# Patient Record
Sex: MALE | Race: BLACK OR AFRICAN AMERICAN | HISPANIC OR LATINO
[De-identification: names, ages, dates, MRNs, and addresses within clinical notes are randomized per-mention and may not be internally consistent; named-entity substitution may affect disease eponyms.]

---

## 2017-07-21 ENCOUNTER — APPOINTMENT (OUTPATIENT)
Dept: VASCULAR SURGERY | Facility: CLINIC | Age: 62
End: 2017-07-21

## 2017-07-21 VITALS
TEMPERATURE: 98.3 F | BODY MASS INDEX: 25.77 KG/M2 | HEART RATE: 82 BPM | WEIGHT: 174 LBS | SYSTOLIC BLOOD PRESSURE: 131 MMHG | HEIGHT: 69 IN | DIASTOLIC BLOOD PRESSURE: 91 MMHG

## 2017-07-26 ENCOUNTER — OTHER (OUTPATIENT)
Age: 62
End: 2017-07-26

## 2018-03-28 ENCOUNTER — APPOINTMENT (OUTPATIENT)
Dept: UROLOGY | Facility: CLINIC | Age: 63
End: 2018-03-28

## 2018-05-14 ENCOUNTER — APPOINTMENT (OUTPATIENT)
Dept: CT IMAGING | Facility: IMAGING CENTER | Age: 63
End: 2018-05-14
Payer: COMMERCIAL

## 2018-05-14 ENCOUNTER — OUTPATIENT (OUTPATIENT)
Dept: OUTPATIENT SERVICES | Facility: HOSPITAL | Age: 63
LOS: 1 days | End: 2018-05-14
Payer: COMMERCIAL

## 2018-05-14 DIAGNOSIS — Z00.8 ENCOUNTER FOR OTHER GENERAL EXAMINATION: ICD-10-CM

## 2018-05-14 PROCEDURE — 82565 ASSAY OF CREATININE: CPT

## 2018-05-14 PROCEDURE — 74177 CT ABD & PELVIS W/CONTRAST: CPT

## 2018-05-14 PROCEDURE — 74177 CT ABD & PELVIS W/CONTRAST: CPT | Mod: 26

## 2018-05-14 PROCEDURE — 71260 CT THORAX DX C+: CPT

## 2018-05-14 PROCEDURE — 71260 CT THORAX DX C+: CPT | Mod: 26

## 2022-04-26 ENCOUNTER — INPATIENT (INPATIENT)
Facility: HOSPITAL | Age: 67
LOS: 1 days | Discharge: HOME | DRG: 300 | End: 2022-04-28
Attending: GENERAL PRACTICE | Admitting: GENERAL PRACTICE
Payer: MEDICARE

## 2022-04-26 VITALS
OXYGEN SATURATION: 97 % | HEART RATE: 63 BPM | HEIGHT: 69 IN | RESPIRATION RATE: 16 BRPM | DIASTOLIC BLOOD PRESSURE: 89 MMHG | SYSTOLIC BLOOD PRESSURE: 155 MMHG | TEMPERATURE: 97 F

## 2022-04-26 LAB
ALBUMIN SERPL ELPH-MCNC: 4.4 G/DL — SIGNIFICANT CHANGE UP (ref 3.3–5)
ALP SERPL-CCNC: 105 U/L — SIGNIFICANT CHANGE UP (ref 40–120)
ALT FLD-CCNC: 95 U/L — HIGH (ref 10–45)
ANION GAP SERPL CALC-SCNC: 13 MMOL/L — SIGNIFICANT CHANGE UP (ref 5–17)
APAP SERPL-MCNC: <15 UG/ML — SIGNIFICANT CHANGE UP (ref 10–30)
APTT BLD: 31.7 SEC — SIGNIFICANT CHANGE UP (ref 27.5–35.5)
AST SERPL-CCNC: 93 U/L — HIGH (ref 10–40)
BASOPHILS # BLD AUTO: 0.06 K/UL — SIGNIFICANT CHANGE UP (ref 0–0.2)
BASOPHILS NFR BLD AUTO: 0.5 % — SIGNIFICANT CHANGE UP (ref 0–2)
BILIRUB SERPL-MCNC: 1.6 MG/DL — HIGH (ref 0.2–1.2)
BUN SERPL-MCNC: 5 MG/DL — LOW (ref 7–23)
CALCIUM SERPL-MCNC: 9.7 MG/DL — SIGNIFICANT CHANGE UP (ref 8.4–10.5)
CHLORIDE SERPL-SCNC: 93 MMOL/L — LOW (ref 96–108)
CO2 SERPL-SCNC: 28 MMOL/L — SIGNIFICANT CHANGE UP (ref 22–31)
CREAT SERPL-MCNC: 0.62 MG/DL — SIGNIFICANT CHANGE UP (ref 0.5–1.3)
EGFR: 103 ML/MIN/1.73M2 — SIGNIFICANT CHANGE UP
EOSINOPHIL # BLD AUTO: 0.15 K/UL — SIGNIFICANT CHANGE UP (ref 0–0.5)
EOSINOPHIL NFR BLD AUTO: 1.4 % — SIGNIFICANT CHANGE UP (ref 0–6)
ETHANOL SERPL-MCNC: SIGNIFICANT CHANGE UP MG/DL (ref 0–10)
GLUCOSE SERPL-MCNC: 109 MG/DL — HIGH (ref 70–99)
HCT VFR BLD CALC: 35.9 % — LOW (ref 39–50)
HGB BLD-MCNC: 12.1 G/DL — LOW (ref 13–17)
IMM GRANULOCYTES NFR BLD AUTO: 0.7 % — SIGNIFICANT CHANGE UP (ref 0–1.5)
INR BLD: 0.89 RATIO — SIGNIFICANT CHANGE UP (ref 0.88–1.16)
LIDOCAIN IGE QN: 25 U/L — SIGNIFICANT CHANGE UP (ref 7–60)
LYMPHOCYTES # BLD AUTO: 1.29 K/UL — SIGNIFICANT CHANGE UP (ref 1–3.3)
LYMPHOCYTES # BLD AUTO: 11.7 % — LOW (ref 13–44)
MCHC RBC-ENTMCNC: 30.4 PG — SIGNIFICANT CHANGE UP (ref 27–34)
MCHC RBC-ENTMCNC: 33.7 GM/DL — SIGNIFICANT CHANGE UP (ref 32–36)
MCV RBC AUTO: 90.2 FL — SIGNIFICANT CHANGE UP (ref 80–100)
MONOCYTES # BLD AUTO: 0.76 K/UL — SIGNIFICANT CHANGE UP (ref 0–0.9)
MONOCYTES NFR BLD AUTO: 6.9 % — SIGNIFICANT CHANGE UP (ref 2–14)
NEUTROPHILS # BLD AUTO: 8.67 K/UL — HIGH (ref 1.8–7.4)
NEUTROPHILS NFR BLD AUTO: 78.8 % — HIGH (ref 43–77)
NRBC # BLD: 0 /100 WBCS — SIGNIFICANT CHANGE UP (ref 0–0)
PLATELET # BLD AUTO: 352 K/UL — SIGNIFICANT CHANGE UP (ref 150–400)
POTASSIUM SERPL-MCNC: 4.1 MMOL/L — SIGNIFICANT CHANGE UP (ref 3.5–5.3)
POTASSIUM SERPL-SCNC: 4.1 MMOL/L — SIGNIFICANT CHANGE UP (ref 3.5–5.3)
PROT SERPL-MCNC: 7.5 G/DL — SIGNIFICANT CHANGE UP (ref 6–8.3)
PROTHROM AB SERPL-ACNC: 10.2 SEC — LOW (ref 10.5–13.4)
RBC # BLD: 3.98 M/UL — LOW (ref 4.2–5.8)
RBC # FLD: 15.7 % — HIGH (ref 10.3–14.5)
SARS-COV-2 RNA SPEC QL NAA+PROBE: SIGNIFICANT CHANGE UP
SODIUM SERPL-SCNC: 134 MMOL/L — LOW (ref 135–145)
WBC # BLD: 11.01 K/UL — HIGH (ref 3.8–10.5)
WBC # FLD AUTO: 11.01 K/UL — HIGH (ref 3.8–10.5)

## 2022-04-26 PROCEDURE — 74174 CTA ABD&PLVS W/CONTRAST: CPT | Mod: 26,MA

## 2022-04-26 PROCEDURE — 76705 ECHO EXAM OF ABDOMEN: CPT | Mod: 26

## 2022-04-26 PROCEDURE — 99285 EMERGENCY DEPT VISIT HI MDM: CPT

## 2022-04-26 RX ORDER — IPRATROPIUM/ALBUTEROL SULFATE 18-103MCG
3 AEROSOL WITH ADAPTER (GRAM) INHALATION ONCE
Refills: 0 | Status: COMPLETED | OUTPATIENT
Start: 2022-04-26 | End: 2022-04-26

## 2022-04-26 RX ORDER — FAMOTIDINE 10 MG/ML
20 INJECTION INTRAVENOUS ONCE
Refills: 0 | Status: COMPLETED | OUTPATIENT
Start: 2022-04-26 | End: 2022-04-26

## 2022-04-26 RX ADMIN — FAMOTIDINE 20 MILLIGRAM(S): 10 INJECTION INTRAVENOUS at 19:27

## 2022-04-26 RX ADMIN — Medication 3 MILLILITER(S): at 19:27

## 2022-04-26 RX ADMIN — Medication 30 MILLILITER(S): at 18:57

## 2022-04-26 NOTE — ED PROVIDER NOTE - CLINICAL SUMMARY MEDICAL DECISION MAKING FREE TEXT BOX
Pt with several comorbids most notably cardiac disease and COPD, PAD, here with abd pain x1 week and outpatient elevated ?LFT/lipase/bili unclear which. Tender RUQ, no fevers. Is a daily drinker. No tylenol use. ETOH use vs acute hepatitis vs pancreatitis vs bob, will start with sono and labs inc tylenol and lipase. CT if workup does not reveal cause. No new medications to attribute this to.

## 2022-04-26 NOTE — ED ADULT NURSE NOTE - OBJECTIVE STATEMENT
68 y/o Male pmhx COPD on oxygen PRN at home of 2 L NC,  CAD with cardiac stents, CHF with AICD, severe PAD s/p angioplasty and fem-pop bypass presenting to ED for abnormal labs- elevated liver function found on labs that were drawn yesterday, pt for the most part asymptomatic, denies any cp, new or worsening sob, weakness, dizziness, cough, fever, vomiting or change in bowels. verbalized mild abd pain. labs, ct  and sono of abdomen obtained and pts sono abnormal, pending vascular consult and admission. safety and fall precautions maintained ann-marie all times, call bell within reach.

## 2022-04-26 NOTE — ED PROVIDER NOTE - PHYSICAL EXAMINATION
Vitals: I have reviewed the patients vital signs  General: chronically ill appearing  HEENT: Atraumatic, normocephalic, airway patent  Eyes: EOMI, tracking appropriately  Neck: no tracheal deviation, no JVD  Chest/Lungs: no trauma, symmetric chest rise, speaking in complete sentences, no WOB  Heart: skin and extremities well perfused, regular rate and rhythm  Neuro: A+Ox3, CN grossly intact, moving all extremities  MSK: strength at baseline in all extremities, no muscle wasting or atrophy  Skin: chronic PAD related changes  Abd: soft, RUQ and epigastric tenderness without peritoneal signs

## 2022-04-26 NOTE — ED PROVIDER NOTE - ATTENDING CONTRIBUTION TO CARE
68 y/o M hx COPD on oxygen PRN, CAD with stent, CHF with AICD, severe PAD s/p angioplasty and fem-pop bypass sent in by dr ackerman for abnormal lft outpt, daily drinker, not currently intoxiccated, abd soft, ruq tend, neg Maddox's, not jaundiced, labs drawn, ruq us ordered.  pt with no prior hepatology history, he said he's labs have been nl in the past for his lft.  ruq work up, benign exam likely could be outpt gi work up.

## 2022-04-26 NOTE — ED PROVIDER NOTE - OBJECTIVE STATEMENT
68 y/o M hx COPD on oxygen PRN, CAD with stent, CHF with AICD, severe PAD s/p angioplasty and fem-pop bypass, difficulty getting around 2/2 leg pain so has home health nurse come and draw labs. Pt of Dr Welsh. Told today that he has "very high liver numbers". For the last week has had some abdominal pain, left and right upper quadrant, intermittent. Has not noted skin or eye color changes. Does drink "a couple beers" daily. No tylenol use. No fevers.

## 2022-04-26 NOTE — ED PROVIDER NOTE - NS ED ROS FT
Constitutional: (-) fever (-) vomiting  Eyes/ENT: (-) vision changes, (-) hearing changes  Cardiovascular: (-) chest pain, (-) wheezing  Respiratory: (-) cough, (-) shortness of breath  Gastrointestinal: (-) vomiting, (-) diarrhea, +-) abdominal pain  : (-) dysuria   Musculoskeletal: (-) back pain  Integumentary: (-) rash, (-) edema  Neurological: (-)loc  Allergic/Immunologic: (-) pruritus  Endocrine: No history of thyroid disease

## 2022-04-27 DIAGNOSIS — I50.22 CHRONIC SYSTOLIC (CONGESTIVE) HEART FAILURE: ICD-10-CM

## 2022-04-27 DIAGNOSIS — I71.4 ABDOMINAL AORTIC ANEURYSM, WITHOUT RUPTURE: ICD-10-CM

## 2022-04-27 DIAGNOSIS — R74.01 ELEVATION OF LEVELS OF LIVER TRANSAMINASE LEVELS: ICD-10-CM

## 2022-04-27 DIAGNOSIS — R10.9 UNSPECIFIED ABDOMINAL PAIN: ICD-10-CM

## 2022-04-27 DIAGNOSIS — J44.9 CHRONIC OBSTRUCTIVE PULMONARY DISEASE, UNSPECIFIED: ICD-10-CM

## 2022-04-27 DIAGNOSIS — R07.9 CHEST PAIN, UNSPECIFIED: ICD-10-CM

## 2022-04-27 DIAGNOSIS — I25.10 ATHEROSCLEROTIC HEART DISEASE OF NATIVE CORONARY ARTERY WITHOUT ANGINA PECTORIS: ICD-10-CM

## 2022-04-27 LAB
HAV IGM SER-ACNC: SIGNIFICANT CHANGE UP
HBV CORE IGM SER-ACNC: SIGNIFICANT CHANGE UP
HBV SURFACE AG SER-ACNC: SIGNIFICANT CHANGE UP
HCV AB S/CO SERPL IA: 2.77 S/CO — HIGH (ref 0–0.99)
HCV AB SERPL-IMP: ABNORMAL
HCV RNA FLD QL NAA+PROBE: SIGNIFICANT CHANGE UP
HCV RNA SPEC QL PROBE+SIG AMP: SIGNIFICANT CHANGE UP
TROPONIN T, HIGH SENSITIVITY RESULT: 23 NG/L — SIGNIFICANT CHANGE UP (ref 0–51)

## 2022-04-27 RX ORDER — ASPIRIN/CALCIUM CARB/MAGNESIUM 324 MG
81 TABLET ORAL DAILY
Refills: 0 | Status: DISCONTINUED | OUTPATIENT
Start: 2022-04-27 | End: 2022-04-28

## 2022-04-27 RX ORDER — LANOLIN ALCOHOL/MO/W.PET/CERES
3 CREAM (GRAM) TOPICAL AT BEDTIME
Refills: 0 | Status: DISCONTINUED | OUTPATIENT
Start: 2022-04-27 | End: 2022-04-28

## 2022-04-27 RX ORDER — CHOLECALCIFEROL (VITAMIN D3) 125 MCG
1000 CAPSULE ORAL DAILY
Refills: 0 | Status: DISCONTINUED | OUTPATIENT
Start: 2022-04-27 | End: 2022-04-28

## 2022-04-27 RX ORDER — METOPROLOL TARTRATE 50 MG
50 TABLET ORAL
Refills: 0 | Status: DISCONTINUED | OUTPATIENT
Start: 2022-04-27 | End: 2022-04-28

## 2022-04-27 RX ORDER — PRASUGREL 5 MG/1
10 TABLET, FILM COATED ORAL DAILY
Refills: 0 | Status: DISCONTINUED | OUTPATIENT
Start: 2022-04-27 | End: 2022-04-28

## 2022-04-27 RX ORDER — ALBUTEROL 90 UG/1
1 AEROSOL, METERED ORAL EVERY 6 HOURS
Refills: 0 | Status: DISCONTINUED | OUTPATIENT
Start: 2022-04-27 | End: 2022-04-28

## 2022-04-27 RX ORDER — ACETAMINOPHEN 500 MG
650 TABLET ORAL EVERY 6 HOURS
Refills: 0 | Status: DISCONTINUED | OUTPATIENT
Start: 2022-04-27 | End: 2022-04-28

## 2022-04-27 RX ORDER — LISINOPRIL 2.5 MG/1
20 TABLET ORAL DAILY
Refills: 0 | Status: DISCONTINUED | OUTPATIENT
Start: 2022-04-27 | End: 2022-04-28

## 2022-04-27 RX ORDER — ACETAMINOPHEN 500 MG
650 TABLET ORAL ONCE
Refills: 0 | Status: COMPLETED | OUTPATIENT
Start: 2022-04-27 | End: 2022-04-27

## 2022-04-27 RX ORDER — LEVOTHYROXINE SODIUM 125 MCG
37.5 TABLET ORAL DAILY
Refills: 0 | Status: DISCONTINUED | OUTPATIENT
Start: 2022-04-27 | End: 2022-04-28

## 2022-04-27 RX ORDER — ONDANSETRON 8 MG/1
4 TABLET, FILM COATED ORAL EVERY 8 HOURS
Refills: 0 | Status: DISCONTINUED | OUTPATIENT
Start: 2022-04-27 | End: 2022-04-28

## 2022-04-27 RX ORDER — ATORVASTATIN CALCIUM 80 MG/1
40 TABLET, FILM COATED ORAL AT BEDTIME
Refills: 0 | Status: DISCONTINUED | OUTPATIENT
Start: 2022-04-27 | End: 2022-04-28

## 2022-04-27 RX ORDER — PANTOPRAZOLE SODIUM 20 MG/1
40 TABLET, DELAYED RELEASE ORAL
Refills: 0 | Status: DISCONTINUED | OUTPATIENT
Start: 2022-04-27 | End: 2022-04-28

## 2022-04-27 RX ADMIN — Medication 50 MILLIGRAM(S): at 17:41

## 2022-04-27 RX ADMIN — Medication 1000 UNIT(S): at 13:05

## 2022-04-27 RX ADMIN — ATORVASTATIN CALCIUM 40 MILLIGRAM(S): 80 TABLET, FILM COATED ORAL at 21:40

## 2022-04-27 RX ADMIN — PRASUGREL 10 MILLIGRAM(S): 5 TABLET, FILM COATED ORAL at 13:05

## 2022-04-27 RX ADMIN — Medication 81 MILLIGRAM(S): at 13:06

## 2022-04-27 RX ADMIN — Medication 650 MILLIGRAM(S): at 06:35

## 2022-04-27 NOTE — H&P ADULT - HISTORY OF PRESENT ILLNESS
>>>>>>Medical Attending Initial / Admission note<<<<<<  -------------------------------------------------------------------------------  CHIEF COMPLAINT & HISTORY OF PRESENT ILLNESS:      Patient is a 66yo man with past medical history of CAD, PAD, CHF, AICD, COPD ( home O2, 2L), OA, Hypothyroidism, AAA, Bladder cancer, presenting with abdominal pain X 4 days and sent in for abnormal labs : elevated LFTs and anemia  Pt otherwise denies fever/chills, CP, SOB, nausea, vomiting or other concerns.   US Abdomen showed Partially thrombosed abdominal aortic aneurysm measuring 4.8 x 4.7 by at least 8.4 cm. CTA showed Partially thrombosed infrarenal aortic aneurysm measures 5.2 x 5.0 x 11.1 cm (AP by transverse by craniocaudad) end extends to the bifurcation. No evidence of dissection or impending rupture. Ectasia of the bilateral common iliac arteries with the right being aneurysmal measuring up to 1.8 cm. Mesenteric vessels are patent. Right femoral bypass graft appears patent proximally in the visualized portions  pt in ED evaluated by vascular : no intervention planned ( pt follows as OP with Dr Joaquin  mildly elevated LFTs, and Hep C tested positive..!  last EGD / colonoscopy was several years ago    --------------------------------------------------------------------------------  PAST MEDICAL HISTORY:    CAD, several past angioplasties   PAD, post Rt fem-pop Bypass   COPD ( home O2, 2L)  OA  Hypothyroidism  AAA  Bladder cancer post resection   gastritis  CHF post AICD  anemia    --------------------------------------------------------------------------------  PAST SURGICAL HISTORY:    as above     --------------------------------------------------------------------------------  FAMILY HISTORY:    Father: CHF  Mother: ESRD  Sister: Lung cancer  Sister: CHF    --------------------------------------------------------------------------------  SOCIAL HISTORY:  1 ppd x 30 years but has not smoked in the past 4 years and drinks 2-3 drinks of alcohol ( beer ) per day.     --------------------------------------------------------------------------------  ALLERGIES:    [As bellow, reviewed]    --------------------------------------------------------------------------------  MEDICATIONS:   [As bellow, reviewed]    --------------------------------------------------------------------------------  REVIEW OF SYSTEM:    GEN: no fever, no chills, no pain  RESP: no SOB, no cough, no sputum  CVS: no chest pain, no palpitations, no edema  GI: no abdominal pain now but past 4 days, with pain after each meal , no nausea, no vomiting, no constipation, no diarrhea  : no dysuria, no frequency, no hematuria  Neuro: no headache, no dizziness  PSYCH: no anxiety, no depression  Derm : no itching, no rash    --------------------------------------------------------------------------------    VITAL SIGNS:  Height (cm): 175.3  Vital Signs Last 24 HrsT(C): 36.7 (04-27-22 @ 08:22)  T(F): 98 (04-27-22 @ 08:22), Max: 98 (04-26-22 @ 19:01)  HR: 64 (04-27-22 @ 08:22) (60 - 76)  BP: 156/87 (04-27-22 @ 08:22)  RR: 20 (04-27-22 @ 08:22) (16 - 20)  SpO2: 100% (04-27-22 @ 08:22) (97% - 100%)      --------------------------------------------------------------------------------  PHYSICAL EXAM:    GEN: A&O X 3 , NAD , comfortable, pleasant, calm  HEENT: NCAT, PERRL, MMM, hearing intact  Neck: supple , no JVD  CVS: S1S2 , regular , No M/R/G appreciated  PULM: CTA B/L,  no W/R/R appreciated  ABD.: soft. non tender, non distended,  bowel sounds present  Extrem: intact pulses , no edema   Derm: No rash , no ecchymoses  PSYCH : normal mood,  no delusion not anxious    --------------------------------------------------------------------------------  LAB AND IMAGING:   [As audi, reviewed]    --------------------------------------------------------------------------------  ASSESSMENT AND PLAN:   [As bellow]    --------------------  Case discussed with pt, Cardio, GI, PMD..   ___________________________  H. JOJO Brown.  Pager: [phone]  04-27-22

## 2022-04-27 NOTE — CONSULT NOTE ADULT - SUBJECTIVE AND OBJECTIVE BOX
CHIEF COMPLAINT: abnormal labs    HISTORY OF PRESENT ILLNESS:  67M with a PMHx of COPD on home O2, CAD s/p stents, CHF with AICD, PAD s/p angioplasties and RLE fem-pop bypass (these procedures done by Dr. Calle at [facility]), gastritis, osteoarthritis, hypoactive thryroid who presents after bring sent in by his PMD secondary to elevated LFTs and abdominal pain that started 4 days ago. Pt otherwise denies fever/chills, CP, SOB, nausea, vomiting or other concerns. Of note, pt has smoked 1 ppd x 30 years but has not smoked in the past 4 years and drinks 2-3 drinks of alcohol per day. US Abdomen showed Partially thrombosed abdominal aortic aneurysm measuring 4.8 x 4.7 by at least 8.4 cm. CTA showed Partially thrombosed infrarenal aortic aneurysm measures 5.2 x 5.0 x 11.1 cm (AP by transverse by craniocaudad) end extends to the bifurcation. No evidence of dissection or impending rupture. Ectasia of the bilateral common iliac arteries with the right being aneurysmal measuring up to 1.8 cm. Mesenteric vessels are patent. Right femoral bypass graft appears patent proximally in the visualized portions.         Allergies    No Known Allergies    Intolerances    	    MEDICATIONS:  see med rec                PAST MEDICAL & SURGICAL HISTORY:      FAMILY HISTORY:      SOCIAL HISTORY:    former smoker. indep in adl      REVIEW OF SYSTEMS:  See HPI, otherwise complete 10 point review of systems negative    [ ] All others negative	      PHYSICAL EXAM:  T(C): 36.6 (04-27-22 @ 04:55), Max: 36.7 (04-26-22 @ 19:01)  HR: 60 (04-27-22 @ 04:55) (60 - 76)  BP: 146/91 (04-27-22 @ 04:55) (130/84 - 157/91)  RR: 18 (04-27-22 @ 04:55) (16 - 18)  SpO2: 100% (04-27-22 @ 04:55) (97% - 100%)  Wt(kg): --  I&O's Summary      Appearance: No Acute Distress	  HEENT:  Normal oral mucosa, PERRL, EOMI	  Cardiovascular: Normal S1 S2, No JVD, No murmurs/rubs/gallops  Respiratory: Lungs clear to auscultation bilaterally  Gastrointestinal:  Soft, Non-tender, + BS	  Skin: No rashes, No ecchymoses, No cyanosis	  Neurologic: Non-focal  Extremities: No clubbing, cyanosis or edema  Vascular: dec peripheral pulses  Psychiatry: A & O x 3, Mood & affect appropriate    Laboratory Data:	 	    CBC Full  -  ( 26 Apr 2022 18:35 )  WBC Count : 11.01 K/uL  Hemoglobin : 12.1 g/dL  Hematocrit : 35.9 %  Platelet Count - Automated : 352 K/uL  Mean Cell Volume : 90.2 fl  Mean Cell Hemoglobin : 30.4 pg  Mean Cell Hemoglobin Concentration : 33.7 gm/dL  Auto Neutrophil # : 8.67 K/uL  Auto Lymphocyte # : 1.29 K/uL  Auto Monocyte # : 0.76 K/uL  Auto Eosinophil # : 0.15 K/uL  Auto Basophil # : 0.06 K/uL  Auto Neutrophil % : 78.8 %  Auto Lymphocyte % : 11.7 %  Auto Monocyte % : 6.9 %  Auto Eosinophil % : 1.4 %  Auto Basophil % : 0.5 %    04-26    134<L>  |  93<L>  |  5<L>  ----------------------------<  109<H>  4.1   |  28  |  0.62    Ca    9.7      26 Apr 2022 18:35    TPro  7.5  /  Alb  4.4  /  TBili  1.6<H>  /  DBili  x   /  AST  93<H>  /  ALT  95<H>  /  AlkPhos  105  04-26      proBNP:   Lipid Profile:   HgA1c:   TSH:       CARDIAC MARKERS:            Interpretation of Telemetry: 	    ECG:  	not uploaded  RADIOLOGY:  OTHER: 	    PREVIOUS DIAGNOSTIC TESTING:    [ ] Echocardiogram:  [ ] Catheterization:  [ ] Stress Test:  	    Assessment:  elevated LFT  COPD on home O2  CAD s/p stents,  HFrEF s/p  AICD  PAD s/p angioplasties and RLE fem-pop bypass   gastritis  Partially thrombosed abdominal aortic aneurysm measuring 4.8 x 4.7 by at least 8.4 cm. CTA showed Partially thrombosed infrarenal aortic aneurysm measures 5.2 x 5.0 x 11.1 cm (AP by transverse by craniocaudad) end extends to the bifurcation.    Recs:  cardiac stable  f/u ecg. no e/o acs by biomarkers  check 2d echo  vol status stable  cw gdmt for lv dysfunction  cw antiplatelets, statin and antianginals  f/u vascular recs  gi eval  will follow          Greater than 60 minutes spent on total encounter; more than 50% of the visit was spent counseling and/or coordinating care by the attending physician.   	  Vaughn Simms MD   Cardiovascular Diseases  [phone]    
[facility]  Hair Anderson PA-C  [address] [place] [address]  [phone]      Chief Complaint:  Patient is a 67y old  Male who presents with a chief complaint of abnormal labs, abd pain, AAA (27 Apr 2022 09:43)      HPI:68yo man with past medical history of CAD, PAD, CHF, AICD, COPD ( home O2, 2L), OA, Hypothyroidism, AAA, Bladder cancer, presenting with abdominal pain X 4 days and sent in for abnormal labs : elevated LFTs and anemia  Pt otherwise denies fever/chills, CP, SOB, nausea, vomiting or other concerns.   US Abdomen showed Partially thrombosed abdominal aortic aneurysm measuring 4.8 x 4.7 by at least 8.4 cm. CTA showed Partially thrombosed infrarenal aortic aneurysm measures 5.2 x 5.0 x 11.1 cm (AP by transverse by craniocaudad) end extends to the bifurcation. No evidence of dissection or impending rupture. Ectasia of the bilateral common iliac arteries with the right being aneurysmal measuring up to 1.8 cm. Mesenteric vessels are patent. Right femoral bypass graft appears patent proximally in the visualized portions  pt in ED evaluated by vascular : no intervention planned ( pt follows as OP with Dr Joaquin  mildly elevated LFTs, and Hep C tested positive..!  last EGD / colonoscopy was several years ago    Allergies:  No Known Allergies      Medications:  acetaminophen     Tablet .. 650 milliGRAM(s) Oral every 6 hours PRN  ALBUTerol    90 MICROgram(s) HFA Inhaler 1 Puff(s) Inhalation every 6 hours PRN  aluminum hydroxide/magnesium hydroxide/simethicone Suspension 30 milliLiter(s) Oral every 4 hours PRN  aspirin enteric coated 81 milliGRAM(s) Oral daily  atorvastatin 40 milliGRAM(s) Oral at bedtime  cholecalciferol 1000 Unit(s) Oral daily  levothyroxine 37.5 MICROGram(s) Oral daily  lisinopril 20 milliGRAM(s) Oral daily  melatonin 3 milliGRAM(s) Oral at bedtime PRN  metoprolol tartrate 50 milliGRAM(s) Oral two times a day  ondansetron Injectable 4 milliGRAM(s) IV Push every 8 hours PRN  pantoprazole    Tablet 40 milliGRAM(s) Oral before breakfast  prasugrel 10 milliGRAM(s) Oral daily      PMHX/PSHX:      Family history:      Social History:     ROS:     General:  No wt loss, fevers, chills, night sweats, fatigue,   Eyes:  Good vision, no reported pain  ENT:  No sore throat, pain, runny nose, dysphagia  CV:  No pain, palpitations, hypo/hypertension  Resp:  No dyspnea, cough, tachypnea, wheezing  GI:  No pain, No nausea, No vomiting, No diarrhea, No constipation, No weight loss, No fever, No pruritis, No rectal bleeding, No tarry stools, No dysphagia,  :  No pain, bleeding, incontinence, nocturia  Muscle:  No pain, weakness  Neuro:  No weakness, tingling, memory problems  Psych:  No fatigue, insomnia, mood problems, depression  Endocrine:  No polyuria, polydipsia, cold/heat intolerance  Heme:  No petechiae, ecchymosis, easy bruisability  Skin:  No rash, tattoos, scars, edema      PHYSICAL EXAM:   Vital Signs:  Vital Signs Last 24 Hrs  T(C): 36.4 (27 Apr 2022 13:31), Max: 36.7 (26 Apr 2022 19:01)  T(F): 97.6 (27 Apr 2022 13:31), Max: 98.1 (27 Apr 2022 13:14)  HR: 73 (27 Apr 2022 13:31) (60 - 76)  BP: 135/88 (27 Apr 2022 13:31) (122/81 - 157/91)  BP(mean): 113 (27 Apr 2022 04:09) (113 - 113)  RR: 18 (27 Apr 2022 13:31) (16 - 20)  SpO2: 100% (27 Apr 2022 13:31) (97% - 100%)  Daily Height in cm: 175.26 (26 Apr 2022 17:05)    Daily     GENERAL:  Appears stated age,   HEENT:  NC/AT,    CHEST:  Full & symmetric excursion,   HEART:  Regular rhythm  ABDOMEN:  Soft, non-tender, non-distended,   EXTEREMITIES:  no cyanosis,clubbing or edema  SKIN:  No rash  NEURO:  Alert,    LABS:                        12.1   11.01 )-----------( 352      ( 26 Apr 2022 18:35 )             35.9     04-26    134<L>  |  93<L>  |  5<L>  ----------------------------<  109<H>  4.1   |  28  |  0.62    Ca    9.7      26 Apr 2022 18:35    TPro  7.5  /  Alb  4.4  /  TBili  1.6<H>  /  DBili  x   /  AST  93<H>  /  ALT  95<H>  /  AlkPhos  105  04-26    LIVER FUNCTIONS - ( 26 Apr 2022 18:35 )  Alb: 4.4 g/dL / Pro: 7.5 g/dL / ALK PHOS: 105 U/L / ALT: 95 U/L / AST: 93 U/L / GGT: x           PT/INR - ( 26 Apr 2022 18:35 )   PT: 10.2 sec;   INR: 0.89 ratio         PTT - ( 26 Apr 2022 18:35 )  PTT:31.7 sec    Amylase Serum--      Lipase serum25       Ammonia--      Imaging:          
HPI: 67M with a PMHx of COPD on home O2, CAD s/p stents, CHF with AICD, PAD s/p angioplasties and RLE fem-pop bypass (these procedures done by Dr. Calle at [facility]), gastritis, osteoarthritis, hypoactive thryroid who presents after bring sent in by his PMD secondary to elevated LFTs and abdominal pain that started 4 days ago. Pt otherwise denies fever/chills, CP, SOB, nausea, vomiting or other concerns. Of note, pt has smoked 1 ppd x 30 years but has not smoked in the past 4 years and drinks 2-3 drinks of alcohol per day. US Abdomen showed Partially thrombosed abdominal aortic aneurysm measuring 4.8 x 4.7 by at least 8.4 cm. CTA showed Partially thrombosed infrarenal aortic aneurysm measures 5.2 x 5.0 x 11.1 cm (AP by transverse by craniocaudad) end extends to the bifurcation. No evidence of dissection or impending rupture. Ectasia of the bilateral common iliac arteries with the right being aneurysmal measuring up to 1.8 cm. Mesenteric vessels are patent. Right femoral bypass graft appears patent proximally in the visualized portions. Vascular Surgery called to comment.        PAST MEDICAL & SURGICAL HISTORY:      REVIEW OF SYSTEMS    10 point review of systems was assessed and is unremarkable other than what was mentioned in the HPI    MEDICATIONS  (STANDING):    MEDICATIONS  (PRN):      Allergies    No Known Allergies    Intolerances    FAMILY HISTORY:    unless noted, no significant family hx with Mother, Father, Siblings    Vital Signs Last 24 Hrs  T(C): 36.7 (26 Apr 2022 19:01), Max: 36.7 (26 Apr 2022 19:01)  T(F): 98 (26 Apr 2022 19:01), Max: 98 (26 Apr 2022 19:01)  HR: 76 (26 Apr 2022 19:01) (63 - 76)  BP: 146/92 (26 Apr 2022 19:01) (146/92 - 155/89)  BP(mean): --  RR: 16 (26 Apr 2022 17:05) (16 - 16)  SpO2: 97% (26 Apr 2022 17:05) (97% - 97%)    General: WN/WD NAD  Neurology: Awake, nonfocal, METZ x 4  Respiratory: non labored breath sounds on RA  CV: RRR, S1S2, no murmurs, rubs or gallops  Abdominal: Soft, mild epigastric tenderness, ND  Psych: Oriented x 3, normal affect    LABS:                        12.1   11.01 )-----------( 352      ( 26 Apr 2022 18:35 )             35.9     04-26    134<L>  |  93<L>  |  5<L>  ----------------------------<  109<H>  4.1   |  28  |  0.62    Ca    9.7      26 Apr 2022 18:35    TPro  7.5  /  Alb  4.4  /  TBili  1.6<H>  /  DBili  x   /  AST  93<H>  /  ALT  95<H>  /  AlkPhos  105  04-26    PT/INR - ( 26 Apr 2022 18:35 )   PT: 10.2 sec;   INR: 0.89 ratio         PTT - ( 26 Apr 2022 18:35 )  PTT:31.7 sec      RADIOLOGY & ADDITIONAL STUDIES:

## 2022-04-27 NOTE — CONSULT NOTE ADULT - ASSESSMENT
67M with a PMHx of COPD on home O2, CAD s/p stents, CHF with AICD, PAD s/p angioplasties and RLE fem-pop bypass (these procedures done by Dr. Calle at [facility]), gastritis, osteoarthritis, hypoactive thryroid who presents after bring sent in by his PMD secondary to elevated LFTs and abdominal pain that started 4 days ago. Pt otherwise denies fever/chills, CP, SOB, nausea, vomiting or other concerns. Of note, pt has smoked 1 ppd x 30 years but has not smoked in the past 4 years and drinks 2-3 drinks of alcohol per day. US Abdomen showed Partially thrombosed abdominal aortic aneurysm measuring 4.8 x 4.7 by at least 8.4 cm. CTA showed Partially thrombosed infrarenal aortic aneurysm measures 5.2 x 5.0 x 11.1 cm (AP by transverse by craniocaudad) end extends to the bifurcation. No evidence of dissection or impending rupture. Ectasia of the bilateral common iliac arteries with the right being aneurysmal measuring up to 1.8 cm. Mesenteric vessels are patent. Right femoral bypass graft appears patent proximally in the visualized portions. Vascular Surgery called to comment.    PLAN:  - Pt was seen, examined and discussed with Dr. Amaya, vascular surgery fellow  - No acute indication for surgical intervention  - Pt should follow up as an outpatient with Dr. Ordonez within 1 week    En Gambino, PGY-2  Vascular Surgery  [id number]
abdominal pain  elevated lfts    u/s and CT both reviewed  no hepatobiliary pathology  elevated lfts likely 2/2 etoh use  hep c ab weakly posititive  check hep c pcr  abdominal pain and reflux  agree with proton pump inhibitor; if no improvement then will consider adding carafate  d/w patient    I reviewed the overnight course of events on the unit, re-confirming the patient history. I discussed the care with the patient and their family  The plan of care was discussed with the physician assistant and modifications were made to the notation where appropriate.   Differential diagnosis and plan of care discussed with patient after the evaluation  35 minutes spent on total encounter of which more than fifty percent of the encounter was spent counseling and/or coordinating care by the attending physician.  Advanced care planning was discussed with patient and family.  Advanced care planning forms were reviewed and discussed.  Risks, benefits and alternatives of gastroenterologic procedures were discussed in detail and all questions were answered.

## 2022-04-27 NOTE — H&P ADULT - ASSESSMENT
Patient is a 66yo man with past medical history of CAD, PAD, CHF, AICD, COPD ( home O2, 2L), OA, Hypothyroidism, AAA, Bladder cancer, presenting with abdominal pain X 4 days and sent in for abnormal labs : elevated LFTs and anemia

## 2022-04-27 NOTE — PATIENT PROFILE ADULT - FALL HARM RISK - HARM RISK INTERVENTIONS

## 2022-04-27 NOTE — H&P ADULT - PROBLEM SELECTOR PLAN 2
unclear etiology       ? related to CHF     ? related to Hep C    full Hep C workup needed    monitor ( seems decreased compared to reported OP labs) >> trend   GI to follow

## 2022-04-27 NOTE — H&P ADULT - PROBLEM SELECTOR PLAN 1
unclear etiology for post prandial abd pain    pt states was taking vinegar and later probiotics with some improvement..!    unclear if + Hep C and mild transaminitis may be related to pain    CT scan noted     GI consulted    likely will need further workup inpatient ( pt has a very difficult time walking to offices due to severe PAD)   diet as tolerated for now  monitor  PPI  check occult blood, anemia panel  monitor  supportive care

## 2022-04-27 NOTE — H&P ADULT - PROBLEM SELECTOR PLAN 3
appreciated vascular and cardio follow notes  no urgent intervention  needs close monitoring  Continue Current medications otherwise and monitor.

## 2022-04-28 ENCOUNTER — TRANSCRIPTION ENCOUNTER (OUTPATIENT)
Age: 67
End: 2022-04-28

## 2022-04-28 LAB
24R-OH-CALCIDIOL SERPL-MCNC: 68.2 NG/ML — SIGNIFICANT CHANGE UP (ref 30–80)
ALBUMIN SERPL ELPH-MCNC: 3.6 G/DL — SIGNIFICANT CHANGE UP (ref 3.3–5)
ALP SERPL-CCNC: 75 U/L — SIGNIFICANT CHANGE UP (ref 40–120)
ALT FLD-CCNC: 84 U/L — HIGH (ref 10–45)
ANION GAP SERPL CALC-SCNC: 12 MMOL/L — SIGNIFICANT CHANGE UP (ref 5–17)
AST SERPL-CCNC: 75 U/L — HIGH (ref 10–40)
BASOPHILS # BLD AUTO: 0.04 K/UL — SIGNIFICANT CHANGE UP (ref 0–0.2)
BASOPHILS NFR BLD AUTO: 0.5 % — SIGNIFICANT CHANGE UP (ref 0–2)
BILIRUB SERPL-MCNC: 0.6 MG/DL — SIGNIFICANT CHANGE UP (ref 0.2–1.2)
BUN SERPL-MCNC: 6 MG/DL — LOW (ref 7–23)
CALCIUM SERPL-MCNC: 8.8 MG/DL — SIGNIFICANT CHANGE UP (ref 8.4–10.5)
CHLORIDE SERPL-SCNC: 96 MMOL/L — SIGNIFICANT CHANGE UP (ref 96–108)
CHOLEST SERPL-MCNC: 152 MG/DL — SIGNIFICANT CHANGE UP
CO2 SERPL-SCNC: 25 MMOL/L — SIGNIFICANT CHANGE UP (ref 22–31)
CREAT SERPL-MCNC: 0.61 MG/DL — SIGNIFICANT CHANGE UP (ref 0.5–1.3)
EGFR: 104 ML/MIN/1.73M2 — SIGNIFICANT CHANGE UP
EOSINOPHIL # BLD AUTO: 0.27 K/UL — SIGNIFICANT CHANGE UP (ref 0–0.5)
EOSINOPHIL NFR BLD AUTO: 3.3 % — SIGNIFICANT CHANGE UP (ref 0–6)
FERRITIN SERPL-MCNC: 268 NG/ML — SIGNIFICANT CHANGE UP (ref 30–400)
FOLATE SERPL-MCNC: 19.4 NG/ML — SIGNIFICANT CHANGE UP
GLUCOSE SERPL-MCNC: 97 MG/DL — SIGNIFICANT CHANGE UP (ref 70–99)
HCT VFR BLD CALC: 30.8 % — LOW (ref 39–50)
HCV AB S/CO SERPL IA: 1.96 S/CO — HIGH (ref 0–0.99)
HCV AB SERPL-IMP: ABNORMAL
HCV RNA SPEC NAA+PROBE-LOG IU: SIGNIFICANT CHANGE UP
HCV RNA SPEC NAA+PROBE-LOG IU: SIGNIFICANT CHANGE UP LOGIU/ML
HDLC SERPL-MCNC: 96 MG/DL — SIGNIFICANT CHANGE UP
HGB BLD-MCNC: 10.3 G/DL — LOW (ref 13–17)
IMM GRANULOCYTES NFR BLD AUTO: 0.7 % — SIGNIFICANT CHANGE UP (ref 0–1.5)
IRON SATN MFR SERPL: 16 % — SIGNIFICANT CHANGE UP (ref 16–55)
IRON SATN MFR SERPL: 35 UG/DL — LOW (ref 45–165)
LIPID PNL WITH DIRECT LDL SERPL: 45 MG/DL — SIGNIFICANT CHANGE UP
LYMPHOCYTES # BLD AUTO: 1.91 K/UL — SIGNIFICANT CHANGE UP (ref 1–3.3)
LYMPHOCYTES # BLD AUTO: 23.1 % — SIGNIFICANT CHANGE UP (ref 13–44)
MCHC RBC-ENTMCNC: 30.7 PG — SIGNIFICANT CHANGE UP (ref 27–34)
MCHC RBC-ENTMCNC: 33.4 GM/DL — SIGNIFICANT CHANGE UP (ref 32–36)
MCV RBC AUTO: 91.7 FL — SIGNIFICANT CHANGE UP (ref 80–100)
MONOCYTES # BLD AUTO: 0.81 K/UL — SIGNIFICANT CHANGE UP (ref 0–0.9)
MONOCYTES NFR BLD AUTO: 9.8 % — SIGNIFICANT CHANGE UP (ref 2–14)
NEUTROPHILS # BLD AUTO: 5.19 K/UL — SIGNIFICANT CHANGE UP (ref 1.8–7.4)
NEUTROPHILS NFR BLD AUTO: 62.6 % — SIGNIFICANT CHANGE UP (ref 43–77)
NON HDL CHOLESTEROL: 56 MG/DL — SIGNIFICANT CHANGE UP
NRBC # BLD: 0 /100 WBCS — SIGNIFICANT CHANGE UP (ref 0–0)
PLATELET # BLD AUTO: 261 K/UL — SIGNIFICANT CHANGE UP (ref 150–400)
POTASSIUM SERPL-MCNC: 3.6 MMOL/L — SIGNIFICANT CHANGE UP (ref 3.5–5.3)
POTASSIUM SERPL-SCNC: 3.6 MMOL/L — SIGNIFICANT CHANGE UP (ref 3.5–5.3)
PROT SERPL-MCNC: 5.9 G/DL — LOW (ref 6–8.3)
RBC # BLD: 3.36 M/UL — LOW (ref 4.2–5.8)
RBC # FLD: 15.5 % — HIGH (ref 10.3–14.5)
SODIUM SERPL-SCNC: 133 MMOL/L — LOW (ref 135–145)
TIBC SERPL-MCNC: 213 UG/DL — LOW (ref 220–430)
TRIGL SERPL-MCNC: 53 MG/DL — SIGNIFICANT CHANGE UP
TSH SERPL-MCNC: 8.48 UIU/ML — HIGH (ref 0.27–4.2)
UIBC SERPL-MCNC: 178 UG/DL — SIGNIFICANT CHANGE UP (ref 110–370)
VIT B12 SERPL-MCNC: 810 PG/ML — SIGNIFICANT CHANGE UP (ref 232–1245)
WBC # BLD: 8.28 K/UL — SIGNIFICANT CHANGE UP (ref 3.8–10.5)
WBC # FLD AUTO: 8.28 K/UL — SIGNIFICANT CHANGE UP (ref 3.8–10.5)

## 2022-04-28 PROCEDURE — 93282 PRGRMG EVAL IMPLANTABLE DFB: CPT | Mod: 26

## 2022-04-28 PROCEDURE — 93010 ELECTROCARDIOGRAM REPORT: CPT

## 2022-04-28 RX ORDER — ALBUTEROL 90 UG/1
1 AEROSOL, METERED ORAL
Qty: 0 | Refills: 0 | DISCHARGE
Start: 2022-04-28

## 2022-04-28 RX ORDER — ATORVASTATIN CALCIUM 80 MG/1
1 TABLET, FILM COATED ORAL
Qty: 0 | Refills: 0 | DISCHARGE
Start: 2022-04-28

## 2022-04-28 RX ORDER — METOPROLOL TARTRATE 50 MG
1 TABLET ORAL
Qty: 0 | Refills: 0 | DISCHARGE
Start: 2022-04-28

## 2022-04-28 RX ORDER — PRASUGREL 5 MG/1
1 TABLET, FILM COATED ORAL
Qty: 0 | Refills: 0 | DISCHARGE
Start: 2022-04-28

## 2022-04-28 RX ORDER — ASPIRIN/CALCIUM CARB/MAGNESIUM 324 MG
1 TABLET ORAL
Qty: 0 | Refills: 0 | DISCHARGE
Start: 2022-04-28

## 2022-04-28 RX ORDER — LEVOTHYROXINE SODIUM 125 MCG
0.5 TABLET ORAL
Qty: 15 | Refills: 0
Start: 2022-04-28 | End: 2022-05-27

## 2022-04-28 RX ORDER — PANTOPRAZOLE SODIUM 20 MG/1
1 TABLET, DELAYED RELEASE ORAL
Qty: 30 | Refills: 0
Start: 2022-04-28 | End: 2022-05-27

## 2022-04-28 RX ADMIN — Medication 81 MILLIGRAM(S): at 13:17

## 2022-04-28 RX ADMIN — Medication 50 MILLIGRAM(S): at 05:30

## 2022-04-28 RX ADMIN — LISINOPRIL 20 MILLIGRAM(S): 2.5 TABLET ORAL at 05:29

## 2022-04-28 RX ADMIN — Medication 1000 UNIT(S): at 13:17

## 2022-04-28 RX ADMIN — Medication 50 MILLIGRAM(S): at 17:59

## 2022-04-28 RX ADMIN — PANTOPRAZOLE SODIUM 40 MILLIGRAM(S): 20 TABLET, DELAYED RELEASE ORAL at 05:29

## 2022-04-28 RX ADMIN — PRASUGREL 10 MILLIGRAM(S): 5 TABLET, FILM COATED ORAL at 13:17

## 2022-04-28 RX ADMIN — Medication 37.5 MICROGRAM(S): at 05:29

## 2022-04-28 NOTE — PROGRESS NOTE ADULT - ASSESSMENT
abdominal pain  elevated lfts    u/s and CT both reviewed  no hepatobiliary pathology  elevated lfts likely 2/2 etoh use  hep c ab weakly positive  check hep c pcr, pending   abdominal pain and reflux  agree with proton pump inhibitor; if no improvement then will consider adding carafate  d/w patient
                                            M E D I C A L   A T T E N D I N G    F O L L O W    U P   N O T E  (04-28-22 )                                     ------------------------------------------------------------------------------------------------    patient evaluated by me, case discussed with team, chart, medications, and physical exam reviewed, labs / tests  and vitals reviewed by me, as bellow.   Patient is stable for discharge today.   I discussed case with GI attending... pt likely with alcoholic gastritis, started on PPI... for ROM, to follow up as OP for further workup..      hep C results reviewed with GI as well : no intervention needed as considered negative..    pt noted on tele to have ? abnormal pacing>> AICD interrogated showing normal function      no further medical testing / intervention planned... pt feels better and plan for DC home   Patient to follow up with  PMD, Cardio, GI  See discharge document for full note.                             ( note written / Date of service  04-28-22 )    ==================>> MEDICATIONS <<====================    aspirin enteric coated 81 milliGRAM(s) Oral daily  atorvastatin 40 milliGRAM(s) Oral at bedtime  cholecalciferol 1000 Unit(s) Oral daily  levothyroxine 37.5 MICROGram(s) Oral daily  lisinopril 20 milliGRAM(s) Oral daily  metoprolol tartrate 50 milliGRAM(s) Oral two times a day  pantoprazole    Tablet 40 milliGRAM(s) Oral before breakfast  prasugrel 10 milliGRAM(s) Oral daily    MEDICATIONS  (PRN):  acetaminophen     Tablet .. 650 milliGRAM(s) Oral every 6 hours PRN Temp greater or equal to 38C (100.4F), Mild Pain (1 - 3)  ALBUTerol    90 MICROgram(s) HFA Inhaler 1 Puff(s) Inhalation every 6 hours PRN Shortness of Breath and/or Wheezing  aluminum hydroxide/magnesium hydroxide/simethicone Suspension 30 milliLiter(s) Oral every 4 hours PRN Dyspepsia  melatonin 3 milliGRAM(s) Oral at bedtime PRN Insomnia  ondansetron Injectable 4 milliGRAM(s) IV Push every 8 hours PRN Nausea and/or Vomiting    ___________  Active diet:  Diet, Regular:   DASH/TLC Sodium & Cholesterol Restricted (DASH)  ___________________    ==================>> VITAL SIGNS <<==================    T(C): 36.3 (04-28-22 @ 11:54), Max: 36.7 (04-27-22 @ 19:10)  HR: 67 (04-28-22 @ 11:54) (63 - 67)  BP: 114/70 (04-28-22 @ 11:54) (114/70 - 135/80)  BP(mean): --  RR: 18 (04-28-22 @ 11:54) (18 - 18)  SpO2: 95% (04-28-22 @ 11:54) (95% - 100%)       I&O's Summary    27 Apr 2022 07:01  -  28 Apr 2022 07:00  --------------------------------------------------------  IN: 180 mL / OUT: 600 mL / NET: -420 mL    28 Apr 2022 07:01  -  28 Apr 2022 17:45  --------------------------------------------------------  IN: 180 mL / OUT: 150 mL / NET: 30 mL       ==================>> LAB AND IMAGING <<==================                        10.3   8.28  )-----------( 261      ( 28 Apr 2022 06:41 )             30.8        04-28    133<L>  |  96  |  6<L>  ----------------------------<  97  3.6   |  25  |  0.61    Ca    8.8      28 Apr 2022 06:41    TPro  5.9<L>  /  Alb  3.6  /  TBili  0.6  /  DBili  x   /  AST  75<H>  /  ALT  84<H>  /  AlkPhos  75  04-28    PT/INR - ( 26 Apr 2022 18:35 )   PT: 10.2 sec;   INR: 0.89 ratio         PTT - ( 26 Apr 2022 18:35 )  PTT:31.7 sec                 TSH:      8.48   (04-28-22)           Lipid profile:  (04-28-22)     Total: 152     LDL  : (p)     HDL  :96     TG   :53     WBC count:   8.28 <<== ,  11.01 <<==   Hemoglobin:   10.3 <<==,  12.1 <<==  platelets:  261 <==, 352 <==    Creatinine:  0.61  <<==, 0.62  <<==  Sodium:   133  <==, 134  <==       AST:          75 <== , 93 <==      ALT:        84  <== , 95  <==      AP:        75  <=, 105  <=     Bili:        0.6  <=, 1.6  <=

## 2022-04-28 NOTE — DISCHARGE NOTE PROVIDER - NSDCCPCAREPLAN_GEN_ALL_CORE_FT
PRINCIPAL DISCHARGE DIAGNOSIS  Diagnosis: Atypical chest pain  Assessment and Plan of Treatment:   HOME CARE INSTRUCTIONS  For the next few days, avoid physical activities that bring on chest pain. Continue physical activities as directed.  Do not smoke.  Avoid drinking alcohol.   Only take over-the-counter or prescription medicine for pain, discomfort, or fever as directed by your caregiver.  Follow your caregiver's suggestions for further testing if your chest pain does not go away.  Keep any follow-up appointments you made. If you do not go to an appointment, you could develop lasting (chronic) problems with pain. If there is any problem keeping an appointment, you must call to reschedule.   SEEK MEDICAL CARE IF:  You think you are having problems from the medicine you are taking. Read your medicine instructions carefully.  Your chest pain does not go away, even after treatment.  You develop a rash with blisters on your chest.  SEEK IMMEDIATE MEDICAL CARE IF:  You have increased chest pain or pain that spreads to your arm, neck, jaw, back, or abdomen.   You develop shortness of breath, an increasing cough, or you are coughing up blood.  You have severe back or abdominal pain, feel nauseous, or vomit.  You develop severe weakness, fainting, or chills.  You have a fever.  THIS IS AN EMERGENCY. Do not wait to see if the pain will go away. Get medical help at once. Call your local emergency services (_____________________). Do not drive yourself to the hospital.        SECONDARY DISCHARGE DIAGNOSES  Diagnosis: Chronic systolic heart failure  Assessment and Plan of Treatment: Weigh yourself daily.  If you gain 3lbs in 3 days, or 5lbs in a week call your Health Care Provider.  Do not eat or drink foods containing more than 2000mg of salt (sodium) in your diet every day.  Call your Health Care Provider if you have any swelling or increased swelling in your feet, ankles, and/or stomach.  Take all of your medication as directed.  If you become dizzy call your Health Care Provider.      Diagnosis: Abdominal pain  Assessment and Plan of Treatment: resolved

## 2022-04-28 NOTE — PROGRESS NOTE ADULT - SUBJECTIVE AND OBJECTIVE BOX
Cardiovascular Disease Progress Note    Overnight events: No acute events overnight.  no cp/sob/palps/dizziness  Otherwise review of systems negative    Objective Findings:  T(C): 36.3 (04-28-22 @ 04:33), Max: 36.7 (04-27-22 @ 08:22)  HR: 63 (04-28-22 @ 04:33) (63 - 97)  BP: 135/80 (04-28-22 @ 04:33) (122/81 - 156/87)  RR: 18 (04-28-22 @ 04:33) (18 - 20)  SpO2: 99% (04-28-22 @ 04:33) (99% - 100%)  Wt(kg): --  Daily     Daily       Physical Exam:  Gen: NAD  HEENT: EOMI  CV: RRR, normal S1 + S2, no m/r/g  Lungs: CTAB  Abd: soft, non-tender  Ext: No edema    Telemetry:    Laboratory Data:                        10.3   8.28  )-----------( 261      ( 28 Apr 2022 06:41 )             30.8     04-28    133<L>  |  96  |  6<L>  ----------------------------<  97  3.6   |  25  |  0.61    Ca    8.8      28 Apr 2022 06:41    TPro  5.9<L>  /  Alb  3.6  /  TBili  0.6  /  DBili  x   /  AST  75<H>  /  ALT  84<H>  /  AlkPhos  75  04-28    PT/INR - ( 26 Apr 2022 18:35 )   PT: 10.2 sec;   INR: 0.89 ratio         PTT - ( 26 Apr 2022 18:35 )  PTT:31.7 sec          Inpatient Medications:  MEDICATIONS  (STANDING):  aspirin enteric coated 81 milliGRAM(s) Oral daily  atorvastatin 40 milliGRAM(s) Oral at bedtime  cholecalciferol 1000 Unit(s) Oral daily  levothyroxine 37.5 MICROGram(s) Oral daily  lisinopril 20 milliGRAM(s) Oral daily  metoprolol tartrate 50 milliGRAM(s) Oral two times a day  pantoprazole    Tablet 40 milliGRAM(s) Oral before breakfast  prasugrel 10 milliGRAM(s) Oral daily      Assessment:  elevated LFT  COPD on home O2  CAD s/p stents,  HFrEF s/p  AICD  PAD s/p angioplasties and RLE fem-pop bypass   gastritis  Partially thrombosed abdominal aortic aneurysm measuring 4.8 x 4.7 by at least 8.4 cm. CTA showed Partially thrombosed infrarenal aortic aneurysm measures 5.2 x 5.0 x 11.1 cm (AP by transverse by craniocaudad) end extends to the bifurcation.    Recs:  cardiac stable  f/u ecg. no e/o acs by biomarkers  check 2d echo  vol status stable  cw gdmt for lv dysfunction  cw antiplatelets, statin and antianginals  f/u vascular recs  gi eval. pending egd. can proceed without further cardiac workup  will follow            Over 25 minutes spent on total encounter; more than 50% of the visit was spent counseling and/or coordinating care by the attending physician.      Vaughn Simms MD   Cardiovascular Disease  [phone]
[facility]  Hair Anderson PA-C  Formerly [facility] [place] [address] [phone]      INTERVAL HPI/OVERNIGHT EVENTS:  pt seen and examined, no new events  reports some improvement in reflux   tolerating diet, no N/V/D, abdominal pain     MEDICATIONS  (STANDING):  aspirin enteric coated 81 milliGRAM(s) Oral daily  atorvastatin 40 milliGRAM(s) Oral at bedtime  cholecalciferol 1000 Unit(s) Oral daily  levothyroxine 37.5 MICROGram(s) Oral daily  lisinopril 20 milliGRAM(s) Oral daily  metoprolol tartrate 50 milliGRAM(s) Oral two times a day  pantoprazole    Tablet 40 milliGRAM(s) Oral before breakfast  prasugrel 10 milliGRAM(s) Oral daily    MEDICATIONS  (PRN):  acetaminophen     Tablet .. 650 milliGRAM(s) Oral every 6 hours PRN Temp greater or equal to 38C (100.4F), Mild Pain (1 - 3)  ALBUTerol    90 MICROgram(s) HFA Inhaler 1 Puff(s) Inhalation every 6 hours PRN Shortness of Breath and/or Wheezing  aluminum hydroxide/magnesium hydroxide/simethicone Suspension 30 milliLiter(s) Oral every 4 hours PRN Dyspepsia  melatonin 3 milliGRAM(s) Oral at bedtime PRN Insomnia  ondansetron Injectable 4 milliGRAM(s) IV Push every 8 hours PRN Nausea and/or Vomiting      Allergies    No Known Allergies    Intolerances        ROS:   General:  No wt loss, fevers, chills, night sweats, fatigue,   Eyes:  Good vision, no reported pain  ENT:  No sore throat, pain, runny nose, dysphagia  CV:  No pain, palpitations, hypo/hypertension  Resp:  No dyspnea, cough, tachypnea, wheezing  GI:  No pain, No nausea, No vomiting, No diarrhea, No constipation, No weight loss, No fever, No pruritis, No rectal bleeding, No tarry stools, No dysphagia,  :  No pain, bleeding, incontinence, nocturia  Muscle:  No pain, weakness  Neuro:  No weakness, tingling, memory problems  Psych:  No fatigue, insomnia, mood problems, depression  Endocrine:  No polyuria, polydipsia, cold/heat intolerance  Heme:  No petechiae, ecchymosis, easy bruisability  Skin:  No rash, tattoos, scars, edema      PHYSICAL EXAM:   Vital Signs:  Vital Signs Last 24 Hrs  T(C): 36.3 (28 Apr 2022 04:33), Max: 36.7 (27 Apr 2022 13:14)  T(F): 97.3 (28 Apr 2022 04:33), Max: 98.1 (27 Apr 2022 13:14)  HR: 63 (28 Apr 2022 04:33) (63 - 97)  BP: 135/80 (28 Apr 2022 04:33) (122/81 - 150/85)  BP(mean): --  RR: 18 (28 Apr 2022 04:33) (18 - 18)  SpO2: 99% (28 Apr 2022 04:33) (99% - 100%)  Daily     Daily     GENERAL:  Appears stated age,   HEENT:  NC/AT,    CHEST:  Full & symmetric excursion,   HEART:  Regular rhythm,  ABDOMEN:  Soft, non-tender, non-distended,  EXTEREMITIES:  no cyanosis  SKIN:  No rash  NEURO:  Alert,       LABS:                        10.3   8.28  )-----------( 261      ( 28 Apr 2022 06:41 )             30.8     04-28    133<L>  |  96  |  6<L>  ----------------------------<  97  3.6   |  25  |  0.61    Ca    8.8      28 Apr 2022 06:41    TPro  5.9<L>  /  Alb  3.6  /  TBili  0.6  /  DBili  x   /  AST  75<H>  /  ALT  84<H>  /  AlkPhos  75  04-28    PT/INR - ( 26 Apr 2022 18:35 )   PT: 10.2 sec;   INR: 0.89 ratio         PTT - ( 26 Apr 2022 18:35 )  PTT:31.7 sec      RADIOLOGY & ADDITIONAL TESTS:

## 2022-04-28 NOTE — PROCEDURE NOTE - ADDITIONAL PROCEDURE DETAILS
1) Presenting rhythm: NSR  2) Normal sensing and pacing threshold. Stable lead impedence. Patient is not PPM dependent.  3) Battery Longevity: 50%  4) Stored data revealed patient is paced 0%      [id number]

## 2022-04-28 NOTE — DISCHARGE NOTE PROVIDER - NSDCFUSCHEDAPPT_GEN_ALL_CORE_FT
Ricci Joaquin  [facility]  VASCULAR [address]  Scheduled Appointment: 08/11/2022    [facility]  VASCULAR [address]  Scheduled Appointment: 08/11/2022

## 2022-04-28 NOTE — DISCHARGE NOTE PROVIDER - NSDCCAREPROVSEEN_GEN_ALL_CORE_FT
[facility]han[facility] Brown [facility]  Ordering Physician  Vaughn Simms  [facility] Medicine   Bhanu Pichardo  [facility] Chichi Ponce  Ordering Physician  Vaughn Simms  [facility] Medicine      [ Greater than 35 min spent for discharge services.   JACQUELINE Brown ]

## 2022-04-28 NOTE — DISCHARGE NOTE PROVIDER - CARE PROVIDERS DIRECT ADDRESSES
,DirectAddress_Unknown,DirectAddress_Unknown ,DirectAddress_Unknown,DirectAddress_Unknown,[email or web address]

## 2022-04-28 NOTE — DISCHARGE NOTE NURSING/CASE MANAGEMENT/SOCIAL WORK - NSDCPEFALRISK_GEN_ALL_CORE
For information on Fall & Injury Prevention, visit: [email or web address] OR  [email or web address] OR  https://www.cdc.gov/steadi/patient.html

## 2022-04-28 NOTE — DISCHARGE NOTE PROVIDER - NSDCMRMEDTOKEN_GEN_ALL_CORE_FT
albuterol 90 mcg/inh inhalation aerosol: 1 puff(s) inhaled , As Needed  Aspirin Enteric Coated 81 mg oral delayed release tablet: 1 tab(s) orally once a day  Effient 10 mg oral tablet: 1 tab(s) orally once a day    Note:Pt gets from [employer] order service.  [phone]  Lipitor 40 mg oral tablet: 1 tab(s) orally once a day  metoprolol tartrate 50 mg oral tablet: 1 tab(s) orally 2 times a day  ramipril 5 mg oral capsule: 1 cap(s) orally once a day  Synthroid 25 mcg (0.025 mg) oral tablet: 1 tab(s) orally 5 times a week on Mon,Wed,Thur,Sat,Sun only  Synthroid 25 mcg (0.025 mg) oral tablet: 2 tab(s) orally 2 times a week on Tue and Fri only  Vitamin D3: 1 tab(s) orally once a day   albuterol 90 mcg/inh inhalation aerosol: 1 puff(s) inhaled every 6 hours, As needed, Shortness of Breath and/or Wheezing  aspirin 81 mg oral delayed release tablet: 1 tab(s) orally once a day  atorvastatin 40 mg oral tablet: 1 tab(s) orally once a day (at bedtime)  metoprolol tartrate 50 mg oral tablet: 1 tab(s) orally 2 times a day  pantoprazole 40 mg oral delayed release tablet: 1 tab(s) orally once a day (before a meal)  prasugrel 10 mg oral tablet: 1 tab(s) orally once a day  ramipril 5 mg oral capsule: 1 cap(s) orally once a day  Synthroid 75 mcg (0.075 mg) oral tablet: 0.5 tab(s) orally once a day  Vitamin D3: 1 tab(s) orally once a day   albuterol 90 mcg/inh inhalation aerosol: 1 puff(s) inhaled every 6 hours, As needed, Shortness of Breath and/or Wheezing  aspirin 81 mg oral delayed release tablet: 1 tab(s) orally once a day  atorvastatin 40 mg oral tablet: 1 tab(s) orally once a day (at bedtime)  metoprolol tartrate 50 mg oral tablet: 1 tab(s) orally 2 times a day  pantoprazole 40 mg oral delayed release tablet: 1 tab(s) orally once a day (before a meal)  prasugrel 10 mg oral tablet: 1 tab(s) orally once a day  ramipril 5 mg oral capsule: 1 cap(s) orally once a day  Synthroid 75 mcg (0.075 mg) oral tablet: 0.5 tab(s) orally once a day  Synthroid 75 mcg (0.075 mg) oral tablet: 0.5 tab(s) orally once a day   Vitamin D3: 1 tab(s) orally once a day

## 2022-04-28 NOTE — DISCHARGE NOTE PROVIDER - PROVIDER TOKENS
PROVIDER:[TOKEN:[[id number]]],PROVIDER:[TOKEN:[[id number]]] PROVIDER:[TOKEN:[[id number]]],PROVIDER:[TOKEN:[[id number]]],PROVIDER:[TOKEN:[[id number]]]

## 2022-04-28 NOTE — DISCHARGE NOTE NURSING/CASE MANAGEMENT/SOCIAL WORK - PATIENT PORTAL LINK FT
You can access the FollowMyHealth Patient Portal offered by [facility] by registering at the following website: [email or web address]. By joining [employer]’s FollowMyHealth portal, you will also be able to view your health information using other applications (apps) compatible with our system.

## 2022-04-28 NOTE — DISCHARGE NOTE PROVIDER - HOSPITAL COURSE
Patient is a 68yo man with past medical history of CAD, PAD, CHF, AICD, COPD ( home O2, 2L), OA, Hypothyroidism, AAA, Bladder cancer, presenting with abdominal pain X 4 days and sent in for abnormal labs : elevated LFTs and anemia Patient is a 66yo man with past medical history of CAD, PAD, CHF, AICD, COPD ( home O2, 2L), OA, Hypothyroidism, AAA, Bladder cancer, presenting with abdominal pain X 4 days and sent in for abnormal labs : elevated LFTs and anemia  GI eval and cards eval no acute findings   Elevated LFT  sec to ETOH abuse , symptoms resolved on PPI   will be discharged home with close follow up from PCP   PPM interrogation , not pacemaker dependant   Vascular consult  for AAA Partially thrombosed infrarenal aortic aneurysm measuring 5.2 cm in AP , OP follow up with DR Joaquin planned

## 2022-04-28 NOTE — DISCHARGE NOTE NURSING/CASE MANAGEMENT/SOCIAL WORK - NSDCFUADDAPPT_GEN_ALL_CORE_FT
APPTS ARE READY TO BE MADE: [ x] YES    Best Family or Patient Contact (if needed):    Additional Information about above appointments (if needed):    1: please follow up with PCP with in a week of discharge   2: please follow up with DR Joaquin as routinely   3:     Other comments or requests:

## 2022-04-28 NOTE — DISCHARGE NOTE PROVIDER - CARE PROVIDER_API CALL
Remington Simms J  CARDIOVASCULAR DISEASE  [address] [place] [address]  Phone: [phone]  Fax: [phone]  Follow Up Time:     Martinez Hill (DO)  Gastroenterology; Internal Medicine  [address] [place] [address]  Phone: [phone]  Fax: [phone]  Follow Up Time:    Remington Simms J  CARDIOVASCULAR DISEASE  [address] [place] [address]  Phone: [phone]  Fax: [phone]  Follow Up Time:     Martinez Hill (DO)  Gastroenterology; Internal Medicine  [address] [place] [address]  Phone: [phone]  Fax: [phone]  Follow Up Time:     Ricci Joaquin)  Surgery; Vascular Surgery  [address] [place] [address]  Phone: [phone]  Fax: [phone]  Follow Up Time:

## 2022-04-29 VITALS
HEART RATE: 103 BPM | DIASTOLIC BLOOD PRESSURE: 97 MMHG | WEIGHT: 231.26 LBS | RESPIRATION RATE: 18 BRPM | TEMPERATURE: 97 F | SYSTOLIC BLOOD PRESSURE: 145 MMHG | OXYGEN SATURATION: 95 %

## 2022-06-25 RX ORDER — METOPROLOL TARTRATE 50 MG
1 TABLET ORAL
Qty: 0 | Refills: 0 | DISCHARGE

## 2022-06-25 RX ORDER — PRASUGREL 5 MG/1
1 TABLET, FILM COATED ORAL
Qty: 0 | Refills: 0 | DISCHARGE

## 2022-06-25 RX ORDER — ATORVASTATIN CALCIUM 80 MG/1
1 TABLET, FILM COATED ORAL
Qty: 0 | Refills: 0 | DISCHARGE

## 2022-06-25 RX ORDER — LEVOTHYROXINE SODIUM 125 MCG
2 TABLET ORAL
Qty: 0 | Refills: 0 | DISCHARGE

## 2022-06-25 RX ORDER — ALBUTEROL 90 UG/1
1 AEROSOL, METERED ORAL
Qty: 0 | Refills: 0 | DISCHARGE

## 2022-06-25 RX ORDER — ASPIRIN/CALCIUM CARB/MAGNESIUM 324 MG
1 TABLET ORAL
Qty: 0 | Refills: 0 | DISCHARGE

## 2022-06-25 RX ORDER — LEVOTHYROXINE SODIUM 125 MCG
1 TABLET ORAL
Qty: 0 | Refills: 0 | DISCHARGE

## 2022-08-11 ENCOUNTER — APPOINTMENT (OUTPATIENT)
Dept: VASCULAR SURGERY | Facility: CLINIC | Age: 67
End: 2022-08-11

## 2022-08-11 VITALS
HEIGHT: 68 IN | WEIGHT: 142 LBS | DIASTOLIC BLOOD PRESSURE: 83 MMHG | TEMPERATURE: 97.7 F | HEART RATE: 74 BPM | BODY MASS INDEX: 21.52 KG/M2 | SYSTOLIC BLOOD PRESSURE: 138 MMHG

## 2022-08-11 VITALS — DIASTOLIC BLOOD PRESSURE: 90 MMHG | HEART RATE: 76 BPM | SYSTOLIC BLOOD PRESSURE: 142 MMHG

## 2022-08-11 DIAGNOSIS — I71.4 ABDOMINAL AORTIC ANEURYSM, W/OUT RUPTURE: ICD-10-CM

## 2022-08-11 PROCEDURE — 99204 OFFICE O/P NEW MOD 45 MIN: CPT

## 2022-08-11 PROCEDURE — XXXXX: CPT | Mod: 1L

## 2022-08-12 PROBLEM — I71.4 AAA (ABDOMINAL AORTIC ANEURYSM): Status: ACTIVE | Noted: 2017-07-21

## 2022-08-18 ENCOUNTER — APPOINTMENT (OUTPATIENT)
Dept: CT IMAGING | Facility: IMAGING CENTER | Age: 67
End: 2022-08-18

## 2022-10-05 NOTE — DISCHARGE NOTE PROVIDER - NSDCFUADDAPPT_GEN_ALL_CORE_FT
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 diff colitis  -hx of urinary retention with urology follow up on recent admission  -hx of b/l distal DVT  -hx lf L pneumothorax  -hx aspiration pneumonia    who was brought in by EMS from [facility] for evaluation of elevated Cr to 4.2 (baseline 0.9)   ID consulted for UTI and sacral ulcer    IMPRESSION  #    Afebrile    No leukocytosis    Sepsis ruled out on admission   UA Blood: x / Protein: 30 mg/dL / Nitrite: Negative   Leuk Esterase: Large / RBC: 4 /HPF / WBC 58 /HPF   Sq Epi: x / Non Sq Epi: 7 /HPF / Bacteria: Negative  #Left-sided pleural effusion is of uncertain etiology.  #Trach/PEG  #Abx allergy: penicillin (Other)  #MICHELLE- urinary retention s/p teixeira  < from: US Kidney and Bladder (10.04.22 @ 13:17) >  Echogenic kidneys consistent with medical renal disease.  Mild fullness of the collecting systems.  Creatinine, Serum: 3.2 (10-05-22 @ 08:05)    Weight (kg): 66.4 (10-04-22 @ 22:39)    RECOMMENDATIONS  This is an incomplete consult note. All final recommendations to follow after interview and examination of the patient. Please follow recommendations noted below.    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 diff colitis  -hx of urinary retention with urology follow up on recent admission  -hx of b/l distal DVT  -hx lf L pneumothorax  -hx aspiration pneumonia    who was brought in by EMS from [facility] for evaluation of elevated Cr to 4.2 (baseline 0.9)   ID consulted for UTI and sacral ulcer    IMPRESSION  # Sepsis ruled out on admission     Afebrile    No leukocytosis   UA Blood: x / Protein: 30 mg/dL / Nitrite: Negative   Leuk Esterase: Large / RBC: 4 /HPF / WBC 58 /HPF   Sq Epi: x / Non Sq Epi: 7 /HPF / Bacteria: Negative  #Left-sided pleural effusion is of uncertain etiology.  #Trach/PEG  #Abx allergy: penicillin (Other)  #MICHELLE- urinary retention s/p teixeira  < from: US Kidney and Bladder (10.04.22 @ 13:17) >  Echogenic kidneys consistent with medical renal disease.  Mild fullness of the collecting systems.  Creatinine, Serum: 3.2 (10-05-22 @ 08:05)    Weight (kg): 66.4 (10-04-22 @ 22:39)    RECOMMENDATIONS  - Monitor off antibiotics  - UA with some pyuria but in the setting of retention, no signs of sepsis and sacrum is clean. UA no Bacteria  - UCX pending    If any questions, please call or send a message on [employer] Teams  Please continue to update ID with any pertinent new laboratory or radiographic findings APPTS ARE READY TO BE MADE: [ x] YES    Best Family or Patient Contact (if needed):    Additional Information about above appointments (if needed):    1: please follow up with PCP with in a week of discharge   2:   3:     Other comments or requests:    APPTS ARE READY TO BE MADE: [ x] YES    Best Family or Patient Contact (if needed):    Additional Information about above appointments (if needed):    1: please follow up with PCP with in a week of discharge   2: please follow up with DR Joaquin as routinely   3:     Other comments or requests:

## 2022-12-15 ENCOUNTER — INPATIENT (INPATIENT)
Facility: HOSPITAL | Age: 67
LOS: 8 days | Discharge: HOME | DRG: 445 | End: 2022-12-24
Attending: INTERNAL MEDICINE | Admitting: INTERNAL MEDICINE
Payer: MEDICARE

## 2022-12-15 VITALS
RESPIRATION RATE: 20 BRPM | WEIGHT: 139.99 LBS | HEIGHT: 68 IN | HEART RATE: 76 BPM | SYSTOLIC BLOOD PRESSURE: 140 MMHG | DIASTOLIC BLOOD PRESSURE: 86 MMHG | TEMPERATURE: 98 F | OXYGEN SATURATION: 100 %

## 2022-12-15 LAB
ALBUMIN SERPL ELPH-MCNC: 4.3 G/DL — SIGNIFICANT CHANGE UP (ref 3.3–5)
ALP SERPL-CCNC: 136 U/L — HIGH (ref 40–120)
ALT FLD-CCNC: 136 U/L — HIGH (ref 10–45)
ANION GAP SERPL CALC-SCNC: 13 MMOL/L — SIGNIFICANT CHANGE UP (ref 5–17)
APPEARANCE UR: CLEAR — SIGNIFICANT CHANGE UP
AST SERPL-CCNC: 142 U/L — HIGH (ref 10–40)
BACTERIA # UR AUTO: NEGATIVE — SIGNIFICANT CHANGE UP
BASE EXCESS BLDV CALC-SCNC: 8.7 MMOL/L — HIGH (ref -2–3)
BASOPHILS # BLD AUTO: 0.04 K/UL — SIGNIFICANT CHANGE UP (ref 0–0.2)
BASOPHILS NFR BLD AUTO: 0.5 % — SIGNIFICANT CHANGE UP (ref 0–2)
BILIRUB SERPL-MCNC: 0.7 MG/DL — SIGNIFICANT CHANGE UP (ref 0.2–1.2)
BILIRUB UR-MCNC: NEGATIVE — SIGNIFICANT CHANGE UP
BUN SERPL-MCNC: 10 MG/DL — SIGNIFICANT CHANGE UP (ref 7–23)
CA-I SERPL-SCNC: 1.18 MMOL/L — SIGNIFICANT CHANGE UP (ref 1.15–1.33)
CALCIUM SERPL-MCNC: 9.3 MG/DL — SIGNIFICANT CHANGE UP (ref 8.4–10.5)
CHLORIDE BLDV-SCNC: 88 MMOL/L — LOW (ref 96–108)
CHLORIDE SERPL-SCNC: 86 MMOL/L — LOW (ref 96–108)
CO2 BLDV-SCNC: 40 MMOL/L — HIGH (ref 22–26)
CO2 SERPL-SCNC: 31 MMOL/L — SIGNIFICANT CHANGE UP (ref 22–31)
COLOR SPEC: SIGNIFICANT CHANGE UP
CREAT SERPL-MCNC: 0.43 MG/DL — LOW (ref 0.5–1.3)
DIFF PNL FLD: NEGATIVE — SIGNIFICANT CHANGE UP
EGFR: 116 ML/MIN/1.73M2 — SIGNIFICANT CHANGE UP
EOSINOPHIL # BLD AUTO: 0.04 K/UL — SIGNIFICANT CHANGE UP (ref 0–0.5)
EOSINOPHIL NFR BLD AUTO: 0.5 % — SIGNIFICANT CHANGE UP (ref 0–6)
EPI CELLS # UR: 1 /HPF — SIGNIFICANT CHANGE UP
FLUAV AG NPH QL: SIGNIFICANT CHANGE UP
FLUBV AG NPH QL: SIGNIFICANT CHANGE UP
GAS PNL BLDV: 126 MMOL/L — LOW (ref 136–145)
GAS PNL BLDV: SIGNIFICANT CHANGE UP
GAS PNL BLDV: SIGNIFICANT CHANGE UP
GLUCOSE BLDV-MCNC: 114 MG/DL — HIGH (ref 70–99)
GLUCOSE SERPL-MCNC: 112 MG/DL — HIGH (ref 70–99)
GLUCOSE UR QL: NEGATIVE — SIGNIFICANT CHANGE UP
HCO3 BLDV-SCNC: 38 MMOL/L — HIGH (ref 22–29)
HCT VFR BLD CALC: 36.2 % — LOW (ref 39–50)
HCT VFR BLDA CALC: 39 % — SIGNIFICANT CHANGE UP (ref 39–51)
HGB BLD CALC-MCNC: 13 G/DL — SIGNIFICANT CHANGE UP (ref 12.6–17.4)
HGB BLD-MCNC: 12.2 G/DL — LOW (ref 13–17)
IMM GRANULOCYTES NFR BLD AUTO: 0.4 % — SIGNIFICANT CHANGE UP (ref 0–0.9)
KETONES UR-MCNC: NEGATIVE — SIGNIFICANT CHANGE UP
LACTATE BLDV-MCNC: 2.5 MMOL/L — HIGH (ref 0.5–2)
LEUKOCYTE ESTERASE UR-ACNC: NEGATIVE — SIGNIFICANT CHANGE UP
LIDOCAIN IGE QN: 19 U/L — SIGNIFICANT CHANGE UP (ref 7–60)
LYMPHOCYTES # BLD AUTO: 1.09 K/UL — SIGNIFICANT CHANGE UP (ref 1–3.3)
LYMPHOCYTES # BLD AUTO: 12.9 % — LOW (ref 13–44)
MCHC RBC-ENTMCNC: 30.7 PG — SIGNIFICANT CHANGE UP (ref 27–34)
MCHC RBC-ENTMCNC: 33.7 GM/DL — SIGNIFICANT CHANGE UP (ref 32–36)
MCV RBC AUTO: 91.2 FL — SIGNIFICANT CHANGE UP (ref 80–100)
MONOCYTES # BLD AUTO: 0.66 K/UL — SIGNIFICANT CHANGE UP (ref 0–0.9)
MONOCYTES NFR BLD AUTO: 7.8 % — SIGNIFICANT CHANGE UP (ref 2–14)
NEUTROPHILS # BLD AUTO: 6.56 K/UL — SIGNIFICANT CHANGE UP (ref 1.8–7.4)
NEUTROPHILS NFR BLD AUTO: 77.9 % — HIGH (ref 43–77)
NITRITE UR-MCNC: NEGATIVE — SIGNIFICANT CHANGE UP
NRBC # BLD: 0 /100 WBCS — SIGNIFICANT CHANGE UP (ref 0–0)
PCO2 BLDV: 75 MMHG — HIGH (ref 42–55)
PH BLDV: 7.31 — LOW (ref 7.32–7.43)
PH UR: 6 — SIGNIFICANT CHANGE UP (ref 5–8)
PLATELET # BLD AUTO: 305 K/UL — SIGNIFICANT CHANGE UP (ref 150–400)
PO2 BLDV: 23 MMHG — LOW (ref 25–45)
POTASSIUM BLDV-SCNC: 4.2 MMOL/L — SIGNIFICANT CHANGE UP (ref 3.5–5.1)
POTASSIUM SERPL-MCNC: 4 MMOL/L — SIGNIFICANT CHANGE UP (ref 3.5–5.3)
POTASSIUM SERPL-SCNC: 4 MMOL/L — SIGNIFICANT CHANGE UP (ref 3.5–5.3)
PROT SERPL-MCNC: 7.3 G/DL — SIGNIFICANT CHANGE UP (ref 6–8.3)
PROT UR-MCNC: ABNORMAL
RBC # BLD: 3.97 M/UL — LOW (ref 4.2–5.8)
RBC # FLD: 16.9 % — HIGH (ref 10.3–14.5)
RBC CASTS # UR COMP ASSIST: 1 /HPF — SIGNIFICANT CHANGE UP (ref 0–4)
RSV RNA NPH QL NAA+NON-PROBE: SIGNIFICANT CHANGE UP
SAO2 % BLDV: 24.9 % — LOW (ref 67–88)
SARS-COV-2 RNA SPEC QL NAA+PROBE: SIGNIFICANT CHANGE UP
SODIUM SERPL-SCNC: 130 MMOL/L — LOW (ref 135–145)
SP GR SPEC: >1.05 (ref 1.01–1.02)
UROBILINOGEN FLD QL: NEGATIVE — SIGNIFICANT CHANGE UP
WBC # BLD: 8.42 K/UL — SIGNIFICANT CHANGE UP (ref 3.8–10.5)
WBC # FLD AUTO: 8.42 K/UL — SIGNIFICANT CHANGE UP (ref 3.8–10.5)
WBC UR QL: 1 /HPF — SIGNIFICANT CHANGE UP (ref 0–5)

## 2022-12-15 PROCEDURE — 74174 CTA ABD&PLVS W/CONTRAST: CPT | Mod: 26,MG

## 2022-12-15 PROCEDURE — 71275 CT ANGIOGRAPHY CHEST: CPT | Mod: 26,MG

## 2022-12-15 PROCEDURE — G1004: CPT

## 2022-12-15 PROCEDURE — 99285 EMERGENCY DEPT VISIT HI MDM: CPT

## 2022-12-15 PROCEDURE — 76705 ECHO EXAM OF ABDOMEN: CPT | Mod: 26

## 2022-12-15 NOTE — ED ADULT NURSE REASSESSMENT NOTE - NS ED NURSE REASSESS COMMENT FT1
Patient resting comfortably, breathing unlabored, remains on 4L NC, VSS, no signs of acute distress, no requests at this time, plan of care explained, pt reminded to provide urine sample, side rails raised, comfort and safety maintained.

## 2022-12-15 NOTE — ED PROVIDER NOTE - PHYSICAL EXAMINATION
Gen: NAD, AOx3, able to make needs known, non-toxic  Head: NCAT  HEENT: EOMI, normal conjunctiva  Lung: CTAB, no respiratory distress, no wheezes/rhonchi/rales B/L, speaking in full sentences. on 2L NC at this time.   CV: RRR, ?murmur, pulses bilaterally   Abd: soft, diffusely tender, mostly over the RUQ, LLQ, and suprapubic, no guarding, no CVA tenderness  MSK: no visible bony deformities  Neuro: No focal sensory or motor deficits  Skin: Warm, well perfused, no rash  Psych: normal affect

## 2022-12-15 NOTE — CONSULT NOTE ADULT - ATTENDING COMMENTS
Pt seen and examined, agree with A/P. Symptoms not c/w acute cholecystitis, possibly related to choledocholithiasis, or other UGI pathology as pt c/o burning and reflux and gas bloating in L side of abdomen while eating. Pt is a poor surgical candidate for lap bob regardless of pathology. Can obtain HIDA. Recommend abx +/- perc bob if positive. Pt also needs vascular surgery f/u, has AAA that requires repair.

## 2022-12-15 NOTE — ED PROVIDER NOTE - ATTENDING CONTRIBUTION TO CARE
I, Kal Sinhg, performed a history and physical exam of the patient and discussed their management with the resident and /or advanced care provider. I reviewed the resident and /or ACP's note and agree with the documented findings and plan of care. I was present and available for all procedures.

## 2022-12-15 NOTE — ED PROVIDER NOTE - CLINICAL SUMMARY MEDICAL DECISION MAKING FREE TEXT BOX
Rupert, PGY2 - 67-year-old man with PMH HTN, HLD, COPD on home oxygen, known AAA, presenting with elevated liver enzymes and diffuse abdominal pain mostly after eating, +dysuria.  Consider cholecystitis vs  hepatitis especially with elevated liver enzymes outpatient vs  UTI vs  pyelonephritis vs  diverticulitis due to LLQ pain on physical exam.  Labs, CT scan, right upper quadrant ultrasound, reassess. *The above represents an initial assessment/impression. Please refer to progress notes for potential changes in patient clinical course* Rupert, PGY2 - 67-year-old man with PMH HTN, HLD, COPD on home oxygen, known AAA, presenting with elevated liver enzymes and diffuse abdominal pain mostly after eating, +dysuria.  Consider cholecystitis vs  hepatitis especially with elevated liver enzymes outpatient vs  UTI vs  pyelonephritis vs  diverticulitis due to LLQ pain on physical exam.  Labs, CT scan, right upper quadrant ultrasound, reassess. *The above represents an initial assessment/impression. Please refer to progress notes for potential changes in patient clinical course*    Kal Singh MD: Will obtain CT Aorta, exam with no focal TTP and no rebound or guarding and pt is not jaundice. exam with sym radial pulses, left DP pulse slightly diminished compared to the right but otherwise good strength in all 4 exts. Presentation could possibly be associated with worsening Aortic aneurysm or the elevated LFTs secondary to liver disease or choledocholithiasis.  Will also obtain UA as pt describes urinary hesitancy which could be secondary to UTI or prostate.

## 2022-12-15 NOTE — ED PROVIDER NOTE - NSICDXPASTMEDICALHX_GEN_ALL_CORE_FT
PAST MEDICAL HISTORY:  HLD (hyperlipidemia)     HTN (hypertension)     PAD (peripheral artery disease)

## 2022-12-15 NOTE — ED PROVIDER NOTE - OBJECTIVE STATEMENT
67-year-old man with PMH HTN, HLD, COPD on oxygen on average 2 to 4 L, PAD, defibrillator, known AAA measuring 5cm, presenting due to elevated liver enzymes that were found 2 days ago on routine labs.  Patient states that he had a call back yesterday about results, transport to the ER was arranged for today.  PCP Dr. Simms.  Endorsing acute worsening of diffuse abdominal pain that is present for the last 3 weeks, usually present after eating. +dysuria for the last few days.  Denies fever, chest pain, SOB.

## 2022-12-15 NOTE — CONSULT NOTE ADULT - ASSESSMENT
67-year-old man with PMH HTN, HLD, COPD on oxygen on average 2 to 4 L, PAD, defibrillator, known AAA measuring 5cm, presenting due to elevated liver enzymes that were found 2 days ago on routine labs.  Patient states that he had a call back yesterday about results, transport to the ER was arranged for today. FOund to have cholelithiasis without acute cholecystitis    Recommendations:    - No surgical intervention at this time. Patient is a poor surgical candidate  - Obtain HIDA  - Start antibiotics and IR consult for drainage if HIDA is positive    Patient seen and examined. Plan discussed with Dr. Janna Castillo MD, PGY2  ACS/Trauma Surgery  [phone] 67-year-old man with PMH HTN, HLD, COPD on oxygen on average 2 to 4 L, PAD, defibrillator, known AAA measuring 5cm, presenting due to elevated liver enzymes that were found 2 days ago on routine labs.  Patient states that he had a call back yesterday about results, transport to the ER was arranged for today. FOund to have cholelithiasis without acute cholecystitis    Recommendations:    - No surgical intervention at this time. Patient is a poor surgical candidate  - Obtain HIDA  - Start antibiotics and IR consult for drainage if HIDA is positive  - Medicine consult/admission  - Surgery will follow    Patient seen and examined. Plan discussed with Dr. Janna Castillo MD, PGY2  ACS/Trauma Surgery  [phone]

## 2022-12-15 NOTE — ED PROVIDER NOTE - PROGRESS NOTE DETAILS
Rupert, PGY2 - Discussed with surgery cholelithiasis seen on RUQ, patient symptomatic, will come see patient. Waiting for CT read Shantanu PGY1: Patient signed out by Dr. Emery. Surgery stated that patient is a poor surgical candidate and requested medicine admission for HIDA scan and possible antibiotics if HIDA scan is positive. Imaging significant for 5.4 cm infrarenal AAA, cholelithiasis w/ no CBD dilation or evidence of cholecystitis, and elevated LFTs. PCP is Dr. Remington Simms. Patien admitted to Dr. Schmitt.

## 2022-12-16 ENCOUNTER — TRANSCRIPTION ENCOUNTER (OUTPATIENT)
Age: 67
End: 2022-12-16

## 2022-12-16 DIAGNOSIS — K80.20 CALCULUS OF GALLBLADDER WITHOUT CHOLECYSTITIS WITHOUT OBSTRUCTION: ICD-10-CM

## 2022-12-16 PROCEDURE — 99223 1ST HOSP IP/OBS HIGH 75: CPT

## 2022-12-16 PROCEDURE — 78226 HEPATOBILIARY SYSTEM IMAGING: CPT | Mod: 26

## 2022-12-16 PROCEDURE — 99232 SBSQ HOSP IP/OBS MODERATE 35: CPT | Mod: GC

## 2022-12-16 PROCEDURE — 99222 1ST HOSP IP/OBS MODERATE 55: CPT

## 2022-12-16 RX ORDER — CHOLECALCIFEROL (VITAMIN D3) 125 MCG
1 CAPSULE ORAL
Qty: 0 | Refills: 0 | DISCHARGE

## 2022-12-16 RX ORDER — KETOROLAC TROMETHAMINE 30 MG/ML
15 SYRINGE (ML) INJECTION ONCE
Refills: 0 | Status: DISCONTINUED | OUTPATIENT
Start: 2022-12-16 | End: 2022-12-16

## 2022-12-16 RX ORDER — LEVOTHYROXINE SODIUM 125 MCG
75 TABLET ORAL DAILY
Refills: 0 | Status: DISCONTINUED | OUTPATIENT
Start: 2022-12-16 | End: 2022-12-24

## 2022-12-16 RX ORDER — RAMIPRIL 5 MG
1 CAPSULE ORAL
Qty: 0 | Refills: 0 | DISCHARGE

## 2022-12-16 RX ORDER — ASPIRIN/CALCIUM CARB/MAGNESIUM 324 MG
81 TABLET ORAL DAILY
Refills: 0 | Status: DISCONTINUED | OUTPATIENT
Start: 2022-12-16 | End: 2022-12-24

## 2022-12-16 RX ORDER — LEVOTHYROXINE SODIUM 125 MCG
0.5 TABLET ORAL
Qty: 0 | Refills: 0 | DISCHARGE

## 2022-12-16 RX ORDER — IBUPROFEN 200 MG
400 TABLET ORAL EVERY 6 HOURS
Refills: 0 | Status: DISCONTINUED | OUTPATIENT
Start: 2022-12-16 | End: 2022-12-24

## 2022-12-16 RX ORDER — METOPROLOL TARTRATE 50 MG
50 TABLET ORAL
Refills: 0 | Status: DISCONTINUED | OUTPATIENT
Start: 2022-12-16 | End: 2022-12-24

## 2022-12-16 RX ORDER — ALBUTEROL 90 UG/1
2 AEROSOL, METERED ORAL EVERY 6 HOURS
Refills: 0 | Status: DISCONTINUED | OUTPATIENT
Start: 2022-12-16 | End: 2022-12-24

## 2022-12-16 RX ORDER — ENOXAPARIN SODIUM 100 MG/ML
40 INJECTION SUBCUTANEOUS EVERY 24 HOURS
Refills: 0 | Status: DISCONTINUED | OUTPATIENT
Start: 2022-12-16 | End: 2022-12-20

## 2022-12-16 RX ORDER — LISINOPRIL 2.5 MG/1
20 TABLET ORAL DAILY
Refills: 0 | Status: DISCONTINUED | OUTPATIENT
Start: 2022-12-16 | End: 2022-12-24

## 2022-12-16 RX ORDER — PANTOPRAZOLE SODIUM 20 MG/1
40 TABLET, DELAYED RELEASE ORAL
Refills: 0 | Status: DISCONTINUED | OUTPATIENT
Start: 2022-12-16 | End: 2022-12-18

## 2022-12-16 RX ADMIN — ENOXAPARIN SODIUM 40 MILLIGRAM(S): 100 INJECTION SUBCUTANEOUS at 17:23

## 2022-12-16 RX ADMIN — Medication 15 MILLIGRAM(S): at 20:57

## 2022-12-16 RX ADMIN — Medication 15 MILLIGRAM(S): at 21:30

## 2022-12-16 RX ADMIN — Medication 50 MILLIGRAM(S): at 17:23

## 2022-12-16 RX ADMIN — Medication 15 MILLIGRAM(S): at 05:00

## 2022-12-16 RX ADMIN — Medication 15 MILLIGRAM(S): at 04:28

## 2022-12-16 NOTE — H&P ADULT - ASSESSMENT
67 m with     Abnormal LFT  - follow  - hold Atorvastatin   - Hepatology evaluation  - Surgical evaluation    AAA  - vascular evaluation called    HTN   - control    Hypothyroid  - TSH     COPD  - stable  - nebs    DVT prophylaxis    Further action as per clinical course     Abelardo Priest MD phone [phone]

## 2022-12-16 NOTE — CONSULT NOTE ADULT - ASSESSMENT
66 yo male with HTN, HLD, COPD on O2 2-4L, PAD, ICD, known AAA to 5 cm who presented due to elevated transaminases on outpatient labs and abdominal pain for 1 month, worse with eating.     #Elevated transaminases   May be due to atorvastatin medication. Not likely due to autoimmune and no new medications in the past 6 months.   -hepatitis B core IgM, B surface antigen, A IgM antibody non-reactive   -hepatitis C weakly reactive but PCR negative  -admission labs Tbili 0.7, Alk phosp 136, ,   -US abdomen with cholithiasis but no evidence of cholecystitis  -HIDA negative  -CT AP without liver abnormalities.      Recommendations:   - CPK  - hold atorvastatin until liver enzymes normalize   - trend CMP and INR daily   - rest of care per primary team     All recommendations are tentative until note is attested by attending.     Camelia Gonzalez, PGY-4   Gastroenterology/Hepatology Fellow  Available on Microsoft Teams  [phone] ([facility] Short Range Pager)  [phone] ([facility] Long Range Pager)    After 5pm, please contact the on-call GI fellow. [phone]  66 yo male with HTN, HLD, COPD on O2 2-4L, PAD, ICD, known AAA to 5 cm who presented due to elevated transaminases on outpatient labs and abdominal pain for 1 month, worse with eating.     #Elevated transaminases   May be due to atorvastatin medication. Not likely due to autoimmune and no new medications in the past 6 months.   -hepatitis B core IgM, B surface antigen, A IgM antibody non-reactive   -hepatitis C weakly reactive but PCR negative  -admission labs Tbili 0.7, Alk phosp 136, ,   -US abdomen with cholithiasis but no evidence of cholecystitis  -HIDA negative  -CT AP without liver abnormalities.      Recommendations:   - CPK  - hold atorvastatin until liver enzymes normalize   - trend CMP and INR daily   - Please provide patient with Hepatology Clinic outpatient follow up number for an appointment; [phone] ([facility] [place]) or [phone] ([facility] at [address] [place] [address])   - rest of care per primary team     All recommendations are tentative until note is attested by attending.     Camelia Gonzalez, PGY-4   Gastroenterology/Hepatology Fellow  Available on Microsoft Teams  [phone] ([facility] Short Range Pager)  [phone] ([facility] Long Range Pager)    After 5pm, please contact the on-call GI fellow. [phone]

## 2022-12-16 NOTE — H&P ADULT - NSICDXPASTMEDICALHX_GEN_ALL_CORE_FT
PAST MEDICAL HISTORY:  Gastritis     History of abdominal aortic aneurysm (AAA)     History of COPD     HLD (hyperlipidemia)     HTN (hypertension)     Hypothyroidism     PAD (peripheral artery disease)

## 2022-12-16 NOTE — H&P ADULT - HISTORY OF PRESENT ILLNESS
67-year-old man with PMH HTN, HLD, COPD on oxygen on average 2 to 4 L, PAD, defibrillator, known AAA measuring 5cm, presenting due to elevated liver enzymes that were found 2 days ago on routine labs.  Patient states that he had a call back yesterday about results, transport to the ER was arranged for today.  PCP Dr. Simms.  Endorsing acute worsening of diffuse abdominal pain that is present for the last 3 weeks, usually present after eating. Denies fever, chest pain, SOB

## 2022-12-16 NOTE — DISCHARGE NOTE PROVIDER - NSDCCPCAREPLAN_GEN_ALL_CORE_FT
PRINCIPAL DISCHARGE DIAGNOSIS  Diagnosis: Symptomatic cholelithiasis  Assessment and Plan of Treatment: Monitor and report  symptoms and signs of  worsening cholelithiasis   Notify provider  or report to ER for fever greater or equal to 100.4, persistent nausea,  uncontoled vomiting, or abdominal pain .  follow up with Surgery as outpatient

## 2022-12-16 NOTE — DISCHARGE NOTE PROVIDER - HOSPITAL COURSE
66 yo male with history of HTN, HLD, COPD on oxygen average 2-4 l, PAD, defibrillator, known AAA measuring 5cm, presents due to elevated LFTs found two days ago on routine lab work. Pt states "my doctor called me and told me to come to the ED". CT abdomen showing cholelithiasis, negative for cholecystitis, HIDA scan (12/16) confirmed these results. Hepatology consulted on 12/16 for further reccs  Vascular surgery following for known AAA, no interventions at this time as AAA is stable. 68 yo male with history of HTN, HLD, COPD on oxygen average 2-4 l, PAD, defibrillator, known AAA measuring 5cm, presents due to elevated LFTs found two days ago on routine lab work. Pt states "my doctor called me and told me to come to the ED". CT abdomen showing cholelithiasis, negative for cholecystitis, HIDA scan (12/16) confirmed these results. Hepatology consulted on 12/16 for further reccs  Vascular surgery following for known AAA, no interventions at this time as AAA is stable.   CPK____. CMP/INR monitored daily. Atorvastatin held until liver enzymes normalized as per hepatology 68 yo male with history of HTN, HLD, COPD on oxygen average 2-4 l, PAD, defibrillator, known AAA measuring 5cm, presents due to elevated LFTs found two days ago on routine lab work. Pt states "my doctor called me and told me to come to the ED". CT abdomen showing cholelithiasis, negative for cholecystitis, HIDA scan (12/16) confirmed these results. [facility]  Vascular surgery following for known AAA, no interventions at this time as AAA is stable.   CK 72. CMP/INR monitored daily. Atorvastatin held until liver enzymes normalized as per hepatology. hepatology signed off, psych recommended remeron and melatonin for sleep, anxiety, patient doesn't want. patient ready for discharge. 67-year-old man with PMH HTN, HLD, COPD on oxygen on average 2 to 4 L, PAD, defibrillator, known AAA measuring 5cm, presenting due to elevated liver enzymes that were found 2 days ago on routine labs.  Patient states that he had a call back yesterday about results, transport to the ER was arranged for today.  PCP Dr. Simms.  Endorsing acute worsening of diffuse abdominal pain that is present for the last 3 weeks, usually present after eating. Denies fever, chest pain, SOB  His CTangio abdomen on 12/15  showing cholelithiasis, negative for cholecystitis, HIDA scan (12/16) confirmed these results. GI/Hepatology consulted on 12/16 for further reccs  Vascular surgery following for known AAA, no interventions at this time as AAA is stable.   Atorvastatin held until liver enzymes normalized as per hepatology. Psych consulted and  recommended Remeron and melatonin for sleep, anxiety, patient doesn't want.   as per GI, EGD and  EUS to r/o choledocholithiasis for evaluation of symptoms.  PPM interrogation done prior to procedure. Pt underwent EGD and  EUS on ___      ----incomplete ----- 67-year-old man with PMH HTN, HLD, COPD on oxygen on average 2 to 4 L, PAD, defibrillator, known AAA measuring 5cm, presenting due to elevated liver enzymes that were found 2 days ago on routine labs.  Patient states that he had a call back yesterday about results, transport to the ER was arranged for today.  PCP Dr. Simms.  Endorsing acute worsening of diffuse abdominal pain that is present for the last 3 weeks, usually present after eating. Denies fever, chest pain, SOB  His CTangio abdomen on 12/15  showing cholelithiasis, negative for cholecystitis, HIDA scan (12/16) confirmed these results. GI/Hepatology consulted on 12/16 for further reccs  Vascular surgery following for known AAA, no interventions at this time as AAA is stable.   Atorvastatin held until liver enzymes normalized as per hepatology. Psych consulted and  recommended Remeron and melatonin for sleep, anxiety, patient doesn't want.   as per GI, EGD and  EUS to r/o choledocholithiasis for evaluation of symptoms.  PPM interrogation done prior to procedure. Pt underwent EGD and  EUS on  12/22 reveals B sludge, gastritis, subepithelial lesion in 2nd portion of duodenum benign appearing.  f/u path from EGD as outpatient. GI suggested to consider surgical eval for elective cholecystectomy.   Surgery called on 12/23 for FU____          ----incomplete ----- 67-year-old man with PMH HTN, HLD, COPD on oxygen on average 2 to 4 L, PAD, defibrillator, known AAA measuring 5cm, presenting due to elevated liver enzymes that were found 2 days ago on routine labs.  Patient states that he had a call back yesterday about results, transport to the ER was arranged for today.  PCP Dr. Simms.  Endorsing acute worsening of diffuse abdominal pain that is present for the last 3 weeks, usually present after eating. Denies fever, chest pain, SOB  His CTangio abdomen on 12/15  showing cholelithiasis, negative for cholecystitis, HIDA scan (12/16) confirmed these results. GI/Hepatology consulted on 12/16 for further reccs  Vascular surgery following for known AAA, no interventions at this time as AAA is stable.   Atorvastatin held until liver enzymes normalized as per hepatology. Psych consulted and  recommended Remeron and melatonin for sleep, anxiety, patient doesn't want.   as per GI, EGD and  EUS to r/o choledocholithiasis for evaluation of symptoms.  PPM interrogation done prior to procedure. Pt underwent EGD and  EUS on  12/22 reveals B sludge, gastritis, subepithelial lesion in 2nd portion of duodenum benign appearing.  f/u path from EGD as outpatient. GI suggested to consider surgical eval for elective cholecystectomy.   Surgery called on 12/23 for FU rec follow up outpatient with Dr. Nieto or one of her associates [phone] for outpatient follow-up in 2 weeks for elective lap bob planning for symptomatic cholelithiasis   If GI doctors are considering PUD as differential for ABD pain, please have patient follow up with them for EGD prior to planning of Laparoscopic Bob.  Pt is stable for discharge home

## 2022-12-16 NOTE — PATIENT PROFILE ADULT - FALL HARM RISK - HARM RISK INTERVENTIONS

## 2022-12-16 NOTE — H&P ADULT - NSHPPHYSICALEXAM_GEN_ALL_CORE
PHYSICAL EXAMINATION:  Vital Signs Last 24 Hrs  T(C): 36.6 (16 Dec 2022 13:00), Max: 37 (16 Dec 2022 03:19)  T(F): 97.9 (16 Dec 2022 13:00), Max: 98.6 (16 Dec 2022 03:19)  HR: 92 (16 Dec 2022 13:00) (70 - 92)  BP: 150/87 (16 Dec 2022 13:00) (138/89 - 152/92)  BP(mean): --  RR: 18 (16 Dec 2022 13:00) (18 - 20)  SpO2: 98% (16 Dec 2022 13:00) (98% - 100%)    Parameters below as of 16 Dec 2022 13:00  Patient On (Oxygen Delivery Method): nasal cannula  O2 Flow (L/min): 2    CAPILLARY BLOOD GLUCOSE          GENERAL: NAD  HEAD:  atraumatic, normocephalic  EYES: sclera anicteric  ENMT: mucous membranes moist  NECK: supple, No JVD  CHEST/LUNG: clear to auscultation bilaterally; no rales, rhonchi, or wheezing b/l  HEART: normal S1, S2  ABDOMEN: BS+, soft, ND, NT   EXTREMITIES:  pulses palpable; no clubbing, cyanosis, or edema b/l LEs  NEURO: awake, alert, interactive; moves all extremities  SKIN: no rashes or lesions

## 2022-12-16 NOTE — H&P ADULT - NSHPLABSRESULTS_GEN_ALL_CORE
12.2   8.42  )-----------( 305      ( 15 Dec 2022 15:39 )             36.2       12-15    130<L>  |  86<L>  |  10  ----------------------------<  112<H>  4.0   |  31  |  0.43<L>    Ca    9.3      15 Dec 2022 15:39    TPro  7.3  /  Alb  4.3  /  TBili  0.7  /  DBili  x   /  AST  142<H>  /  ALT  136<H>  /  AlkPhos  136<H>  12-15              Urinalysis Basic - ( 15 Dec 2022 21:36 )    Color: Light Yellow / Appearance: Clear / SG: >1.050 / pH: x  Gluc: x / Ketone: Negative  / Bili: Negative / Urobili: Negative   Blood: x / Protein: Trace / Nitrite: Negative   Leuk Esterase: Negative / RBC: 1 /hpf / WBC 1 /HPF   Sq Epi: x / Non Sq Epi: 1 /hpf / Bacteria: Negative      < from: [facility] Hepatobiliary Imaging (12.16.22 @ 09:33) >    IMPRESSION: Hepatobiliary scan demonstrates:    No evidence of acute cholecystitis.    < end of copied text >    < from: CT Angio Abdomen and Pelvis w/ IV Cont (12.15.22 @ 17:29) >      IMPRESSION:  No aortic dissection.    Stable 5.3 cm partially thrombosed infrarenal intra-abdominal aortic   aneurysm.    < end of copied text >

## 2022-12-16 NOTE — PROGRESS NOTE ADULT - ASSESSMENT
67-year-old man with PMH HTN, HLD, COPD on oxygen on average 2 to 4 L, PAD, defibrillator, known AAA measuring 5cm, presenting due to elevated liver enzymes that were found 2 days ago on routine labs.  Patient states that he had a call back yesterday about results, transport to the ER was arranged for today. FOund to have cholelithiasis without acute cholecystitis    Recommendations:    - No surgical intervention at this time. Patient is a poor surgical candidate and HIDA negative for acute cholecystitis  - Rest of care and work up of symptoms per Medicine  - Please call back or page if needed      ACS/Trauma Surgery  [phone] 67-year-old man with PMH HTN, HLD, COPD on oxygen on average 2 to 4 L, PAD, defibrillator, known AAA measuring 5cm, presenting due to elevated liver enzymes that were found 2 days ago on routine labs.  Patient states that he had a call back yesterday about results, transport to the ER was arranged for today. Found to have cholelithiasis without acute cholecystitis    Recommendations:    - No surgical intervention at this time. Patient is a poor surgical candidate and HIDA negative for acute cholecystitis  - Rest of care and work up of symptoms per Medicine  - Please call back or page if needed      ACS/Trauma Surgery  [phone]

## 2022-12-16 NOTE — CONSULT NOTE ADULT - ASSESSMENT
Patient is a 67-year-old man with PMH HTN, HLD, COPD on oxygen on average 2 to 4 L, PAD, defibrillator, known AAA measuring 5cm, presenting due to elevated liver enzymes that were found 2 days ago on routine labs. Endorsing acute worsening of diffuse abdominal pain that is present for the last 3 weeks, usually present after eating. +dysuria for the last few days. Vascular Surgery consulted for AAA measuring 5.3 cm. CT showing stable partially thrombosed infrarenal intra-abdominal aortic aneurysm measures 5.3 x 4.8 x 11.5 cm ( TRV x AP x CC). No aortic dissection is seen. Unchanged ectatic bilateral common iliac arteries. Right common iliac artery measures up to 1.5 cm. Intact right femoral bypass graft. Patient known to Dr. Joaquin, last follow-up 08/2022.    PLAN:  - No acute vascular surgical intervention  - Rest of care per primary team  - Patient should follow up as an outpatient with Dr. Joaquin within 1-2 weeks after discharge  - Plan to be discussed with Vascular Surgery Fellow on call Dr. Zepeda on behalf of Dr. Joaquin        Vascular Surgery  [phone]   Patient is a 67-year-old man with PMH HTN, HLD, COPD on oxygen on average 2 to 4 L, PAD, defibrillator, known AAA measuring 5cm, presenting due to elevated liver enzymes that were found 2 days ago on routine labs. Endorsing acute worsening of diffuse abdominal pain that is present for the last 3 weeks, usually present after eating. +dysuria for the last few days. Vascular Surgery consulted for AAA measuring 5.3 cm. CT showing stable partially thrombosed infrarenal intra-abdominal aortic aneurysm measures 5.3 x 4.8 x 11.5 cm ( TRV x AP x CC). No aortic dissection is seen. Unchanged ectatic bilateral common iliac arteries. Right common iliac artery measures up to 1.5 cm. Intact right femoral bypass graft. Patient known to Dr. Joaquin, last follow-up 08/2022.    PLAN:  - No acute vascular surgical intervention  - Rest of care per primary team  - Patient should follow up as an outpatient with Dr. Joaquin within 1-2 weeks after discharge  - Plan discussed with Vascular Surgery Fellow on call Dr. Zepeda on behalf of Dr. Joaquin        Vascular Surgery  [phone]   Patient is a 67-year-old man with PMH HTN, HLD, COPD on oxygen on average 2 to 4 L, PAD, defibrillator, known AAA measuring 5cm, presenting due to elevated liver enzymes that were found 2 days ago on routine labs. Endorsing acute worsening of diffuse abdominal pain that is present for the last 3 weeks, usually present after eating. +dysuria for the last few days. Vascular Surgery consulted for AAA measuring 5.3 cm. CT showing stable partially thrombosed infrarenal intra-abdominal aortic aneurysm measures 5.3 x 4.8 x 11.5 cm ( TRV x AP x CC). No aortic dissection is seen. Unchanged ectatic bilateral common iliac arteries. Right common iliac artery measures up to 1.5 cm. Intact right femoral bypass graft. Patient known to Dr. Joaquin, last follow-up 08/2022.    PLAN:  - No acute vascular surgical intervention  - Rest of care per primary team  - Recommend GI consult  - Patient should follow up as an outpatient with Dr. Joaquin within 1-2 weeks after discharge  - Plan discussed with Vascular Surgery Fellow on call Dr. Zepeda on behalf of Dr. Joaquin        Vascular Surgery  [phone]

## 2022-12-16 NOTE — CONSULT NOTE ADULT - ATTENDING COMMENTS
As above  Pt will inés Joaquin As above  Pt will inés Joaquin    For [place]:   As above  Stable from vascular- INÉS Joaquin outpt.

## 2022-12-16 NOTE — DISCHARGE NOTE PROVIDER - NSDCFUADDINST_GEN_ALL_CORE_FT
Patient should follow up as an outpatient with Dr. Joaquin within 1-2 weeks after discharge Patient should follow up as an outpatient with Dr. Joaquin within 1-2 weeks after discharge     Please provide patient with Hepatology Clinic outpatient follow up number for an appointment; [phone] ([facility] [place]) or [phone] ([facility] at [address] [place] [address])

## 2022-12-16 NOTE — H&P ADULT - NSHPSOCIALHISTORY_GEN_ALL_CORE
Social History:    Marital Status:  ( x  )    (   ) Single    (   )    (  )   Occupation:   Lives with: (  ) alone  (  ) children   ( x ) spouse   (  ) parents  (  ) other    Substance Use (street drugs): (x  ) never used  (  ) other:  Tobacco Usage:  (   ) never smoked   (  x ) former smoker   (   ) current smoker  (     ) pack years  (        ) last cigarette date  Alcohol Usage: 3-4 beers daily    (     ) Advanced Directives: (     ) None    (      ) DNR    (     ) DNI    (     ) Health Care Proxy:

## 2022-12-16 NOTE — DISCHARGE NOTE PROVIDER - PROVIDER TOKENS
PROVIDER:[TOKEN:[[id number]],FOLLOWUP:[1 week],ESTABLISHEDPATIENT:[T]] PROVIDER:[TOKEN:[[id number]],FOLLOWUP:[1 week],ESTABLISHEDPATIENT:[T]],PROVIDER:[TOKEN:[[id number]],FOLLOWUP:[2 weeks]] PROVIDER:[TOKEN:[[id number]],FOLLOWUP:[1 week],ESTABLISHEDPATIENT:[T]],PROVIDER:[TOKEN:[[id number]],FOLLOWUP:[2 weeks]],PROVIDER:[TOKEN:[[id number]],FOLLOWUP:[1 week]]

## 2022-12-16 NOTE — CONSULT NOTE ADULT - ATTENDING COMMENTS
This is a 67-year-old male with history of hypertension, hyperlipidemia, COPD on home oxygen 2 to 4 L, PAD, ICD, known AAA to 5 cm who currently presented with elevated transaminases on outpatient labs and abdominal pain for about 1 month, worse with eating.  Trend of liver test was reviewed.  Patient has baseline elevation of liver test in April 28, 2022 with a AST 75, ALT 84, alkaline phosphatase 75, total bilirubin 0.6 mg/dL which is now increased to , , alkaline phosphatase 136, total bilirubin 0.7 mg/dL.  Patient had an ultrasound of the abdomen on December 50 which revealed cholelithiasis without evidence for cholecystitis.  5.4 cm infrarenal abdominal attic aneurysm which is stable was noted.  CT angio of the abdomen revealed no aortic dissection.  Stable 5.3 cm partially thrombosed infrarenal intra-abdominal anticoagulation was again noted.  HIDA scan revealed no evidence for acute cholecystitis.  Activity was first seen in the gallbladder neck 20 minutes and in the bowel in 15 minutes.  There is good clearance of the activity from the liver by the end of the study.  No evidence for biliary obstruction.  Viral serology for hepatitis A IgM antibody, hepatitis B surface antigen, hepatitis B core IgM antibody were all negative.  Hepatitis C antibody was positive but hep C RNA is negative likely reflecting past infection.  Etiology of elevated liver test is unclear at this time.  Potential considerations include mild physiological increase related to atorvastatin (patient has chronic elevation of transaminases).  Recommend checking a CPK level.  We will hold atorvastatin for now at least temporarily to assess potential relationship of abnormal liver enzymes with atorvastatin.  There is no evidence for fatty liver on the available imaging study including ultrasound of the abdomen and CT abdomen.  Trend LFTs for now.

## 2022-12-16 NOTE — H&P ADULT - NSHPREVIEWOFSYSTEMS_GEN_ALL_CORE
`REVIEW OF SYSTEMS:    CONSTITUTIONAL: No weakness, fevers or chills  EYES/ENT: No visual changes;  No vertigo or throat pain   NECK: No pain or stiffness  RESPIRATORY: No cough, wheezing, hemoptysis; No shortness of breath  CARDIOVASCULAR: No chest pain or palpitations  GASTROINTESTINAL: + abdominal pain. No nausea, vomiting, or hematemesis; No diarrhea or constipation. No melena or hematochezia.  GENITOURINARY: No dysuria, frequency or hematuria  NEUROLOGICAL: No numbness or weakness  SKIN: No itching, burning, rashes, or lesions   All other review of systems is negative unless indicated above.

## 2022-12-16 NOTE — DISCHARGE NOTE PROVIDER - CARE PROVIDERS DIRECT ADDRESSES
,[email or web address]

## 2022-12-16 NOTE — DISCHARGE NOTE PROVIDER - NSDCFUADDAPPT_GEN_ALL_CORE_FT
Hepatology clinical call [phone] (Faculty practice [facility]) or [phone](fellow clinic at [address] [place] [address])  Hepatology clinical call [phone] (Faculty practice in [facility]) or [phone](fellow clinic at [address] [place] [address])     follow up outpatient with Dr. Nieto or one of her associates [phone] for outpatient follow-up in 2 weeks for elective lap bob planning for symptomatic cholelithiasis

## 2022-12-16 NOTE — DISCHARGE NOTE PROVIDER - NSDCMRMEDTOKEN_GEN_ALL_CORE_FT
albuterol 90 mcg/inh inhalation aerosol with adapter: 1-2 puff(s) inhaled every 4-6 hours, As Needed  Aspirin Enteric Coated 81 mg oral delayed release tablet: 1 tab(s) orally once a day    NOTE: Spouse unsure if patient still takes aspirin.  atorvastatin 40 mg oral tablet: 1 tab(s) orally once a day  levothyroxine 75 mcg (0.075 mg) oral tablet: 1 tab(s) orally once a day  Metoprolol Tartrate 50 mg oral tablet: 1 tab(s) orally 2 times a day  pantoprazole 40 mg oral delayed release tablet: 1 tab(s) orally once a day  ramipril 5 mg oral capsule: 1 cap(s) orally once a day   albuterol 90 mcg/inh inhalation aerosol: 2 puff(s) inhaled every 6 hours, As Needed -Shortness of Breath and/or Wheezing - for shortness of breath and/or wheezing   Aspirin Enteric Coated 81 mg oral delayed release tablet: 1 tab(s) orally once a day    NOTE: Spouse unsure if patient still takes aspirin.  atorvastatin 40 mg oral tablet: 1 tab(s) orally once a day  levothyroxine 75 mcg (0.075 mg) oral tablet: 1 tab(s) orally once a day  lisinopril 20 mg oral tablet: 1 tab(s) orally once a day  melatonin 3 mg oral tablet: 1 tab(s) orally once a day (at bedtime), As needed, Sleep  Metoprolol Tartrate 50 mg oral tablet: 1 tab(s) orally 2 times a day  mirtazapine 7.5 mg oral tablet: 1 tab(s) orally once a day (at bedtime)  pantoprazole 40 mg oral delayed release tablet: 1 tab(s) orally once a day  polyethylene glycol 3350 oral powder for reconstitution: 17 gram(s) orally 2 times a day  senna leaf extract oral tablet: 2 tab(s) orally once a day (at bedtime)  simethicone 80 mg oral tablet, chewable: 1 tab(s) orally 3 times a day

## 2022-12-16 NOTE — DISCHARGE NOTE PROVIDER - CARE PROVIDER_API CALL
Ricci Joaquin)  Surgery; Vascular Surgery  [address] [place] [address]  Phone: [phone]  Fax: [phone]  Established Patient  Follow Up Time: 1 week   Ricci Joaquin)  Surgery; Vascular Surgery  [address] [place] [address]  Phone: [phone]  Fax: [phone]  Established Patient  Follow Up Time: 1 week    Gato Saldivar)  Surgery  [address] [place] [address], [address]  [place] [address]  Phone: [phone]  Fax: [phone]  Follow Up Time: 2 weeks   Ricci Joaquin)  Surgery; Vascular Surgery  [address] [place] [address]  Phone: [phone]  Fax: [phone]  Established Patient  Follow Up Time: 1 week    Radha Niteo)  Surgery; Surgical Critical Care  [address] [place] [address]  Phone: [phone]  Fax: [phone]  Follow Up Time: 2 weeks    Leno Crump)  Gastroenterology; Internal Medicine  Division of Gastroenterology - [address] [place] [address]  Phone: [phone]  Fax: [phone]  Follow Up Time: 1 week

## 2022-12-17 LAB
ALBUMIN SERPL ELPH-MCNC: 3.6 G/DL — SIGNIFICANT CHANGE UP (ref 3.3–5)
ALBUMIN SERPL ELPH-MCNC: 3.9 G/DL — SIGNIFICANT CHANGE UP (ref 3.3–5)
ALBUMIN SERPL ELPH-MCNC: 4.1 G/DL — SIGNIFICANT CHANGE UP (ref 3.3–5)
ALP SERPL-CCNC: 109 U/L — SIGNIFICANT CHANGE UP (ref 40–120)
ALP SERPL-CCNC: 112 U/L — SIGNIFICANT CHANGE UP (ref 40–120)
ALP SERPL-CCNC: 115 U/L — SIGNIFICANT CHANGE UP (ref 40–120)
ALT FLD-CCNC: 85 U/L — HIGH (ref 10–45)
ALT FLD-CCNC: 86 U/L — HIGH (ref 10–45)
ALT FLD-CCNC: 86 U/L — HIGH (ref 10–45)
ANION GAP SERPL CALC-SCNC: 10 MMOL/L — SIGNIFICANT CHANGE UP (ref 5–17)
ANION GAP SERPL CALC-SCNC: 10 MMOL/L — SIGNIFICANT CHANGE UP (ref 5–17)
AST SERPL-CCNC: 77 U/L — HIGH (ref 10–40)
AST SERPL-CCNC: 77 U/L — HIGH (ref 10–40)
AST SERPL-CCNC: 79 U/L — HIGH (ref 10–40)
BILIRUB DIRECT SERPL-MCNC: 0.5 MG/DL — HIGH (ref 0–0.3)
BILIRUB INDIRECT FLD-MCNC: 0.9 MG/DL — SIGNIFICANT CHANGE UP (ref 0.2–1)
BILIRUB SERPL-MCNC: 1.3 MG/DL — HIGH (ref 0.2–1.2)
BILIRUB SERPL-MCNC: 1.4 MG/DL — HIGH (ref 0.2–1.2)
BILIRUB SERPL-MCNC: 1.5 MG/DL — HIGH (ref 0.2–1.2)
BUN SERPL-MCNC: 7 MG/DL — SIGNIFICANT CHANGE UP (ref 7–23)
BUN SERPL-MCNC: 7 MG/DL — SIGNIFICANT CHANGE UP (ref 7–23)
CALCIUM SERPL-MCNC: 9.2 MG/DL — SIGNIFICANT CHANGE UP (ref 8.4–10.5)
CALCIUM SERPL-MCNC: 9.4 MG/DL — SIGNIFICANT CHANGE UP (ref 8.4–10.5)
CHLORIDE SERPL-SCNC: 88 MMOL/L — LOW (ref 96–108)
CHLORIDE SERPL-SCNC: 90 MMOL/L — LOW (ref 96–108)
CK SERPL-CCNC: 72 U/L — SIGNIFICANT CHANGE UP (ref 30–200)
CO2 SERPL-SCNC: 31 MMOL/L — SIGNIFICANT CHANGE UP (ref 22–31)
CO2 SERPL-SCNC: 31 MMOL/L — SIGNIFICANT CHANGE UP (ref 22–31)
CREAT SERPL-MCNC: 0.44 MG/DL — LOW (ref 0.5–1.3)
CREAT SERPL-MCNC: 0.47 MG/DL — LOW (ref 0.5–1.3)
CULTURE RESULTS: SIGNIFICANT CHANGE UP
EGFR: 112 ML/MIN/1.73M2 — SIGNIFICANT CHANGE UP
EGFR: 115 ML/MIN/1.73M2 — SIGNIFICANT CHANGE UP
GLUCOSE SERPL-MCNC: 122 MG/DL — HIGH (ref 70–99)
GLUCOSE SERPL-MCNC: 88 MG/DL — SIGNIFICANT CHANGE UP (ref 70–99)
HCT VFR BLD CALC: 34 % — LOW (ref 39–50)
HGB BLD-MCNC: 11.2 G/DL — LOW (ref 13–17)
INR BLD: 1 RATIO — SIGNIFICANT CHANGE UP (ref 0.88–1.16)
MAGNESIUM SERPL-MCNC: 1.7 MG/DL — SIGNIFICANT CHANGE UP (ref 1.6–2.6)
MCHC RBC-ENTMCNC: 30.8 PG — SIGNIFICANT CHANGE UP (ref 27–34)
MCHC RBC-ENTMCNC: 32.9 GM/DL — SIGNIFICANT CHANGE UP (ref 32–36)
MCV RBC AUTO: 93.4 FL — SIGNIFICANT CHANGE UP (ref 80–100)
NRBC # BLD: 0 /100 WBCS — SIGNIFICANT CHANGE UP (ref 0–0)
PHOSPHATE SERPL-MCNC: 2.7 MG/DL — SIGNIFICANT CHANGE UP (ref 2.5–4.5)
PLATELET # BLD AUTO: 230 K/UL — SIGNIFICANT CHANGE UP (ref 150–400)
POTASSIUM SERPL-MCNC: 3.3 MMOL/L — LOW (ref 3.5–5.3)
POTASSIUM SERPL-MCNC: 3.4 MMOL/L — LOW (ref 3.5–5.3)
POTASSIUM SERPL-SCNC: 3.3 MMOL/L — LOW (ref 3.5–5.3)
POTASSIUM SERPL-SCNC: 3.4 MMOL/L — LOW (ref 3.5–5.3)
PROT SERPL-MCNC: 6.4 G/DL — SIGNIFICANT CHANGE UP (ref 6–8.3)
PROT SERPL-MCNC: 6.7 G/DL — SIGNIFICANT CHANGE UP (ref 6–8.3)
PROT SERPL-MCNC: 6.8 G/DL — SIGNIFICANT CHANGE UP (ref 6–8.3)
PROTHROM AB SERPL-ACNC: 11.5 SEC — SIGNIFICANT CHANGE UP (ref 10.5–13.4)
RBC # BLD: 3.64 M/UL — LOW (ref 4.2–5.8)
RBC # FLD: 16.8 % — HIGH (ref 10.3–14.5)
SODIUM SERPL-SCNC: 129 MMOL/L — LOW (ref 135–145)
SODIUM SERPL-SCNC: 131 MMOL/L — LOW (ref 135–145)
SPECIMEN SOURCE: SIGNIFICANT CHANGE UP
TSH SERPL-MCNC: 6.39 UIU/ML — HIGH (ref 0.27–4.2)
WBC # BLD: 8.13 K/UL — SIGNIFICANT CHANGE UP (ref 3.8–10.5)
WBC # FLD AUTO: 8.13 K/UL — SIGNIFICANT CHANGE UP (ref 3.8–10.5)

## 2022-12-17 PROCEDURE — 74018 RADEX ABDOMEN 1 VIEW: CPT | Mod: 26

## 2022-12-17 RX ORDER — DIPHENHYDRAMINE HCL 50 MG
25 CAPSULE ORAL ONCE
Refills: 0 | Status: DISCONTINUED | OUTPATIENT
Start: 2022-12-17 | End: 2022-12-17

## 2022-12-17 RX ORDER — POTASSIUM CHLORIDE 20 MEQ
40 PACKET (EA) ORAL ONCE
Refills: 0 | Status: COMPLETED | OUTPATIENT
Start: 2022-12-17 | End: 2022-12-17

## 2022-12-17 RX ORDER — LANOLIN ALCOHOL/MO/W.PET/CERES
3 CREAM (GRAM) TOPICAL ONCE
Refills: 0 | Status: COMPLETED | OUTPATIENT
Start: 2022-12-17 | End: 2022-12-17

## 2022-12-17 RX ORDER — KETOROLAC TROMETHAMINE 30 MG/ML
15 SYRINGE (ML) INJECTION ONCE
Refills: 0 | Status: DISCONTINUED | OUTPATIENT
Start: 2022-12-17 | End: 2022-12-17

## 2022-12-17 RX ORDER — SODIUM CHLORIDE 9 MG/ML
500 INJECTION INTRAMUSCULAR; INTRAVENOUS; SUBCUTANEOUS ONCE
Refills: 0 | Status: COMPLETED | OUTPATIENT
Start: 2022-12-17 | End: 2022-12-17

## 2022-12-17 RX ORDER — SENNA PLUS 8.6 MG/1
2 TABLET ORAL AT BEDTIME
Refills: 0 | Status: DISCONTINUED | OUTPATIENT
Start: 2022-12-17 | End: 2022-12-24

## 2022-12-17 RX ORDER — SODIUM CHLORIDE 9 MG/ML
1000 INJECTION INTRAMUSCULAR; INTRAVENOUS; SUBCUTANEOUS
Refills: 0 | Status: DISCONTINUED | OUTPATIENT
Start: 2022-12-17 | End: 2022-12-24

## 2022-12-17 RX ORDER — POLYETHYLENE GLYCOL 3350 17 G/17G
17 POWDER, FOR SOLUTION ORAL
Refills: 0 | Status: DISCONTINUED | OUTPATIENT
Start: 2022-12-17 | End: 2022-12-24

## 2022-12-17 RX ORDER — SIMETHICONE 80 MG/1
80 TABLET, CHEWABLE ORAL THREE TIMES A DAY
Refills: 0 | Status: DISCONTINUED | OUTPATIENT
Start: 2022-12-17 | End: 2022-12-24

## 2022-12-17 RX ADMIN — Medication 50 MILLIGRAM(S): at 17:11

## 2022-12-17 RX ADMIN — Medication 400 MILLIGRAM(S): at 13:11

## 2022-12-17 RX ADMIN — Medication 50 MILLIGRAM(S): at 05:24

## 2022-12-17 RX ADMIN — SODIUM CHLORIDE 100 MILLILITER(S): 9 INJECTION INTRAMUSCULAR; INTRAVENOUS; SUBCUTANEOUS at 19:39

## 2022-12-17 RX ADMIN — Medication 40 MILLIEQUIVALENT(S): at 22:03

## 2022-12-17 RX ADMIN — SIMETHICONE 80 MILLIGRAM(S): 80 TABLET, CHEWABLE ORAL at 22:01

## 2022-12-17 RX ADMIN — PANTOPRAZOLE SODIUM 40 MILLIGRAM(S): 20 TABLET, DELAYED RELEASE ORAL at 05:24

## 2022-12-17 RX ADMIN — LISINOPRIL 20 MILLIGRAM(S): 2.5 TABLET ORAL at 10:50

## 2022-12-17 RX ADMIN — SODIUM CHLORIDE 1000 MILLILITER(S): 9 INJECTION INTRAMUSCULAR; INTRAVENOUS; SUBCUTANEOUS at 18:49

## 2022-12-17 RX ADMIN — POLYETHYLENE GLYCOL 3350 17 GRAM(S): 17 POWDER, FOR SOLUTION ORAL at 18:45

## 2022-12-17 RX ADMIN — SODIUM CHLORIDE 100 MILLILITER(S): 9 INJECTION INTRAMUSCULAR; INTRAVENOUS; SUBCUTANEOUS at 18:48

## 2022-12-17 RX ADMIN — Medication 3 MILLIGRAM(S): at 22:01

## 2022-12-17 RX ADMIN — Medication 81 MILLIGRAM(S): at 11:49

## 2022-12-17 RX ADMIN — Medication 15 MILLIGRAM(S): at 07:53

## 2022-12-17 RX ADMIN — Medication 15 MILLIGRAM(S): at 08:50

## 2022-12-17 RX ADMIN — Medication 400 MILLIGRAM(S): at 12:53

## 2022-12-17 RX ADMIN — ENOXAPARIN SODIUM 40 MILLIGRAM(S): 100 INJECTION SUBCUTANEOUS at 17:11

## 2022-12-17 RX ADMIN — Medication 75 MICROGRAM(S): at 05:24

## 2022-12-17 RX ADMIN — SENNA PLUS 2 TABLET(S): 8.6 TABLET ORAL at 22:02

## 2022-12-17 NOTE — PROGRESS NOTE ADULT - ASSESSMENT
67 m with     Abnormal LFT improving   - follow  - hold Atorvastatin   - Hepatology evaluation noted  - Surgical evaluation noted    AAA  - vascular evaluation noted    HTN   - control    Hypothyroid  - TSH     COPD  - stable  - nebs    Abdominal pain  - Axr  - GI follow    DVT prophylaxis    Abelardo Priest MD phone [phone]

## 2022-12-17 NOTE — PHYSICAL THERAPY INITIAL EVALUATION ADULT - PERTINENT HX OF CURRENT PROBLEM, REHAB EVAL
67-year-old man with PMH HTN, HLD, COPD on oxygen on average 2 to 4 L, PAD, defibrillator, known AAA measuring 5cm, presenting due to elevated liver enzymes that were found 2 days ago on routine labs.  Patient states that he had a call back yesterday about results, transport to the ER was arranged for today.  PCP Dr. Simms.  Endorsing acute worsening of diffuse abdominal pain that is present for the last 3 weeks, usually present after eating. Denies fever, chest pain, SOB. CT angio chest: (-)dissection, stable 5.3 cm partially aortic aneurysm; NM hepato: (-)acute cholecystitis; right US quadrant:Cholelithiasis without evidence of cholecystitis.5.4 cm infrarenal abdominal aortic aneurysm, stable.

## 2022-12-17 NOTE — PHYSICAL THERAPY INITIAL EVALUATION ADULT - ADDITIONAL COMMENTS
pt states lives with significant other in private house, 10 steps to enter, pt ambulates with straight cane, owns rolling walker

## 2022-12-18 LAB
ALBUMIN SERPL ELPH-MCNC: 3.6 G/DL — SIGNIFICANT CHANGE UP (ref 3.3–5)
ALP SERPL-CCNC: 93 U/L — SIGNIFICANT CHANGE UP (ref 40–120)
ALT FLD-CCNC: 68 U/L — HIGH (ref 10–45)
ANION GAP SERPL CALC-SCNC: 9 MMOL/L — SIGNIFICANT CHANGE UP (ref 5–17)
AST SERPL-CCNC: 58 U/L — HIGH (ref 10–40)
BILIRUB SERPL-MCNC: 0.8 MG/DL — SIGNIFICANT CHANGE UP (ref 0.2–1.2)
BUN SERPL-MCNC: 4 MG/DL — LOW (ref 7–23)
CALCIUM SERPL-MCNC: 8.6 MG/DL — SIGNIFICANT CHANGE UP (ref 8.4–10.5)
CHLORIDE SERPL-SCNC: 91 MMOL/L — LOW (ref 96–108)
CO2 SERPL-SCNC: 30 MMOL/L — SIGNIFICANT CHANGE UP (ref 22–31)
CREAT SERPL-MCNC: 0.46 MG/DL — LOW (ref 0.5–1.3)
EGFR: 113 ML/MIN/1.73M2 — SIGNIFICANT CHANGE UP
ERYTHROCYTE [SEDIMENTATION RATE] IN BLOOD: 17 MM/HR — SIGNIFICANT CHANGE UP (ref 0–20)
GLUCOSE SERPL-MCNC: 100 MG/DL — HIGH (ref 70–99)
HCT VFR BLD CALC: 34.6 % — LOW (ref 39–50)
HGB BLD-MCNC: 11.5 G/DL — LOW (ref 13–17)
INR BLD: 1.03 RATIO — SIGNIFICANT CHANGE UP (ref 0.88–1.16)
MCHC RBC-ENTMCNC: 31.3 PG — SIGNIFICANT CHANGE UP (ref 27–34)
MCHC RBC-ENTMCNC: 33.2 GM/DL — SIGNIFICANT CHANGE UP (ref 32–36)
MCV RBC AUTO: 94.3 FL — SIGNIFICANT CHANGE UP (ref 80–100)
NRBC # BLD: 0 /100 WBCS — SIGNIFICANT CHANGE UP (ref 0–0)
PLATELET # BLD AUTO: 263 K/UL — SIGNIFICANT CHANGE UP (ref 150–400)
POTASSIUM SERPL-MCNC: 3.9 MMOL/L — SIGNIFICANT CHANGE UP (ref 3.5–5.3)
POTASSIUM SERPL-SCNC: 3.9 MMOL/L — SIGNIFICANT CHANGE UP (ref 3.5–5.3)
PROT SERPL-MCNC: 5.9 G/DL — LOW (ref 6–8.3)
PROTHROM AB SERPL-ACNC: 12 SEC — SIGNIFICANT CHANGE UP (ref 10.5–13.4)
RBC # BLD: 3.67 M/UL — LOW (ref 4.2–5.8)
RBC # FLD: 16.9 % — HIGH (ref 10.3–14.5)
SODIUM SERPL-SCNC: 130 MMOL/L — LOW (ref 135–145)
WBC # BLD: 11.57 K/UL — HIGH (ref 3.8–10.5)
WBC # FLD AUTO: 11.57 K/UL — HIGH (ref 3.8–10.5)

## 2022-12-18 RX ORDER — LANOLIN ALCOHOL/MO/W.PET/CERES
5 CREAM (GRAM) TOPICAL ONCE
Refills: 0 | Status: COMPLETED | OUTPATIENT
Start: 2022-12-18 | End: 2022-12-18

## 2022-12-18 RX ORDER — PANTOPRAZOLE SODIUM 20 MG/1
40 TABLET, DELAYED RELEASE ORAL EVERY 12 HOURS
Refills: 0 | Status: DISCONTINUED | OUTPATIENT
Start: 2022-12-18 | End: 2022-12-24

## 2022-12-18 RX ADMIN — Medication 400 MILLIGRAM(S): at 21:33

## 2022-12-18 RX ADMIN — Medication 75 MICROGRAM(S): at 05:29

## 2022-12-18 RX ADMIN — Medication 50 MILLIGRAM(S): at 17:15

## 2022-12-18 RX ADMIN — Medication 400 MILLIGRAM(S): at 09:31

## 2022-12-18 RX ADMIN — SENNA PLUS 2 TABLET(S): 8.6 TABLET ORAL at 21:33

## 2022-12-18 RX ADMIN — SIMETHICONE 80 MILLIGRAM(S): 80 TABLET, CHEWABLE ORAL at 15:19

## 2022-12-18 RX ADMIN — SIMETHICONE 80 MILLIGRAM(S): 80 TABLET, CHEWABLE ORAL at 05:28

## 2022-12-18 RX ADMIN — LISINOPRIL 20 MILLIGRAM(S): 2.5 TABLET ORAL at 05:28

## 2022-12-18 RX ADMIN — Medication 400 MILLIGRAM(S): at 09:01

## 2022-12-18 RX ADMIN — Medication 81 MILLIGRAM(S): at 11:03

## 2022-12-18 RX ADMIN — SODIUM CHLORIDE 100 MILLILITER(S): 9 INJECTION INTRAMUSCULAR; INTRAVENOUS; SUBCUTANEOUS at 07:00

## 2022-12-18 RX ADMIN — SIMETHICONE 80 MILLIGRAM(S): 80 TABLET, CHEWABLE ORAL at 21:33

## 2022-12-18 RX ADMIN — ENOXAPARIN SODIUM 40 MILLIGRAM(S): 100 INJECTION SUBCUTANEOUS at 17:17

## 2022-12-18 RX ADMIN — POLYETHYLENE GLYCOL 3350 17 GRAM(S): 17 POWDER, FOR SOLUTION ORAL at 05:26

## 2022-12-18 RX ADMIN — Medication 50 MILLIGRAM(S): at 05:29

## 2022-12-18 RX ADMIN — PANTOPRAZOLE SODIUM 40 MILLIGRAM(S): 20 TABLET, DELAYED RELEASE ORAL at 05:30

## 2022-12-18 RX ADMIN — Medication 5 MILLIGRAM(S): at 21:34

## 2022-12-18 RX ADMIN — PANTOPRAZOLE SODIUM 40 MILLIGRAM(S): 20 TABLET, DELAYED RELEASE ORAL at 17:15

## 2022-12-18 NOTE — PROVIDER CONTACT NOTE (OTHER) - SITUATION
Patient requesting sleep aid
Pt refused Benadryl
Pt requesting sleep aid
Lab results - Sodium 130
Patient reports aching body pain in legs with pain rating 7-8, back pain rating of 10, and abdomen pain rating of 3/4. VS stable, no signs of distress.
Patient reports stabbing pain in abdomen rated 7, requests pain medication.

## 2022-12-18 NOTE — PROVIDER CONTACT NOTE (OTHER) - ASSESSMENT
VSS, no signs of distress.
VSS, no signs of distress.
VSS, no acute distress.
VSS, no signs of distress.
VSS no signs of distress.
VSS, no signs of distress.

## 2022-12-18 NOTE — PROGRESS NOTE ADULT - ASSESSMENT
67 m with     Abnormal LFT improving   - follow  - hold Atorvastatin   - Hepatology evaluation noted  - Surgical evaluation noted    AAA  - vascular evaluation noted  - follow as OTP    HTN   - control    Hypothyroid  - TSH     COPD  - stable  - nebs    Abdominal pain resolved  - Axr noted  - GI follow    DVT prophylaxis    Abelardo Priest MD phone [phone]      67 m with     Abnormal LFT improving   - follow  - hold Atorvastatin   - Hepatology evaluation noted  - Surgical evaluation noted    AAA  - vascular evaluation noted  - follow as OTP    HTN   - control    Hypothyroid  - TSH     COPD  - stable  - nebs    Abdominal pain resolved  - Axr noted  - GI follow    DVT prophylaxis    DCP home .    Abelardo Priest MD phone [phone]

## 2022-12-18 NOTE — PROVIDER CONTACT NOTE (OTHER) - BACKGROUND
Abdominal Pain
Abdominal pain, calculus of gallbladder without cholecystitis
Abdominal pain

## 2022-12-18 NOTE — PROVIDER CONTACT NOTE (OTHER) - REASON
Please contact patient. Patient will need to schedule an appointment for future refills.       
Patient requesting sleep aid
Patient reports pain
Pt refused Benadryl
Lab results - Sodium 130
Pt requesting sleep aid
Patient reports aching body pain in legs with pain rating 7-8, back pain rating of 10, and abdomen pain rating of 3/4. VS stable, no signs of distress.
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
## 2022-12-18 NOTE — PROVIDER CONTACT NOTE (OTHER) - DATE AND TIME:
16-Dec-2022 20:15
17-Dec-2022 02:30
17-Dec-2022 06:30
18-Dec-2022 03:25
17-Dec-2022 02:15
17-Dec-2022 20:37

## 2022-12-18 NOTE — PROVIDER CONTACT NOTE (OTHER) - ACTION/TREATMENT ORDERED:
Provider d/c orders
Melatonin 3mg PO x1 dose
No new orders
Toradol 15mg IV Push x1 dose
Toradol 15 mg IV push x1 dose
As per hospital protocol no sleep aids after 02:00 am. 25 mg Benadryl x1 dose ordered.

## 2022-12-19 PROCEDURE — 99222 1ST HOSP IP/OBS MODERATE 55: CPT

## 2022-12-19 PROCEDURE — 99232 SBSQ HOSP IP/OBS MODERATE 35: CPT | Mod: GC

## 2022-12-19 PROCEDURE — 99221 1ST HOSP IP/OBS SF/LOW 40: CPT

## 2022-12-19 RX ORDER — LANOLIN ALCOHOL/MO/W.PET/CERES
3 CREAM (GRAM) TOPICAL AT BEDTIME
Refills: 0 | Status: DISCONTINUED | OUTPATIENT
Start: 2022-12-19 | End: 2022-12-24

## 2022-12-19 RX ORDER — MIRTAZAPINE 45 MG/1
7.5 TABLET, ORALLY DISINTEGRATING ORAL AT BEDTIME
Refills: 0 | Status: DISCONTINUED | OUTPATIENT
Start: 2022-12-19 | End: 2022-12-24

## 2022-12-19 RX ADMIN — LISINOPRIL 20 MILLIGRAM(S): 2.5 TABLET ORAL at 05:15

## 2022-12-19 RX ADMIN — Medication 400 MILLIGRAM(S): at 09:10

## 2022-12-19 RX ADMIN — SODIUM CHLORIDE 100 MILLILITER(S): 9 INJECTION INTRAMUSCULAR; INTRAVENOUS; SUBCUTANEOUS at 21:59

## 2022-12-19 RX ADMIN — SIMETHICONE 80 MILLIGRAM(S): 80 TABLET, CHEWABLE ORAL at 21:39

## 2022-12-19 RX ADMIN — PANTOPRAZOLE SODIUM 40 MILLIGRAM(S): 20 TABLET, DELAYED RELEASE ORAL at 17:19

## 2022-12-19 RX ADMIN — PANTOPRAZOLE SODIUM 40 MILLIGRAM(S): 20 TABLET, DELAYED RELEASE ORAL at 05:16

## 2022-12-19 RX ADMIN — ENOXAPARIN SODIUM 40 MILLIGRAM(S): 100 INJECTION SUBCUTANEOUS at 17:19

## 2022-12-19 RX ADMIN — Medication 75 MICROGRAM(S): at 05:16

## 2022-12-19 RX ADMIN — Medication 400 MILLIGRAM(S): at 09:54

## 2022-12-19 RX ADMIN — SIMETHICONE 80 MILLIGRAM(S): 80 TABLET, CHEWABLE ORAL at 14:46

## 2022-12-19 RX ADMIN — Medication 50 MILLIGRAM(S): at 17:19

## 2022-12-19 RX ADMIN — SIMETHICONE 80 MILLIGRAM(S): 80 TABLET, CHEWABLE ORAL at 05:15

## 2022-12-19 RX ADMIN — Medication 81 MILLIGRAM(S): at 12:02

## 2022-12-19 RX ADMIN — SODIUM CHLORIDE 100 MILLILITER(S): 9 INJECTION INTRAMUSCULAR; INTRAVENOUS; SUBCUTANEOUS at 00:41

## 2022-12-19 RX ADMIN — SENNA PLUS 2 TABLET(S): 8.6 TABLET ORAL at 21:39

## 2022-12-19 RX ADMIN — Medication 50 MILLIGRAM(S): at 05:15

## 2022-12-19 NOTE — PROGRESS NOTE ADULT - ASSESSMENT
67 m with     Abnormal LFT improving   - follow  - hold Atorvastatin   - Hepatology follow   - Surgical evaluation noted    AAA  - vascular evaluation noted  - follow as OTP    HTN   - control    Hypothyroid  - TSH     COPD  - stable  - nebs    Abdominal pain  - Axr noted  - GI follow  - EGD/ EUS pending     DVT prophylaxis    DCP home .    Abelardo Priest MD phone [phone]

## 2022-12-19 NOTE — PROGRESS NOTE ADULT - ASSESSMENT
66 yo male with HTN, HLD, COPD on O2 2-4L, PAD, ICD, known AAA to 5 cm who presented due to elevated transaminases on outpatient labs and abdominal pain for 1 month, worse with eating.     #Elevated transaminases   May be due to atorvastatin medication. Not likely due to autoimmune and no new medications in the past 6 months.   -hepatitis B core IgM, B surface antigen, A IgM antibody non-reactive   -hepatitis C weakly reactive but PCR negative  -admission labs Tbili 0.7, Alk phosp 136, ,   -US abdomen with cholithiasis but no evidence of cholecystitis  -HIDA negative  -CT AP without liver abnormalities.    -CPK 72    Recommendations:   - hold atorvastatin until liver enzymes normalize   - trend CMP and INR daily, downtrending   - Please provide patient with Hepatology Clinic outpatient follow up number for an appointment; [phone] ([facility]ulty Practice [facility]) or [phone] ([facility] at [address] [place] [address])   - rest of care per primary team     All recommendations are tentative until note is attested by attending.     Camelia Gonzalez, PGY-4   Gastroenterology/Hepatology Fellow  Available on [employer] Teams  [phone] ([facility] Short Range Pager)  [phone] ([facility] Long Range Pager)    After 5pm, please contact the on-call GI fellow. [phone]    66 yo male with HTN, HLD, COPD on O2 2-4L, PAD, ICD, known AAA to 5 cm who presented due to elevated transaminases on outpatient labs and abdominal pain for 1 month, worse with eating.     #Elevated transaminases   May be due to atorvastatin medication. Not likely due to autoimmune and no new medications in the past 6 months.   -hepatitis B core IgM, B surface antigen, A IgM antibody non-reactive   -hepatitis C weakly reactive but PCR negative  -admission labs Tbili 0.7, Alk phosp 136, ,   -US abdomen with cholithiasis but no evidence of cholecystitis  -HIDA negative  -CT AP without liver abnormalities.    -CPK 72    Recommendations:   - hold atorvastatin until liver enzymes normalize   - trend CMP and INR daily, downtrending currently   - patient complaining of abdominal pain with eating; appreciate GI consult   - rest of care per primary team     No further interventions at this time from hepatology. Hepatology will sign off at this time. Please re-consult for any other further questions.   All recommendations are tentative until note is attested by attending.     Camelia Gonzalez, PGY-4   Gastroenterology/Hepatology Fellow  Available on [employer] Teams  [phone] ([facility] Short Range Pager)  [phone] ([facility] Long Range Pager)    After 5pm, please contact the on-call GI fellow. [phone]
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
## 2022-12-19 NOTE — BH CONSULTATION LIAISON ASSESSMENT NOTE - NSBHSATHC_PSY_A_CORE FT
After Visit Summary   6/8/2018    Tisha Arias    MRN: [id number]           Patient Information     Date Of Birth          1962        Visit Information        Provider Department      6/8/2018 12:00 PM Edu Bneitez MD [facility]        Today's Diagnoses     Neuropathic pain of chest    -  1    High grade B-cell lymphoma (H)        Neoplasm related pain           Follow-ups after your visit        Your next 10 appointments already scheduled     Sep 04, 2018  4:30 PM CDT   [facility] with  [facility]   [facility] Draw ([facility])    [address] [place] [address]  [address]  [facility] [id number] [phone]            Sep 04, 2018  5:00 PM CDT   RETURN ONC with Garima Sauceda MD   [facility] Blood and Marrow Transplant ([facility])    [address] [place] [address]  [address]  [facility] [id number] [phone]            Nov 26, 2018  3:00 PM CST   (Arrive by 2:45 PM)   Return Visit with Shhala Crawley MD   [facility] ([facility])    [address] [place] [address]  [address]  [facility] [id number] [phone]            Nov 26, 2018  4:40 PM CST   (Arrive by 4:25 PM)   RETURN ENDOCRINE with Avelina Castro MD   [facility] Endocrinology ([facility])    [address] [place] [address]  3rd Floor  [facility] [id number] [phone]              Who to contact     If you have questions or need follow up information about today's clinic visit or your schedule please contact [facility] directly at [phone].  Normal or non-critical lab and imaging results will be communicated to you by MyChart, letter or phone within 4 business days after the clinic has received the results. If you do not hear from us within 7 days, please contact the clinic through MyChart or phone. If you have a critical or abnormal lab  "result, we will notify you by phone as soon as possible.  Submit refill requests through [employer] or call your pharmacy and they will forward the refill request to us. Please allow 3 business days for your refill to be completed.          Additional Information About Your Visit        [employer] Information     [employer] gives you secure access to your electronic health record. If you see a primary care provider, you can also send messages to your care team and make appointments. If you have questions, please call your primary care clinic.  If you do not have a primary care provider, please call [phone] and they will assist you.        Care EveryWhere ID     This is your Care EveryWhere ID. This could be used by other organizations to access your [facility] medical records  [id number]        Your Vitals Were     Pulse Temperature Respirations Height Pulse Oximetry BMI (Body Mass Index)    121 98.4  F (36.9  C) (Oral) 16 1.651 m (5' 5\") 97% 30.19 kg/m2       Blood Pressure from Last 3 Encounters:   06/08/18 (!) 141/92   05/29/18 123/85   05/29/18 123/85    Weight from Last 3 Encounters:   06/08/18 82.3 kg (181 lb 7 oz)   05/29/18 84 kg (185 lb 1.6 oz)   05/29/18 84 kg (185 lb 1.6 oz)              Today, you had the following     No orders found for display         Today's Medication Changes          These changes are accurate as of 6/8/18 11:59 PM.  If you have any questions, ask your nurse or doctor.               Start taking these medicines.        Dose/Directions    morphine 30 MG 12 hr tablet   Commonly known as:  MS CONTIN   Used for:  Neoplasm related pain   Started by:  Edu Benitez MD        Dose:  30 mg   Start taking on:  7/14/2018   Take 1 tablet (30 mg) by mouth every 8 hours . Take an addition pill at night such that your evening dose is 60mg   Quantity:  120 tablet   Refills:  0       oxyCODONE IR 30 MG tablet   Commonly known as:  ROXICODONE   Used for:  High grade B-cell lymphoma (H) "   Started by:  Edu Benitez MD        Dose:  30 mg   Start taking on:  7/14/2018   Take 1 tablet (30 mg) by mouth every 4 hours as needed for moderate to severe pain   Quantity:  180 tablet   Refills:  0         These medicines have changed or have updated prescriptions.        Dose/Directions    cyclobenzaprine 10 MG tablet   Commonly known as:  FLEXERIL   This may have changed:  when to take this   Used for:  High grade B-cell lymphoma (H)        Dose:  10 mg   Take 1 tablet (10 mg) by mouth 3 times daily as needed   Quantity:  30 tablet   Refills:  0       levothyroxine 112 MCG tablet   Commonly known as:  SYNTHROID/LEVOTHROID   This may have changed:    - how much to take  - additional instructions   Used for:  Postsurgical hypothyroidism, Papillary carcinoma, follicular variant (H), Postsurgical hypoparathyroidism (H), Thyroid cancer (H)        Mon-Sat: (2 tabs daily) Sunday: 3 tabs   Quantity:  189 tablet   Refills:  3       * ondansetron 8 MG ODT tab   Commonly known as:  ZOFRAN-ODT   This may have changed:  when to take this   Used for:  High grade B-cell lymphoma (H), Nausea and vomiting, intractability of vomiting not specified, unspecified vomiting type        Dose:  8 mg   Take 1 tablet (8 mg) by mouth every 8 hours as needed   Quantity:  30 tablet   Refills:  1       * ondansetron 8 MG ODT tab   Commonly known as:  ZOFRAN-ODT   This may have changed:  Another medication with the same name was changed. Make sure you understand how and when to take each.   Used for:  High grade B-cell lymphoma (H), Nausea and vomiting, intractability of vomiting not specified, unspecified vomiting type, Breast cancer (H)        Take 1 tablet (8 mg) by mouth every 8 hours as needed   Quantity:  30 tablet   Refills:  0       * Notice:  This list has 2 medication(s) that are the same as other medications prescribed for you. Read the directions carefully, and ask your doctor or other care provider to review them  with you.         Where to get your medicines      Some of these will need a paper prescription and others can be bought over the counter.  Ask your nurse if you have questions.     Bring a paper prescription for each of these medications     morphine 30 MG 12 hr tablet    oxyCODONE IR 30 MG tablet               Information about OPIOIDS     PRESCRIPTION OPIOIDS: WHAT YOU NEED TO KNOW   You have a prescription for an opioid (narcotic) pain medicine. Opioids can cause addiction. If you have a history of chemical dependency of any type, you are at a higher risk of becoming addicted to opioids. Only take this medicine after all other options have been tried. Take it for as short a time and as few doses as possible.     Do not:    Drive. If you drive while taking these medicines, you could be arrested for driving under the influence (DUI).    Operate heavy machinery    Do any other dangerous activities while taking these medicines.     Drink any alcohol while taking these medicines.      Take with any other medicines that contain acetaminophen. Read all labels carefully. Look for the word  acetaminophen  or  Tylenol.  Ask your pharmacist if you have questions or are unsure.    Store your pills in a secure place, locked if possible. We will not replace any lost or stolen medicine. If you don t finish your medicine, please throw away (dispose) as directed by your pharmacist. The [place] Pollution Control Agency has more information about safe disposal: https://www.pca.[facility].[place].us/living-green/managing-unwanted-medications    All opioids tend to cause constipation. Drink plenty of water and eat foods that have a lot of fiber, such as fruits, vegetables, prune juice, apple juice and high-fiber cereal. Take a laxative (Miralax, milk of magnesia, Colace, Senna) if you don t move your bowels at least every other day.          Primary Care Provider Office Phone # Fax #    Shahla Crawley [phone]        [address] [facility] [address]  [facility] [address]        Equal Access to Services     RODRIGO ZAMORA : Hadii aad ku hadmoraimajennifer Venegas, wajony ge, qafrancymunir bernabemajosseline lama, ranjan avelarjaronchance martin. So [facility] [phone].    ATENCIÓN: Si habla español, tiene a stiles disposición servicios gratuitos de asistencia lingüística. [facility] [phone].    We comply with applicable federal civil rights laws and [place] laws. We do not discriminate on the basis of race, color, national origin, age, disability, sex, sexual orientation, or gender identity.            Thank you!     Thank you for choosing [facility]  for your care. Our goal is always to provide you with excellent care. Hearing back from our patients is one way we can continue to improve our services. Please take a few minutes to complete the written survey that you may receive in the mail after your visit with us. Thank you!             Your Updated Medication List - Protect others around you: Learn how to safely use, store and throw away your medicines at www.disposemymeds.org.          This list is accurate as of 6/8/18 11:59 PM.  Always use your most recent med list.                   Brand Name Dispense Instructions for use Diagnosis    ACAI RAMIREZ PO      Take 50 mg by mouth        acetaminophen 325 MG tablet    TYLENOL    100 tablet    Take 2 tablets (650 mg) by mouth every 4 hours as needed for mild pain or fever    High grade B-cell lymphoma (H)       acyclovir 400 MG tablet    ZOVIRAX    30 tablet    Take 1 tablet (400 mg) by mouth 2 times daily    High grade B-cell lymphoma (H)       aluminum chloride 20 % external solution    DRYSOL    60 mL    Apply topically At Bedtime    Rash, Intertrigo       AZMACORT IN      Inhale 2 puffs into the lungs as needed        bisacodyl 5 MG EC tablet     30 tablet    Take 3 tablets (15 mg) by mouth daily as needed for constipation    Constipation, unspecified constipation type        calcitRIOL 0.25 MCG capsule    ROCALTROL    90 capsule    TAKE 2 CAPSULES BY MOUTH EVERY MORNING AND TAKE 1 CAPSULE BY MOUTH EVERY NIGHT AT BEDTIME    Postsurgical hypothyroidism, Papillary carcinoma, follicular variant (H), 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    High grade B-cell lymphoma (H)       COMPOUND CONTAINING CONTROLLED SUBSTANCE - PHARMACY TO MIX COMPOUNDED MEDICATION    CMPD RX    120 g    Apply small amount to affected area two times daily.    Neoplasm related pain       cromolyn 4 % ophthalmic solution    OPTICROM    10 mL    Place 1 drop into both eyes 4 times daily    Idiopathic mast cell activation syndrome (H)       cromolyn sodium 5.2 MG/ACT Aers Inhaler    NASALCROM    1 Bottle    SPRAY ONE SPRAY( 1 ML) IN NOSTRIL DAILY    Mast cell disease, systemic       [employer] GUMMY [employer]ILDREN Chew     120 tablet    Take 5 g by mouth daily (2 gummy= 5 g =1 serving)    High grade B-cell lymphoma (H)       cyclobenzaprine 10 MG tablet    FLEXERIL    30 tablet    Take 1 tablet (10 mg) by mouth 3 times daily as needed    High grade B-cell lymphoma (H)       diazepam 5 MG tablet    VALIUM    90 tablet    Take 1 tablet (5 mg) by mouth 3 times daily as needed for muscle spasms    Chest wall pain       diclofenac 1 % Gel topical gel    VOLTAREN    100 g    Apply affected area two times daily PRN using enclosed dosing card.    Myofascial pain       EPINEPHrine 0.3 MG/0.3ML injection 2-pack    EPIPEN 2-CARIDAD    0.6 mL    Inject 0.3 mLs (0.3 mg) into the muscle once as needed for anaphylaxis        fluticasone 50 MCG/ACT spray    FLONASE    1 Bottle     Spray 1-2 sprays into both nostrils daily    Bacterial sinusitis       * furosemide 20 MG tablet    LASIX    60 tablet    Take 1 tablet (20 mg) by mouth 2 times daily    Localized edema       * furosemide 20 MG tablet    LASIX    60 tablet    Take 1 tablet (20 mg) by mouth 2 times daily    Bilateral lower extremity edema       hydrochlorothiazide 12.5 MG capsule    MICROZIDE    30 capsule    Take 1 capsule (12.5 mg) by mouth daily    Hypocalcemia       levofloxacin 750 MG tablet    LEVAQUIN    10 tablet    Take 1 tablet (750 mg) by mouth daily    Bacterial sinusitis       levothyroxine 112 MCG tablet    SYNTHROID/LEVOTHROID    189 tablet    Mon-Sat: (2 tabs daily) Sunday: 3 tabs    Postsurgical hypothyroidism, Papillary carcinoma, follicular variant (H), Postsurgical hypoparathyroidism (H), Thyroid cancer (H)       lidocaine 5 % ointment    XYLOCAINE    350 g    Apply quarter size amount to chest and back up to 3 times daily as needed for pain.    Chest wall pain       lidocaine visc 2% 2.5mL/5mL & maalox/mylanta w/ simeth 2.5mL/5mL & diphenhydrAMINE 5mg/5mL Susp suspension    [facility]    360 mL    Swish and spit 10 mLs in mouth every 6 hours as needed for mouth sores    Stomatitis and mucositis, High grade B-cell lymphoma (H)       * lidocaine-prilocaine cream    EMLA    30 g    Apply topically as needed (port access pain.)    Neoplasm related pain, Chest wall pain       * lidocaine-prilocaine cream    EMLA    60 g    Apply topically as needed for moderate pain    High grade B-cell lymphoma (H)       magic mouthwash suspension (diphenhydrAMINE, lidocaine, aluminum-magnesium & simethicone) Susp compounding kit     237 mL    Swish and spit 5-10 mLs in mouth every 6 hours as needed for mouth sores    Stomatitis and mucositis, High grade B-cell lymphoma (H)       methocarbamol 750 MG tablet    ROBAXIN    360 tablet    Take 1 tablet (750 mg) by mouth 4 times daily . Ok to take a 5th Robaxin on very  severe days.    Myofascial pain       montelukast 10 MG tablet    SINGULAIR    60 tablet    Take 2 tablets (20 mg) by mouth 2 times daily    Idiopathic mast cell activation syndrome (H)       morphine 30 MG 12 hr tablet   Start taking on:  7/14/2018    MS CONTIN    120 tablet    Take 1 tablet (30 mg) by mouth every 8 hours . Take an addition pill at night such that your evening dose is 60mg    Neoplasm related pain       * ondansetron 8 MG ODT tab    ZOFRAN-ODT    30 tablet    Take 1 tablet (8 mg) by mouth every 8 hours as needed    High grade B-cell lymphoma (H), Nausea and vomiting, intractability of vomiting not specified, unspecified vomiting type       * ondansetron 8 MG ODT tab    ZOFRAN-ODT    30 tablet    Take 1 tablet (8 mg) by mouth every 8 hours as needed    High grade B-cell lymphoma (H), Nausea and vomiting, intractability of vomiting not specified, unspecified vomiting type, Breast cancer (H)       * order for DME     1 Units    Compression stockings, medium grade, please measure and fit. Please dispense a pair.    Peripheral edema       * order for DME     2 Units    Equipment being ordered: compression stockings. 20-30 mm or 30 - 40 mm as patient can tolerate. Physical therapy to determine if they should be above or below the knee.    Venous stasis       * order for DME     2 Device    Equipment being ordered: compression bra    Malignant neoplasm of right female breast (H)       order for DME     1 Units    Compression socks - knee high 20-30 mmHg zippered if possible    Lower extremity edema       oxyCODONE IR 30 MG tablet   Start taking on:  7/14/2018    ROXICODONE    180 tablet    Take 1 tablet (30 mg) by mouth every 4 hours as needed for moderate to severe pain    High grade B-cell lymphoma (H)       polyethylene glycol powder    MIRALAX    510 g    Take 17 g (1 capful) by mouth daily    Acute constipation       potassium chloride SA 20 MEQ CR tablet    KLOR-CON    60 tablet    Take 1 tablet (20  mEq) by mouth 2 times daily    Hypokalemia       PROBIOTIC DAILY PO      Take 1 capsule by mouth daily Lacto acid bifidobacterium    Breast cancer, unspecified laterality, Thyroid cancer (H), Chronic arthralgias of knees and hips, unspecified laterality       prochlorperazine 10 MG tablet    COMPAZINE     Take 10 mg by mouth as needed        ranitidine 75 MG tablet    ZANTAC    90 tablet    Take 1 tablet (75 mg) by mouth 3 times daily    Mast cell disease, systemic       senna-docusate 8.6-50 MG per tablet    SENOKOT-S;PERICOLACE    60 tablet    Take 1-2 tablets by mouth 2 times daily as needed for constipation    Acute constipation       SUMAtriptan 50 MG tablet    IMITREX     Take 50 mg by mouth as needed        tamoxifen 20 MG tablet    NOLVADEX    90 tablet    Take 1 tablet (20 mg) by mouth daily    Ductal carcinoma in situ (DCIS) of breast, unspecified laterality       triamcinolone 0.025 % ointment    KENALOG     Apply topically as needed        UNABLE TO FIND      3 tablets 3 times daily MEDICATION NAME: calcium D-Glucarate  3 caps contain 180mg of elemental calcium.        UNABLE TO FIND      Take 2 capsules by mouth 3 times daily Muscle Mag. 2 caps contain B1 20mg, B2 20mg, B6 10mg, magesium 20mg, manganese 2mg.        UNABLE TO FIND      2 tablets 3 times daily MEDICATION NAME: Digestzymes    Thyroid cancer (H), Postsurgical hypothyroidism, Postsurgical hypoparathyroidism (H)       UNABLE TO FIND      1 tablet daily MEDICATION NAME: Pure Encapsulations    Thyroid cancer (H), Postsurgical hypothyroidism, Postsurgical hypoparathyroidism (H)       VENTOLIN  (90 Base) MCG/ACT Inhaler   Generic drug:  albuterol     18 g    INHALE 2 PUFFS INTO THE LUNGS EVERY 4 HOURS AS NEEDED FOR SHORTNESS OF BREATH OR DIFFICULT BREATHING OR WHEEZING    Mild intermittent asthma without complication       vitamin D3 2000 units Caps     60 capsule    Take 5,000 Units by mouth daily Takes 2 tabs daily    Thyroid cancer (H),  Postsurgical hypothyroidism, Papillary carcinoma, follicular variant (H), Postsurgical hypoparathyroidism (H)       * Notice:  This list has 9 medication(s) that are the same as other medications prescribed for you. Read the directions carefully, and ask your doctor or other care provider to review them with you.       Medical marijuana

## 2022-12-19 NOTE — CONSULT NOTE ADULT - ASSESSMENT
66 yo male with HTN, HLD, COPD on O2 2-4L, PAD, ICD, known AAA to 5 cm who presented due to elevated transaminases on outpatient labs with acute on chronic postprandial abdominal pain.    Impression:  #Post prandial abdominal pain - different locations recently as described above. Likely has a component of chronic dyspepsia however may have symptomatic cholelithiasis vs PUD.   #COPD on home O2  #AAA    Recommendation:  - patient should undergo EGD and can do EUS to r/o choledocholithiasis for evaluation of symptoms  - last AICD interrogation was 4/2022, please repeat interrogation as it must be within last 6 months  - Cardiology following - please document cardiac optimization for EGD/EUS  - can tentatively plan for tomorrow if schedule allows, will determine in AM  - NPO after midnight    Note not finalized until signed by attending.    Angelic Drew PGY-6  Gastroenterology/Hepatology Fellow  Pager #[phone] (SONAL) or [phone] (NS)  Available on [employer] Teams.  Please contact on-call GI fellow via answering service ([phone]) after 5pm and before 8am, and on weekends.

## 2022-12-19 NOTE — CONSULT NOTE ADULT - ATTENDING COMMENTS
As above  Nonspecific abd pain - post prandial, possibly PUD?  Mildly elevated liver tests  Normal CT/US of bile ducts, do note cholelithiasis      Will plan for EGD/ EUS possibly Tues or Wed pending endoscopy schedule  PPI + symptomatic management for now    Thank you for this interesting consult.  Please call the advanced GI service with any questions or concerns.
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
## 2022-12-19 NOTE — BH CONSULTATION LIAISON ASSESSMENT NOTE - CURRENT MEDICATION
MEDICATIONS  (STANDING):  aspirin enteric coated 81 milliGRAM(s) Oral daily  enoxaparin Injectable 40 milliGRAM(s) SubCutaneous every 24 hours  levothyroxine 75 MICROGram(s) Oral daily  lisinopril 20 milliGRAM(s) Oral daily  metoprolol tartrate 50 milliGRAM(s) Oral two times a day  pantoprazole    Tablet 40 milliGRAM(s) Oral every 12 hours  polyethylene glycol 3350 17 Gram(s) Oral two times a day  senna 2 Tablet(s) Oral at bedtime  simethicone 80 milliGRAM(s) Chew three times a day  sodium chloride 0.9%. 1000 milliLiter(s) (100 mL/Hr) IV Continuous <Continuous>    MEDICATIONS  (PRN):  albuterol    90 MICROgram(s) HFA Inhaler 2 Puff(s) Inhalation every 6 hours PRN Shortness of Breath and/or Wheezing  ibuprofen  Tablet. 400 milliGRAM(s) Oral every 6 hours PRN Temp greater or equal to 38.5C (101.3F), Mild Pain (1 - 3)

## 2022-12-19 NOTE — CONSULT NOTE ADULT - SUBJECTIVE AND OBJECTIVE BOX
General Surgery Consult  Consulting surgical team: [facility]  Consulting attending: Janna    HPI: Patient is a 67-year-old man with PMH HTN, HLD, COPD on oxygen on average 2 to 4 L, PAD, defibrillator, known AAA measuring 5cm, presenting due to elevated liver enzymes that were found 2 days ago on routine labs.  Patient states that he had a call back yesterday about results, transport to the ER was arranged for today.  PCP Dr. Simms.  Endorsing acute worsening of diffuse abdominal pain that is present for the last 3 weeks, usually present after eating. +dysuria for the last few days.  In the ED, found to have cholelithiasis without acute cholecystitis. Denies fever, chest pain, SOB.      PAST MEDICAL HISTORY:  HTN (hypertension)    HLD (hyperlipidemia)    PAD (peripheral artery disease)        PAST SURGICAL HISTORY:  No significant past surgical history        MEDICATIONS:      ALLERGIES:  No Known Allergies      VITALS & I/Os:  Vital Signs Last 24 Hrs  T(C): 36.7 (15 Dec 2022 20:48), Max: 36.9 (15 Dec 2022 12:22)  T(F): 98.1 (15 Dec 2022 20:48), Max: 98.5 (15 Dec 2022 12:22)  HR: 77 (15 Dec 2022 20:48) (76 - 77)  BP: 139/72 (15 Dec 2022 20:48) (139/72 - 140/86)  BP(mean): --  RR: 18 (15 Dec 2022 20:48) (18 - 20)  SpO2: 100% (15 Dec 2022 20:48) (100% - 100%)    Parameters below as of 15 Dec 2022 20:48  Patient On (Oxygen Delivery Method): nasal cannula  O2 Flow (L/min): 4      I&O's Summary      PHYSICAL EXAM:  General: No acute distress  Respiratory: Nonlabored  Cardiovascular: RRR  Abdominal: Soft, nondistended, mildly tender in RUQ. No rebound or guarding. No organomegaly, no palpable mass.  Extremities: Warm    LABS:                        12.2   8.42  )-----------( 305      ( 15 Dec 2022 15:39 )             36.2     12-15    130<L>  |  86<L>  |  10  ----------------------------<  112<H>  4.0   |  31  |  0.43<L>    Ca    9.3      15 Dec 2022 15:39    TPro  7.3  /  Alb  4.3  /  TBili  0.7  /  DBili  x   /  AST  142<H>  /  ALT  136<H>  /  AlkPhos  136<H>  12-15    Lactate:  12-15 @ 15:30  2.5              Urinalysis Basic - ( 15 Dec 2022 21:36 )    Color: Light Yellow / Appearance: Clear / SG: >1.050 / pH: x  Gluc: x / Ketone: Negative  / Bili: Negative / Urobili: Negative   Blood: x / Protein: Trace / Nitrite: Negative   Leuk Esterase: Negative / RBC: 1 /hpf / WBC 1 /HPF   Sq Epi: x / Non Sq Epi: 1 /hpf / Bacteria: Negative        IMAGING:      ACC: [id number] EXAM: CT ANGIO ABD PELV (W)AW IC  ACC: [id number] EXAM: CT ANGIO CHEST AORTA [facility]    PROCEDURE DATE: 12/15/2022        INTERPRETATION: CLINICAL INFORMATION: Chest pain.    ADDITIONAL CLINICAL INFORMATION:    COMPARISON: CT into abdomen pelvis 4/26/2022, CT chest abdomen pelvis 5/14/2018.    CONTRAST/COMPLICATIONS:  IV Contrast: Omnipaque 350 125 cc administered 25 cc discarded  Oral Contrast: NONE  Complications: None reported at time of study completion    PROCEDURE:  CT Angiography of the Chest, Abdomen and Pelvis.  Gated precontrast imaging was performed through the heart followed by gated CT Angiography of the heart with subsequent non-gated arterial phase imaging of the chest, abdomen and pelvis.  Sagittal and coronal reformats were performed as well as 3D (MIP) reconstructions.    FINDINGS:  CHEST:  LUNGS AND LARGE AIRWAYS: Patent central airways. Emphysema with unchanged bilateral lung nodules measuring up to 3 mm.  PLEURA: No pleural effusion.  VESSELS: There is no intramural hematoma. There is no aneurysmal dilatation or dissection of the aorta. Aortic calcification.  HEART: Heart size is normal. Coronary artery and aortic valvular calcifications. No pericardial effusion.  MEDIASTINUM AND ALANNA: No lymphadenopathy.  CHEST WALL AND LOWER NECK: Right chest wall AICD.    ABDOMEN AND PELVIS:  LIVER: Within normal limits.  BILE DUCTS: Normal caliber.  GALLBLADDER: Cholelithiasis.  SPLEEN: Within normal limits.  PANCREAS: Within normal limits.  ADRENALS: Stable bilateral adrenal nodules.  KIDNEYS/URETERS: Symmetric enhancement of the kidneys. Stable left lower pole cortical scarring. No hydronephrosis. 1.1 cm left upper pole renal cyst, unchanged.    BLADDER: Fluid status distended bladder.  REPRODUCTIVE ORGANS: Prostate is enlarged.    BOWEL: No bowel obstruction. Appendix is normal. Moderate rectal stool burden.  PERITONEUM: No ascites.  VESSELS: Stable partially thrombosed infrarenal intra-abdominal aortic aneurysm measures 5.3 x 4.8 x 11.5 cm ( TRV x AP x CC). No aortic dissection is seen. Unchanged ectatic bilateral common iliac arteries. Right common iliac artery measures up to 1.5 cm. Intact right femoral bypass graft.  RETROPERITONEUM/LYMPH NODES: No lymphadenopathy.  ABDOMINAL WALL: Within normal limits.  BONES: Degenerative changes of the spine.    IMPRESSION:  No aortic dissection.    Stable 5.3 cm partially thrombosed infrarenal intra-abdominal aortic aneurysm.        --- End of Report ---      
VASCULAR SURGERY CONSULT NOTE    Previous consult from 04/26/22:   67M with a PMHx of COPD on home O2, CAD s/p stents, CHF with AICD, PAD s/p angioplasties and RLE fem-pop bypass (these procedures done by Dr. Calle at [facility]), gastritis, osteoarthritis, hypoactive thryroid who presents after bring sent in by his PMD secondary to elevated LFTs and abdominal pain that started 4 days ago. Pt otherwise denies fever/chills, CP, SOB, nausea, vomiting or other concerns. Of note, pt has smoked 1 ppd x 30 years but has not smoked in the past 4 years and drinks 2-3 drinks of alcohol per day. US Abdomen showed Partially thrombosed abdominal aortic aneurysm measuring 4.8 x 4.7 by at least 8.4 cm. CTA showed Partially thrombosed infrarenal aortic aneurysm measures 5.2 x 5.0 x 11.1 cm (AP by transverse by craniocaudad) end extends to the bifurcation. No evidence of dissection or impending rupture. Ectasia of the bilateral common iliac arteries with the right being aneurysmal measuring up to 1.8 cm. Mesenteric vessels are patent. Right femoral bypass graft appears patent proximally in the visualized portions. Vascular Surgery called to comment.      HPI:  Patient is a 67-year-old man with PMH HTN, HLD, COPD on oxygen on average 2 to 4 L, PAD, defibrillator, known AAA measuring 5cm, presenting due to elevated liver enzymes that were found 2 days ago on routine labs.  Patient states that he had a call back yesterday about results, transport to the ER was arranged for today.  PCP Dr. Simms.  Endorsing acute worsening of diffuse abdominal pain that is present for the last 3 weeks, usually present after eating. +dysuria for the last few days.  In the ED, found to have cholelithiasis without acute cholecystitis. Denies fever, chest pain, SOB.    Vascular Surgery consulted for AAA measuring 5.3 cm. CT showing stable partially thrombosed infrarenal intra-abdominal aortic aneurysm measures 5.3 x 4.8 x 11.5 cm ( TRV x AP x CC). No aortic dissection is seen. Unchanged ectatic bilateral common iliac arteries. Right common iliac artery measures up to 1.5 cm. Intact right femoral bypass graft.  Patient known to Dr. Joaquin, last follow-up 08/2022.      PAST MEDICAL & SURGICAL HISTORY:      REVIEW OF SYSTEMS    10 point review of systems was assessed and is unremarkable other than what was mentioned in the HPI    MEDICATIONS  (STANDING):    MEDICATIONS  (PRN):      Allergies    No Known Allergies    Intolerances    FAMILY HISTORY:    unless noted, no significant family hx with Mother, Father, Siblings     Vital Signs Last 24 Hrs  T(C): 36.6 (16 Dec 2022 13:00), Max: 37 (16 Dec 2022 03:19)  T(F): 97.9 (16 Dec 2022 13:00), Max: 98.6 (16 Dec 2022 03:19)  HR: 92 (16 Dec 2022 13:00) (70 - 92)  BP: 150/87 (16 Dec 2022 13:00) (138/89 - 152/92)  BP(mean): --  RR: 18 (16 Dec 2022 13:00) (18 - 20)  SpO2: 98% (16 Dec 2022 13:00) (98% - 100%)    Parameters below as of 16 Dec 2022 13:00  Patient On (Oxygen Delivery Method): nasal cannula  O2 Flow (L/min): 2      General: WN/WD NAD  Neurology: Awake, nonfocal, METZ x 4  Respiratory: non labored breath sounds on RA  CV: RRR, S1S2, no murmurs, rubs or gallops  Abdominal: Soft, mild epigastric tenderness, ND  Psych: Oriented x 3, normal affect  Vascular: Palpable fem/pop b/l, signals on Doppler b/l       LABS:  cret                        12.2   8.42  )-----------( 305      ( 15 Dec 2022 15:39 )             36.2     12-15    130<L>  |  86<L>  |  10  ----------------------------<  112<H>  4.0   |  31  |  0.43<L>    Ca    9.3      15 Dec 2022 15:39    TPro  7.3  /  Alb  4.3  /  TBili  0.7  /  DBili  x   /  AST  142<H>  /  ALT  136<H>  /  AlkPhos  136<H>  12-15    RADIOLOGY & ADDITIONAL STUDIES:    ACC: [id number] EXAM:  CT ANGIO ABD PELV (W)AW IC                        ACC: [id number] EXAM:  CT ANGIO CHEST AORTA [facility]                          PROCEDURE DATE:  12/15/2022          INTERPRETATION:  CLINICAL INFORMATION: Chest pain.    ADDITIONAL CLINICAL INFORMATION:    COMPARISON: CT into abdomen pelvis 4/26/2022, CT chest abdomen pelvis   5/14/2018.    CONTRAST/COMPLICATIONS:  IV Contrast: Omnipaque 350  125 cc administered   25 cc discarded  Oral Contrast: NONE  Complications: None reported at time of study completion    PROCEDURE:  CT Angiography of the Chest, Abdomen and Pelvis.  Gated precontrast imaging was performed through the heart followed by   gated CT Angiography of the heart with subsequent non-gated arterial   phase imaging of the chest, abdomen and pelvis.  Sagittal and coronal reformats were performed as well as 3D (MIP)   reconstructions.    FINDINGS:  CHEST:  LUNGS AND LARGE AIRWAYS: Patent central airways. Emphysema with unchanged   bilateral lung nodules measuring up to 3 mm.  PLEURA: No pleural effusion.  VESSELS: There is no intramural hematoma. There is no aneurysmal   dilatation or dissection of the aorta. Aortic calcification.  HEART: Heart size is normal. Coronary artery and aortic valvular   calcifications. No pericardial effusion.  MEDIASTINUM AND ALANNA: No lymphadenopathy.  CHEST WALL AND LOWER NECK: Right chest wall AICD.    ABDOMEN AND PELVIS:  LIVER: Within normal limits.  BILE DUCTS: Normal caliber.  GALLBLADDER: Cholelithiasis.  SPLEEN: Within normal limits.  PANCREAS: Within normal limits.  ADRENALS: Stable bilateral adrenal nodules.  KIDNEYS/URETERS: Symmetric enhancement of the kidneys. Stable left lower   pole cortical scarring. No hydronephrosis. 1.1 cm left upper pole renal   cyst, unchanged.    BLADDER: Fluid status distended bladder.  REPRODUCTIVE ORGANS: Prostate is enlarged.    BOWEL: No bowel obstruction. Appendix is normal. Moderate rectal stool   burden.  PERITONEUM: No ascites.  VESSELS: Stable partially thrombosed infrarenal intra-abdominal aortic   aneurysm measures 5.3 x 4.8 x 11.5 cm ( TRV x AP x CC). No aortic   dissection is seen. Unchanged ectatic bilateral common iliac arteries.   Right common iliac artery measures up to 1.5 cm. Intact right femoral   bypass graft.  RETROPERITONEUM/LYMPH NODES: No lymphadenopathy.  ABDOMINAL WALL: Within normal limits.  BONES: Degenerative changes of the spine.    IMPRESSION:  No aortic dissection.    Stable 5.3 cm partially thrombosed infrarenal intra-abdominal aortic   aneurysm.        --- End of Report ---           JOAN WILLIS MD; Resident Radiologist  This document has been electronically signed.  DIANNA FLORES MD; Attending Radiologist  This document has been electronically signed. Dec 15 2022  7:01PM    
  Chief Complaint:  Patient is a 67y old  Male who presents with a chief complaint of Elevated liver enzymes (16 Dec 2022 16:20)      HPI: 66 yo male with HTN, HLD, COPD on O2 2-4L, PAD, ICD, known AAA to 5 cm who presented due to elevated transaminases on outpatient labs. Patient reports that he has been having one month of abdominal pain that was worse with eating. He states he has chronic "gastritis" which he describes as post-prandial discomfort in epigastric area. He states it is usually worse at night that improves with belching. However over the past 4 weeks he notes it has been getting worse after eating and turned into a sharp pain that lasts for 30min and self resolves. Denies taking any OTC meds at home for the pain. He states he still has his baseline epigastric discomfort that is relieved with belching. He also notes baseline constipation. He reports having to strain occasionally and has not had a solid BM since admission. Denies ever noticing yellowing of the eyes or skin. Does report his urine looked slightly red and dark in color recently. Patient had gotten labwork with elevated transaminases but endorses history of elevated transaminases in the past he states in April however reports on repeat blood work he was told it was a "false positive". Patient reports that he has not had any new medications in the past 6 months, but patient is on atorvastatin 40mg daily.     Labs pertinent on admission for elevated AST/ALT, normal bili and alk phos. HIDA neg. US with cholelithiasis.     Of note, patient states he had an upper endoscopy, colonoscopy and capsule endoscopy about 12 years ago for dark stools and was told he had ulcers. He states at that time he was taking aleve.     Allergies:  No Known Allergies      Home Medications:    Hospital Medications:  albuterol    90 MICROgram(s) HFA Inhaler 2 Puff(s) Inhalation every 6 hours PRN  aspirin enteric coated 81 milliGRAM(s) Oral daily  enoxaparin Injectable 40 milliGRAM(s) SubCutaneous every 24 hours  ibuprofen  Tablet. 400 milliGRAM(s) Oral every 6 hours PRN  levothyroxine 75 MICROGram(s) Oral daily  lisinopril 20 milliGRAM(s) Oral daily  melatonin 3 milliGRAM(s) Oral at bedtime PRN  metoprolol tartrate 50 milliGRAM(s) Oral two times a day  mirtazapine 7.5 milliGRAM(s) Oral at bedtime  pantoprazole    Tablet 40 milliGRAM(s) Oral every 12 hours  polyethylene glycol 3350 17 Gram(s) Oral two times a day  senna 2 Tablet(s) Oral at bedtime  simethicone 80 milliGRAM(s) Chew three times a day  sodium chloride 0.9%. 1000 milliLiter(s) IV Continuous <Continuous>      PMHX/PSHX:  HTN (hypertension)    HLD (hyperlipidemia)    PAD (peripheral artery disease)    History of abdominal aortic aneurysm (AAA)    History of COPD    Gastritis    Hypothyroidism    No significant past surgical history        Family history:      Social History:     ROS:     General:  No weight loss, fevers, chills, night sweats, fatigue  Eyes:  No vision changes, no yellowing of eyes   ENT:  No throat pain, runny nose  CV:  No chest pain, palpitations  Resp:  No SOB, cough, wheezing  GI:  See HPI  :  No burning with urination, no hematuria   Muscle:  No muscle pain, weakness  Neuro:  No numbness/tingling, memory problems  Psych:  No fatigue, insomnia, mood problems  Heme:  No easy bruisability  Skin:  No rash, itching       PHYSICAL EXAM:     GENERAL:  Appears stated age, well-groomed, well-nourished, no distress  HEENT:  NC/AT,  conjunctivae clear and pink,  no JVD  CHEST:  Full & symmetric excursion, no increased effort, breath sounds clear  HEART:  Regular rhythm, S1, S2, no murmur/rub/S3/S4, no abdominal bruit, no edema  ABDOMEN:  Soft, tender to palpation in LLQ and RUQ, nondistended, no rebound, no guarding  EXTREMITIES:  no cyanosis,clubbing or edema  SKIN:  No rash/erythema/ecchymoses/petechiae/wounds/abscess/warm/dry  NEURO:  Alert, oriented    Vital Signs:  Vital Signs Last 24 Hrs  T(C): 36.9 (19 Dec 2022 11:30), Max: 36.9 (19 Dec 2022 11:30)  T(F): 98.5 (19 Dec 2022 11:30), Max: 98.5 (19 Dec 2022 11:30)  HR: 70 (19 Dec 2022 11:30) (56 - 70)  BP: 155/88 (19 Dec 2022 11:30) (151/82 - 165/88)  BP(mean): --  RR: 18 (19 Dec 2022 11:30) (18 - 20)  SpO2: 95% (19 Dec 2022 11:30) (95% - 96%)    Parameters below as of 19 Dec 2022 11:30  Patient On (Oxygen Delivery Method): nasal cannula  O2 Flow (L/min): 2    Daily     Daily     LABS:                        11.5   11.57 )-----------( 263      ( 18 Dec 2022 12:06 )             34.6     12-18    130<L>  |  91<L>  |  4<L>  ----------------------------<  100<H>  3.9   |  30  |  0.46<L>    Ca    8.6      18 Dec 2022 02:21    TPro  5.9<L>  /  Alb  3.6  /  TBili  0.8  /  DBili  x   /  AST  58<H>  /  ALT  68<H>  /  AlkPhos  93  12-18    LIVER FUNCTIONS - ( 18 Dec 2022 02:21 )  Alb: 3.6 g/dL / Pro: 5.9 g/dL / ALK PHOS: 93 U/L / ALT: 68 U/L / AST: 58 U/L / GGT: x           PT/INR - ( 18 Dec 2022 02:21 )   PT: 12.0 sec;   INR: 1.03 ratio                 Imaging:      < from: NM Hepatobiliary Imaging (12.16.22 @ 09:33) >    FINDINGS: There is prompt, homogeneous uptake of radiotracer by the   hepatocytes. Activity is first seen in the gallbladder at 20 minutes and   in the bowel at 15 minutes. There is good clearance of activity from the   liver by the end of the study.    IMPRESSION: Hepatobiliary scan demonstrates:    No evidence of acute cholecystitis.    --- End of Report ---    < end of copied text >  < from: CT Angio Abdomen and Pelvis w/ IV Cont (12.15.22 @ 17:29) >    FINDINGS:  CHEST:  LUNGS AND LARGE AIRWAYS: Patent central airways. Emphysema with unchanged   bilateral lung nodules measuring up to 3 mm.  PLEURA: No pleural effusion.  VESSELS: There is no intramural hematoma. There is no aneurysmal   dilatation or dissection of the aorta. Aortic calcification.  HEART: Heart size is normal. Coronary artery and aortic valvular   calcifications. No pericardial effusion.  MEDIASTINUM AND ALANNA: No lymphadenopathy.  CHEST WALL AND LOWER NECK: Right chest wall AICD.    ABDOMEN AND PELVIS:  LIVER: Within normal limits.  BILE DUCTS: Normal caliber.  GALLBLADDER: Cholelithiasis.  SPLEEN: Within normal limits.  PANCREAS: Within normal limits.  ADRENALS: Stable bilateral adrenal nodules.  KIDNEYS/URETERS: Symmetric enhancement of the kidneys. Stable left lower   pole cortical scarring. No hydronephrosis. 1.1 cm left upper pole renal   cyst, unchanged.    BLADDER: Fluid status distended bladder.  REPRODUCTIVE ORGANS: Prostate is enlarged.    BOWEL: No bowel obstruction. Appendix is normal. Moderate rectal stool   burden.  PERITONEUM: No ascites.  VESSELS: Stable partially thrombosed infrarenal intra-abdominal aortic   aneurysm measures 5.3 x 4.8 x 11.5 cm ( TRV x AP x CC). No aortic   dissection is seen. Unchanged ectatic bilateral common iliac arteries.   Right common iliac artery measures up to 1.5 cm. Intact right femoral   bypass graft.  RETROPERITONEUM/LYMPH NODES: No lymphadenopathy.  ABDOMINAL WALL: Within normal limits.  BONES: Degenerative changes of the spine.    IMPRESSION:  No aortic dissection.    Stable 5.3 cm partially thrombosed infrarenal intra-abdominal aortic   aneurysm.    < end of copied text >      
CHIEF COMPLAINT: abnormal labs    HISTORY OF PRESENT ILLNESS:  67-year-old man with PMH HTN, HLD, COPD on oxygen on average 2 to 4 L, PAD, defibrillator, known AAA measuring 5cm, presenting due to elevated liver enzymes that were found 2 days ago on routine labs.  Patient states that he had a call back yesterday about results, transport to the ER was arranged for today.  PCP Dr. Simms.  Endorsing acute worsening of diffuse abdominal pain that is present for the last 3 weeks, usually present after eating. +dysuria for the last few days.  In the ED, found to have cholelithiasis without acute cholecystitis. Denies fever, chest pain, SOB.        Allergies    No Known Allergies    Intolerances    	    MEDICATIONS:  see med rec                PAST MEDICAL & SURGICAL HISTORY:  HTN (hypertension)      HLD (hyperlipidemia)      PAD (peripheral artery disease)      No significant past surgical history          FAMILY HISTORY:      SOCIAL HISTORY:    former smoker. + etoh    REVIEW OF SYSTEMS:  See HPI, otherwise complete 10 point review of systems negative    [ ] All others negative	      PHYSICAL EXAM:  T(C): 37 (12-16-22 @ 03:19), Max: 37 (12-16-22 @ 03:19)  HR: 83 (12-16-22 @ 03:19) (70 - 83)  BP: 152/92 (12-16-22 @ 03:19) (138/89 - 152/92)  RR: 18 (12-16-22 @ 03:19) (18 - 20)  SpO2: 99% (12-16-22 @ 03:19) (99% - 100%)  Wt(kg): --  I&O's Summary      Appearance: No Acute Distress	  HEENT:  Normal oral mucosa, PERRL, EOMI	  Cardiovascular: Normal S1 S2, No JVD, No murmurs/rubs/gallops  Respiratory: Lungs clear to auscultation bilaterally  Gastrointestinal:  Soft, Non-tender, + BS	  Skin: No rashes, No ecchymoses, No cyanosis	  Neurologic: Non-focal  Extremities: No clubbing, cyanosis or edema  Vascular: Peripheral pulses palpable 2+ bilaterally  Psychiatry: A & O x 3, Mood & affect appropriate    Laboratory Data:	 	    CBC Full  -  ( 15 Dec 2022 15:39 )  WBC Count : 8.42 K/uL  Hemoglobin : 12.2 g/dL  Hematocrit : 36.2 %  Platelet Count - Automated : 305 K/uL  Mean Cell Volume : 91.2 fl  Mean Cell Hemoglobin : 30.7 pg  Mean Cell Hemoglobin Concentration : 33.7 gm/dL  Auto Neutrophil # : 6.56 K/uL  Auto Lymphocyte # : 1.09 K/uL  Auto Monocyte # : 0.66 K/uL  Auto Eosinophil # : 0.04 K/uL  Auto Basophil # : 0.04 K/uL  Auto Neutrophil % : 77.9 %  Auto Lymphocyte % : 12.9 %  Auto Monocyte % : 7.8 %  Auto Eosinophil % : 0.5 %  Auto Basophil % : 0.5 %    12-15    130<L>  |  86<L>  |  10  ----------------------------<  112<H>  4.0   |  31  |  0.43<L>    Ca    9.3      15 Dec 2022 15:39    TPro  7.3  /  Alb  4.3  /  TBili  0.7  /  DBili  x   /  AST  142<H>  /  ALT  136<H>  /  AlkPhos  136<H>  12-15      proBNP:   Lipid Profile:   HgA1c:   TSH:       CARDIAC MARKERS:            Interpretation of Telemetry: 	    ECG:  	  RADIOLOGY:  OTHER: 	    PREVIOUS DIAGNOSTIC TESTING:    [ ] Echocardiogram:  [ ] Catheterization:  [ ] Stress Test:  	    Assessment:  elevated LFT  COPD on home O2  CAD s/p stents,  HFrEF s/p  AICD  PAD s/p angioplasties and RLE fem-pop bypass   gastritis  Partially thrombosed abdominal aortic aneurysm measuring 4.8 x 4.7 by at least 8.4 cm. CTA showed Partially thrombosed infrarenal aortic aneurysm measures 5.2 x 5.0 x 11.1 cm (AP by transverse by craniocaudad) end extends to the bifurcation.    Recs:  cardiac stable  vol status acceptable. cw diuretics as dosed  cw gdmt for lv dysfunction  cw antiplatelets, statin and antianginals  f/u surgery recs  pending hida scan   will follow      Greater than 60 minutes spent on total encounter; more than 50% of the visit was spent counseling and/or coordinating care by the attending physician.   	  Vaughn Simms MD   Cardiovascular Diseases  [phone]    
  HPI: 68 yo male with HTN, HLD, COPD on O2 2-4L, PAD, ICD, known AAA to 5 cm who presented due to elevated transaminases on outpatient labs. Patient reports that he had been having one month of abdominal pain that was worse with eating. Patient had gotten labwork with elevated transaminases but endorses history of elevated transaminases in the past. Patient reports that he has not had any new medications in the past 6 months, but patient is on atorvastatin 40mg daily. Otherwise no complaints at this time.     Labs pertinent for Tbili 0.7, Alk phosp 136, , . US abdomen with cholithiasis but no evidence of cholecystitis. HIDA negative. CT AP without liver abnormalities.      Allergies:  No Known Allergies      Home Medications:    Hospital Medications:  albuterol    90 MICROgram(s) HFA Inhaler 2 Puff(s) Inhalation every 6 hours PRN  aspirin enteric coated 81 milliGRAM(s) Oral daily  enoxaparin Injectable 40 milliGRAM(s) SubCutaneous every 24 hours  ibuprofen  Tablet. 400 milliGRAM(s) Oral every 6 hours PRN  levothyroxine 75 MICROGram(s) Oral daily  lisinopril 20 milliGRAM(s) Oral daily  metoprolol tartrate 50 milliGRAM(s) Oral two times a day  pantoprazole    Tablet 40 milliGRAM(s) Oral before breakfast      PMHX/PSHX:  HTN (hypertension)    HLD (hyperlipidemia)    PAD (peripheral artery disease)    History of abdominal aortic aneurysm (AAA)    History of COPD    Gastritis    Hypothyroidism    No significant past surgical history        Family history:      Denies family history of colon cancer/polyps, stomach cancer/polyps, pancreatic cancer/masses, liver cancer/disease, ovarian cancer and endometrial cancer.    Social History:   Tob: Denies  EtOH: Denies  Illicit Drugs: Denies    ROS:     General:  No wt loss, fevers, chills, night sweats, fatigue  Eyes:  Good vision, no reported pain  ENT:  No sore throat, pain, runny nose, dysphagia  CV:  No pain, palpitations, hypo/hypertension  Pulm:  No dyspnea, cough, tachypnea, wheezing  GI:  see HPI  :  No pain, bleeding, incontinence, nocturia  Muscle:  No pain, weakness  Neuro:  No weakness, tingling, memory problems  Psych:  No fatigue, insomnia, mood problems, depression  Endocrine:  No polyuria, polydipsia, cold/heat intolerance  Heme:  No petechiae, ecchymosis, easy bruisability  Skin:  No rash, tattoos, scars, edema    PHYSICAL EXAM:     GENERAL:  No acute distress  HEENT:  NCAT, no scleral icterus   CHEST:  no respiratory distress  HEART:  Regular rate and rhythm  ABDOMEN:  Soft, non-tender, non-distended, no masses  EXTREMITIES: No edema  SKIN:  No rash/erythema/ecchymoses/petechiae/wounds/abscess/warm/dry  NEURO:  Alert and oriented x 3, no asterixis    Vital Signs:  Vital Signs Last 24 Hrs  T(C): 36.6 (16 Dec 2022 13:00), Max: 37 (16 Dec 2022 03:19)  T(F): 97.9 (16 Dec 2022 13:00), Max: 98.6 (16 Dec 2022 03:19)  HR: 92 (16 Dec 2022 13:00) (70 - 92)  BP: 150/87 (16 Dec 2022 13:00) (138/89 - 152/92)  BP(mean): --  RR: 18 (16 Dec 2022 13:00) (18 - 20)  SpO2: 98% (16 Dec 2022 13:00) (98% - 100%)    Parameters below as of 16 Dec 2022 13:00  Patient On (Oxygen Delivery Method): nasal cannula  O2 Flow (L/min): 2    Daily     Daily     LABS:                        12.2   8.42  )-----------( 305      ( 15 Dec 2022 15:39 )             36.2       12-15    130<L>  |  86<L>  |  10  ----------------------------<  112<H>  4.0   |  31  |  0.43<L>    Ca    9.3      15 Dec 2022 15:39    TPro  7.3  /  Alb  4.3  /  TBili  0.7  /  DBili  x   /  AST  142<H>  /  ALT  136<H>  /  AlkPhos  136<H>  12-15    LIVER FUNCTIONS - ( 15 Dec 2022 15:39 )  Alb: 4.3 g/dL / Pro: 7.3 g/dL / ALK PHOS: 136 U/L / ALT: 136 U/L / AST: 142 U/L / GGT: x             Urinalysis Basic - ( 15 Dec 2022 21:36 )    Color: Light Yellow / Appearance: Clear / SG: >1.050 / pH: x  Gluc: x / Ketone: Negative  / Bili: Negative / Urobili: Negative   Blood: x / Protein: Trace / Nitrite: Negative   Leuk Esterase: Negative / RBC: 1 /hpf / WBC 1 /HPF   Sq Epi: x / Non Sq Epi: 1 /hpf / Bacteria: Negative                              12.2   8.42  )-----------( 305      ( 15 Dec 2022 15:39 )             36.2       Imaging:

## 2022-12-19 NOTE — BH CONSULTATION LIAISON ASSESSMENT NOTE - NSBHMSEPERCEPT_PSY_A_CORE
[Adequate] : adequate [Use of Plain Language] : use of plain language [None] : none No abnormalities

## 2022-12-19 NOTE — BH CONSULTATION LIAISON ASSESSMENT NOTE - NSBHCHARTREVIEWVS_PSY_A_CORE FT
Vital Signs Last 24 Hrs  T(C): 36.5 (19 Dec 2022 06:05), Max: 36.6 (18 Dec 2022 17:11)  T(F): 97.7 (19 Dec 2022 06:05), Max: 97.9 (18 Dec 2022 17:11)  HR: 56 (19 Dec 2022 06:05) (56 - 64)  BP: 151/82 (19 Dec 2022 06:05) (151/82 - 165/88)  BP(mean): --  RR: 18 (19 Dec 2022 06:05) (18 - 20)  SpO2: 95% (19 Dec 2022 06:05) (94% - 96%)    Parameters below as of 19 Dec 2022 06:05  Patient On (Oxygen Delivery Method): nasal cannula  O2 Flow (L/min): 2

## 2022-12-20 ENCOUNTER — NON-APPOINTMENT (OUTPATIENT)
Age: 67
End: 2022-12-20

## 2022-12-20 LAB
ALBUMIN SERPL ELPH-MCNC: 3.5 G/DL — SIGNIFICANT CHANGE UP (ref 3.3–5)
ALP SERPL-CCNC: 81 U/L — SIGNIFICANT CHANGE UP (ref 40–120)
ALT FLD-CCNC: 61 U/L — HIGH (ref 10–45)
ANION GAP SERPL CALC-SCNC: 10 MMOL/L — SIGNIFICANT CHANGE UP (ref 5–17)
AST SERPL-CCNC: 57 U/L — HIGH (ref 10–40)
BILIRUB SERPL-MCNC: 0.5 MG/DL — SIGNIFICANT CHANGE UP (ref 0.2–1.2)
BUN SERPL-MCNC: <4 MG/DL — LOW (ref 7–23)
CALCIUM SERPL-MCNC: 8.7 MG/DL — SIGNIFICANT CHANGE UP (ref 8.4–10.5)
CHLORIDE SERPL-SCNC: 92 MMOL/L — LOW (ref 96–108)
CO2 SERPL-SCNC: 28 MMOL/L — SIGNIFICANT CHANGE UP (ref 22–31)
CREAT SERPL-MCNC: 0.42 MG/DL — LOW (ref 0.5–1.3)
EGFR: 116 ML/MIN/1.73M2 — SIGNIFICANT CHANGE UP
GLUCOSE SERPL-MCNC: 90 MG/DL — SIGNIFICANT CHANGE UP (ref 70–99)
HCT VFR BLD CALC: 28.2 % — LOW (ref 39–50)
HGB BLD-MCNC: 9.7 G/DL — LOW (ref 13–17)
MCHC RBC-ENTMCNC: 31.7 PG — SIGNIFICANT CHANGE UP (ref 27–34)
MCHC RBC-ENTMCNC: 34.4 GM/DL — SIGNIFICANT CHANGE UP (ref 32–36)
MCV RBC AUTO: 92.2 FL — SIGNIFICANT CHANGE UP (ref 80–100)
NRBC # BLD: 0 /100 WBCS — SIGNIFICANT CHANGE UP (ref 0–0)
PLATELET # BLD AUTO: 231 K/UL — SIGNIFICANT CHANGE UP (ref 150–400)
POTASSIUM SERPL-MCNC: 3.1 MMOL/L — LOW (ref 3.5–5.3)
POTASSIUM SERPL-SCNC: 3.1 MMOL/L — LOW (ref 3.5–5.3)
PROT SERPL-MCNC: 5.9 G/DL — LOW (ref 6–8.3)
RBC # BLD: 3.06 M/UL — LOW (ref 4.2–5.8)
RBC # FLD: 16.7 % — HIGH (ref 10.3–14.5)
SARS-COV-2 RNA SPEC QL NAA+PROBE: SIGNIFICANT CHANGE UP
SODIUM SERPL-SCNC: 130 MMOL/L — LOW (ref 135–145)
WBC # BLD: 7.61 K/UL — SIGNIFICANT CHANGE UP (ref 3.8–10.5)
WBC # FLD AUTO: 7.61 K/UL — SIGNIFICANT CHANGE UP (ref 3.8–10.5)

## 2022-12-20 PROCEDURE — 99232 SBSQ HOSP IP/OBS MODERATE 35: CPT

## 2022-12-20 PROCEDURE — 99232 SBSQ HOSP IP/OBS MODERATE 35: CPT | Mod: GC

## 2022-12-20 PROCEDURE — 93282 PRGRMG EVAL IMPLANTABLE DFB: CPT | Mod: 26

## 2022-12-20 RX ORDER — INFLUENZA VIRUS VACCINE 15; 15; 15; 15 UG/.5ML; UG/.5ML; UG/.5ML; UG/.5ML
0.7 SUSPENSION INTRAMUSCULAR ONCE
Refills: 0 | Status: DISCONTINUED | OUTPATIENT
Start: 2022-12-20 | End: 2022-12-24

## 2022-12-20 RX ORDER — POTASSIUM CHLORIDE 20 MEQ
40 PACKET (EA) ORAL ONCE
Refills: 0 | Status: COMPLETED | OUTPATIENT
Start: 2022-12-20 | End: 2022-12-20

## 2022-12-20 RX ORDER — POTASSIUM CHLORIDE 20 MEQ
10 PACKET (EA) ORAL
Refills: 0 | Status: COMPLETED | OUTPATIENT
Start: 2022-12-20 | End: 2022-12-20

## 2022-12-20 RX ORDER — MAGNESIUM SULFATE 500 MG/ML
2 VIAL (ML) INJECTION ONCE
Refills: 0 | Status: COMPLETED | OUTPATIENT
Start: 2022-12-20 | End: 2022-12-20

## 2022-12-20 RX ADMIN — SIMETHICONE 80 MILLIGRAM(S): 80 TABLET, CHEWABLE ORAL at 06:31

## 2022-12-20 RX ADMIN — Medication 100 MILLIEQUIVALENT(S): at 11:28

## 2022-12-20 RX ADMIN — SODIUM CHLORIDE 100 MILLILITER(S): 9 INJECTION INTRAMUSCULAR; INTRAVENOUS; SUBCUTANEOUS at 06:32

## 2022-12-20 RX ADMIN — Medication 50 MILLIGRAM(S): at 06:31

## 2022-12-20 RX ADMIN — PANTOPRAZOLE SODIUM 40 MILLIGRAM(S): 20 TABLET, DELAYED RELEASE ORAL at 06:33

## 2022-12-20 RX ADMIN — Medication 50 MILLIGRAM(S): at 17:25

## 2022-12-20 RX ADMIN — Medication 40 MILLIEQUIVALENT(S): at 09:06

## 2022-12-20 RX ADMIN — Medication 81 MILLIGRAM(S): at 11:28

## 2022-12-20 RX ADMIN — SIMETHICONE 80 MILLIGRAM(S): 80 TABLET, CHEWABLE ORAL at 21:59

## 2022-12-20 RX ADMIN — PANTOPRAZOLE SODIUM 40 MILLIGRAM(S): 20 TABLET, DELAYED RELEASE ORAL at 17:26

## 2022-12-20 RX ADMIN — MIRTAZAPINE 7.5 MILLIGRAM(S): 45 TABLET, ORALLY DISINTEGRATING ORAL at 21:59

## 2022-12-20 RX ADMIN — Medication 100 MILLIEQUIVALENT(S): at 13:21

## 2022-12-20 RX ADMIN — LISINOPRIL 20 MILLIGRAM(S): 2.5 TABLET ORAL at 08:46

## 2022-12-20 RX ADMIN — Medication 25 GRAM(S): at 09:07

## 2022-12-20 RX ADMIN — Medication 100 MILLIEQUIVALENT(S): at 16:27

## 2022-12-20 RX ADMIN — SIMETHICONE 80 MILLIGRAM(S): 80 TABLET, CHEWABLE ORAL at 13:21

## 2022-12-20 RX ADMIN — Medication 75 MICROGRAM(S): at 06:31

## 2022-12-20 NOTE — BH CONSULTATION LIAISON PROGRESS NOTE - NSBHFUPINTERVALHXFT_PSY_A_CORE
Pt says he is feeling a little better today.  He declined Remeron because he feels that he is on too many medications already.  He is not interested in taking Melatonin either but says he is eating a little better today since his stomach is not hurting today.  He is hopeful that he will have endoscopy tomorrow.

## 2022-12-20 NOTE — BH CONSULTATION LIAISON PROGRESS NOTE - CURRENT MEDICATION
MEDICATIONS  (STANDING):  aspirin enteric coated 81 milliGRAM(s) Oral daily  influenza  Vaccine (HIGH DOSE) 0.7 milliLiter(s) IntraMuscular once  levothyroxine 75 MICROGram(s) Oral daily  lisinopril 20 milliGRAM(s) Oral daily  metoprolol tartrate 50 milliGRAM(s) Oral two times a day  mirtazapine 7.5 milliGRAM(s) Oral at bedtime  pantoprazole    Tablet 40 milliGRAM(s) Oral every 12 hours  polyethylene glycol 3350 17 Gram(s) Oral two times a day  potassium chloride  10 mEq/100 mL IVPB 10 milliEquivalent(s) IV Intermittent every 1 hour  senna 2 Tablet(s) Oral at bedtime  simethicone 80 milliGRAM(s) Chew three times a day  sodium chloride 0.9%. 1000 milliLiter(s) (100 mL/Hr) IV Continuous <Continuous>    MEDICATIONS  (PRN):  albuterol    90 MICROgram(s) HFA Inhaler 2 Puff(s) Inhalation every 6 hours PRN Shortness of Breath and/or Wheezing  ibuprofen  Tablet. 400 milliGRAM(s) Oral every 6 hours PRN Temp greater or equal to 38.5C (101.3F), Mild Pain (1 - 3)  melatonin 3 milliGRAM(s) Oral at bedtime PRN Sleep

## 2022-12-20 NOTE — PROGRESS NOTE ADULT - ASSESSMENT
67 m with     Abnormal LFT improving   - follow  - hold Atorvastatin   - Hepatology follow   - Surgical evaluation noted    AAA  - vascular evaluation noted  - follow as OTP    HTN   - control    Hypothyroid  - TSH     COPD  - stable  - nebs    Abdominal pain  - Axr noted  - GI follow  - EGD/ EUS pending     Depression  - Psych eval noted    DVT prophylaxis    DCP home .    Abelardo Priest MD phone [phone]
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
## 2022-12-20 NOTE — BH CONSULTATION LIAISON PROGRESS NOTE - NSBHCHARTREVIEWVS_PSY_A_CORE FT
Vital Signs Last 24 Hrs  T(C): 36.6 (20 Dec 2022 13:00), Max: 36.6 (19 Dec 2022 20:05)  T(F): 97.9 (20 Dec 2022 13:00), Max: 97.9 (19 Dec 2022 20:05)  HR: 70 (20 Dec 2022 13:00) (58 - 70)  BP: 136/84 (20 Dec 2022 13:00) (136/84 - 167/83)  BP(mean): --  RR: 16 (20 Dec 2022 13:00) (16 - 17)  SpO2: 93% (20 Dec 2022 13:00) (93% - 94%)    Parameters below as of 20 Dec 2022 13:00  Patient On (Oxygen Delivery Method): nasal cannula w/ humidification  O2 Flow (L/min): 2

## 2022-12-20 NOTE — PROGRESS NOTE ADULT - ASSESSMENT
66 yo male with HTN, HLD, COPD on O2 2-4L, PAD, ICD, known AAA to 5 cm who presented due to elevated transaminases on outpatient labs with acute on chronic postprandial abdominal pain.    Impression:  #Post prandial abdominal pain - different locations recently as described above. Likely has a component of chronic dyspepsia however may have symptomatic cholelithiasis vs PUD.   #COPD on home O2  #AAA    Recommendation:  - send repeat covid swab  - patient should undergo EGD and can do EUS to r/o choledocholithiasis for evaluation of symptoms  - PPM interrogation done today, apprec cards eval  - due to emergent procedures, will reschedule EGD/EUS for tomorrow 12/21  - diet as tolerated today  - NPO after midnight    Note not finalized until signed by attending.    Angelic Drew PGY-6  Gastroenterology/Hepatology Fellow  Pager #[phone] (SONAL) or [phone] (NS)  Available on [employer] Teams.  Please contact on-call GI fellow via answering service ([phone]) after 5pm and before 8am, and on weekends.

## 2022-12-20 NOTE — PROCEDURE NOTE - ADDITIONAL PROCEDURE DETAILS
Indication for interrogation: EGD today  Presenting rhythm: SR 60s  Measured data WNL, normal pacemaker function, Pt is NOT pacemaker dependent  Stored data revealed no new events Indication for interrogation: EGD today  Presenting rhythm: SR 60s  Measured data WNL, normal ICD function, Pt is NOT pacemaker dependent  Stored data revealed no new events

## 2022-12-21 LAB
ALBUMIN SERPL ELPH-MCNC: 3.5 G/DL — SIGNIFICANT CHANGE UP (ref 3.3–5)
ALP SERPL-CCNC: 82 U/L — SIGNIFICANT CHANGE UP (ref 40–120)
ALT FLD-CCNC: 58 U/L — HIGH (ref 10–45)
ANION GAP SERPL CALC-SCNC: 7 MMOL/L — SIGNIFICANT CHANGE UP (ref 5–17)
AST SERPL-CCNC: 51 U/L — HIGH (ref 10–40)
BILIRUB SERPL-MCNC: 0.4 MG/DL — SIGNIFICANT CHANGE UP (ref 0.2–1.2)
BLD GP AB SCN SERPL QL: NEGATIVE — SIGNIFICANT CHANGE UP
BUN SERPL-MCNC: <4 MG/DL — LOW (ref 7–23)
CALCIUM SERPL-MCNC: 9.1 MG/DL — SIGNIFICANT CHANGE UP (ref 8.4–10.5)
CHLORIDE SERPL-SCNC: 97 MMOL/L — SIGNIFICANT CHANGE UP (ref 96–108)
CO2 SERPL-SCNC: 32 MMOL/L — HIGH (ref 22–31)
CREAT SERPL-MCNC: 0.52 MG/DL — SIGNIFICANT CHANGE UP (ref 0.5–1.3)
EGFR: 109 ML/MIN/1.73M2 — SIGNIFICANT CHANGE UP
GLUCOSE SERPL-MCNC: 92 MG/DL — SIGNIFICANT CHANGE UP (ref 70–99)
HCT VFR BLD CALC: 31.3 % — LOW (ref 39–50)
HGB BLD-MCNC: 10.4 G/DL — LOW (ref 13–17)
INR BLD: 0.97 RATIO — SIGNIFICANT CHANGE UP (ref 0.88–1.16)
MCHC RBC-ENTMCNC: 31.1 PG — SIGNIFICANT CHANGE UP (ref 27–34)
MCHC RBC-ENTMCNC: 33.2 GM/DL — SIGNIFICANT CHANGE UP (ref 32–36)
MCV RBC AUTO: 93.7 FL — SIGNIFICANT CHANGE UP (ref 80–100)
NRBC # BLD: 0 /100 WBCS — SIGNIFICANT CHANGE UP (ref 0–0)
PLATELET # BLD AUTO: 243 K/UL — SIGNIFICANT CHANGE UP (ref 150–400)
POTASSIUM SERPL-MCNC: 3.7 MMOL/L — SIGNIFICANT CHANGE UP (ref 3.5–5.3)
POTASSIUM SERPL-SCNC: 3.7 MMOL/L — SIGNIFICANT CHANGE UP (ref 3.5–5.3)
PROT SERPL-MCNC: 6.2 G/DL — SIGNIFICANT CHANGE UP (ref 6–8.3)
PROTHROM AB SERPL-ACNC: 11.2 SEC — SIGNIFICANT CHANGE UP (ref 10.5–13.4)
RBC # BLD: 3.34 M/UL — LOW (ref 4.2–5.8)
RBC # FLD: 17.1 % — HIGH (ref 10.3–14.5)
RH IG SCN BLD-IMP: POSITIVE — SIGNIFICANT CHANGE UP
SODIUM SERPL-SCNC: 136 MMOL/L — SIGNIFICANT CHANGE UP (ref 135–145)
WBC # BLD: 7.11 K/UL — SIGNIFICANT CHANGE UP (ref 3.8–10.5)
WBC # FLD AUTO: 7.11 K/UL — SIGNIFICANT CHANGE UP (ref 3.8–10.5)

## 2022-12-21 PROCEDURE — 99232 SBSQ HOSP IP/OBS MODERATE 35: CPT | Mod: GC

## 2022-12-21 RX ADMIN — LISINOPRIL 20 MILLIGRAM(S): 2.5 TABLET ORAL at 06:19

## 2022-12-21 RX ADMIN — Medication 3 MILLIGRAM(S): at 21:23

## 2022-12-21 RX ADMIN — Medication 50 MILLIGRAM(S): at 17:21

## 2022-12-21 RX ADMIN — SODIUM CHLORIDE 100 MILLILITER(S): 9 INJECTION INTRAMUSCULAR; INTRAVENOUS; SUBCUTANEOUS at 06:20

## 2022-12-21 RX ADMIN — SIMETHICONE 80 MILLIGRAM(S): 80 TABLET, CHEWABLE ORAL at 13:57

## 2022-12-21 RX ADMIN — SIMETHICONE 80 MILLIGRAM(S): 80 TABLET, CHEWABLE ORAL at 06:19

## 2022-12-21 RX ADMIN — PANTOPRAZOLE SODIUM 40 MILLIGRAM(S): 20 TABLET, DELAYED RELEASE ORAL at 06:19

## 2022-12-21 RX ADMIN — SENNA PLUS 2 TABLET(S): 8.6 TABLET ORAL at 21:23

## 2022-12-21 RX ADMIN — Medication 75 MICROGRAM(S): at 06:19

## 2022-12-21 RX ADMIN — SIMETHICONE 80 MILLIGRAM(S): 80 TABLET, CHEWABLE ORAL at 21:23

## 2022-12-21 RX ADMIN — Medication 50 MILLIGRAM(S): at 06:19

## 2022-12-21 RX ADMIN — PANTOPRAZOLE SODIUM 40 MILLIGRAM(S): 20 TABLET, DELAYED RELEASE ORAL at 17:21

## 2022-12-21 RX ADMIN — Medication 81 MILLIGRAM(S): at 11:30

## 2022-12-21 RX ADMIN — MIRTAZAPINE 7.5 MILLIGRAM(S): 45 TABLET, ORALLY DISINTEGRATING ORAL at 21:23

## 2022-12-21 NOTE — PROGRESS NOTE ADULT - ASSESSMENT
68 yo male with HTN, HLD, COPD on O2 2-4L, PAD, ICD, known AAA to 5 cm who presented due to elevated transaminases on outpatient labs with acute on chronic postprandial abdominal pain.    Impression:  #Post prandial abdominal pain - different locations recently as described above. Likely has a component of chronic dyspepsia however may have symptomatic cholelithiasis vs PUD.   #COPD on home O2  #AAA    Recommendation:  - send repeat covid swab  - patient should undergo EGD and can do EUS to r/o choledocholithiasis however given improvement in transaminases and symptoms, discussed with patient this can be done outpatient - he states it is difficult to follow up due to mobility from PAD and prefers to have evaluation prior to d/c  - PPM interrogation done, apprec cards eval  - due to emergent procedures, will tentatively plan for EGD/EUS for tomorrow 12/22 however pending endoscopy schedule  - diet as tolerated today  - NPO after midnight    Note not finalized until signed by attending.    Angelic Drew PGY-6  Gastroenterology/Hepatology Fellow  Pager #[phone] (SONAL) or [phone] (NS)  Available on [employer] Teams.  Please contact on-call GI fellow via answering service ([phone]) after 5pm and before 8am, and on weekends.

## 2022-12-21 NOTE — PROGRESS NOTE ADULT - ASSESSMENT
67 m with     Abnormal LFT improving   - follow  - hold Atorvastatin   - Hepatology follow   - Surgical evaluation noted    AAA  - vascular evaluation noted  - follow as OTP    HTN   - control    Hypothyroid  - TSH     COPD  - stable  - nebs    Abdominal pain  - GI follow  - EGD/ EUS pending     Depression  - Psych eval noted    DVT prophylaxis    DCP home .    Abelardo Priest MD phone [phone]

## 2022-12-22 ENCOUNTER — RESULT REVIEW (OUTPATIENT)
Age: 67
End: 2022-12-22

## 2022-12-22 LAB
ALBUMIN SERPL ELPH-MCNC: 2.7 G/DL — LOW (ref 3.3–5)
ALP SERPL-CCNC: 67 U/L — SIGNIFICANT CHANGE UP (ref 40–120)
ALT FLD-CCNC: 38 U/L — SIGNIFICANT CHANGE UP (ref 10–45)
ANION GAP SERPL CALC-SCNC: 8 MMOL/L — SIGNIFICANT CHANGE UP (ref 5–17)
ANION GAP SERPL CALC-SCNC: 9 MMOL/L — SIGNIFICANT CHANGE UP (ref 5–17)
AST SERPL-CCNC: 32 U/L — SIGNIFICANT CHANGE UP (ref 10–40)
BILIRUB SERPL-MCNC: 0.2 MG/DL — SIGNIFICANT CHANGE UP (ref 0.2–1.2)
BUN SERPL-MCNC: <4 MG/DL — LOW (ref 7–23)
BUN SERPL-MCNC: <4 MG/DL — LOW (ref 7–23)
CALCIUM SERPL-MCNC: 6.6 MG/DL — LOW (ref 8.4–10.5)
CALCIUM SERPL-MCNC: 9.6 MG/DL — SIGNIFICANT CHANGE UP (ref 8.4–10.5)
CHLORIDE SERPL-SCNC: 109 MMOL/L — HIGH (ref 96–108)
CHLORIDE SERPL-SCNC: 97 MMOL/L — SIGNIFICANT CHANGE UP (ref 96–108)
CO2 SERPL-SCNC: 24 MMOL/L — SIGNIFICANT CHANGE UP (ref 22–31)
CO2 SERPL-SCNC: 31 MMOL/L — SIGNIFICANT CHANGE UP (ref 22–31)
CREAT SERPL-MCNC: 0.35 MG/DL — LOW (ref 0.5–1.3)
CREAT SERPL-MCNC: 0.42 MG/DL — LOW (ref 0.5–1.3)
EGFR: 116 ML/MIN/1.73M2 — SIGNIFICANT CHANGE UP
EGFR: 123 ML/MIN/1.73M2 — SIGNIFICANT CHANGE UP
GLUCOSE SERPL-MCNC: 74 MG/DL — SIGNIFICANT CHANGE UP (ref 70–99)
GLUCOSE SERPL-MCNC: 98 MG/DL — SIGNIFICANT CHANGE UP (ref 70–99)
INR BLD: 0.98 RATIO — SIGNIFICANT CHANGE UP (ref 0.88–1.16)
MAGNESIUM SERPL-MCNC: 1.2 MG/DL — LOW (ref 1.6–2.6)
MAGNESIUM SERPL-MCNC: 2.1 MG/DL — SIGNIFICANT CHANGE UP (ref 1.6–2.6)
PHOSPHATE SERPL-MCNC: 3 MG/DL — SIGNIFICANT CHANGE UP (ref 2.5–4.5)
POTASSIUM SERPL-MCNC: 2.8 MMOL/L — CRITICAL LOW (ref 3.5–5.3)
POTASSIUM SERPL-MCNC: 4.3 MMOL/L — SIGNIFICANT CHANGE UP (ref 3.5–5.3)
POTASSIUM SERPL-SCNC: 2.8 MMOL/L — CRITICAL LOW (ref 3.5–5.3)
POTASSIUM SERPL-SCNC: 4.3 MMOL/L — SIGNIFICANT CHANGE UP (ref 3.5–5.3)
PROT SERPL-MCNC: 4.7 G/DL — LOW (ref 6–8.3)
PROTHROM AB SERPL-ACNC: 11.4 SEC — SIGNIFICANT CHANGE UP (ref 10.5–13.4)
SODIUM SERPL-SCNC: 137 MMOL/L — SIGNIFICANT CHANGE UP (ref 135–145)
SODIUM SERPL-SCNC: 141 MMOL/L — SIGNIFICANT CHANGE UP (ref 135–145)

## 2022-12-22 PROCEDURE — 43259 EGD US EXAM DUODENUM/JEJUNUM: CPT | Mod: GC

## 2022-12-22 PROCEDURE — 43239 EGD BIOPSY SINGLE/MULTIPLE: CPT | Mod: GC,59

## 2022-12-22 PROCEDURE — 88305 TISSUE EXAM BY PATHOLOGIST: CPT | Mod: 26

## 2022-12-22 RX ORDER — POTASSIUM CHLORIDE 20 MEQ
40 PACKET (EA) ORAL ONCE
Refills: 0 | Status: COMPLETED | OUTPATIENT
Start: 2022-12-22 | End: 2022-12-22

## 2022-12-22 RX ORDER — SODIUM CHLORIDE 9 MG/ML
500 INJECTION, SOLUTION INTRAVENOUS
Refills: 0 | Status: DISCONTINUED | OUTPATIENT
Start: 2022-12-22 | End: 2022-12-24

## 2022-12-22 RX ORDER — POTASSIUM CHLORIDE 20 MEQ
40 PACKET (EA) ORAL ONCE
Refills: 0 | Status: DISCONTINUED | OUTPATIENT
Start: 2022-12-22 | End: 2022-12-22

## 2022-12-22 RX ORDER — CALCIUM GLUCONATE 100 MG/ML
1 VIAL (ML) INTRAVENOUS ONCE
Refills: 0 | Status: COMPLETED | OUTPATIENT
Start: 2022-12-22 | End: 2022-12-22

## 2022-12-22 RX ORDER — MAGNESIUM SULFATE 500 MG/ML
2 VIAL (ML) INJECTION ONCE
Refills: 0 | Status: COMPLETED | OUTPATIENT
Start: 2022-12-22 | End: 2022-12-22

## 2022-12-22 RX ADMIN — SODIUM CHLORIDE 100 MILLILITER(S): 9 INJECTION INTRAMUSCULAR; INTRAVENOUS; SUBCUTANEOUS at 05:26

## 2022-12-22 RX ADMIN — Medication 50 MILLIGRAM(S): at 05:31

## 2022-12-22 RX ADMIN — Medication 100 GRAM(S): at 12:28

## 2022-12-22 RX ADMIN — SIMETHICONE 80 MILLIGRAM(S): 80 TABLET, CHEWABLE ORAL at 21:21

## 2022-12-22 RX ADMIN — Medication 75 MICROGRAM(S): at 05:33

## 2022-12-22 RX ADMIN — SIMETHICONE 80 MILLIGRAM(S): 80 TABLET, CHEWABLE ORAL at 05:30

## 2022-12-22 RX ADMIN — MIRTAZAPINE 7.5 MILLIGRAM(S): 45 TABLET, ORALLY DISINTEGRATING ORAL at 21:21

## 2022-12-22 RX ADMIN — Medication 40 MILLIEQUIVALENT(S): at 08:22

## 2022-12-22 RX ADMIN — PANTOPRAZOLE SODIUM 40 MILLIGRAM(S): 20 TABLET, DELAYED RELEASE ORAL at 20:09

## 2022-12-22 RX ADMIN — LISINOPRIL 20 MILLIGRAM(S): 2.5 TABLET ORAL at 05:31

## 2022-12-22 RX ADMIN — Medication 50 MILLIGRAM(S): at 20:09

## 2022-12-22 RX ADMIN — Medication 40 MILLIEQUIVALENT(S): at 12:27

## 2022-12-22 RX ADMIN — PANTOPRAZOLE SODIUM 40 MILLIGRAM(S): 20 TABLET, DELAYED RELEASE ORAL at 05:30

## 2022-12-22 RX ADMIN — Medication 25 GRAM(S): at 09:43

## 2022-12-22 RX ADMIN — SIMETHICONE 80 MILLIGRAM(S): 80 TABLET, CHEWABLE ORAL at 13:55

## 2022-12-22 RX ADMIN — Medication 81 MILLIGRAM(S): at 12:28

## 2022-12-22 NOTE — PROGRESS NOTE ADULT - ASSESSMENT
67 m with     Abnormal LFT improving   - follow  - hold Atorvastatin   - Hepatology follow   - Surgical evaluation noted    AAA  - vascular evaluation noted  - follow as OTP    HTN   - control    Hypothyroid  - TSH     COPD  - stable  - nebs    Abdominal pain  - GI follow  - EGD/ EUS pending     Depression  - Psych eval noted    DVT prophylaxis    DCP home .    Abelardo Priest MD phone [phone]

## 2022-12-22 NOTE — PRE PROCEDURE NOTE - PRE PROCEDURE EVALUATION
Attending Physician:    Dr. Crowell                        Procedure: EGD / EUS possible ERCP     Indication for Procedure: Abdominal pain / elevated liver chemistries   ________________________________________________________  PAST MEDICAL & SURGICAL HISTORY:  HTN (hypertension)      HLD (hyperlipidemia)      PAD (peripheral artery disease)      History of abdominal aortic aneurysm (AAA)      History of COPD      Gastritis      Hypothyroidism      No significant past surgical history        ALLERGIES:  No Known Allergies    HOME MEDICATIONS:  albuterol 90 mcg/inh inhalation aerosol with adapter: 1-2 puff(s) inhaled every 4-6 hours, As Needed  Aspirin Enteric Coated 81 mg oral delayed release tablet: 1 tab(s) orally once a day    NOTE: Spouse unsure if patient still takes aspirin.  atorvastatin 40 mg oral tablet: 1 tab(s) orally once a day  levothyroxine 75 mcg (0.075 mg) oral tablet: 1 tab(s) orally once a day  Metoprolol Tartrate 50 mg oral tablet: 1 tab(s) orally 2 times a day  pantoprazole 40 mg oral delayed release tablet: 1 tab(s) orally once a day  ramipril 5 mg oral capsule: 1 cap(s) orally once a day    AICD/PPM: [ ] yes   [x] no    PERTINENT LAB DATA:                        10.4   7.11  )-----------( 243      ( 21 Dec 2022 06:54 )             31.3     12-22    137  |  97  |  <4<L>  ----------------------------<  98  4.3   |  31  |  0.42<L>    Ca    9.6      22 Dec 2022 15:38  Phos  3.0     12-22  Mg     2.1     12-22    TPro  4.7<L>  /  Alb  2.7<L>  /  TBili  0.2  /  DBili  x   /  AST  32  /  ALT  38  /  AlkPhos  67  12-22    PT/INR - ( 22 Dec 2022 07:07 )   PT: 11.4 sec;   INR: 0.98 ratio      PHYSICAL EXAMINATION:    T(C): 36.9  HR: 68  BP: 182/81  RR: 20  SpO2: 96%    Constitutional: NAD    Respiratory: CTAB/L  Cardiovascular: RRR  Gastrointestinal: BS+, soft, NT/ND  Extremities: No peripheral edema  Neurological: A/O x 3, no confusion     COMMENTS:  The patient is a suitable candidate for the planned procedure unless box checked [ ]  No, explain:

## 2022-12-22 NOTE — PRE-ANESTHESIA EVALUATION ADULT - NSANTHOSAYNRD_GEN_A_CORE
No. RICHY screening performed.  STOP BANG Legend: 0-2 = LOW Risk; 3-4 = INTERMEDIATE Risk; 5-8 = HIGH Risk

## 2022-12-23 LAB
ALBUMIN SERPL ELPH-MCNC: 3.5 G/DL — SIGNIFICANT CHANGE UP (ref 3.3–5)
ALP SERPL-CCNC: 85 U/L — SIGNIFICANT CHANGE UP (ref 40–120)
ALT FLD-CCNC: 44 U/L — SIGNIFICANT CHANGE UP (ref 10–45)
ANION GAP SERPL CALC-SCNC: 7 MMOL/L — SIGNIFICANT CHANGE UP (ref 5–17)
AST SERPL-CCNC: 34 U/L — SIGNIFICANT CHANGE UP (ref 10–40)
BILIRUB SERPL-MCNC: 0.4 MG/DL — SIGNIFICANT CHANGE UP (ref 0.2–1.2)
BUN SERPL-MCNC: <4 MG/DL — LOW (ref 7–23)
CALCIUM SERPL-MCNC: 9.2 MG/DL — SIGNIFICANT CHANGE UP (ref 8.4–10.5)
CHLORIDE SERPL-SCNC: 97 MMOL/L — SIGNIFICANT CHANGE UP (ref 96–108)
CO2 SERPL-SCNC: 33 MMOL/L — HIGH (ref 22–31)
CREAT SERPL-MCNC: 0.51 MG/DL — SIGNIFICANT CHANGE UP (ref 0.5–1.3)
EGFR: 110 ML/MIN/1.73M2 — SIGNIFICANT CHANGE UP
GLUCOSE SERPL-MCNC: 107 MG/DL — HIGH (ref 70–99)
INR BLD: 0.99 RATIO — SIGNIFICANT CHANGE UP (ref 0.88–1.16)
POTASSIUM SERPL-MCNC: 4.2 MMOL/L — SIGNIFICANT CHANGE UP (ref 3.5–5.3)
POTASSIUM SERPL-SCNC: 4.2 MMOL/L — SIGNIFICANT CHANGE UP (ref 3.5–5.3)
PROT SERPL-MCNC: 6.3 G/DL — SIGNIFICANT CHANGE UP (ref 6–8.3)
PROTHROM AB SERPL-ACNC: 11.5 SEC — SIGNIFICANT CHANGE UP (ref 10.5–13.4)
SODIUM SERPL-SCNC: 137 MMOL/L — SIGNIFICANT CHANGE UP (ref 135–145)

## 2022-12-23 PROCEDURE — 99232 SBSQ HOSP IP/OBS MODERATE 35: CPT

## 2022-12-23 RX ORDER — RAMIPRIL 5 MG
1 CAPSULE ORAL
Qty: 0 | Refills: 0 | DISCHARGE

## 2022-12-23 RX ORDER — ALBUTEROL 90 UG/1
1 AEROSOL, METERED ORAL
Qty: 0 | Refills: 0 | DISCHARGE

## 2022-12-23 RX ORDER — MIRTAZAPINE 45 MG/1
1 TABLET, ORALLY DISINTEGRATING ORAL
Qty: 30 | Refills: 0
Start: 2022-12-23 | End: 2023-01-21

## 2022-12-23 RX ORDER — POLYETHYLENE GLYCOL 3350 17 G/17G
17 POWDER, FOR SOLUTION ORAL
Qty: 1020 | Refills: 0
Start: 2022-12-23 | End: 2023-01-21

## 2022-12-23 RX ORDER — LANOLIN ALCOHOL/MO/W.PET/CERES
1 CREAM (GRAM) TOPICAL
Qty: 30 | Refills: 0
Start: 2022-12-23 | End: 2023-01-21

## 2022-12-23 RX ORDER — LISINOPRIL 2.5 MG/1
1 TABLET ORAL
Qty: 30 | Refills: 0
Start: 2022-12-23 | End: 2023-01-21

## 2022-12-23 RX ORDER — ALBUTEROL 90 UG/1
2 AEROSOL, METERED ORAL
Qty: 1 | Refills: 0
Start: 2022-12-23 | End: 2023-01-21

## 2022-12-23 RX ORDER — PANTOPRAZOLE SODIUM 20 MG/1
1 TABLET, DELAYED RELEASE ORAL
Qty: 0 | Refills: 0 | DISCHARGE

## 2022-12-23 RX ORDER — PANTOPRAZOLE SODIUM 20 MG/1
1 TABLET, DELAYED RELEASE ORAL
Qty: 30 | Refills: 0
Start: 2022-12-23 | End: 2023-01-21

## 2022-12-23 RX ORDER — SIMETHICONE 80 MG/1
1 TABLET, CHEWABLE ORAL
Qty: 90 | Refills: 0
Start: 2022-12-23 | End: 2023-01-21

## 2022-12-23 RX ORDER — SENNA PLUS 8.6 MG/1
2 TABLET ORAL
Qty: 60 | Refills: 0
Start: 2022-12-23 | End: 2023-01-21

## 2022-12-23 RX ADMIN — PANTOPRAZOLE SODIUM 40 MILLIGRAM(S): 20 TABLET, DELAYED RELEASE ORAL at 05:21

## 2022-12-23 RX ADMIN — SIMETHICONE 80 MILLIGRAM(S): 80 TABLET, CHEWABLE ORAL at 13:13

## 2022-12-23 RX ADMIN — SENNA PLUS 2 TABLET(S): 8.6 TABLET ORAL at 21:12

## 2022-12-23 RX ADMIN — Medication 75 MICROGRAM(S): at 05:20

## 2022-12-23 RX ADMIN — PANTOPRAZOLE SODIUM 40 MILLIGRAM(S): 20 TABLET, DELAYED RELEASE ORAL at 17:45

## 2022-12-23 RX ADMIN — Medication 81 MILLIGRAM(S): at 13:13

## 2022-12-23 RX ADMIN — LISINOPRIL 20 MILLIGRAM(S): 2.5 TABLET ORAL at 05:21

## 2022-12-23 RX ADMIN — Medication 50 MILLIGRAM(S): at 05:21

## 2022-12-23 RX ADMIN — SIMETHICONE 80 MILLIGRAM(S): 80 TABLET, CHEWABLE ORAL at 05:20

## 2022-12-23 RX ADMIN — SIMETHICONE 80 MILLIGRAM(S): 80 TABLET, CHEWABLE ORAL at 21:12

## 2022-12-23 RX ADMIN — Medication 50 MILLIGRAM(S): at 17:45

## 2022-12-23 NOTE — CHART NOTE - NSCHARTNOTEFT_GEN_A_CORE
Plan discussed with Dr. Nieto:    If patient is medically cleared for discharge by primary team with resolving symptoms:    Please follow up outpatient with Dr. Nieto or one of her associates [phone] for outpatient follow-up in 2 weeks for elective lap bob planning for symptomatic cholelithiasis   If GI doctors are considering PUD as differential for ABD pain, please have patient follow up with them for EGD prior to planning of Laparoscopic Bob.      ACS Team  p. [phone]

## 2022-12-23 NOTE — PROGRESS NOTE ADULT - ATTENDING COMMENTS
As above  Post prandial abd pain, improving  Slightly elevated liver tests    Tentative plan for EUS for evaluation tomorrow pending endoscopy schedule    Thank you for this interesting consult.  Please call the advanced GI service with any questions or concerns.
As above.    Impression:    #1.  Acute on chronic migratory abdominal pain, improved  #2.  Minimally abnormal LFTs, improving  #3.  COPD on home oxygen 2-4 Lpm  #4.  Cardiovascular problems (peripheral artery disease, AAA 5 cm, s/p defibrillator)    s/p Upper endoscopy / biopsy and endoscopic ultrasound on 12/22/22.    Recommendations:    #1.  Follow CBC/LFTs.  #2.  Await endoscopic biopsy results.  #3.  No need for ERCP, EUS did not show choledocholithiasis.  #4.  Diet as tolerated.  #5.  Followup in GI office in 2-4 weeks.
As above.    Pt seen/examined on 12/21/22 in the evening.    Impression:    #1.  Acute on chronic migratory abdominal pain  #2.  Abnormal LFTs, improving  #3.  COPD on home oxygen 2-4 Lpm  #4.  Cardiovascular problems (peripheral artery disease, AAA 5 cm, s/p defibrillator)    Recommendations:    #1.  NPO after MN for likely EGD/EUS tomorrow to look for peptic ulcer disease and choledocholithiasis  #2.  Appreciate cardiology consultants  #3.  Follow CBC/LFTs.
He is still having intermittent epigastric pain today out of proportion to exam, with mild AST and ALT elevations. Imaging reviewed with no evidence of acute cholecystitis on HIDA scan, no biliary dilatation on US, and no CT evidence of acute pancreatitis or mesenteric ischemia, though with infrarenal aortic aneurysm. Warrants EGD for further investigation as well as EUS (given inability to undergo MRCP); appreciate Advanced GI involvement. No further inpatient work-up of elevated liver enzymes planned but can have outpatient Hepatology follow-up.

## 2022-12-23 NOTE — PROGRESS NOTE ADULT - ASSESSMENT
68 yo male with HTN, HLD, COPD on O2 2-4L, PAD, ICD, known AAA to 5 cm who presented due to elevated transaminases on outpatient labs with acute on chronic postprandial abdominal pain.    Impression:  #Post prandial abdominal pain - different locations recently as described above. Likely has a component of chronic dyspepsia however may have symptomatic cholelithiasis vs PUD.   #COPD on home O2  #AAA    Recommendation:  - regular diet  - f/u path from EGD  - consider surgical eval for elective CCY  - no further GI w/u needed at this time, please call back with questions    Note not finalized until signed by attending.    Angelic Drew PGY-6  Gastroenterology/Hepatology Fellow  Pager #[phone] (GIDEONJ) or [phone] (NS)  Available on [employer] Teams.  Please contact on-call GI fellow via answering service ([phone]) after 5pm and before 8am, and on weekends.

## 2022-12-23 NOTE — PROGRESS NOTE ADULT - ASSESSMENT
67 m with     Abnormal LFT improving   - follow  - hold Atorvastatin   - Hepatology follow   - Surgical evaluation noted  - cholecystectomy as OTP    AAA  - vascular evaluation noted  - follow as OTP    HTN   - control    Hypothyroid  - TSH     COPD  - stable  - nebs    Abdominal pain  - GI follow  - s/p  EGD Official result pending      Depression  - Psych eval noted    DVT prophylaxis    DCP home .    Abelardo Priest MD phone [phone]

## 2022-12-24 ENCOUNTER — TRANSCRIPTION ENCOUNTER (OUTPATIENT)
Age: 67
End: 2022-12-24

## 2022-12-24 VITALS
SYSTOLIC BLOOD PRESSURE: 156 MMHG | HEART RATE: 70 BPM | RESPIRATION RATE: 18 BRPM | OXYGEN SATURATION: 97 % | TEMPERATURE: 98 F | DIASTOLIC BLOOD PRESSURE: 85 MMHG

## 2022-12-24 LAB
ALBUMIN SERPL ELPH-MCNC: 3.6 G/DL — SIGNIFICANT CHANGE UP (ref 3.3–5)
ALP SERPL-CCNC: 84 U/L — SIGNIFICANT CHANGE UP (ref 40–120)
ALT FLD-CCNC: 37 U/L — SIGNIFICANT CHANGE UP (ref 10–45)
ANION GAP SERPL CALC-SCNC: 7 MMOL/L — SIGNIFICANT CHANGE UP (ref 5–17)
AST SERPL-CCNC: 28 U/L — SIGNIFICANT CHANGE UP (ref 10–40)
BILIRUB SERPL-MCNC: 0.3 MG/DL — SIGNIFICANT CHANGE UP (ref 0.2–1.2)
BUN SERPL-MCNC: <4 MG/DL — LOW (ref 7–23)
CALCIUM SERPL-MCNC: 9.4 MG/DL — SIGNIFICANT CHANGE UP (ref 8.4–10.5)
CHLORIDE SERPL-SCNC: 96 MMOL/L — SIGNIFICANT CHANGE UP (ref 96–108)
CO2 SERPL-SCNC: 35 MMOL/L — HIGH (ref 22–31)
CREAT SERPL-MCNC: 0.49 MG/DL — LOW (ref 0.5–1.3)
EGFR: 111 ML/MIN/1.73M2 — SIGNIFICANT CHANGE UP
GLUCOSE SERPL-MCNC: 107 MG/DL — HIGH (ref 70–99)
INR BLD: 1 RATIO — SIGNIFICANT CHANGE UP (ref 0.88–1.16)
POTASSIUM SERPL-MCNC: 4.1 MMOL/L — SIGNIFICANT CHANGE UP (ref 3.5–5.3)
POTASSIUM SERPL-SCNC: 4.1 MMOL/L — SIGNIFICANT CHANGE UP (ref 3.5–5.3)
PROT SERPL-MCNC: 6.3 G/DL — SIGNIFICANT CHANGE UP (ref 6–8.3)
PROTHROM AB SERPL-ACNC: 11.5 SEC — SIGNIFICANT CHANGE UP (ref 10.5–13.4)
SODIUM SERPL-SCNC: 138 MMOL/L — SIGNIFICANT CHANGE UP (ref 135–145)

## 2022-12-24 PROCEDURE — A9537: CPT

## 2022-12-24 PROCEDURE — 85652 RBC SED RATE AUTOMATED: CPT

## 2022-12-24 PROCEDURE — 87086 URINE CULTURE/COLONY COUNT: CPT

## 2022-12-24 PROCEDURE — 74174 CTA ABD&PLVS W/CONTRAST: CPT | Mod: MG

## 2022-12-24 PROCEDURE — 85025 COMPLETE CBC W/AUTO DIFF WBC: CPT

## 2022-12-24 PROCEDURE — 78226 HEPATOBILIARY SYSTEM IMAGING: CPT

## 2022-12-24 PROCEDURE — 85610 PROTHROMBIN TIME: CPT

## 2022-12-24 PROCEDURE — 84100 ASSAY OF PHOSPHORUS: CPT

## 2022-12-24 PROCEDURE — 86850 RBC ANTIBODY SCREEN: CPT

## 2022-12-24 PROCEDURE — U0003: CPT

## 2022-12-24 PROCEDURE — 84295 ASSAY OF SERUM SODIUM: CPT

## 2022-12-24 PROCEDURE — 71275 CT ANGIOGRAPHY CHEST: CPT | Mod: MG

## 2022-12-24 PROCEDURE — G1004: CPT

## 2022-12-24 PROCEDURE — 83605 ASSAY OF LACTIC ACID: CPT

## 2022-12-24 PROCEDURE — 84443 ASSAY THYROID STIM HORMONE: CPT

## 2022-12-24 PROCEDURE — 85014 HEMATOCRIT: CPT

## 2022-12-24 PROCEDURE — 97162 PT EVAL MOD COMPLEX 30 MIN: CPT

## 2022-12-24 PROCEDURE — 81001 URINALYSIS AUTO W/SCOPE: CPT

## 2022-12-24 PROCEDURE — 97110 THERAPEUTIC EXERCISES: CPT

## 2022-12-24 PROCEDURE — 88305 TISSUE EXAM BY PATHOLOGIST: CPT

## 2022-12-24 PROCEDURE — 82550 ASSAY OF CK (CPK): CPT

## 2022-12-24 PROCEDURE — 76705 ECHO EXAM OF ABDOMEN: CPT

## 2022-12-24 PROCEDURE — 82947 ASSAY GLUCOSE BLOOD QUANT: CPT

## 2022-12-24 PROCEDURE — 74018 RADEX ABDOMEN 1 VIEW: CPT

## 2022-12-24 PROCEDURE — 86900 BLOOD TYPING SEROLOGIC ABO: CPT

## 2022-12-24 PROCEDURE — 99285 EMERGENCY DEPT VISIT HI MDM: CPT | Mod: 25

## 2022-12-24 PROCEDURE — 85018 HEMOGLOBIN: CPT

## 2022-12-24 PROCEDURE — 84132 ASSAY OF SERUM POTASSIUM: CPT

## 2022-12-24 PROCEDURE — 87637 SARSCOV2&INF A&B&RSV AMP PRB: CPT

## 2022-12-24 PROCEDURE — 80053 COMPREHEN METABOLIC PANEL: CPT

## 2022-12-24 PROCEDURE — 80048 BASIC METABOLIC PNL TOTAL CA: CPT

## 2022-12-24 PROCEDURE — 82330 ASSAY OF CALCIUM: CPT

## 2022-12-24 PROCEDURE — 86901 BLOOD TYPING SEROLOGIC RH(D): CPT

## 2022-12-24 PROCEDURE — 82435 ASSAY OF BLOOD CHLORIDE: CPT

## 2022-12-24 PROCEDURE — 82803 BLOOD GASES ANY COMBINATION: CPT

## 2022-12-24 PROCEDURE — 85027 COMPLETE CBC AUTOMATED: CPT

## 2022-12-24 PROCEDURE — 83735 ASSAY OF MAGNESIUM: CPT

## 2022-12-24 PROCEDURE — 80076 HEPATIC FUNCTION PANEL: CPT

## 2022-12-24 PROCEDURE — 97116 GAIT TRAINING THERAPY: CPT

## 2022-12-24 PROCEDURE — 97530 THERAPEUTIC ACTIVITIES: CPT

## 2022-12-24 PROCEDURE — 36415 COLL VENOUS BLD VENIPUNCTURE: CPT

## 2022-12-24 PROCEDURE — 83690 ASSAY OF LIPASE: CPT

## 2022-12-24 PROCEDURE — U0005: CPT

## 2022-12-24 RX ADMIN — PANTOPRAZOLE SODIUM 40 MILLIGRAM(S): 20 TABLET, DELAYED RELEASE ORAL at 05:24

## 2022-12-24 RX ADMIN — SIMETHICONE 80 MILLIGRAM(S): 80 TABLET, CHEWABLE ORAL at 05:25

## 2022-12-24 RX ADMIN — Medication 81 MILLIGRAM(S): at 11:54

## 2022-12-24 RX ADMIN — SIMETHICONE 80 MILLIGRAM(S): 80 TABLET, CHEWABLE ORAL at 11:54

## 2022-12-24 RX ADMIN — Medication 75 MICROGRAM(S): at 05:24

## 2022-12-24 RX ADMIN — Medication 50 MILLIGRAM(S): at 05:24

## 2022-12-24 RX ADMIN — LISINOPRIL 20 MILLIGRAM(S): 2.5 TABLET ORAL at 05:24

## 2022-12-24 NOTE — DISCHARGE NOTE NURSING/CASE MANAGEMENT/SOCIAL WORK - NSDCPEFALRISK_GEN_ALL_CORE
For information on Fall & Injury Prevention, visit: [email or web address] OR  [email or web address] OR  https://www.cdc.gov/steadi/patient.html

## 2022-12-24 NOTE — PROGRESS NOTE ADULT - ASSESSMENT
67 m with     Abnormal LFT improving   - follow  - hold Atorvastatin   - Hepatology follow   - Surgical evaluation noted  - cholecystectomy as OTP    AAA  - vascular evaluation noted  - follow as OTP    HTN   - control    Hypothyroid  - TSH     COPD  - stable  - nebs    Abdominal pain  - GI follow  - s/p  EGD Official result pending      Depression  - Psych eval noted    DVT prophylaxis    DC home . Follow with PMD/ Cardiology/ Vascular/ Gastroenterology in 3-4 days. QA    Abelardo Priest MD phone [phone]

## 2022-12-24 NOTE — DISCHARGE NOTE NURSING/CASE MANAGEMENT/SOCIAL WORK - PATIENT PORTAL LINK FT
You can access the FollowMyHealth Patient Portal offered by [facility] by registering at the following website: [email or web address]. By joining [employer]’s FollowMyHealth portal, you will also be able to view your health information using other applications (apps) compatible with our system.

## 2022-12-24 NOTE — PROGRESS NOTE ADULT - SUBJECTIVE AND OBJECTIVE BOX
Chief Complaint:  Patient is a 67y old  Male who presents with a chief complaint of Elevated liver enzymes (16 Dec 2022 16:20)      Interval Events: s/p EGD/EUS yesterday with GB sludge, gastritis, subepithelial lesion in 2nd portion of duodenum benign appearing  - feels well today, ate yesterday without issues      Hospital Medications:  albuterol    90 MICROgram(s) HFA Inhaler 2 Puff(s) Inhalation every 6 hours PRN  aspirin enteric coated 81 milliGRAM(s) Oral daily  ibuprofen  Tablet. 400 milliGRAM(s) Oral every 6 hours PRN  influenza  Vaccine (HIGH DOSE) 0.7 milliLiter(s) IntraMuscular once  lactated ringers. 500 milliLiter(s) IV Continuous <Continuous>  levothyroxine 75 MICROGram(s) Oral daily  lisinopril 20 milliGRAM(s) Oral daily  melatonin 3 milliGRAM(s) Oral at bedtime PRN  metoprolol tartrate 50 milliGRAM(s) Oral two times a day  mirtazapine 7.5 milliGRAM(s) Oral at bedtime  pantoprazole    Tablet 40 milliGRAM(s) Oral every 12 hours  polyethylene glycol 3350 17 Gram(s) Oral two times a day  senna 2 Tablet(s) Oral at bedtime  simethicone 80 milliGRAM(s) Chew three times a day  sodium chloride 0.9%. 1000 milliLiter(s) IV Continuous <Continuous>      PMHX/PSHX:  HTN (hypertension)    HLD (hyperlipidemia)    PAD (peripheral artery disease)    History of abdominal aortic aneurysm (AAA)    History of COPD    Gastritis    Hypothyroidism    No significant past surgical history            ROS:     General:  No weight loss, fevers, chills, night sweats, fatigue   Eyes:  No vision changes  ENT:  No sore throat, pain, runny nose  CV:  No chest pain, palpitations, dizziness   Resp:  No SOB, cough, wheezing  GI:  See HPI  :  No burning with urination, hematuria  Muscle:  No pain, weakness  Neuro:  No weakness/tingling, memory problems  Psych:  No fatigue, insomnia, mood problems, depression  Heme:  No easy bruisability  Skin:  No rash, edema      PHYSICAL EXAM:     GENERAL:  Well developed, no distress  HEENT:  NC/AT,  conjunctivae clear, sclera anicteric  CHEST:  Full & symmetric excursion, no increased effort w/ respirations  HEART:  Regular rhythm & rate  ABDOMEN:  Soft, non-tender, non-distended  EXTREMITIES:  no LE  edema  SKIN:  No rash/erythema/ecchymoses/petechiae/wounds/jaundice  NEURO:  Alert, oriented    Vital Signs:  Vital Signs Last 24 Hrs  T(C): 36.7 (23 Dec 2022 05:50), Max: 36.9 (22 Dec 2022 16:50)  T(F): 98 (23 Dec 2022 05:50), Max: 98.5 (22 Dec 2022 16:50)  HR: 57 (23 Dec 2022 05:50) (57 - 90)  BP: 129/78 (23 Dec 2022 05:50) (128/78 - 182/81)  BP(mean): --  RR: 18 (23 Dec 2022 05:50) (16 - 20)  SpO2: 99% (23 Dec 2022 05:50) (96% - 100%)    Parameters below as of 23 Dec 2022 05:50  Patient On (Oxygen Delivery Method): nasal cannula  O2 Flow (L/min): 2    Daily Height in cm: 172.72 (22 Dec 2022 18:03)    Daily     LABS:    12-23    137  |  97  |  <4<L>  ----------------------------<  107<H>  4.2   |  33<H>  |  0.51    Ca    9.2      23 Dec 2022 08:59  Phos  3.0     12-22  Mg     2.1     12-22    TPro  6.3  /  Alb  3.5  /  TBili  0.4  /  DBili  x   /  AST  34  /  ALT  44  /  AlkPhos  85  12-23    LIVER FUNCTIONS - ( 23 Dec 2022 08:59 )  Alb: 3.5 g/dL / Pro: 6.3 g/dL / ALK PHOS: 85 U/L / ALT: 44 U/L / AST: 34 U/L / GGT: x           PT/INR - ( 23 Dec 2022 08:59 )   PT: 11.5 sec;   INR: 0.99 ratio                 Imaging:          
Cardiovascular Disease Progress Note    Overnight events: No acute events overnight.  no cp/sob/palps  Otherwise review of systems negative    Objective Findings:  T(C): 36.5 (12-19-22 @ 06:05), Max: 36.6 (12-18-22 @ 17:11)  HR: 56 (12-19-22 @ 06:05) (56 - 64)  BP: 151/82 (12-19-22 @ 06:05) (151/82 - 165/88)  RR: 18 (12-19-22 @ 06:05) (18 - 20)  SpO2: 95% (12-19-22 @ 06:05) (94% - 96%)  Wt(kg): --  Daily     Daily       Physical Exam:  Gen: NAD  HEENT: EOMI  CV: RRR, normal S1 + S2, no m/r/g  Lungs: CTAB  Abd: soft, non-tender  Ext: No edema    Telemetry:    Laboratory Data:                        11.5   11.57 )-----------( 263      ( 18 Dec 2022 12:06 )             34.6     12-18    130<L>  |  91<L>  |  4<L>  ----------------------------<  100<H>  3.9   |  30  |  0.46<L>    Ca    8.6      18 Dec 2022 02:21  Phos  2.7     12-17  Mg     1.7     12-17    TPro  5.9<L>  /  Alb  3.6  /  TBili  0.8  /  DBili  x   /  AST  58<H>  /  ALT  68<H>  /  AlkPhos  93  12-18    PT/INR - ( 18 Dec 2022 02:21 )   PT: 12.0 sec;   INR: 1.03 ratio                   Inpatient Medications:  MEDICATIONS  (STANDING):  aspirin enteric coated 81 milliGRAM(s) Oral daily  enoxaparin Injectable 40 milliGRAM(s) SubCutaneous every 24 hours  levothyroxine 75 MICROGram(s) Oral daily  lisinopril 20 milliGRAM(s) Oral daily  metoprolol tartrate 50 milliGRAM(s) Oral two times a day  pantoprazole    Tablet 40 milliGRAM(s) Oral every 12 hours  polyethylene glycol 3350 17 Gram(s) Oral two times a day  senna 2 Tablet(s) Oral at bedtime  simethicone 80 milliGRAM(s) Chew three times a day  sodium chloride 0.9%. 1000 milliLiter(s) (100 mL/Hr) IV Continuous <Continuous>      Assessment:  elevated LFT  COPD on home O2  CAD s/p stents,  HFrEF s/p  AICD  PAD s/p angioplasties and RLE fem-pop bypass   gastritis  Partially thrombosed abdominal aortic aneurysm measuring 4.8 x 4.7 by at least 8.4 cm. CTA showed Partially thrombosed infrarenal aortic aneurysm measures 5.2 x 5.0 x 11.1 cm (AP by transverse by craniocaudad) end extends to the bifurcation.    Recs:  cardiac stable  vol status acceptable. cw diuretics as dosed  cw gdmt for lv dysfunction  cw antiplatelets, statin and antianginals  f/u surgery and GI recs  vascular following for AAA  will follow          Over 25 minutes spent on total encounter; more than 50% of the visit was spent counseling and/or coordinating care by the attending physician.      Vaughn Simms MD   Cardiovascular Disease  [phone]
Cardiovascular Disease Progress Note    Overnight events: No acute events overnight.  no cp/sob/palps/dizziness  Otherwise review of systems negative    Objective Findings:  T(C): 36.6 (12-17-22 @ 05:07), Max: 36.7 (12-16-22 @ 20:24)  HR: 67 (12-17-22 @ 05:07) (67 - 92)  BP: 123/81 (12-17-22 @ 05:07) (123/81 - 150/87)  RR: 17 (12-17-22 @ 05:07) (17 - 18)  SpO2: 88% (12-17-22 @ 05:07) (88% - 98%)  Wt(kg): --  Daily     Daily       Physical Exam:  Gen: NAD  HEENT: EOMI  CV: RRR, normal S1 + S2, no m/r/g  Lungs: CTAB  Abd: soft, non-tender  Ext: No edema    Telemetry:    Laboratory Data:                        12.2   8.42  )-----------( 305      ( 15 Dec 2022 15:39 )             36.2     12-17    131<L>  |  90<L>  |  7   ----------------------------<  88  3.4<L>   |  31  |  0.44<L>    Ca    9.2      17 Dec 2022 06:51    TPro  6.7  /  Alb  3.9  /  TBili  1.5<H>  /  DBili  x   /  AST  79<H>  /  ALT  86<H>  /  AlkPhos  112  12-17    PT/INR - ( 17 Dec 2022 06:52 )   PT: 11.5 sec;   INR: 1.00 ratio           CARDIAC MARKERS ( 17 Dec 2022 06:51 )  x     / x     / 72 U/L / x     / x              Inpatient Medications:  MEDICATIONS  (STANDING):  aspirin enteric coated 81 milliGRAM(s) Oral daily  enoxaparin Injectable 40 milliGRAM(s) SubCutaneous every 24 hours  levothyroxine 75 MICROGram(s) Oral daily  lisinopril 20 milliGRAM(s) Oral daily  metoprolol tartrate 50 milliGRAM(s) Oral two times a day  pantoprazole    Tablet 40 milliGRAM(s) Oral before breakfast      Assessment:  elevated LFT  COPD on home O2  CAD s/p stents,  HFrEF s/p  AICD  PAD s/p angioplasties and RLE fem-pop bypass   gastritis  Partially thrombosed abdominal aortic aneurysm measuring 4.8 x 4.7 by at least 8.4 cm. CTA showed Partially thrombosed infrarenal aortic aneurysm measures 5.2 x 5.0 x 11.1 cm (AP by transverse by craniocaudad) end extends to the bifurcation.    Recs:  cardiac stable  vol status acceptable. cw diuretics as dosed  cw gdmt for lv dysfunction  cw antiplatelets, statin and antianginals  f/u surgery and GI recs  vascular following for AAA  will follow          Over 25 minutes spent on total encounter; more than 50% of the visit was spent counseling and/or coordinating care by the attending physician.      Vaughn Simms MD   Cardiovascular Disease  [phone]
Gastroenterology/Hepatology Progress Note    Interval Events:   - no acute ON events   - liver transaminases downtrending     Allergies:  No Known Allergies      Hospital Medications:  albuterol    90 MICROgram(s) HFA Inhaler 2 Puff(s) Inhalation every 6 hours PRN  aspirin enteric coated 81 milliGRAM(s) Oral daily  enoxaparin Injectable 40 milliGRAM(s) SubCutaneous every 24 hours  ibuprofen  Tablet. 400 milliGRAM(s) Oral every 6 hours PRN  levothyroxine 75 MICROGram(s) Oral daily  lisinopril 20 milliGRAM(s) Oral daily  metoprolol tartrate 50 milliGRAM(s) Oral two times a day  pantoprazole    Tablet 40 milliGRAM(s) Oral every 12 hours  polyethylene glycol 3350 17 Gram(s) Oral two times a day  senna 2 Tablet(s) Oral at bedtime  simethicone 80 milliGRAM(s) Chew three times a day  sodium chloride 0.9%. 1000 milliLiter(s) IV Continuous <Continuous>      ROS: 14 point ROS negative unless otherwise state in subjective    PHYSICAL EXAM:   Vital Signs:  Vital Signs Last 24 Hrs  T(C): 36.5 (19 Dec 2022 06:05), Max: 36.6 (18 Dec 2022 17:11)  T(F): 97.7 (19 Dec 2022 06:05), Max: 97.9 (18 Dec 2022 17:11)  HR: 56 (19 Dec 2022 06:05) (56 - 64)  BP: 151/82 (19 Dec 2022 06:05) (151/82 - 165/88)  BP(mean): --  RR: 18 (19 Dec 2022 06:05) (18 - 20)  SpO2: 95% (19 Dec 2022 06:05) (94% - 96%)    Parameters below as of 19 Dec 2022 06:05  Patient On (Oxygen Delivery Method): nasal cannula  O2 Flow (L/min): 2    Daily     Daily     GENERAL:  No acute distress  HEENT:  NCAT, no scleral icterus  CHEST: no resp distress  HEART:  RRR  ABDOMEN:  Soft, non-tender, non-distended, no masses  EXTREMITIES:  No cyanosis, clubbing, or edema  SKIN:  No rash/erythema/ecchymoses/petechiae/wounds/abscess/warm/dry  NEURO:  Alert and oriented x 3, no asterixis, no tremor    LABS:                        11.5   11.57 )-----------( 263      ( 18 Dec 2022 12:06 )             34.6     Mean Cell Volume: 94.3 fl (12-18-22 @ 12:06)    12-18    130<L>  |  91<L>  |  4<L>  ----------------------------<  100<H>  3.9   |  30  |  0.46<L>    Ca    8.6      18 Dec 2022 02:21  Phos  2.7     12-17  Mg     1.7     12-17    TPro  5.9<L>  /  Alb  3.6  /  TBili  0.8  /  DBili  x   /  AST  58<H>  /  ALT  68<H>  /  AlkPhos  93  12-18    LIVER FUNCTIONS - ( 18 Dec 2022 02:21 )  Alb: 3.6 g/dL / Pro: 5.9 g/dL / ALK PHOS: 93 U/L / ALT: 68 U/L / AST: 58 U/L / GGT: x           PT/INR - ( 18 Dec 2022 02:21 )   PT: 12.0 sec;   INR: 1.03 ratio                   Imaging:          
Patient is a 67y old  Male who presents with a chief complaint of Elevated liver enzymes (16 Dec 2022 16:20)      SUBJECTIVE / OVERNIGHT EVENTS: c/o abdominal discomfort/ pain.  Review of Systems  chest pain no  palpitations no  sob no  nausea no  headache no    MEDICATIONS  (STANDING):  aspirin enteric coated 81 milliGRAM(s) Oral daily  enoxaparin Injectable 40 milliGRAM(s) SubCutaneous every 24 hours  levothyroxine 75 MICROGram(s) Oral daily  lisinopril 20 milliGRAM(s) Oral daily  metoprolol tartrate 50 milliGRAM(s) Oral two times a day  pantoprazole    Tablet 40 milliGRAM(s) Oral before breakfast    MEDICATIONS  (PRN):  albuterol    90 MICROgram(s) HFA Inhaler 2 Puff(s) Inhalation every 6 hours PRN Shortness of Breath and/or Wheezing  ibuprofen  Tablet. 400 milliGRAM(s) Oral every 6 hours PRN Temp greater or equal to 38.5C (101.3F), Mild Pain (1 - 3)      Vital Signs Last 24 Hrs  T(C): 36.7 (17 Dec 2022 09:10), Max: 36.7 (16 Dec 2022 20:24)  T(F): 98.1 (17 Dec 2022 09:10), Max: 98.1 (17 Dec 2022 09:10)  HR: 98 (17 Dec 2022 09:10) (67 - 98)  BP: 153/93 (17 Dec 2022 09:10) (123/81 - 153/93)  BP(mean): --  RR: 18 (17 Dec 2022 09:10) (17 - 18)  SpO2: 96% (17 Dec 2022 09:10) (88% - 96%)    Parameters below as of 17 Dec 2022 09:10  Patient On (Oxygen Delivery Method): nasal cannula  O2 Flow (L/min): 2      PHYSICAL EXAM:  GENERAL: NAD, well-developed  HEAD:  Atraumatic, Normocephalic  EYES: EOMI, PERRLA, conjunctiva and sclera clear  NECK: Supple, No JVD  CHEST/LUNG: Clear to auscultation bilaterally; No wheeze  HEART: Regular rate and rhythm; No murmurs, rubs, or gallops  ABDOMEN: Soft, difuse tender, Nondistended; Bowel sounds present  EXTREMITIES:  2+ Peripheral Pulses, No clubbing, cyanosis, or edema  PSYCH: AAOx3  NEUROLOGY: non-focal  SKIN: No rashes or lesions    LABS:                        11.2   8.13  )-----------( 230      ( 17 Dec 2022 09:59 )             34.0     12-17    129<L>  |  88<L>  |  7   ----------------------------<  122<H>  3.3<L>   |  31  |  0.47<L>    Ca    9.4      17 Dec 2022 09:59  Phos  2.7     12-17  Mg     1.7     12-17    TPro  6.8  /  Alb  4.1  /  TBili  1.3<H>  /  DBili  x   /  AST  77<H>  /  ALT  85<H>  /  AlkPhos  115  12-17    PT/INR - ( 17 Dec 2022 06:52 )   PT: 11.5 sec;   INR: 1.00 ratio           CARDIAC MARKERS ( 17 Dec 2022 06:51 )  x     / x     / 72 U/L / x     / x          Urinalysis Basic - ( 15 Dec 2022 21:36 )    Color: Light Yellow / Appearance: Clear / SG: >1.050 / pH: x  Gluc: x / Ketone: Negative  / Bili: Negative / Urobili: Negative   Blood: x / Protein: Trace / Nitrite: Negative   Leuk Esterase: Negative / RBC: 1 /hpf / WBC 1 /HPF   Sq Epi: x / Non Sq Epi: 1 /hpf / Bacteria: Negative        Culture - Urine (collected 15 Dec 2022 19:21)  Source: Clean Catch Clean Catch (Midstream)  Final Report (17 Dec 2022 11:51):    <10,000 CFU/mL Normal Urogenital Bela        RADIOLOGY & ADDITIONAL TESTS:    Imaging Personally Reviewed:    Consultant(s) Notes Reviewed:      Care Discussed with Consultants/Other Providers:  
Patient is a 67y old  Male who presents with a chief complaint of Elevated liver enzymes (16 Dec 2022 16:20)      SUBJECTIVE / OVERNIGHT EVENTS: feels better.  Review of Systems  chest pain no  palpitations no  sob no  nausea no  headache no    MEDICATIONS  (STANDING):  aspirin enteric coated 81 milliGRAM(s) Oral daily  enoxaparin Injectable 40 milliGRAM(s) SubCutaneous every 24 hours  levothyroxine 75 MICROGram(s) Oral daily  lisinopril 20 milliGRAM(s) Oral daily  metoprolol tartrate 50 milliGRAM(s) Oral two times a day  pantoprazole    Tablet 40 milliGRAM(s) Oral every 12 hours  polyethylene glycol 3350 17 Gram(s) Oral two times a day  senna 2 Tablet(s) Oral at bedtime  simethicone 80 milliGRAM(s) Chew three times a day  sodium chloride 0.9%. 1000 milliLiter(s) (100 mL/Hr) IV Continuous <Continuous>    MEDICATIONS  (PRN):  albuterol    90 MICROgram(s) HFA Inhaler 2 Puff(s) Inhalation every 6 hours PRN Shortness of Breath and/or Wheezing  ibuprofen  Tablet. 400 milliGRAM(s) Oral every 6 hours PRN Temp greater or equal to 38.5C (101.3F), Mild Pain (1 - 3)      Vital Signs Last 24 Hrs  T(C): 36.6 (18 Dec 2022 17:11), Max: 37 (17 Dec 2022 21:20)  T(F): 97.9 (18 Dec 2022 17:11), Max: 98.6 (17 Dec 2022 21:20)  HR: 62 (18 Dec 2022 17:11) (60 - 67)  BP: 164/89 (18 Dec 2022 17:11) (156/90 - 175/81)  BP(mean): --  RR: 18 (18 Dec 2022 17:11) (18 - 18)  SpO2: 95% (18 Dec 2022 17:11) (93% - 95%)    Parameters below as of 18 Dec 2022 17:11  Patient On (Oxygen Delivery Method): nasal cannula  O2 Flow (L/min): 2      PHYSICAL EXAM:  GENERAL: NAD, well-developed  HEAD:  Atraumatic, Normocephalic  EYES: EOMI, PERRLA, conjunctiva and sclera clear  NECK: Supple, No JVD  CHEST/LUNG: Clear to auscultation bilaterally; No wheeze  HEART: Regular rate and rhythm; No murmurs, rubs, or gallops  ABDOMEN: Soft, Nontender, Nondistended; Bowel sounds present  EXTREMITIES:  2+ Peripheral Pulses, No clubbing, cyanosis, or edema  PSYCH: AAOx3  NEUROLOGY: non-focal  SKIN: No rashes or lesions    LABS:                        11.5   11.57 )-----------( 263      ( 18 Dec 2022 12:06 )             34.6     12-18    130<L>  |  91<L>  |  4<L>  ----------------------------<  100<H>  3.9   |  30  |  0.46<L>    Ca    8.6      18 Dec 2022 02:21  Phos  2.7     12-17  Mg     1.7     12-17    TPro  5.9<L>  /  Alb  3.6  /  TBili  0.8  /  DBili  x   /  AST  58<H>  /  ALT  68<H>  /  AlkPhos  93  12-18    PT/INR - ( 18 Dec 2022 02:21 )   PT: 12.0 sec;   INR: 1.03 ratio           CARDIAC MARKERS ( 17 Dec 2022 06:51 )  x     / x     / 72 U/L / x     / x              Culture - Urine (collected 15 Dec 2022 19:21)  Source: Clean Catch Clean Catch (Midstream)  Final Report (17 Dec 2022 11:51):    <10,000 CFU/mL Normal Urogenital Bela        RADIOLOGY & ADDITIONAL TESTS:    Imaging Personally Reviewed:  < from: Xray Abdomen 1 View PORTABLE -Urgent (Xray Abdomen 1 View PORTABLE -Urgent .) (12.17.22 @ 11:08) >    IMPRESSION:  Nonobstructive bowel gas pattern.    < end of copied text >    Consultant(s) Notes Reviewed:      Care Discussed with Consultants/Other Providers:  
Patient is a 67y old  Male who presents with a chief complaint of Elevated liver enzymes (16 Dec 2022 16:20)      SUBJECTIVE / OVERNIGHT EVENTS: feels better. Less abdominal pain.  Review of Systems  chest pain no  palpitations no  sob no  nausea no  headache no    MEDICATIONS  (STANDING):  aspirin enteric coated 81 milliGRAM(s) Oral daily  influenza  Vaccine (HIGH DOSE) 0.7 milliLiter(s) IntraMuscular once  levothyroxine 75 MICROGram(s) Oral daily  lisinopril 20 milliGRAM(s) Oral daily  metoprolol tartrate 50 milliGRAM(s) Oral two times a day  mirtazapine 7.5 milliGRAM(s) Oral at bedtime  pantoprazole    Tablet 40 milliGRAM(s) Oral every 12 hours  polyethylene glycol 3350 17 Gram(s) Oral two times a day  senna 2 Tablet(s) Oral at bedtime  simethicone 80 milliGRAM(s) Chew three times a day  sodium chloride 0.9%. 1000 milliLiter(s) (100 mL/Hr) IV Continuous <Continuous>    MEDICATIONS  (PRN):  albuterol    90 MICROgram(s) HFA Inhaler 2 Puff(s) Inhalation every 6 hours PRN Shortness of Breath and/or Wheezing  ibuprofen  Tablet. 400 milliGRAM(s) Oral every 6 hours PRN Temp greater or equal to 38.5C (101.3F), Mild Pain (1 - 3)  melatonin 3 milliGRAM(s) Oral at bedtime PRN Sleep      Vital Signs Last 24 Hrs  T(C): 36.6 (20 Dec 2022 13:00), Max: 36.6 (19 Dec 2022 20:05)  T(F): 97.9 (20 Dec 2022 13:00), Max: 97.9 (19 Dec 2022 20:05)  HR: 70 (20 Dec 2022 13:00) (58 - 70)  BP: 136/84 (20 Dec 2022 13:00) (136/84 - 167/83)  BP(mean): --  RR: 16 (20 Dec 2022 13:00) (16 - 17)  SpO2: 93% (20 Dec 2022 13:00) (93% - 94%)    Parameters below as of 20 Dec 2022 13:00  Patient On (Oxygen Delivery Method): nasal cannula w/ humidification  O2 Flow (L/min): 2      PHYSICAL EXAM:  GENERAL: NAD  HEAD:  Atraumatic, Normocephalic  EYES: EOMI, PERRLA, conjunctiva and sclera clear  NECK: Supple, No JVD  CHEST/LUNG: Clear to auscultation bilaterally; No wheeze  HEART: Regular rate and rhythm; No murmurs, rubs, or gallops  ABDOMEN: Soft, Nontender, Nondistended; Bowel sounds present  EXTREMITIES:  2+ Peripheral Pulses, No clubbing, cyanosis, or edema  PSYCH: AAOx3  NEUROLOGY: non-focal  SKIN: No rashes or lesions    LABS:                        9.7    7.61  )-----------( 231      ( 20 Dec 2022 06:56 )             28.2     12-20    130<L>  |  92<L>  |  <4<L>  ----------------------------<  90  3.1<L>   |  28  |  0.42<L>    Ca    8.7      20 Dec 2022 06:56    TPro  5.9<L>  /  Alb  3.5  /  TBili  0.5  /  DBili  x   /  AST  57<H>  /  ALT  61<H>  /  AlkPhos  81  12-20                RADIOLOGY & ADDITIONAL TESTS:    Imaging Personally Reviewed:    Consultant(s) Notes Reviewed:      Care Discussed with Consultants/Other Providers:  
Patient is a 67y old  Male who presents with a chief complaint of Elevated liver enzymes (16 Dec 2022 16:20)      SUBJECTIVE / OVERNIGHT EVENTS: feels better. Wants to go home.  Review of Systems  chest pain no  palpitations no  sob no  nausea no  headache no    MEDICATIONS  (STANDING):  aspirin enteric coated 81 milliGRAM(s) Oral daily  influenza  Vaccine (HIGH DOSE) 0.7 milliLiter(s) IntraMuscular once  lactated ringers. 500 milliLiter(s) (30 mL/Hr) IV Continuous <Continuous>  levothyroxine 75 MICROGram(s) Oral daily  lisinopril 20 milliGRAM(s) Oral daily  metoprolol tartrate 50 milliGRAM(s) Oral two times a day  mirtazapine 7.5 milliGRAM(s) Oral at bedtime  pantoprazole    Tablet 40 milliGRAM(s) Oral every 12 hours  polyethylene glycol 3350 17 Gram(s) Oral two times a day  senna 2 Tablet(s) Oral at bedtime  simethicone 80 milliGRAM(s) Chew three times a day  sodium chloride 0.9%. 1000 milliLiter(s) (100 mL/Hr) IV Continuous <Continuous>    MEDICATIONS  (PRN):  albuterol    90 MICROgram(s) HFA Inhaler 2 Puff(s) Inhalation every 6 hours PRN Shortness of Breath and/or Wheezing  ibuprofen  Tablet. 400 milliGRAM(s) Oral every 6 hours PRN Temp greater or equal to 38.5C (101.3F), Mild Pain (1 - 3)  melatonin 3 milliGRAM(s) Oral at bedtime PRN Sleep      Vital Signs Last 24 Hrs  T(C): 36.6 (24 Dec 2022 13:13), Max: 36.7 (23 Dec 2022 21:14)  T(F): 97.8 (24 Dec 2022 13:13), Max: 98.1 (23 Dec 2022 21:14)  HR: 70 (24 Dec 2022 13:13) (63 - 70)  BP: 156/85 (24 Dec 2022 13:13) (156/85 - 160/80)  BP(mean): --  RR: 18 (24 Dec 2022 13:13) (16 - 18)  SpO2: 97% (24 Dec 2022 13:13) (97% - 100%)    Parameters below as of 24 Dec 2022 13:13  Patient On (Oxygen Delivery Method): nasal cannula  O2 Flow (L/min): 2      PHYSICAL EXAM:  GENERAL: NAD, well-developed  HEAD:  Atraumatic, Normocephalic  EYES: EOMI, PERRLA, conjunctiva and sclera clear  NECK: Supple, No JVD  CHEST/LUNG: Clear to auscultation bilaterally; No wheeze  HEART: Regular rate and rhythm; No murmurs, rubs, or gallops  ABDOMEN: Soft, Nontender, Nondistended; Bowel sounds present  EXTREMITIES:  2+ Peripheral Pulses, No clubbing, cyanosis, or edema  PSYCH: AAOx3  NEUROLOGY: non-focal  SKIN: No rashes or lesions    LABS:    12-24    138  |  96  |  <4<L>  ----------------------------<  107<H>  4.1   |  35<H>  |  0.49<L>    Ca    9.4      24 Dec 2022 10:52  Mg     2.1     12-22    TPro  6.3  /  Alb  3.6  /  TBili  0.3  /  DBili  x   /  AST  28  /  ALT  37  /  AlkPhos  84  12-24    PT/INR - ( 24 Dec 2022 10:52 )   PT: 11.5 sec;   INR: 1.00 ratio                     RADIOLOGY & ADDITIONAL TESTS:    Imaging Personally Reviewed:    Consultant(s) Notes Reviewed:      Care Discussed with Consultants/Other Providers:  
  Chief Complaint:  Patient is a 67y old  Male who presents with a chief complaint of Elevated liver enzymes (16 Dec 2022 16:20)      Interval Events: abd pain improving, able to eat 1/2 sandwich yesterday without pain  - reports 2 BMs today that were semi-liquid, becoming more formed      Hospital Medications:  albuterol    90 MICROgram(s) HFA Inhaler 2 Puff(s) Inhalation every 6 hours PRN  aspirin enteric coated 81 milliGRAM(s) Oral daily  ibuprofen  Tablet. 400 milliGRAM(s) Oral every 6 hours PRN  influenza  Vaccine (HIGH DOSE) 0.7 milliLiter(s) IntraMuscular once  levothyroxine 75 MICROGram(s) Oral daily  lisinopril 20 milliGRAM(s) Oral daily  melatonin 3 milliGRAM(s) Oral at bedtime PRN  metoprolol tartrate 50 milliGRAM(s) Oral two times a day  mirtazapine 7.5 milliGRAM(s) Oral at bedtime  pantoprazole    Tablet 40 milliGRAM(s) Oral every 12 hours  polyethylene glycol 3350 17 Gram(s) Oral two times a day  potassium chloride  10 mEq/100 mL IVPB 10 milliEquivalent(s) IV Intermittent every 1 hour  senna 2 Tablet(s) Oral at bedtime  simethicone 80 milliGRAM(s) Chew three times a day  sodium chloride 0.9%. 1000 milliLiter(s) IV Continuous <Continuous>      PMHX/PSHX:  HTN (hypertension)    HLD (hyperlipidemia)    PAD (peripheral artery disease)    History of abdominal aortic aneurysm (AAA)    History of COPD    Gastritis    Hypothyroidism    No significant past surgical history            ROS:     General:  No weight loss, fevers, chills, night sweats, fatigue   Eyes:  No vision changes  ENT:  No sore throat, pain, runny nose  CV:  No chest pain, palpitations, dizziness   Resp:  No SOB, cough, wheezing  GI:  See HPI  :  No burning with urination, hematuria  Muscle:  No pain, weakness  Neuro:  No weakness/tingling, memory problems  Psych:  No fatigue, insomnia, mood problems, depression  Heme:  No easy bruisability  Skin:  No rash, edema      PHYSICAL EXAM:     GENERAL:  Well developed, no distress  HEENT:  NC/AT,  conjunctivae clear, sclera anicteric  CHEST:  Full & symmetric excursion, no increased effort w/ respirations  HEART:  Regular rhythm & rate  ABDOMEN:  Soft, non-tender, non-distended  EXTREMITIES:  no LE  edema  SKIN:  No rash/erythema/ecchymoses/petechiae/wounds/jaundice  NEURO:  Alert, oriented    Vital Signs:  Vital Signs Last 24 Hrs  T(C): 36.6 (20 Dec 2022 05:26), Max: 36.9 (19 Dec 2022 11:30)  T(F): 97.9 (20 Dec 2022 05:26), Max: 98.5 (19 Dec 2022 11:30)  HR: 70 (20 Dec 2022 05:26) (58 - 70)  BP: 150/85 (20 Dec 2022 05:26) (150/85 - 167/83)  BP(mean): --  RR: 17 (20 Dec 2022 05:26) (16 - 18)  SpO2: 93% (20 Dec 2022 05:26) (93% - 95%)    Parameters below as of 20 Dec 2022 05:26  Patient On (Oxygen Delivery Method): nasal cannula  O2 Flow (L/min): 2    Daily     Daily     LABS:                        9.7    7.61  )-----------( 231      ( 20 Dec 2022 06:56 )             28.2     12-20    130<L>  |  92<L>  |  <4<L>  ----------------------------<  90  3.1<L>   |  28  |  0.42<L>    Ca    8.7      20 Dec 2022 06:56    TPro  5.9<L>  /  Alb  3.5  /  TBili  0.5  /  DBili  x   /  AST  57<H>  /  ALT  61<H>  /  AlkPhos  81  12-20    LIVER FUNCTIONS - ( 20 Dec 2022 06:56 )  Alb: 3.5 g/dL / Pro: 5.9 g/dL / ALK PHOS: 81 U/L / ALT: 61 U/L / AST: 57 U/L / GGT: x                   Imaging:          
Cardiovascular Disease Progress Note    Overnight events: No acute events overnight.  no cp/sob/palps/dizziness  Otherwise review of systems negative    Objective Findings:  T(C): 36.6 (12-22-22 @ 05:25), Max: 37.3 (12-21-22 @ 20:50)  HR: 67 (12-22-22 @ 05:25) (57 - 67)  BP: 144/80 (12-22-22 @ 05:25) (144/80 - 154/87)  RR: 16 (12-22-22 @ 05:25) (16 - 16)  SpO2: 98% (12-22-22 @ 05:25) (95% - 100%)  Wt(kg): --  Daily     Daily       Physical Exam:  Gen: NAD  HEENT: EOMI  CV: RRR, normal S1 + S2, no m/r/g  Lungs: CTAB  Abd: soft, non-tender  Ext: No edema    Telemetry:    Laboratory Data:                        10.4   7.11  )-----------( 243      ( 21 Dec 2022 06:54 )             31.3     12-21    136  |  97  |  <4<L>  ----------------------------<  92  3.7   |  32<H>  |  0.52    Ca    9.1      21 Dec 2022 06:54    TPro  6.2  /  Alb  3.5  /  TBili  0.4  /  DBili  x   /  AST  51<H>  /  ALT  58<H>  /  AlkPhos  82  12-21    PT/INR - ( 21 Dec 2022 06:54 )   PT: 11.2 sec;   INR: 0.97 ratio                   Inpatient Medications:  MEDICATIONS  (STANDING):  aspirin enteric coated 81 milliGRAM(s) Oral daily  influenza  Vaccine (HIGH DOSE) 0.7 milliLiter(s) IntraMuscular once  levothyroxine 75 MICROGram(s) Oral daily  lisinopril 20 milliGRAM(s) Oral daily  metoprolol tartrate 50 milliGRAM(s) Oral two times a day  mirtazapine 7.5 milliGRAM(s) Oral at bedtime  pantoprazole    Tablet 40 milliGRAM(s) Oral every 12 hours  polyethylene glycol 3350 17 Gram(s) Oral two times a day  senna 2 Tablet(s) Oral at bedtime  simethicone 80 milliGRAM(s) Chew three times a day  sodium chloride 0.9%. 1000 milliLiter(s) (100 mL/Hr) IV Continuous <Continuous>      Assessment:  elevated LFT  COPD on home O2  CAD s/p stents,  HFrEF s/p  AICD  PAD s/p angioplasties and RLE fem-pop bypass   gastritis  Partially thrombosed abdominal aortic aneurysm measuring 4.8 x 4.7 by at least 8.4 cm. CTA showed Partially thrombosed infrarenal aortic aneurysm measures 5.2 x 5.0 x 11.1 cm (AP by transverse by craniocaudad) end extends to the bifurcation.    Recs:  cardiac stable  vol status acceptable. diuretics prn to maintain euvolemic  cw gdmt for lv dysfunction  cw antiplatelets, statin and antianginals  f/u surgery and GI recs  vascular following for AAA  s/p icd interrogation  denies any ischemic or hf sx. on optimal cardiac regimen. can proceed to egd without further cardiac workup  will follow          Over 25 minutes spent on total encounter; more than 50% of the visit was spent counseling and/or coordinating care by the attending physician.      Vaughn Simms MD   Cardiovascular Disease  [phone]
Cardiovascular Disease Progress Note    Overnight events: No acute events overnight.  no new cardiac sx  Otherwise review of systems negative    Objective Findings:  T(C): 36.7 (12-23-22 @ 05:50), Max: 36.9 (12-22-22 @ 16:50)  HR: 57 (12-23-22 @ 05:50) (57 - 90)  BP: 129/78 (12-23-22 @ 05:50) (128/78 - 182/81)  RR: 18 (12-23-22 @ 05:50) (16 - 20)  SpO2: 99% (12-23-22 @ 05:50) (96% - 100%)  Wt(kg): --  Daily Height in cm: 172.72 (22 Dec 2022 18:03)    Daily       Physical Exam:  Gen: NAD  HEENT: EOMI  CV: RRR, normal S1 + S2, no m/r/g  Lungs: CTAB  Abd: soft, non-tender  Ext: No edema    Telemetry:    Laboratory Data:                        10.4   7.11  )-----------( 243      ( 21 Dec 2022 06:54 )             31.3     12-22    137  |  97  |  <4<L>  ----------------------------<  98  4.3   |  31  |  0.42<L>    Ca    9.6      22 Dec 2022 15:38  Phos  3.0     12-22  Mg     2.1     12-22    TPro  4.7<L>  /  Alb  2.7<L>  /  TBili  0.2  /  DBili  x   /  AST  32  /  ALT  38  /  AlkPhos  67  12-22    PT/INR - ( 22 Dec 2022 07:07 )   PT: 11.4 sec;   INR: 0.98 ratio                   Inpatient Medications:  MEDICATIONS  (STANDING):  aspirin enteric coated 81 milliGRAM(s) Oral daily  influenza  Vaccine (HIGH DOSE) 0.7 milliLiter(s) IntraMuscular once  lactated ringers. 500 milliLiter(s) (30 mL/Hr) IV Continuous <Continuous>  levothyroxine 75 MICROGram(s) Oral daily  lisinopril 20 milliGRAM(s) Oral daily  metoprolol tartrate 50 milliGRAM(s) Oral two times a day  mirtazapine 7.5 milliGRAM(s) Oral at bedtime  pantoprazole    Tablet 40 milliGRAM(s) Oral every 12 hours  polyethylene glycol 3350 17 Gram(s) Oral two times a day  senna 2 Tablet(s) Oral at bedtime  simethicone 80 milliGRAM(s) Chew three times a day  sodium chloride 0.9%. 1000 milliLiter(s) (100 mL/Hr) IV Continuous <Continuous>      Assessment:  elevated LFT  COPD on home O2  CAD s/p stents,  HFrEF s/p  AICD  PAD s/p angioplasties and RLE fem-pop bypass   gastritis  Partially thrombosed abdominal aortic aneurysm measuring 4.8 x 4.7 by at least 8.4 cm. CTA showed Partially thrombosed infrarenal aortic aneurysm measures 5.2 x 5.0 x 11.1 cm (AP by transverse by craniocaudad) end extends to the bifurcation.    Recs:  cardiac stable  vol status acceptable. diuretics prn to maintain euvolemic  cw gdmt for lv dysfunction  cw antiplatelets, statin and antianginals  f/u surgery and GI recs  vascular following for AAA  s/p icd interrogation  s/p egd  will follow        Over 25 minutes spent on total encounter; more than 50% of the visit was spent counseling and/or coordinating care by the attending physician.      Vaughn Simms MD   Cardiovascular Disease  [phone]
Cardiovascular Disease Progress Note    Overnight events: No acute events overnight. no cp/sob/palps   Otherwise review of systems negative    Objective Findings:  T(C): 36.6 (12-20-22 @ 05:26), Max: 36.9 (12-19-22 @ 11:30)  HR: 70 (12-20-22 @ 05:26) (58 - 70)  BP: 150/85 (12-20-22 @ 05:26) (150/85 - 167/83)  RR: 17 (12-20-22 @ 05:26) (16 - 18)  SpO2: 93% (12-20-22 @ 05:26) (93% - 95%)  Wt(kg): --  Daily     Daily       Physical Exam:  Gen: NAD  HEENT: EOMI  CV: RRR, normal S1 + S2, no m/r/g  Lungs: CTAB  Abd: soft, non-tender  Ext: No edema    Telemetry:    Laboratory Data:                        9.7    7.61  )-----------( 231      ( 20 Dec 2022 06:56 )             28.2                     Inpatient Medications:  MEDICATIONS  (STANDING):  aspirin enteric coated 81 milliGRAM(s) Oral daily  enoxaparin Injectable 40 milliGRAM(s) SubCutaneous every 24 hours  influenza  Vaccine (HIGH DOSE) 0.7 milliLiter(s) IntraMuscular once  levothyroxine 75 MICROGram(s) Oral daily  lisinopril 20 milliGRAM(s) Oral daily  metoprolol tartrate 50 milliGRAM(s) Oral two times a day  mirtazapine 7.5 milliGRAM(s) Oral at bedtime  pantoprazole    Tablet 40 milliGRAM(s) Oral every 12 hours  polyethylene glycol 3350 17 Gram(s) Oral two times a day  senna 2 Tablet(s) Oral at bedtime  simethicone 80 milliGRAM(s) Chew three times a day  sodium chloride 0.9%. 1000 milliLiter(s) (100 mL/Hr) IV Continuous <Continuous>      Assessment:  elevated LFT  COPD on home O2  CAD s/p stents,  HFrEF s/p  AICD  PAD s/p angioplasties and RLE fem-pop bypass   gastritis  Partially thrombosed abdominal aortic aneurysm measuring 4.8 x 4.7 by at least 8.4 cm. CTA showed Partially thrombosed infrarenal aortic aneurysm measures 5.2 x 5.0 x 11.1 cm (AP by transverse by craniocaudad) end extends to the bifurcation.    Recs:  cardiac stable  vol status acceptable. diuretics prn to maintain euvolemic  cw gdmt for lv dysfunction  cw antiplatelets, statin and antianginals  f/u surgery and GI recs  vascular following for AAA  denies any ischemic or hf sx. on optimal cardiac regimen. can proceed to egd without further cardiac workup  will follow          Over 25 minutes spent on total encounter; more than 50% of the visit was spent counseling and/or coordinating care by the attending physician.      Vaughn Simms MD   Cardiovascular Disease  [phone]
Patient is a 67y old  Male who presents with a chief complaint of Elevated liver enzymes (16 Dec 2022 16:20)      SUBJECTIVE / OVERNIGHT EVENTS: No new complaints.   Review of Systems  chest pain no  palpitations no  sob no  nausea no  headache no    MEDICATIONS  (STANDING):  aspirin enteric coated 81 milliGRAM(s) Oral daily  influenza  Vaccine (HIGH DOSE) 0.7 milliLiter(s) IntraMuscular once  levothyroxine 75 MICROGram(s) Oral daily  lisinopril 20 milliGRAM(s) Oral daily  metoprolol tartrate 50 milliGRAM(s) Oral two times a day  mirtazapine 7.5 milliGRAM(s) Oral at bedtime  pantoprazole    Tablet 40 milliGRAM(s) Oral every 12 hours  polyethylene glycol 3350 17 Gram(s) Oral two times a day  senna 2 Tablet(s) Oral at bedtime  simethicone 80 milliGRAM(s) Chew three times a day  sodium chloride 0.9%. 1000 milliLiter(s) (100 mL/Hr) IV Continuous <Continuous>    MEDICATIONS  (PRN):  albuterol    90 MICROgram(s) HFA Inhaler 2 Puff(s) Inhalation every 6 hours PRN Shortness of Breath and/or Wheezing  ibuprofen  Tablet. 400 milliGRAM(s) Oral every 6 hours PRN Temp greater or equal to 38.5C (101.3F), Mild Pain (1 - 3)  melatonin 3 milliGRAM(s) Oral at bedtime PRN Sleep      Vital Signs Last 24 Hrs  T(C): 36.1 (22 Dec 2022 13:00), Max: 37.3 (21 Dec 2022 20:50)  T(F): 97 (22 Dec 2022 13:00), Max: 99.1 (21 Dec 2022 20:50)  HR: 68 (22 Dec 2022 13:00) (60 - 68)  BP: 146/75 (22 Dec 2022 13:00) (144/80 - 154/87)  BP(mean): --  RR: 16 (22 Dec 2022 13:00) (16 - 16)  SpO2: 98% (22 Dec 2022 13:00) (98% - 100%)    Parameters below as of 22 Dec 2022 13:00  Patient On (Oxygen Delivery Method): nasal cannula  O2 Flow (L/min): 2      PHYSICAL EXAM:  GENERAL: NAD  HEAD:  Atraumatic, Normocephalic  EYES: EOMI, PERRLA, conjunctiva and sclera clear  NECK: Supple, No JVD  CHEST/LUNG: Clear to auscultation bilaterally; No wheeze  HEART: Regular rate and rhythm; No murmurs, rubs, or gallops  ABDOMEN: Soft, Nontender, Nondistended; Bowel sounds present  EXTREMITIES:  2+ Peripheral Pulses, No clubbing, cyanosis, or edema  PSYCH: AAOx3  NEUROLOGY: non-focal  SKIN: No rashes or lesions    LABS:                        10.4   7.11  )-----------( 243      ( 21 Dec 2022 06:54 )             31.3     12-22    137  |  97  |  <4<L>  ----------------------------<  98  4.3   |  31  |  0.42<L>    Ca    9.6      22 Dec 2022 15:38  Phos  3.0     12-22  Mg     2.1     12-22    TPro  4.7<L>  /  Alb  2.7<L>  /  TBili  0.2  /  DBili  x   /  AST  32  /  ALT  38  /  AlkPhos  67  12-22    PT/INR - ( 22 Dec 2022 07:07 )   PT: 11.4 sec;   INR: 0.98 ratio                     RADIOLOGY & ADDITIONAL TESTS:    Imaging Personally Reviewed:    Consultant(s) Notes Reviewed:      Care Discussed with Consultants/Other Providers:  
Patient is a 67y old  Male who presents with a chief complaint of Elevated liver enzymes (16 Dec 2022 16:20)      SUBJECTIVE / OVERNIGHT EVENTS: feels better.  Review of Systems  chest pain no  palpitations no  sob no  nausea no  headache no    MEDICATIONS  (STANDING):  aspirin enteric coated 81 milliGRAM(s) Oral daily  influenza  Vaccine (HIGH DOSE) 0.7 milliLiter(s) IntraMuscular once  lactated ringers. 500 milliLiter(s) (30 mL/Hr) IV Continuous <Continuous>  levothyroxine 75 MICROGram(s) Oral daily  lisinopril 20 milliGRAM(s) Oral daily  metoprolol tartrate 50 milliGRAM(s) Oral two times a day  mirtazapine 7.5 milliGRAM(s) Oral at bedtime  pantoprazole    Tablet 40 milliGRAM(s) Oral every 12 hours  polyethylene glycol 3350 17 Gram(s) Oral two times a day  senna 2 Tablet(s) Oral at bedtime  simethicone 80 milliGRAM(s) Chew three times a day  sodium chloride 0.9%. 1000 milliLiter(s) (100 mL/Hr) IV Continuous <Continuous>    MEDICATIONS  (PRN):  albuterol    90 MICROgram(s) HFA Inhaler 2 Puff(s) Inhalation every 6 hours PRN Shortness of Breath and/or Wheezing  ibuprofen  Tablet. 400 milliGRAM(s) Oral every 6 hours PRN Temp greater or equal to 38.5C (101.3F), Mild Pain (1 - 3)  melatonin 3 milliGRAM(s) Oral at bedtime PRN Sleep      Vital Signs Last 24 Hrs  T(C): 36.6 (23 Dec 2022 12:30), Max: 36.7 (23 Dec 2022 01:05)  T(F): 97.9 (23 Dec 2022 12:30), Max: 98 (23 Dec 2022 01:05)  HR: 66 (23 Dec 2022 12:30) (57 - 75)  BP: 132/68 (23 Dec 2022 12:30) (128/78 - 132/82)  BP(mean): --  RR: 18 (23 Dec 2022 12:30) (17 - 18)  SpO2: 98% (23 Dec 2022 12:30) (98% - 100%)    Parameters below as of 23 Dec 2022 12:30  Patient On (Oxygen Delivery Method): nasal cannula  O2 Flow (L/min): 2      PHYSICAL EXAM:  GENERAL: NAD, well-developed  HEAD:  Atraumatic, Normocephalic  EYES: EOMI, PERRLA, conjunctiva and sclera clear  NECK: Supple, No JVD  CHEST/LUNG: Clear to auscultation bilaterally; No wheeze  HEART: Regular rate and rhythm; No murmurs, rubs, or gallops  ABDOMEN: Soft, Nontender, Nondistended; Bowel sounds present  EXTREMITIES:  2+ Peripheral Pulses, No clubbing, cyanosis, or edema  PSYCH: AAOx3  NEUROLOGY: non-focal  SKIN: No rashes or lesions    LABS:    12-23    137  |  97  |  <4<L>  ----------------------------<  107<H>  4.2   |  33<H>  |  0.51    Ca    9.2      23 Dec 2022 08:59  Phos  3.0     12-22  Mg     2.1     12-22    TPro  6.3  /  Alb  3.5  /  TBili  0.4  /  DBili  x   /  AST  34  /  ALT  44  /  AlkPhos  85  12-23    PT/INR - ( 23 Dec 2022 08:59 )   PT: 11.5 sec;   INR: 0.99 ratio                     RADIOLOGY & ADDITIONAL TESTS:    Imaging Personally Reviewed:    Consultant(s) Notes Reviewed:      Care Discussed with Consultants/Other Providers:  
  Chief Complaint:  Patient is a 67y old  Male who presents with a chief complaint of Elevated liver enzymes (16 Dec 2022 16:20)      Interval Events: no acute events overnight  - patient states he continues to tolerate a diet, states he ate some dinner last night without issues      Hospital Medications:  albuterol    90 MICROgram(s) HFA Inhaler 2 Puff(s) Inhalation every 6 hours PRN  aspirin enteric coated 81 milliGRAM(s) Oral daily  ibuprofen  Tablet. 400 milliGRAM(s) Oral every 6 hours PRN  influenza  Vaccine (HIGH DOSE) 0.7 milliLiter(s) IntraMuscular once  levothyroxine 75 MICROGram(s) Oral daily  lisinopril 20 milliGRAM(s) Oral daily  melatonin 3 milliGRAM(s) Oral at bedtime PRN  metoprolol tartrate 50 milliGRAM(s) Oral two times a day  mirtazapine 7.5 milliGRAM(s) Oral at bedtime  pantoprazole    Tablet 40 milliGRAM(s) Oral every 12 hours  polyethylene glycol 3350 17 Gram(s) Oral two times a day  senna 2 Tablet(s) Oral at bedtime  simethicone 80 milliGRAM(s) Chew three times a day  sodium chloride 0.9%. 1000 milliLiter(s) IV Continuous <Continuous>      PMHX/PSHX:  HTN (hypertension)    HLD (hyperlipidemia)    PAD (peripheral artery disease)    History of abdominal aortic aneurysm (AAA)    History of COPD    Gastritis    Hypothyroidism    No significant past surgical history            ROS:     General:  No weight loss, fevers, chills, night sweats, fatigue   Eyes:  No vision changes  ENT:  No sore throat, pain, runny nose  CV:  No chest pain, palpitations, dizziness   Resp:  No SOB, cough, wheezing  GI:  See HPI  :  No burning with urination, hematuria  Muscle:  No pain, weakness  Neuro:  No weakness/tingling, memory problems  Psych:  No fatigue, insomnia, mood problems, depression  Heme:  No easy bruisability  Skin:  No rash, edema      PHYSICAL EXAM:     GENERAL:  Well developed, no distress  HEENT:  NC/AT,  conjunctivae clear, sclera anicteric  CHEST:  Full & symmetric excursion, no increased effort w/ respirations  HEART:  Regular rhythm & rate  ABDOMEN:  Soft, non-tender, non-distended  EXTREMITIES:  no LE  edema  SKIN:  No rash/erythema/ecchymoses/petechiae/wounds/jaundice  NEURO:  Alert, oriented    Vital Signs:  Vital Signs Last 24 Hrs  T(C): 36.4 (21 Dec 2022 13:00), Max: 36.8 (20 Dec 2022 21:45)  T(F): 97.5 (21 Dec 2022 13:00), Max: 98.3 (20 Dec 2022 21:45)  HR: 57 (21 Dec 2022 13:00) (57 - 76)  BP: 147/88 (21 Dec 2022 13:00) (141/70 - 166/92)  BP(mean): --  RR: 16 (21 Dec 2022 13:00) (16 - 17)  SpO2: 95% (21 Dec 2022 13:00) (94% - 95%)    Parameters below as of 21 Dec 2022 13:00  Patient On (Oxygen Delivery Method): room air  O2 Flow (L/min): 2    Daily     Daily     LABS:                        10.4   7.11  )-----------( 243      ( 21 Dec 2022 06:54 )             31.3     12-21    136  |  97  |  <4<L>  ----------------------------<  92  3.7   |  32<H>  |  0.52    Ca    9.1      21 Dec 2022 06:54    TPro  6.2  /  Alb  3.5  /  TBili  0.4  /  DBili  x   /  AST  51<H>  /  ALT  58<H>  /  AlkPhos  82  12-21    LIVER FUNCTIONS - ( 21 Dec 2022 06:54 )  Alb: 3.5 g/dL / Pro: 6.2 g/dL / ALK PHOS: 82 U/L / ALT: 58 U/L / AST: 51 U/L / GGT: x           PT/INR - ( 21 Dec 2022 06:54 )   PT: 11.2 sec;   INR: 0.97 ratio                 Imaging:          
GENERAL SURGERY PROGRESS NOTE    HPI: Patient is a 67-year-old man with PMH HTN, HLD, COPD on oxygen on average 2 to 4 L, PAD, defibrillator, known AAA measuring 5cm, presenting due to elevated liver enzymes that were found 2 days ago on routine labs.  Patient states that he had a call back yesterday about results, transport to the ER was arranged for today.  PCP Dr. Simms.  Endorsing acute worsening of diffuse abdominal pain that is present for the last 3 weeks, usually present after eating. +dysuria for the last few days.  In the ED, found to have cholelithiasis without acute cholecystitis. Denies fever, chest pain, SOB.      PAST MEDICAL HISTORY:  HTN (hypertension)    HLD (hyperlipidemia)    PAD (peripheral artery disease)        PAST SURGICAL HISTORY:  No significant past surgical history        MEDICATIONS:      ALLERGIES:  No Known Allergies      Vital Signs Last 24 Hrs  T(C): 37 (16 Dec 2022 03:19), Max: 37 (16 Dec 2022 03:19)  T(F): 98.6 (16 Dec 2022 03:19), Max: 98.6 (16 Dec 2022 03:19)  HR: 83 (16 Dec 2022 03:19) (70 - 83)  BP: 152/92 (16 Dec 2022 03:19) (138/89 - 152/92)  BP(mean): --  RR: 18 (16 Dec 2022 03:19) (18 - 20)  SpO2: 99% (16 Dec 2022 03:19) (99% - 100%)    Parameters below as of 16 Dec 2022 03:19  Patient On (Oxygen Delivery Method): nasal cannula  O2 Flow (L/min): 2      I&O's Summary      PHYSICAL EXAM:  General: No acute distress  Respiratory: Nonlabored  Cardiovascular: RRR  Abdominal: Soft, nondistended, mildly tender in RUQ. No rebound or guarding. No organomegaly, no palpable mass.  Extremities: Warm      LABS:                          12.2   8.42  )-----------( 305      ( 15 Dec 2022 15:39 )             36.2     12-15    130<L>  |  86<L>  |  10  ----------------------------<  112<H>  4.0   |  31  |  0.43<L>    Ca    9.3      15 Dec 2022 15:39    TPro  7.3  /  Alb  4.3  /  TBili  0.7  /  DBili  x   /  AST  142<H>  /  ALT  136<H>  /  AlkPhos  136<H>  12-15      Urinalysis Basic - ( 15 Dec 2022 21:36 )    Color: Light Yellow / Appearance: Clear / SG: >1.050 / pH: x  Gluc: x / Ketone: Negative  / Bili: Negative / Urobili: Negative   Blood: x / Protein: Trace / Nitrite: Negative   Leuk Esterase: Negative / RBC: 1 /hpf / WBC 1 /HPF   Sq Epi: x / Non Sq Epi: 1 /hpf / Bacteria: Negative        IMAGING:      ACC: [id number] EXAM: CT ANGIO ABD PELV (W)AW IC  ACC: [id number] EXAM: CT ANGIO CHEST AORTA [facility]    PROCEDURE DATE: 12/15/2022        INTERPRETATION: CLINICAL INFORMATION: Chest pain.    ADDITIONAL CLINICAL INFORMATION:    COMPARISON: CT into abdomen pelvis 4/26/2022, CT chest abdomen pelvis 5/14/2018.    CONTRAST/COMPLICATIONS:  IV Contrast: Omnipaque 350 125 cc administered 25 cc discarded  Oral Contrast: NONE  Complications: None reported at time of study completion    PROCEDURE:  CT Angiography of the Chest, Abdomen and Pelvis.  Gated precontrast imaging was performed through the heart followed by gated CT Angiography of the heart with subsequent non-gated arterial phase imaging of the chest, abdomen and pelvis.  Sagittal and coronal reformats were performed as well as 3D (MIP) reconstructions.    FINDINGS:  CHEST:  LUNGS AND LARGE AIRWAYS: Patent central airways. Emphysema with unchanged bilateral lung nodules measuring up to 3 mm.  PLEURA: No pleural effusion.  VESSELS: There is no intramural hematoma. There is no aneurysmal dilatation or dissection of the aorta. Aortic calcification.  HEART: Heart size is normal. Coronary artery and aortic valvular calcifications. No pericardial effusion.  MEDIASTINUM AND ALANNA: No lymphadenopathy.  CHEST WALL AND LOWER NECK: Right chest wall AICD.    ABDOMEN AND PELVIS:  LIVER: Within normal limits.  BILE DUCTS: Normal caliber.  GALLBLADDER: Cholelithiasis.  SPLEEN: Within normal limits.  PANCREAS: Within normal limits.  ADRENALS: Stable bilateral adrenal nodules.  KIDNEYS/URETERS: Symmetric enhancement of the kidneys. Stable left lower pole cortical scarring. No hydronephrosis. 1.1 cm left upper pole renal cyst, unchanged.    BLADDER: Fluid status distended bladder.  REPRODUCTIVE ORGANS: Prostate is enlarged.    BOWEL: No bowel obstruction. Appendix is normal. Moderate rectal stool burden.  PERITONEUM: No ascites.  VESSELS: Stable partially thrombosed infrarenal intra-abdominal aortic aneurysm measures 5.3 x 4.8 x 11.5 cm ( TRV x AP x CC). No aortic dissection is seen. Unchanged ectatic bilateral common iliac arteries. Right common iliac artery measures up to 1.5 cm. Intact right femoral bypass graft.  RETROPERITONEUM/LYMPH NODES: No lymphadenopathy.  ABDOMINAL WALL: Within normal limits.  BONES: Degenerative changes of the spine.    IMPRESSION:  No aortic dissection.    Stable 5.3 cm partially thrombosed infrarenal intra-abdominal aortic aneurysm.        --- End of Report ---      ACC: [id number] EXAM:  NM HEPATOBILIARY IMG                          PROCEDURE DATE:  12/16/2022          INTERPRETATION:  CLINICAL INFORMATION: 67-year-old male with COPD,   abdominal pain and elevated liver enzymes    DURATION of DYNAMIC SERIES: 60minutes  RADIOPHARMACEUTICAL: 3.1 mCi Tc-99m-Mebrofenin, I.V.  PHARMACOLOGIC INTERVENTION: None.    TECHNIQUE: Dynamic imaging of the anterior abdomen was performed   following radiopharmaceutical injection. Static images of the abdomen in   the anterior, right anterior oblique, and right lateral views were   obtained immediately thereafter.    COMPARISON: None    OTHER STUDIES USED FOR CORRELATION: None    FINDINGS: There is prompt, homogeneous uptake of radiotracer by the   hepatocytes. Activity is first seen in the gallbladder at 20 minutes and   in the bowel at 15 minutes. There is good clearance of activity from the   liver by the end of the study.    IMPRESSION: Hepatobiliary scan demonstrates:    No evidence of acute cholecystitis.    --- End of Report ---            GABRIELA RUIZ MD; Attending Nuclear Medicine  This document has been electronically signed. Dec 16 2022  9:57AM        
Patient is a 67y old  Male who presents with a chief complaint of Elevated liver enzymes (16 Dec 2022 16:20)      SUBJECTIVE / OVERNIGHT EVENTS: No new complaints. EGD cancelled today.  Review of Systems  chest pain no  palpitations no  sob no  nausea no  headache no    MEDICATIONS  (STANDING):  aspirin enteric coated 81 milliGRAM(s) Oral daily  influenza  Vaccine (HIGH DOSE) 0.7 milliLiter(s) IntraMuscular once  levothyroxine 75 MICROGram(s) Oral daily  lisinopril 20 milliGRAM(s) Oral daily  metoprolol tartrate 50 milliGRAM(s) Oral two times a day  mirtazapine 7.5 milliGRAM(s) Oral at bedtime  pantoprazole    Tablet 40 milliGRAM(s) Oral every 12 hours  polyethylene glycol 3350 17 Gram(s) Oral two times a day  senna 2 Tablet(s) Oral at bedtime  simethicone 80 milliGRAM(s) Chew three times a day  sodium chloride 0.9%. 1000 milliLiter(s) (100 mL/Hr) IV Continuous <Continuous>    MEDICATIONS  (PRN):  albuterol    90 MICROgram(s) HFA Inhaler 2 Puff(s) Inhalation every 6 hours PRN Shortness of Breath and/or Wheezing  ibuprofen  Tablet. 400 milliGRAM(s) Oral every 6 hours PRN Temp greater or equal to 38.5C (101.3F), Mild Pain (1 - 3)  melatonin 3 milliGRAM(s) Oral at bedtime PRN Sleep      Vital Signs Last 24 Hrs  T(C): 37.3 (21 Dec 2022 20:50), Max: 37.3 (21 Dec 2022 20:50)  T(F): 99.1 (21 Dec 2022 20:50), Max: 99.1 (21 Dec 2022 20:50)  HR: 60 (21 Dec 2022 20:50) (57 - 76)  BP: 154/87 (21 Dec 2022 20:50) (141/70 - 166/92)  BP(mean): --  RR: 16 (21 Dec 2022 20:50) (16 - 17)  SpO2: 100% (21 Dec 2022 20:50) (94% - 100%)    Parameters below as of 21 Dec 2022 20:50  Patient On (Oxygen Delivery Method): nasal cannula  O2 Flow (L/min): 2      PHYSICAL EXAM:  GENERAL: NAD, well-developed  HEAD:  Atraumatic, Normocephalic  EYES: EOMI, PERRLA, conjunctiva and sclera clear  NECK: Supple, No JVD  CHEST/LUNG: Clear to auscultation bilaterally; No wheeze  HEART: Regular rate and rhythm; No murmurs, rubs, or gallops  ABDOMEN: Soft, Nontender, Nondistended; Bowel sounds present  EXTREMITIES:  2+ Peripheral Pulses, No clubbing, cyanosis, or edema  PSYCH: AAOx3  NEUROLOGY: non-focal  SKIN: No rashes or lesions    LABS:                        10.4   7.11  )-----------( 243      ( 21 Dec 2022 06:54 )             31.3     12-21    136  |  97  |  <4<L>  ----------------------------<  92  3.7   |  32<H>  |  0.52    Ca    9.1      21 Dec 2022 06:54    TPro  6.2  /  Alb  3.5  /  TBili  0.4  /  DBili  x   /  AST  51<H>  /  ALT  58<H>  /  AlkPhos  82  12-21    PT/INR - ( 21 Dec 2022 06:54 )   PT: 11.2 sec;   INR: 0.97 ratio                     RADIOLOGY & ADDITIONAL TESTS:    Imaging Personally Reviewed:    Consultant(s) Notes Reviewed:      Care Discussed with Consultants/Other Providers:  
Patient is a 67y old  Male who presents with a chief complaint of Elevated liver enzymes (16 Dec 2022 16:20)      SUBJECTIVE / OVERNIGHT EVENTS: c/o abdominal discomfort  Review of Systems  chest pain no  palpitations no  sob no  nausea no  headache no    MEDICATIONS  (STANDING):  aspirin enteric coated 81 milliGRAM(s) Oral daily  enoxaparin Injectable 40 milliGRAM(s) SubCutaneous every 24 hours  levothyroxine 75 MICROGram(s) Oral daily  lisinopril 20 milliGRAM(s) Oral daily  metoprolol tartrate 50 milliGRAM(s) Oral two times a day  mirtazapine 7.5 milliGRAM(s) Oral at bedtime  pantoprazole    Tablet 40 milliGRAM(s) Oral every 12 hours  polyethylene glycol 3350 17 Gram(s) Oral two times a day  senna 2 Tablet(s) Oral at bedtime  simethicone 80 milliGRAM(s) Chew three times a day  sodium chloride 0.9%. 1000 milliLiter(s) (100 mL/Hr) IV Continuous <Continuous>    MEDICATIONS  (PRN):  albuterol    90 MICROgram(s) HFA Inhaler 2 Puff(s) Inhalation every 6 hours PRN Shortness of Breath and/or Wheezing  ibuprofen  Tablet. 400 milliGRAM(s) Oral every 6 hours PRN Temp greater or equal to 38.5C (101.3F), Mild Pain (1 - 3)  melatonin 3 milliGRAM(s) Oral at bedtime PRN Sleep      Vital Signs Last 24 Hrs  T(C): 36.6 (19 Dec 2022 20:05), Max: 36.9 (19 Dec 2022 11:30)  T(F): 97.9 (19 Dec 2022 20:05), Max: 98.5 (19 Dec 2022 11:30)  HR: 58 (19 Dec 2022 20:05) (56 - 70)  BP: 167/83 (19 Dec 2022 20:05) (151/82 - 167/83)  BP(mean): --  RR: 16 (19 Dec 2022 20:05) (16 - 18)  SpO2: 94% (19 Dec 2022 20:05) (94% - 95%)    Parameters below as of 19 Dec 2022 20:05  Patient On (Oxygen Delivery Method): nasal cannula  O2 Flow (L/min): 2      PHYSICAL EXAM:  GENERAL: NAD  HEAD:  Atraumatic, Normocephalic  EYES: EOMI, PERRLA, conjunctiva and sclera clear  NECK: Supple, No JVD  CHEST/LUNG: Clear to auscultation bilaterally; No wheeze  HEART: Regular rate and rhythm; No murmurs, rubs, or gallops  ABDOMEN: Soft, Nontender, Nondistended; Bowel sounds present  EXTREMITIES:  2+ Peripheral Pulses, No clubbing, cyanosis, or edema  PSYCH: AAOx3  NEUROLOGY: non-focal  SKIN: No rashes or lesions    LABS:                        11.5   11.57 )-----------( 263      ( 18 Dec 2022 12:06 )             34.6     12-18    130<L>  |  91<L>  |  4<L>  ----------------------------<  100<H>  3.9   |  30  |  0.46<L>    Ca    8.6      18 Dec 2022 02:21    TPro  5.9<L>  /  Alb  3.6  /  TBili  0.8  /  DBili  x   /  AST  58<H>  /  ALT  68<H>  /  AlkPhos  93  12-18    PT/INR - ( 18 Dec 2022 02:21 )   PT: 12.0 sec;   INR: 1.03 ratio                     RADIOLOGY & ADDITIONAL TESTS:    Imaging Personally Reviewed:    Consultant(s) Notes Reviewed:      Care Discussed with Consultants/Other Providers:

## 2022-12-24 NOTE — PROGRESS NOTE ADULT - PROVIDER SPECIALTY LIST ADULT
Cardiology
Internal Medicine
Internal Medicine
Surgery
Cardiology
Cardiology
Gastroenterology
Internal Medicine
Gastroenterology
Gastroenterology
Hepatology
Internal Medicine

## 2022-12-24 NOTE — DISCHARGE NOTE NURSING/CASE MANAGEMENT/SOCIAL WORK - NSDCFUADDAPPT_GEN_ALL_CORE_FT
Hepatology clinical call [phone] (Faculty practice in [place]) or [phone](fellow clinic at [address] [place] [address])     follow up outpatient with Dr. Nieto or one of her associates [phone] for outpatient follow-up in 2 weeks for elective lap bob planning for symptomatic cholelithiasis

## 2022-12-27 LAB — SURGICAL PATHOLOGY STUDY: SIGNIFICANT CHANGE UP

## 2022-12-30 PROBLEM — Z86.79 PERSONAL HISTORY OF OTHER DISEASES OF THE CIRCULATORY SYSTEM: Chronic | Status: ACTIVE | Noted: 2022-12-16

## 2022-12-30 PROBLEM — K29.70 GASTRITIS, UNSPECIFIED, WITHOUT BLEEDING: Chronic | Status: ACTIVE | Noted: 2022-12-16

## 2022-12-30 PROBLEM — I73.9 PERIPHERAL VASCULAR DISEASE, UNSPECIFIED: Chronic | Status: ACTIVE | Noted: 2022-12-15

## 2022-12-30 PROBLEM — Z87.09 PERSONAL HISTORY OF OTHER DISEASES OF THE RESPIRATORY SYSTEM: Chronic | Status: ACTIVE | Noted: 2022-12-16

## 2022-12-30 PROBLEM — E78.5 HYPERLIPIDEMIA, UNSPECIFIED: Chronic | Status: ACTIVE | Noted: 2022-12-15

## 2022-12-30 PROBLEM — E03.9 HYPOTHYROIDISM, UNSPECIFIED: Chronic | Status: ACTIVE | Noted: 2022-12-16

## 2022-12-30 PROBLEM — I10 ESSENTIAL (PRIMARY) HYPERTENSION: Chronic | Status: ACTIVE | Noted: 2022-12-15

## 2023-01-06 ENCOUNTER — APPOINTMENT (OUTPATIENT)
Dept: VASCULAR SURGERY | Facility: CLINIC | Age: 68
End: 2023-01-06
Payer: MEDICARE

## 2023-01-06 DIAGNOSIS — M25.473 EFFUSION, UNSPECIFIED ANKLE: ICD-10-CM

## 2023-01-06 PROCEDURE — 99441: CPT

## 2023-01-09 PROBLEM — M25.473 ANKLE SWELLING: Status: ACTIVE | Noted: 2023-01-09

## 2023-01-09 NOTE — REASON FOR VISIT
[Home] : at home, [unfilled] , at the time of the visit. [Medical Office: ([facility])___] : at the medical office located in  [Patient] : the patient

## 2023-01-13 NOTE — ADDENDUM
[FreeTextEntry1] : 1/11/2023 Recieved VLE results from outside facility. Bilateral VLE negative for DVT. Unable to reach patient to discuss results. VM left for call back. \par \par 1/12/2023 - VLE results discussed with patient. Advise leg elevation and compression stockings. Call back for in person evaluation if worsening symptoms.

## 2023-01-13 NOTE — HISTORY OF PRESENT ILLNESS
[FreeTextEntry1] : FRANCIS CHAUDHARY (: Mar 30 1955) is a 67 year old male with history of AAA. \par \par He was last evaluated by Dr. Joaquin in 2022 for a juxtarenal AAA. At that time, the aneurysm measured 5.2 cm and its anatomy was not suitable for standard EVAR. CTA was recommend for further evaluation. Patient was unable to go for CTA for various reasons. \par \par Recently he was hospitalized at [facility] for elevated liver function. CTA c/a/p was obtained:\par - no aneurysmal dilatation or dissection in the thoracic aorta\par - stable partially thrombosed intra-abdominal aorta aneurysm measures 5.3x4.8x11.5 cm. No aortic dissection. Unchanged ectatic bilateral common iliac arteries. R PRANAV 1.5 cm. Intact R femoral bypass graft. \par \par Today he reports feeling well. Has improved abdominal pain from before. Possible future plans for cholecystectomy. No chest pain or back pain. Walking distance limited to PAD. Denies any rest pain or wounds. 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 CTA images reviewed - he will need fenestrated EVAR. Do not recommend endovascular repair at this time given multiple comorbidities. Will continue to monitor aneurysm and recommend abdominal duplex in 6 months. If aneurysm has significant growth, then will consider surgery. Patient also advised to seek immediate medical attention if severe pain in the chest/abd/pelvis. \par \par Patient reports new ankle swelling, R>L, since being discharged. No calf swelling, chest pain, or increased SOB (baseline dyspnea 2/2 COPD). Will order duplex to rule out DVT. Offered to schedule lab study at our office, however, patient elects to go to imaging facility near him. Advised to go to the ER if worsening swelling or new chest pain/SOB.

## 2023-01-17 ENCOUNTER — APPOINTMENT (OUTPATIENT)
Dept: HEPATOLOGY | Facility: CLINIC | Age: 68
End: 2023-01-17
Payer: MEDICARE

## 2023-01-17 VITALS
WEIGHT: 133 LBS | DIASTOLIC BLOOD PRESSURE: 94 MMHG | BODY MASS INDEX: 20.16 KG/M2 | HEIGHT: 68 IN | SYSTOLIC BLOOD PRESSURE: 169 MMHG | TEMPERATURE: 98 F | OXYGEN SATURATION: 92 % | HEART RATE: 74 BPM | RESPIRATION RATE: 14 BRPM

## 2023-01-17 DIAGNOSIS — Z86.79 PERSONAL HISTORY OF OTHER DISEASES OF THE CIRCULATORY SYSTEM: ICD-10-CM

## 2023-01-17 DIAGNOSIS — Z72.89 OTHER PROBLEMS RELATED TO LIFESTYLE: ICD-10-CM

## 2023-01-17 DIAGNOSIS — E78.00 PURE HYPERCHOLESTEROLEMIA, UNSPECIFIED: ICD-10-CM

## 2023-01-17 DIAGNOSIS — F10.11 ALCOHOL ABUSE, IN REMISSION: ICD-10-CM

## 2023-01-17 PROCEDURE — 99213 OFFICE O/P EST LOW 20 MIN: CPT

## 2023-01-17 PROCEDURE — 99203 OFFICE O/P NEW LOW 30 MIN: CPT

## 2023-01-17 RX ORDER — METOPROLOL TARTRATE 50 MG/1
50 TABLET, FILM COATED ORAL
Qty: 60 | Refills: 0 | Status: ACTIVE | COMMUNITY
Start: 2023-01-17

## 2023-01-17 RX ORDER — ASPIRIN ENTERIC COATED TABLETS 81 MG 81 MG/1
81 TABLET, DELAYED RELEASE ORAL DAILY
Qty: 30 | Refills: 0 | Status: ACTIVE | COMMUNITY
Start: 2023-01-17

## 2023-01-17 RX ORDER — PRASUGREL 10 MG/1
10 TABLET, FILM COATED ORAL DAILY
Qty: 30 | Refills: 0 | Status: ACTIVE | COMMUNITY
Start: 2023-01-17

## 2023-01-17 RX ORDER — PANTOPRAZOLE 40 MG/1
40 TABLET, DELAYED RELEASE ORAL DAILY
Qty: 30 | Refills: 0 | Status: ACTIVE | COMMUNITY
Start: 2023-01-17

## 2023-01-17 RX ORDER — LEVOTHYROXINE SODIUM 0.07 MG/1
75 TABLET ORAL DAILY
Qty: 15 | Refills: 0 | Status: ACTIVE | COMMUNITY
Start: 2023-01-17

## 2023-01-17 RX ORDER — LISINOPRIL 20 MG/1
20 TABLET ORAL
Refills: 0 | Status: ACTIVE | COMMUNITY

## 2023-01-17 RX ORDER — ATORVASTATIN CALCIUM 40 MG/1
40 TABLET, FILM COATED ORAL
Qty: 30 | Refills: 0 | Status: ACTIVE | COMMUNITY
Start: 2023-01-17

## 2023-01-17 RX ORDER — ALBUTEROL SULFATE 90 UG/1
108 (90 BASE) INHALANT RESPIRATORY (INHALATION) EVERY 6 HOURS
Qty: 1 | Refills: 0 | Status: ACTIVE | COMMUNITY
Start: 2023-01-17

## 2023-01-17 NOTE — HISTORY OF PRESENT ILLNESS
[FreeTextEntry1] : 67M with hx of Chronic ETOH hx - 30-40 years of ETOH use\par 3-4 drinks daily at least No hx of IVDU\par He has a hx of juxtarenal AAA that measured about 5.2cm\par He previously had normal liver tests last year but was admitted to the hospital 2x for abdominal pain and elevated liver tests. Once in April 2022 and again in Dec 2022. Both times they resolved without intervention\par Inpatient workup recently included EGD and EUS with demonstration of sludge in GB but no CBD blockage\par Liver tests normalized upon discharge\par He denies having any nausea associated with the abdominal pain\par There does appear to be some correlation with fatty meals\par Due to medical comorbidities CCY was not considered\par He denies any use of oTC meds or herbal supplements\par There is no other family hx of liver disease as far as he knows\par \par AST / ALT 70's but then improved to normal\par \par He presents today with his son who is also concerned about his chronic ETOH use\par \par PMH: CAD, multiple MI, CABG, HTN AFIB ICD and COPD on Home O2, PAD s/p RLE bypass\par \par \par

## 2023-01-17 NOTE — ASSESSMENT
[FreeTextEntry1] : 67M with multiple medical problems admitted for abdominal pain and elevated liver tests \par Resolved without intervention though there was concern for possible passed stone +/- ETOH +/- GB sludge vs DILI\par \par Concern for chronic ETOH use causing fibrosis\par Has been abstinent since hospitalization\par \par Will repeat labs and schedule fibroscan\par If the liver tests are normal at this time and fibroscan shows absence of advanced fibrosis patient can continue follow up with PCP\par \par Regardless i am concerned about the patients long standing hx of ETOH use\par Spoke with him and son extensively in regards to ETOH cessation and even consideration for pharmacotherapy in future\par \par Will call and review results after Fibroscan\par \par Herve Cerna MD

## 2023-01-17 NOTE — PHYSICAL EXAM
[Scleral Icterus] : No Scleral Icterus [Spider Angioma] : No spider angioma(s) were observed [Abdominal  Ascites] : no ascites [Ascites Fluid Wave] : no ascites fluid wave [Ascites Tense] : ascites is not tense [Ascites Shifting Dullness] : no shifting dullness of ascites [Asterixis] : no asterixis observed [Jaundice] : No jaundice [Palmar Erythema] : no Palmar Erythema [General Appearance - Alert] : alert [General Appearance - In No Acute Distress] : in no acute distress [Sclera] : the sclera and conjunctiva were normal [PERRL With Normal Accommodation] : pupils were equal in size, round, and reactive to light [Extraocular Movements] : extraocular movements were intact [Outer Ear] : the ears and nose were normal in appearance [Oropharynx] : the oropharynx was normal [Neck Appearance] : the appearance of the neck was normal [Neck Cervical Mass (___cm)] : no neck mass was observed [Jugular Venous Distention Increased] : there was no jugular-venous distention [Thyroid Diffuse Enlargement] : the thyroid was not enlarged [Thyroid Nodule] : there were no palpable thyroid nodules [Auscultation Breath Sounds / Voice Sounds] : lungs were clear to auscultation bilaterally [Heart Rate And Rhythm] : heart rate was normal and rhythm regular [Heart Sounds] : normal S1 and S2 [Heart Sounds Gallop] : no gallops [Murmurs] : no murmurs [Heart Sounds Pericardial Friction Rub] : no pericardial rub [Breast Appearance] : normal in appearance [Breast Palpation Mass] : no palpable masses [Bowel Sounds] : normal bowel sounds [Abdomen Soft] : soft [Abdomen Tenderness] : non-tender [] : no hepato-splenomegaly [Abdomen Mass (___ Cm)] : no abdominal mass palpated [No CVA Tenderness] : no ~M costovertebral angle tenderness [No Spinal Tenderness] : no spinal tenderness [Abnormal Walk] : normal gait [Nail Clubbing] : no clubbing  or cyanosis of the fingernails [Musculoskeletal - Swelling] : no joint swelling seen [Motor Tone] : muscle strength and tone were normal [Deep Tendon Reflexes (DTR)] : deep tendon reflexes were 2+ and symmetric [Sensation] : the sensory exam was normal to light touch and pinprick [No Focal Deficits] : no focal deficits [Oriented To Time, Place, And Person] : oriented to person, place, and time [Impaired Insight] : insight and judgment were intact [Affect] : the affect was normal

## 2023-01-18 LAB
ALBUMIN SERPL ELPH-MCNC: 4.4 G/DL
ALP BLD-CCNC: 90 U/L
ALT SERPL-CCNC: 14 U/L
ANION GAP SERPL CALC-SCNC: 7 MMOL/L
AST SERPL-CCNC: 16 U/L
BILIRUB SERPL-MCNC: 0.4 MG/DL
BUN SERPL-MCNC: 5 MG/DL
CALCIUM SERPL-MCNC: 9.9 MG/DL
CHLORIDE SERPL-SCNC: 95 MMOL/L
CO2 SERPL-SCNC: 36 MMOL/L
CREAT SERPL-MCNC: 0.51 MG/DL
EGFR: 110 ML/MIN/1.73M2
GLUCOSE SERPL-MCNC: 102 MG/DL
HCV AB SER QL: ABNORMAL
HCV RNA FLD QL NAA+PROBE: NORMAL
HCV RNA SPEC QL PROBE+SIG AMP: NOT DETECTED
HCV S/CO RATIO: 1.72 S/CO
POTASSIUM SERPL-SCNC: 4.3 MMOL/L
PROT SERPL-MCNC: 6.8 G/DL
SODIUM SERPL-SCNC: 138 MMOL/L

## 2023-01-31 ENCOUNTER — APPOINTMENT (OUTPATIENT)
Dept: HEPATOLOGY | Facility: CLINIC | Age: 68
End: 2023-01-31
Payer: MEDICARE

## 2023-01-31 PROCEDURE — 76981 USE PARENCHYMA: CPT

## 2023-02-07 DIAGNOSIS — R94.5 ABNORMAL RESULTS OF LIVER FUNCTION STUDIES: ICD-10-CM

## 2023-03-07 ENCOUNTER — APPOINTMENT (OUTPATIENT)
Dept: GASTROENTEROLOGY | Facility: CLINIC | Age: 68
End: 2023-03-07

## 2023-03-15 ENCOUNTER — APPOINTMENT (OUTPATIENT)
Dept: GASTROENTEROLOGY | Facility: CLINIC | Age: 68
End: 2023-03-15

## 2023-03-30 ENCOUNTER — APPOINTMENT (OUTPATIENT)
Dept: ULTRASOUND IMAGING | Facility: CLINIC | Age: 68
End: 2023-03-30
Payer: MEDICARE

## 2023-03-30 PROCEDURE — 76982 USE 1ST TARGET LESION: CPT

## 2023-04-05 ENCOUNTER — NON-APPOINTMENT (OUTPATIENT)
Age: 68
End: 2023-04-05

## 2023-04-05 DIAGNOSIS — R74.01 ELEVATION OF LEVELS OF LIVER TRANSAMINASE LEVELS: ICD-10-CM

## 2023-08-01 ENCOUNTER — APPOINTMENT (OUTPATIENT)
Dept: HEPATOLOGY | Facility: CLINIC | Age: 68
End: 2023-08-01
Payer: MEDICARE

## 2023-08-01 DIAGNOSIS — K74.00 HEPATIC FIBROSIS, UNSPECIFIED: ICD-10-CM

## 2023-08-01 PROCEDURE — 99213 OFFICE O/P EST LOW 20 MIN: CPT

## 2023-08-03 PROBLEM — K74.00 LIVER FIBROSIS: Status: ACTIVE | Noted: 2023-04-05

## 2023-08-03 NOTE — PHYSICAL EXAM
[General Appearance - Alert] : alert [General Appearance - In No Acute Distress] : in no acute distress [Sclera] : the sclera and conjunctiva were normal [PERRL With Normal Accommodation] : pupils were equal in size, round, and reactive to light [Extraocular Movements] : extraocular movements were intact [Outer Ear] : the ears and nose were normal in appearance [Oropharynx] : the oropharynx was normal [Neck Appearance] : the appearance of the neck was normal [Neck Cervical Mass (___cm)] : no neck mass was observed [Jugular Venous Distention Increased] : there was no jugular-venous distention [Thyroid Diffuse Enlargement] : the thyroid was not enlarged [Thyroid Nodule] : there were no palpable thyroid nodules [Auscultation Breath Sounds / Voice Sounds] : lungs were clear to auscultation bilaterally [Heart Rate And Rhythm] : heart rate was normal and rhythm regular [Heart Sounds] : normal S1 and S2 [Heart Sounds Gallop] : no gallops [Murmurs] : no murmurs [Heart Sounds Pericardial Friction Rub] : no pericardial rub [Breast Appearance] : normal in appearance [Breast Palpation Mass] : no palpable masses [Bowel Sounds] : normal bowel sounds [Abdomen Soft] : soft [Abdomen Tenderness] : non-tender [] : no hepato-splenomegaly [Abdomen Mass (___ Cm)] : no abdominal mass palpated [No CVA Tenderness] : no ~M costovertebral angle tenderness [No Spinal Tenderness] : no spinal tenderness [Abnormal Walk] : normal gait [Nail Clubbing] : no clubbing  or cyanosis of the fingernails [Musculoskeletal - Swelling] : no joint swelling seen [Deep Tendon Reflexes (DTR)] : deep tendon reflexes were 2+ and symmetric [Motor Tone] : muscle strength and tone were normal [Sensation] : the sensory exam was normal to light touch and pinprick [No Focal Deficits] : no focal deficits [Oriented To Time, Place, And Person] : oriented to person, place, and time [Impaired Insight] : insight and judgment were intact [Affect] : the affect was normal [Scleral Icterus] : No Scleral Icterus [Spider Angioma] : No spider angioma(s) were observed [Abdominal  Ascites] : no ascites [Ascites Fluid Wave] : no ascites fluid wave [Ascites Tense] : ascites is not tense [Ascites Shifting Dullness] : no shifting dullness of ascites [Asterixis] : no asterixis observed [Jaundice] : No jaundice [Palmar Erythema] : no Palmar Erythema

## 2023-08-03 NOTE — ASSESSMENT
[FreeTextEntry1] : 68M with multiple medical problems admitted for abdominal pain and elevated liver tests  Resolved without intervention though there was concern for possible passed stone +/- ETOH +/- GB sludge vs DILI Concern for chronic ETOH use causing fibrosis but he has stopped  Patient with F3 disease Monitor closely Schedule US for HCC SCreening q6 months - he is getting one now to look at AAA - will add RUQ as well   RTC 6M with SONO and Labs  Instructed to provide results to us when available

## 2023-08-03 NOTE — HISTORY OF PRESENT ILLNESS
[FreeTextEntry1] : 68M with hx of Chronic ETOH hx - 30-40 years of ETOH use / 3-4 drinks daily at least No hx of IVDU He has a hx of juxtarenal AAA that measured about 5.2cm He previously had normal liver tests last year but was admitted to the hospital 2x for abdominal pain and elevated liver tests. Once in April 2022 and again in Dec 2022. Both times they resolved without intervention Inpatient workup recently included EGD and EUS with demonstration of sludge in GB but no CBD blockage Liver tests normalized upon discharge He denies having any nausea associated with the abdominal pain There does appear to be some correlation with fatty meals Due to medical comorbidities CCY was not considered He denies any use of oTC meds or herbal supplements There is no other family hx of liver disease as far as he knows  AST / ALT 70's but then improved to normal  PMH: CAD, multiple MI, CABG, HTN AFIB ICD and COPD on Home O2, PAD s/p RLE bypass  Interval Hx Patient had Fibroscan and US Elastography which both confirmed F3 disease He reduced ETOH at time of evaluation but has stopped completely at this time He presents today for routine follow up  Recent liver tests normal but Alk phosph mildly elevated There is no recent imaging to review Remains on 3L NC Denies any GI bleeding, confusion or abdominal distention

## 2023-08-25 ENCOUNTER — APPOINTMENT (OUTPATIENT)
Dept: VASCULAR SURGERY | Facility: CLINIC | Age: 68
End: 2023-08-25
Payer: MEDICARE

## 2023-08-25 PROCEDURE — 99441: CPT

## 2023-08-25 NOTE — HISTORY OF PRESENT ILLNESS
[FreeTextEntry1] : FRANCIS CHAUDHARY (: Mar 30 1955) is a 68 year old male with history of AAA.   He was evaluated by Dr. Joaquin in 2022 for a juxtarenal AAA. At that time, the aneurysm measured 5.2 cm and its anatomy was not suitable for standard EVAR. CTA c/a/p was obtained in 2023 during [facility] hospitalization for elevated liver function. CTA c/a/p demonstrated a stable partially thrombosed intra-abdominal aorta aneurysm measures 5.3x4.8x11.5 cm. No aortic dissection. Unchanged ectatic bilateral common iliac arteries. R PRANAV 1.5 cm. Intact R femoral bypass graft. Images were reviewed with Dr. Joaquin and aneurysm anatomy was not suitable for standard EVAR (likely would need fEVAR). Given patient's comorbidities, routine surveillance was advised for the time being.   Patient reports recent testing at [facility] (2023) concerning for 4.5 x 5.5 cm abdominal aorta. Non-contrast CT chest noted with 5.6 x 4.7 cm infrarenal AAA. Ascending thoracic aorta measures 3.9 x 4.2 cm.   Today he complains of worsening RLE claudication at distances <50 feet. He has history of PAD s/p RLE bypass (?failed). Denies rest pain and poor healing.  Since [facility] hospitalization for abdominal pain, patient reports improvement in pain with intermittent discomfort in the periumbilical area.  He also c/o various joint and back pain which he attributes to arthritis and fibromyalgia.   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 Will obtain CTA a/p for further evaluation and pre-op planning. Patient prefers having imaging done at [facility]. Will e-mail script and advise patient to schedule the test. Will need to obtain CD/report and arrange for TTM follow up.

## 2023-09-28 ENCOUNTER — APPOINTMENT (OUTPATIENT)
Dept: VASCULAR SURGERY | Facility: CLINIC | Age: 68
End: 2023-09-28

## 2023-10-27 ENCOUNTER — INPATIENT (INPATIENT)
Facility: HOSPITAL | Age: 68
LOS: 23 days | Discharge: HOME HEALTH | End: 2023-11-20
Attending: GENERAL PRACTICE | Admitting: GENERAL PRACTICE
Payer: MEDICARE

## 2023-10-27 VITALS
SYSTOLIC BLOOD PRESSURE: 174 MMHG | OXYGEN SATURATION: 99 % | RESPIRATION RATE: 20 BRPM | DIASTOLIC BLOOD PRESSURE: 81 MMHG | TEMPERATURE: 98 F | HEART RATE: 55 BPM

## 2023-10-27 LAB
ALBUMIN SERPL ELPH-MCNC: 4.3 G/DL — SIGNIFICANT CHANGE UP (ref 3.3–5)
ALBUMIN SERPL ELPH-MCNC: 4.3 G/DL — SIGNIFICANT CHANGE UP (ref 3.3–5)
ALP SERPL-CCNC: 100 U/L — SIGNIFICANT CHANGE UP (ref 40–120)
ALP SERPL-CCNC: 100 U/L — SIGNIFICANT CHANGE UP (ref 40–120)
ALT FLD-CCNC: 11 U/L — SIGNIFICANT CHANGE UP (ref 4–41)
ALT FLD-CCNC: 11 U/L — SIGNIFICANT CHANGE UP (ref 4–41)
ANION GAP SERPL CALC-SCNC: 10 MMOL/L — SIGNIFICANT CHANGE UP (ref 7–14)
ANION GAP SERPL CALC-SCNC: 10 MMOL/L — SIGNIFICANT CHANGE UP (ref 7–14)
APPEARANCE UR: ABNORMAL
APPEARANCE UR: ABNORMAL
APTT BLD: 33 SEC — SIGNIFICANT CHANGE UP (ref 24.5–35.6)
APTT BLD: 33 SEC — SIGNIFICANT CHANGE UP (ref 24.5–35.6)
AST SERPL-CCNC: 27 U/L — SIGNIFICANT CHANGE UP (ref 4–40)
AST SERPL-CCNC: 27 U/L — SIGNIFICANT CHANGE UP (ref 4–40)
BACTERIA # UR AUTO: NEGATIVE /HPF — SIGNIFICANT CHANGE UP
BACTERIA # UR AUTO: NEGATIVE /HPF — SIGNIFICANT CHANGE UP
BASE EXCESS BLDV CALC-SCNC: 11.9 MMOL/L — HIGH (ref -2–3)
BASE EXCESS BLDV CALC-SCNC: 11.9 MMOL/L — HIGH (ref -2–3)
BILIRUB SERPL-MCNC: 0.6 MG/DL — SIGNIFICANT CHANGE UP (ref 0.2–1.2)
BILIRUB SERPL-MCNC: 0.6 MG/DL — SIGNIFICANT CHANGE UP (ref 0.2–1.2)
BILIRUB UR-MCNC: NEGATIVE — SIGNIFICANT CHANGE UP
BILIRUB UR-MCNC: NEGATIVE — SIGNIFICANT CHANGE UP
BLD GP AB SCN SERPL QL: NEGATIVE — SIGNIFICANT CHANGE UP
BLD GP AB SCN SERPL QL: NEGATIVE — SIGNIFICANT CHANGE UP
BLOOD GAS VENOUS COMPREHENSIVE RESULT: SIGNIFICANT CHANGE UP
BLOOD GAS VENOUS COMPREHENSIVE RESULT: SIGNIFICANT CHANGE UP
BUN SERPL-MCNC: 10 MG/DL — SIGNIFICANT CHANGE UP (ref 7–23)
BUN SERPL-MCNC: 10 MG/DL — SIGNIFICANT CHANGE UP (ref 7–23)
CALCIUM SERPL-MCNC: 9.5 MG/DL — SIGNIFICANT CHANGE UP (ref 8.4–10.5)
CALCIUM SERPL-MCNC: 9.5 MG/DL — SIGNIFICANT CHANGE UP (ref 8.4–10.5)
CAST: 0 /LPF — SIGNIFICANT CHANGE UP (ref 0–4)
CAST: 0 /LPF — SIGNIFICANT CHANGE UP (ref 0–4)
CHLORIDE BLDV-SCNC: 92 MMOL/L — LOW (ref 96–108)
CHLORIDE BLDV-SCNC: 92 MMOL/L — LOW (ref 96–108)
CHLORIDE SERPL-SCNC: 94 MMOL/L — LOW (ref 98–107)
CHLORIDE SERPL-SCNC: 94 MMOL/L — LOW (ref 98–107)
CO2 BLDV-SCNC: 42.5 MMOL/L — HIGH (ref 22–26)
CO2 BLDV-SCNC: 42.5 MMOL/L — HIGH (ref 22–26)
CO2 SERPL-SCNC: 33 MMOL/L — HIGH (ref 22–31)
CO2 SERPL-SCNC: 33 MMOL/L — HIGH (ref 22–31)
COLOR SPEC: YELLOW — SIGNIFICANT CHANGE UP
COLOR SPEC: YELLOW — SIGNIFICANT CHANGE UP
CREAT SERPL-MCNC: 0.49 MG/DL — LOW (ref 0.5–1.3)
CREAT SERPL-MCNC: 0.49 MG/DL — LOW (ref 0.5–1.3)
DIFF PNL FLD: NEGATIVE — SIGNIFICANT CHANGE UP
DIFF PNL FLD: NEGATIVE — SIGNIFICANT CHANGE UP
EGFR: 110 ML/MIN/1.73M2 — SIGNIFICANT CHANGE UP
EGFR: 110 ML/MIN/1.73M2 — SIGNIFICANT CHANGE UP
GAS PNL BLDV: 133 MMOL/L — LOW (ref 136–145)
GAS PNL BLDV: 133 MMOL/L — LOW (ref 136–145)
GLUCOSE BLDV-MCNC: 97 MG/DL — SIGNIFICANT CHANGE UP (ref 70–99)
GLUCOSE BLDV-MCNC: 97 MG/DL — SIGNIFICANT CHANGE UP (ref 70–99)
GLUCOSE SERPL-MCNC: 101 MG/DL — HIGH (ref 70–99)
GLUCOSE SERPL-MCNC: 101 MG/DL — HIGH (ref 70–99)
GLUCOSE UR QL: NEGATIVE MG/DL — SIGNIFICANT CHANGE UP
GLUCOSE UR QL: NEGATIVE MG/DL — SIGNIFICANT CHANGE UP
HCO3 BLDV-SCNC: 40 MMOL/L — HIGH (ref 22–29)
HCO3 BLDV-SCNC: 40 MMOL/L — HIGH (ref 22–29)
HCT VFR BLD CALC: 37.2 % — LOW (ref 39–50)
HCT VFR BLD CALC: 37.2 % — LOW (ref 39–50)
HCT VFR BLDA CALC: 36 % — LOW (ref 39–51)
HCT VFR BLDA CALC: 36 % — LOW (ref 39–51)
HGB BLD CALC-MCNC: 11.9 G/DL — LOW (ref 12.6–17.4)
HGB BLD CALC-MCNC: 11.9 G/DL — LOW (ref 12.6–17.4)
HGB BLD-MCNC: 12 G/DL — LOW (ref 13–17)
HGB BLD-MCNC: 12 G/DL — LOW (ref 13–17)
INR BLD: 0.96 RATIO — SIGNIFICANT CHANGE UP (ref 0.85–1.18)
INR BLD: 0.96 RATIO — SIGNIFICANT CHANGE UP (ref 0.85–1.18)
KETONES UR-MCNC: NEGATIVE MG/DL — SIGNIFICANT CHANGE UP
KETONES UR-MCNC: NEGATIVE MG/DL — SIGNIFICANT CHANGE UP
LACTATE BLDV-MCNC: 0.8 MMOL/L — SIGNIFICANT CHANGE UP (ref 0.5–2)
LACTATE BLDV-MCNC: 0.8 MMOL/L — SIGNIFICANT CHANGE UP (ref 0.5–2)
LEUKOCYTE ESTERASE UR-ACNC: ABNORMAL
LEUKOCYTE ESTERASE UR-ACNC: ABNORMAL
MCHC RBC-ENTMCNC: 29.3 PG — SIGNIFICANT CHANGE UP (ref 27–34)
MCHC RBC-ENTMCNC: 29.3 PG — SIGNIFICANT CHANGE UP (ref 27–34)
MCHC RBC-ENTMCNC: 32.3 GM/DL — SIGNIFICANT CHANGE UP (ref 32–36)
MCHC RBC-ENTMCNC: 32.3 GM/DL — SIGNIFICANT CHANGE UP (ref 32–36)
MCV RBC AUTO: 90.7 FL — SIGNIFICANT CHANGE UP (ref 80–100)
MCV RBC AUTO: 90.7 FL — SIGNIFICANT CHANGE UP (ref 80–100)
NITRITE UR-MCNC: NEGATIVE — SIGNIFICANT CHANGE UP
NITRITE UR-MCNC: NEGATIVE — SIGNIFICANT CHANGE UP
NRBC # BLD: 0 /100 WBCS — SIGNIFICANT CHANGE UP (ref 0–0)
NRBC # BLD: 0 /100 WBCS — SIGNIFICANT CHANGE UP (ref 0–0)
NRBC # FLD: 0 K/UL — SIGNIFICANT CHANGE UP (ref 0–0)
NRBC # FLD: 0 K/UL — SIGNIFICANT CHANGE UP (ref 0–0)
PCO2 BLDV: 73 MMHG — HIGH (ref 42–55)
PCO2 BLDV: 73 MMHG — HIGH (ref 42–55)
PH BLDV: 7.35 — SIGNIFICANT CHANGE UP (ref 7.32–7.43)
PH BLDV: 7.35 — SIGNIFICANT CHANGE UP (ref 7.32–7.43)
PH UR: 8 — SIGNIFICANT CHANGE UP (ref 5–8)
PH UR: 8 — SIGNIFICANT CHANGE UP (ref 5–8)
PLATELET # BLD AUTO: 209 K/UL — SIGNIFICANT CHANGE UP (ref 150–400)
PLATELET # BLD AUTO: 209 K/UL — SIGNIFICANT CHANGE UP (ref 150–400)
PO2 BLDV: 24 MMHG — LOW (ref 25–45)
PO2 BLDV: 24 MMHG — LOW (ref 25–45)
POTASSIUM BLDV-SCNC: 3.9 MMOL/L — SIGNIFICANT CHANGE UP (ref 3.5–5.1)
POTASSIUM BLDV-SCNC: 3.9 MMOL/L — SIGNIFICANT CHANGE UP (ref 3.5–5.1)
POTASSIUM SERPL-MCNC: 4.6 MMOL/L — SIGNIFICANT CHANGE UP (ref 3.5–5.3)
POTASSIUM SERPL-MCNC: 4.6 MMOL/L — SIGNIFICANT CHANGE UP (ref 3.5–5.3)
POTASSIUM SERPL-SCNC: 4.6 MMOL/L — SIGNIFICANT CHANGE UP (ref 3.5–5.3)
POTASSIUM SERPL-SCNC: 4.6 MMOL/L — SIGNIFICANT CHANGE UP (ref 3.5–5.3)
PROT SERPL-MCNC: 7.5 G/DL — SIGNIFICANT CHANGE UP (ref 6–8.3)
PROT SERPL-MCNC: 7.5 G/DL — SIGNIFICANT CHANGE UP (ref 6–8.3)
PROT UR-MCNC: SIGNIFICANT CHANGE UP MG/DL
PROT UR-MCNC: SIGNIFICANT CHANGE UP MG/DL
PROTHROM AB SERPL-ACNC: 10.9 SEC — SIGNIFICANT CHANGE UP (ref 9.5–13)
PROTHROM AB SERPL-ACNC: 10.9 SEC — SIGNIFICANT CHANGE UP (ref 9.5–13)
RBC # BLD: 4.1 M/UL — LOW (ref 4.2–5.8)
RBC # BLD: 4.1 M/UL — LOW (ref 4.2–5.8)
RBC # FLD: 15.4 % — HIGH (ref 10.3–14.5)
RBC # FLD: 15.4 % — HIGH (ref 10.3–14.5)
RBC CASTS # UR COMP ASSIST: 1 /HPF — SIGNIFICANT CHANGE UP (ref 0–4)
RBC CASTS # UR COMP ASSIST: 1 /HPF — SIGNIFICANT CHANGE UP (ref 0–4)
RH IG SCN BLD-IMP: POSITIVE — SIGNIFICANT CHANGE UP
RH IG SCN BLD-IMP: POSITIVE — SIGNIFICANT CHANGE UP
SAO2 % BLDV: 27.7 % — LOW (ref 67–88)
SAO2 % BLDV: 27.7 % — LOW (ref 67–88)
SODIUM SERPL-SCNC: 137 MMOL/L — SIGNIFICANT CHANGE UP (ref 135–145)
SODIUM SERPL-SCNC: 137 MMOL/L — SIGNIFICANT CHANGE UP (ref 135–145)
SP GR SPEC: 1.02 — SIGNIFICANT CHANGE UP (ref 1–1.03)
SP GR SPEC: 1.02 — SIGNIFICANT CHANGE UP (ref 1–1.03)
SQUAMOUS # UR AUTO: 0 /HPF — SIGNIFICANT CHANGE UP (ref 0–5)
SQUAMOUS # UR AUTO: 0 /HPF — SIGNIFICANT CHANGE UP (ref 0–5)
UROBILINOGEN FLD QL: 1 MG/DL — SIGNIFICANT CHANGE UP (ref 0.2–1)
UROBILINOGEN FLD QL: 1 MG/DL — SIGNIFICANT CHANGE UP (ref 0.2–1)
WBC # BLD: 11.23 K/UL — HIGH (ref 3.8–10.5)
WBC # BLD: 11.23 K/UL — HIGH (ref 3.8–10.5)
WBC # FLD AUTO: 11.23 K/UL — HIGH (ref 3.8–10.5)
WBC # FLD AUTO: 11.23 K/UL — HIGH (ref 3.8–10.5)
WBC UR QL: 18 /HPF — HIGH (ref 0–5)
WBC UR QL: 18 /HPF — HIGH (ref 0–5)

## 2023-10-27 PROCEDURE — 99285 EMERGENCY DEPT VISIT HI MDM: CPT

## 2023-10-27 PROCEDURE — 71275 CT ANGIOGRAPHY CHEST: CPT | Mod: 26,MA

## 2023-10-27 PROCEDURE — 71046 X-RAY EXAM CHEST 2 VIEWS: CPT | Mod: 26

## 2023-10-27 PROCEDURE — 74174 CTA ABD&PLVS W/CONTRAST: CPT | Mod: 26,MA

## 2023-10-27 NOTE — ED PROVIDER NOTE - PHYSICAL EXAMINATION
Attending/Evelio: NAD, mild tachypnea; PERRL/EOMI, non-icterus, supple, no NOE, no JVD, RRR, prolong exp; Abd-soft, NT/ND, no HSM; no LE edema, A&Ox3, nonfocal; Skin-warm/dry

## 2023-10-27 NOTE — ED ADULT TRIAGE NOTE - CHIEF COMPLAINT QUOTE
"Chief Complaint   Patient presents with     RECHECK     pt here for follow up on her breathing and her cough and her neck hurts as well.       Initial /64 (BP Location: Right arm, Patient Position: Sitting, Cuff Size: Adult Large)  Pulse 100  Temp 98.3  F (36.8  C) (Oral)  Ht 5' 4\" (1.626 m)  Wt 195 lb (88.5 kg)  LMP 04/15/2016  SpO2 98%  BMI 33.47 kg/m2 Estimated body mass index is 33.47 kg/(m^2) as calculated from the following:    Height as of this encounter: 5' 4\" (1.626 m).    Weight as of this encounter: 195 lb (88.5 kg).  Medication Reconciliation: complete    "
abnormal labs  1 wk ago-  received  call today-   c/o pain  lower back area. denies problems urinating ,n,v. reports cough. copd- nasal juhi 2-3 liters prn.  cl90,co2 42,na,135,creat- 0.6,

## 2023-10-27 NOTE — ED PROVIDER NOTE - CLINICAL SUMMARY MEDICAL DECISION MAKING FREE TEXT BOX
A/P  71-year-old male history as described above included COPD on home O2, hypertension, AAA who presents with exacerbation of chronic low back pain and reportedly abnormal outpatient laboratory test.  Exam noted for a benign abdominal exam, prolonged exhalation otherwise clear to auscultation bilateral.  Relevant EMR reviewed patient was as per vascular surgery note in August required a CT abdomen to monitor his AAA that has been progressively growing.  Plan: Discussed with Dr. Simms, labs, screening EKG, CT angio of the abdomen, chest x-ray, reassess

## 2023-10-27 NOTE — ED ADULT NURSE REASSESSMENT NOTE - NS ED NURSE REASSESS COMMENT FT1
Report received from day OLGA Guerrero. Pt A&Ox4, ambulatory on 2L NC coming to ED for abnormal lab result. Pt states PCP sent him to ER for abnormal labs, unsure of what lab value. NSR on cardiac monitor.  Pt denies chest pain, SOB, blurred vision, N/V/D. Respirations even and unlabored, chest rise and fall equal b/l. Abdomen soft, round, nondistended. Right 20g placed, + blood return. Pt pending CTA. Safety maintained. Report received from day OLGA Guerrero. Pt A&Ox4, ambulatory on 2L NC coming to ED for abnormal lab result. Pt history of HTN, HLD, left chest wall defibrillator. Pt states PCP sent him to ER for abnormal labs, unsure of what lab value. NSR on cardiac monitor.  Pt denies chest pain, SOB, blurred vision, N/V/D. Respirations even and unlabored, chest rise and fall equal b/l. Abdomen soft, round, nondistended. Right 20g placed, + blood return. Pt pending CTA. Safety maintained.

## 2023-10-27 NOTE — ED PROVIDER NOTE - PROGRESS NOTE DETAILS
Evelio: Dr. Viki martinez Evelio: Dr. Viki martinez, agreed with patient care plan Ava Burgess DO (PGY3): Patient signed out to me by day team.  Patient sent in by Dr. Simms for elevated CO2 levels.  PCO2 70s on VBG here.  Patient with decreased breath sounds bilaterally however no increased work of breathing.  Patient has not had BiPAP in the past.  Given decreased breath sounds and PCO2 70s will start on BiPAP and give duonebs/steroids. Pt tba for copd exac. Spoke with Dr. Brown will accept patient. Ava Burgess DO (PGY3): MICU consulted for new BiPAP, patient cleared for the floors.  Patient also seen by vascular for infrarenal aortic aneurysm.  patient without chest pain or shortness of breath, no neurodeficits weakness or paresthesias, CTA obtained as pt was scheduled to get test done outpt but has not yet undergone testing. Vascular evaluated in Ed, no acute surgical intervention. plan for eventual surgery with his vascular doctor.

## 2023-10-27 NOTE — ED ADULT NURSE NOTE - OBJECTIVE STATEMENT
Pt received to room 3, A&Ox4, nonambulatory at baseline. Phx of PAD, HTN, COPD, High cholesterol, AAA, A-fib. Defibrillator noted to left chest wall. Pt sent to ER by PCP for irregular labs, pt complaining of dull left flank pain. Pain is present upon palpation, denies radiation, worsened over the last few days, denies burning/trouble urinating. Pt has no neuro deficits. Equal strength in all 4 extremities bilaterally. Pt O2 @ 97% on 2L NC. LS clear bilaterally, dry cough present with intermittent secretion clearing. Breathing is equal and unlabored bilaterally. NS rhythm on monitor noted, all 4 pulses present. Abdomen is soft and nondistended with normoactive bowel sounds. Pt denies HA, dizziness, chest pain, double/blurry vision, SOB. Safety maintained, comfort measures taken, continuing to monitor.

## 2023-10-27 NOTE — ED ADULT NURSE NOTE - CHIEF COMPLAINT QUOTE
abnormal labs  1 wk ago-  received  call today-   c/o pain  lower back area. denies problems urinating ,n,v. reports cough. copd- nasal juhi 2-3 liters prn.  cl90,co2 42,na,135,creat- 0.6,

## 2023-10-27 NOTE — ED PROVIDER NOTE - OBJECTIVE STATEMENT
Attending/Evelio: 71-year-old male history of COPD on home O2 2 L nasal cannula, hypertension, known AAA, PAD, alcohol abuse complicated with liver cirrhosis, recently treated for a UTI, as per patient was referred to the emergency department by his primary care physician Dr. Remington Simms for  abnormal labs.   Patient reports exacerbation of his chronic low back pain.  Patient denies weakness, lightheadedness, chest pain, shortness of breath, palpitations, nausea vomiting, fever or chills.    [facility] surgeon: Dr. Rell Joaquin

## 2023-10-28 DIAGNOSIS — Z29.9 ENCOUNTER FOR PROPHYLACTIC MEASURES, UNSPECIFIED: ICD-10-CM

## 2023-10-28 DIAGNOSIS — I71.40 ABDOMINAL AORTIC ANEURYSM, WITHOUT RUPTURE, UNSPECIFIED: ICD-10-CM

## 2023-10-28 DIAGNOSIS — I10 ESSENTIAL (PRIMARY) HYPERTENSION: ICD-10-CM

## 2023-10-28 DIAGNOSIS — J96.22 ACUTE AND CHRONIC RESPIRATORY FAILURE WITH HYPERCAPNIA: ICD-10-CM

## 2023-10-28 DIAGNOSIS — J44.1 CHRONIC OBSTRUCTIVE PULMONARY DISEASE WITH (ACUTE) EXACERBATION: ICD-10-CM

## 2023-10-28 DIAGNOSIS — E78.5 HYPERLIPIDEMIA, UNSPECIFIED: ICD-10-CM

## 2023-10-28 LAB
ANION GAP SERPL CALC-SCNC: 13 MMOL/L — SIGNIFICANT CHANGE UP (ref 7–14)
ANION GAP SERPL CALC-SCNC: 13 MMOL/L — SIGNIFICANT CHANGE UP (ref 7–14)
B PERT DNA SPEC QL NAA+PROBE: SIGNIFICANT CHANGE UP
B PERT DNA SPEC QL NAA+PROBE: SIGNIFICANT CHANGE UP
B PERT+PARAPERT DNA PNL SPEC NAA+PROBE: SIGNIFICANT CHANGE UP
B PERT+PARAPERT DNA PNL SPEC NAA+PROBE: SIGNIFICANT CHANGE UP
BASE EXCESS BLDV CALC-SCNC: 10.5 MMOL/L — HIGH (ref -2–3)
BASE EXCESS BLDV CALC-SCNC: 10.5 MMOL/L — HIGH (ref -2–3)
BASE EXCESS BLDV CALC-SCNC: 10.6 MMOL/L — HIGH (ref -2–3)
BASE EXCESS BLDV CALC-SCNC: 10.6 MMOL/L — HIGH (ref -2–3)
BASOPHILS # BLD AUTO: 0 K/UL — SIGNIFICANT CHANGE UP (ref 0–0.2)
BASOPHILS # BLD AUTO: 0 K/UL — SIGNIFICANT CHANGE UP (ref 0–0.2)
BASOPHILS NFR BLD AUTO: 0 % — SIGNIFICANT CHANGE UP (ref 0–2)
BASOPHILS NFR BLD AUTO: 0 % — SIGNIFICANT CHANGE UP (ref 0–2)
BLOOD GAS VENOUS COMPREHENSIVE RESULT: SIGNIFICANT CHANGE UP
BORDETELLA PARAPERTUSSIS (RAPRVP): SIGNIFICANT CHANGE UP
BORDETELLA PARAPERTUSSIS (RAPRVP): SIGNIFICANT CHANGE UP
BUN SERPL-MCNC: 13 MG/DL — SIGNIFICANT CHANGE UP (ref 7–23)
BUN SERPL-MCNC: 13 MG/DL — SIGNIFICANT CHANGE UP (ref 7–23)
C PNEUM DNA SPEC QL NAA+PROBE: SIGNIFICANT CHANGE UP
C PNEUM DNA SPEC QL NAA+PROBE: SIGNIFICANT CHANGE UP
CALCIUM SERPL-MCNC: 9.7 MG/DL — SIGNIFICANT CHANGE UP (ref 8.4–10.5)
CALCIUM SERPL-MCNC: 9.7 MG/DL — SIGNIFICANT CHANGE UP (ref 8.4–10.5)
CHLORIDE BLDV-SCNC: 93 MMOL/L — LOW (ref 96–108)
CHLORIDE BLDV-SCNC: 93 MMOL/L — LOW (ref 96–108)
CHLORIDE BLDV-SCNC: 95 MMOL/L — LOW (ref 96–108)
CHLORIDE BLDV-SCNC: 95 MMOL/L — LOW (ref 96–108)
CHLORIDE SERPL-SCNC: 90 MMOL/L — LOW (ref 98–107)
CHLORIDE SERPL-SCNC: 90 MMOL/L — LOW (ref 98–107)
CO2 BLDV-SCNC: 39.7 MMOL/L — HIGH (ref 22–26)
CO2 BLDV-SCNC: 39.7 MMOL/L — HIGH (ref 22–26)
CO2 BLDV-SCNC: 39.9 MMOL/L — HIGH (ref 22–26)
CO2 BLDV-SCNC: 39.9 MMOL/L — HIGH (ref 22–26)
CO2 SERPL-SCNC: 32 MMOL/L — HIGH (ref 22–31)
CO2 SERPL-SCNC: 32 MMOL/L — HIGH (ref 22–31)
CREAT SERPL-MCNC: 0.47 MG/DL — LOW (ref 0.5–1.3)
CREAT SERPL-MCNC: 0.47 MG/DL — LOW (ref 0.5–1.3)
EGFR: 112 ML/MIN/1.73M2 — SIGNIFICANT CHANGE UP
EGFR: 112 ML/MIN/1.73M2 — SIGNIFICANT CHANGE UP
EOSINOPHIL # BLD AUTO: 0 K/UL — SIGNIFICANT CHANGE UP (ref 0–0.5)
EOSINOPHIL # BLD AUTO: 0 K/UL — SIGNIFICANT CHANGE UP (ref 0–0.5)
EOSINOPHIL NFR BLD AUTO: 0 % — SIGNIFICANT CHANGE UP (ref 0–6)
EOSINOPHIL NFR BLD AUTO: 0 % — SIGNIFICANT CHANGE UP (ref 0–6)
FLUAV SUBTYP SPEC NAA+PROBE: SIGNIFICANT CHANGE UP
FLUAV SUBTYP SPEC NAA+PROBE: SIGNIFICANT CHANGE UP
FLUBV RNA SPEC QL NAA+PROBE: SIGNIFICANT CHANGE UP
FLUBV RNA SPEC QL NAA+PROBE: SIGNIFICANT CHANGE UP
GAS PNL BLDV: 132 MMOL/L — LOW (ref 136–145)
GIANT PLATELETS BLD QL SMEAR: PRESENT — SIGNIFICANT CHANGE UP
GIANT PLATELETS BLD QL SMEAR: PRESENT — SIGNIFICANT CHANGE UP
GLUCOSE BLDV-MCNC: 114 MG/DL — HIGH (ref 70–99)
GLUCOSE BLDV-MCNC: 114 MG/DL — HIGH (ref 70–99)
GLUCOSE BLDV-MCNC: 200 MG/DL — HIGH (ref 70–99)
GLUCOSE BLDV-MCNC: 200 MG/DL — HIGH (ref 70–99)
GLUCOSE SERPL-MCNC: 198 MG/DL — HIGH (ref 70–99)
GLUCOSE SERPL-MCNC: 198 MG/DL — HIGH (ref 70–99)
HADV DNA SPEC QL NAA+PROBE: SIGNIFICANT CHANGE UP
HADV DNA SPEC QL NAA+PROBE: SIGNIFICANT CHANGE UP
HCO3 BLDV-SCNC: 38 MMOL/L — HIGH (ref 22–29)
HCOV 229E RNA SPEC QL NAA+PROBE: SIGNIFICANT CHANGE UP
HCOV 229E RNA SPEC QL NAA+PROBE: SIGNIFICANT CHANGE UP
HCOV HKU1 RNA SPEC QL NAA+PROBE: SIGNIFICANT CHANGE UP
HCOV HKU1 RNA SPEC QL NAA+PROBE: SIGNIFICANT CHANGE UP
HCOV NL63 RNA SPEC QL NAA+PROBE: SIGNIFICANT CHANGE UP
HCOV NL63 RNA SPEC QL NAA+PROBE: SIGNIFICANT CHANGE UP
HCOV OC43 RNA SPEC QL NAA+PROBE: SIGNIFICANT CHANGE UP
HCOV OC43 RNA SPEC QL NAA+PROBE: SIGNIFICANT CHANGE UP
HCT VFR BLD CALC: 39.6 % — SIGNIFICANT CHANGE UP (ref 39–50)
HCT VFR BLD CALC: 39.6 % — SIGNIFICANT CHANGE UP (ref 39–50)
HCT VFR BLDA CALC: 36 % — LOW (ref 39–51)
HCT VFR BLDA CALC: 36 % — LOW (ref 39–51)
HCT VFR BLDA CALC: 40 % — SIGNIFICANT CHANGE UP (ref 39–51)
HCT VFR BLDA CALC: 40 % — SIGNIFICANT CHANGE UP (ref 39–51)
HGB BLD CALC-MCNC: 12.1 G/DL — LOW (ref 12.6–17.4)
HGB BLD CALC-MCNC: 12.1 G/DL — LOW (ref 12.6–17.4)
HGB BLD CALC-MCNC: 13.2 G/DL — SIGNIFICANT CHANGE UP (ref 12.6–17.4)
HGB BLD CALC-MCNC: 13.2 G/DL — SIGNIFICANT CHANGE UP (ref 12.6–17.4)
HGB BLD-MCNC: 13 G/DL — SIGNIFICANT CHANGE UP (ref 13–17)
HGB BLD-MCNC: 13 G/DL — SIGNIFICANT CHANGE UP (ref 13–17)
HMPV RNA SPEC QL NAA+PROBE: SIGNIFICANT CHANGE UP
HMPV RNA SPEC QL NAA+PROBE: SIGNIFICANT CHANGE UP
HPIV1 RNA SPEC QL NAA+PROBE: SIGNIFICANT CHANGE UP
HPIV1 RNA SPEC QL NAA+PROBE: SIGNIFICANT CHANGE UP
HPIV2 RNA SPEC QL NAA+PROBE: SIGNIFICANT CHANGE UP
HPIV2 RNA SPEC QL NAA+PROBE: SIGNIFICANT CHANGE UP
HPIV3 RNA SPEC QL NAA+PROBE: SIGNIFICANT CHANGE UP
HPIV3 RNA SPEC QL NAA+PROBE: SIGNIFICANT CHANGE UP
HPIV4 RNA SPEC QL NAA+PROBE: SIGNIFICANT CHANGE UP
HPIV4 RNA SPEC QL NAA+PROBE: SIGNIFICANT CHANGE UP
IANC: 5.46 K/UL — SIGNIFICANT CHANGE UP (ref 1.8–7.4)
IANC: 5.46 K/UL — SIGNIFICANT CHANGE UP (ref 1.8–7.4)
LACTATE BLDV-MCNC: 1.1 MMOL/L — SIGNIFICANT CHANGE UP (ref 0.5–2)
LACTATE BLDV-MCNC: 1.1 MMOL/L — SIGNIFICANT CHANGE UP (ref 0.5–2)
LACTATE BLDV-MCNC: 2.9 MMOL/L — HIGH (ref 0.5–2)
LACTATE BLDV-MCNC: 2.9 MMOL/L — HIGH (ref 0.5–2)
LACTATE SERPL-SCNC: 3.5 MMOL/L — HIGH (ref 0.5–2)
LACTATE SERPL-SCNC: 3.5 MMOL/L — HIGH (ref 0.5–2)
LYMPHOCYTES # BLD AUTO: 0.55 K/UL — LOW (ref 1–3.3)
LYMPHOCYTES # BLD AUTO: 0.55 K/UL — LOW (ref 1–3.3)
LYMPHOCYTES # BLD AUTO: 8.8 % — LOW (ref 13–44)
LYMPHOCYTES # BLD AUTO: 8.8 % — LOW (ref 13–44)
M PNEUMO DNA SPEC QL NAA+PROBE: SIGNIFICANT CHANGE UP
M PNEUMO DNA SPEC QL NAA+PROBE: SIGNIFICANT CHANGE UP
MAGNESIUM SERPL-MCNC: 1.8 MG/DL — SIGNIFICANT CHANGE UP (ref 1.6–2.6)
MAGNESIUM SERPL-MCNC: 1.8 MG/DL — SIGNIFICANT CHANGE UP (ref 1.6–2.6)
MANUAL SMEAR VERIFICATION: SIGNIFICANT CHANGE UP
MANUAL SMEAR VERIFICATION: SIGNIFICANT CHANGE UP
MCHC RBC-ENTMCNC: 28.9 PG — SIGNIFICANT CHANGE UP (ref 27–34)
MCHC RBC-ENTMCNC: 28.9 PG — SIGNIFICANT CHANGE UP (ref 27–34)
MCHC RBC-ENTMCNC: 32.8 GM/DL — SIGNIFICANT CHANGE UP (ref 32–36)
MCHC RBC-ENTMCNC: 32.8 GM/DL — SIGNIFICANT CHANGE UP (ref 32–36)
MCV RBC AUTO: 88 FL — SIGNIFICANT CHANGE UP (ref 80–100)
MCV RBC AUTO: 88 FL — SIGNIFICANT CHANGE UP (ref 80–100)
MONOCYTES # BLD AUTO: 0.11 K/UL — SIGNIFICANT CHANGE UP (ref 0–0.9)
MONOCYTES # BLD AUTO: 0.11 K/UL — SIGNIFICANT CHANGE UP (ref 0–0.9)
MONOCYTES NFR BLD AUTO: 1.7 % — LOW (ref 2–14)
MONOCYTES NFR BLD AUTO: 1.7 % — LOW (ref 2–14)
NEUTROPHILS # BLD AUTO: 5.45 K/UL — SIGNIFICANT CHANGE UP (ref 1.8–7.4)
NEUTROPHILS # BLD AUTO: 5.45 K/UL — SIGNIFICANT CHANGE UP (ref 1.8–7.4)
NEUTROPHILS NFR BLD AUTO: 86.8 % — HIGH (ref 43–77)
NEUTROPHILS NFR BLD AUTO: 86.8 % — HIGH (ref 43–77)
NEUTS BAND # BLD: 0.9 % — SIGNIFICANT CHANGE UP (ref 0–6)
NEUTS BAND # BLD: 0.9 % — SIGNIFICANT CHANGE UP (ref 0–6)
PCO2 BLDV: 61 MMHG — HIGH (ref 42–55)
PCO2 BLDV: 61 MMHG — HIGH (ref 42–55)
PCO2 BLDV: 64 MMHG — HIGH (ref 42–55)
PCO2 BLDV: 64 MMHG — HIGH (ref 42–55)
PH BLDV: 7.38 — SIGNIFICANT CHANGE UP (ref 7.32–7.43)
PH BLDV: 7.38 — SIGNIFICANT CHANGE UP (ref 7.32–7.43)
PH BLDV: 7.4 — SIGNIFICANT CHANGE UP (ref 7.32–7.43)
PH BLDV: 7.4 — SIGNIFICANT CHANGE UP (ref 7.32–7.43)
PHOSPHATE SERPL-MCNC: 3.3 MG/DL — SIGNIFICANT CHANGE UP (ref 2.5–4.5)
PHOSPHATE SERPL-MCNC: 3.3 MG/DL — SIGNIFICANT CHANGE UP (ref 2.5–4.5)
PLAT MORPH BLD: NORMAL — SIGNIFICANT CHANGE UP
PLAT MORPH BLD: NORMAL — SIGNIFICANT CHANGE UP
PLATELET # BLD AUTO: 252 K/UL — SIGNIFICANT CHANGE UP (ref 150–400)
PLATELET # BLD AUTO: 252 K/UL — SIGNIFICANT CHANGE UP (ref 150–400)
PLATELET COUNT - ESTIMATE: NORMAL — SIGNIFICANT CHANGE UP
PLATELET COUNT - ESTIMATE: NORMAL — SIGNIFICANT CHANGE UP
PO2 BLDV: 46 MMHG — HIGH (ref 25–45)
PO2 BLDV: 46 MMHG — HIGH (ref 25–45)
PO2 BLDV: 59 MMHG — HIGH (ref 25–45)
PO2 BLDV: 59 MMHG — HIGH (ref 25–45)
POTASSIUM BLDV-SCNC: 4.1 MMOL/L — SIGNIFICANT CHANGE UP (ref 3.5–5.1)
POTASSIUM BLDV-SCNC: 4.1 MMOL/L — SIGNIFICANT CHANGE UP (ref 3.5–5.1)
POTASSIUM BLDV-SCNC: 4.2 MMOL/L — SIGNIFICANT CHANGE UP (ref 3.5–5.1)
POTASSIUM BLDV-SCNC: 4.2 MMOL/L — SIGNIFICANT CHANGE UP (ref 3.5–5.1)
POTASSIUM SERPL-MCNC: 4.1 MMOL/L — SIGNIFICANT CHANGE UP (ref 3.5–5.3)
POTASSIUM SERPL-MCNC: 4.1 MMOL/L — SIGNIFICANT CHANGE UP (ref 3.5–5.3)
POTASSIUM SERPL-SCNC: 4.1 MMOL/L — SIGNIFICANT CHANGE UP (ref 3.5–5.3)
POTASSIUM SERPL-SCNC: 4.1 MMOL/L — SIGNIFICANT CHANGE UP (ref 3.5–5.3)
RAPID RVP RESULT: SIGNIFICANT CHANGE UP
RAPID RVP RESULT: SIGNIFICANT CHANGE UP
RBC # BLD: 4.5 M/UL — SIGNIFICANT CHANGE UP (ref 4.2–5.8)
RBC # BLD: 4.5 M/UL — SIGNIFICANT CHANGE UP (ref 4.2–5.8)
RBC # FLD: 15.4 % — HIGH (ref 10.3–14.5)
RBC # FLD: 15.4 % — HIGH (ref 10.3–14.5)
RBC BLD AUTO: NORMAL — SIGNIFICANT CHANGE UP
RBC BLD AUTO: NORMAL — SIGNIFICANT CHANGE UP
RSV RNA SPEC QL NAA+PROBE: SIGNIFICANT CHANGE UP
RSV RNA SPEC QL NAA+PROBE: SIGNIFICANT CHANGE UP
RV+EV RNA SPEC QL NAA+PROBE: SIGNIFICANT CHANGE UP
RV+EV RNA SPEC QL NAA+PROBE: SIGNIFICANT CHANGE UP
SAO2 % BLDV: 73.5 % — SIGNIFICANT CHANGE UP (ref 67–88)
SAO2 % BLDV: 73.5 % — SIGNIFICANT CHANGE UP (ref 67–88)
SAO2 % BLDV: 90.4 % — HIGH (ref 67–88)
SAO2 % BLDV: 90.4 % — HIGH (ref 67–88)
SARS-COV-2 RNA SPEC QL NAA+PROBE: SIGNIFICANT CHANGE UP
SARS-COV-2 RNA SPEC QL NAA+PROBE: SIGNIFICANT CHANGE UP
SODIUM SERPL-SCNC: 135 MMOL/L — SIGNIFICANT CHANGE UP (ref 135–145)
SODIUM SERPL-SCNC: 135 MMOL/L — SIGNIFICANT CHANGE UP (ref 135–145)
VARIANT LYMPHS # BLD: 1.8 % — SIGNIFICANT CHANGE UP (ref 0–6)
VARIANT LYMPHS # BLD: 1.8 % — SIGNIFICANT CHANGE UP (ref 0–6)
WBC # BLD: 6.21 K/UL — SIGNIFICANT CHANGE UP (ref 3.8–10.5)
WBC # BLD: 6.21 K/UL — SIGNIFICANT CHANGE UP (ref 3.8–10.5)
WBC # FLD AUTO: 6.21 K/UL — SIGNIFICANT CHANGE UP (ref 3.8–10.5)
WBC # FLD AUTO: 6.21 K/UL — SIGNIFICANT CHANGE UP (ref 3.8–10.5)

## 2023-10-28 PROCEDURE — 99221 1ST HOSP IP/OBS SF/LOW 40: CPT

## 2023-10-28 PROCEDURE — 99223 1ST HOSP IP/OBS HIGH 75: CPT

## 2023-10-28 PROCEDURE — 93978 VASCULAR STUDY: CPT | Mod: 26

## 2023-10-28 PROCEDURE — 99291 CRITICAL CARE FIRST HOUR: CPT | Mod: GC

## 2023-10-28 RX ORDER — ATORVASTATIN CALCIUM 80 MG/1
40 TABLET, FILM COATED ORAL AT BEDTIME
Refills: 0 | Status: DISCONTINUED | OUTPATIENT
Start: 2023-10-28 | End: 2023-11-20

## 2023-10-28 RX ORDER — AZITHROMYCIN 500 MG/1
500 TABLET, FILM COATED ORAL ONCE
Refills: 0 | Status: COMPLETED | OUTPATIENT
Start: 2023-10-28 | End: 2023-10-28

## 2023-10-28 RX ORDER — METOPROLOL TARTRATE 50 MG
1 TABLET ORAL
Qty: 0 | Refills: 0 | DISCHARGE

## 2023-10-28 RX ORDER — AZITHROMYCIN 500 MG/1
500 TABLET, FILM COATED ORAL EVERY 24 HOURS
Refills: 0 | Status: COMPLETED | OUTPATIENT
Start: 2023-10-29 | End: 2023-10-30

## 2023-10-28 RX ORDER — METOPROLOL TARTRATE 50 MG
50 TABLET ORAL
Refills: 0 | Status: DISCONTINUED | OUTPATIENT
Start: 2023-10-28 | End: 2023-11-20

## 2023-10-28 RX ORDER — ASPIRIN/CALCIUM CARB/MAGNESIUM 324 MG
81 TABLET ORAL DAILY
Refills: 0 | Status: DISCONTINUED | OUTPATIENT
Start: 2023-10-28 | End: 2023-11-20

## 2023-10-28 RX ORDER — IPRATROPIUM/ALBUTEROL SULFATE 18-103MCG
3 AEROSOL WITH ADAPTER (GRAM) INHALATION EVERY 6 HOURS
Refills: 0 | Status: DISCONTINUED | OUTPATIENT
Start: 2023-10-28 | End: 2023-10-31

## 2023-10-28 RX ORDER — LISINOPRIL 2.5 MG/1
20 TABLET ORAL DAILY
Refills: 0 | Status: DISCONTINUED | OUTPATIENT
Start: 2023-10-28 | End: 2023-11-20

## 2023-10-28 RX ORDER — PRASUGREL 5 MG/1
1 TABLET, FILM COATED ORAL
Refills: 0 | DISCHARGE

## 2023-10-28 RX ORDER — LEVOTHYROXINE SODIUM 125 MCG
0.5 TABLET ORAL
Refills: 0 | DISCHARGE

## 2023-10-28 RX ORDER — ACETAMINOPHEN 500 MG
650 TABLET ORAL EVERY 6 HOURS
Refills: 0 | Status: DISCONTINUED | OUTPATIENT
Start: 2023-10-28 | End: 2023-11-20

## 2023-10-28 RX ORDER — IPRATROPIUM/ALBUTEROL SULFATE 18-103MCG
3 AEROSOL WITH ADAPTER (GRAM) INHALATION ONCE
Refills: 0 | Status: COMPLETED | OUTPATIENT
Start: 2023-10-28 | End: 2023-10-28

## 2023-10-28 RX ORDER — LEVOTHYROXINE SODIUM 125 MCG
37.5 TABLET ORAL DAILY
Refills: 0 | Status: DISCONTINUED | OUTPATIENT
Start: 2023-10-28 | End: 2023-11-20

## 2023-10-28 RX ORDER — PRASUGREL 5 MG/1
10 TABLET, FILM COATED ORAL DAILY
Refills: 0 | Status: DISCONTINUED | OUTPATIENT
Start: 2023-10-28 | End: 2023-11-20

## 2023-10-28 RX ORDER — ASPIRIN/CALCIUM CARB/MAGNESIUM 324 MG
1 TABLET ORAL
Refills: 0 | DISCHARGE

## 2023-10-28 RX ORDER — ATORVASTATIN CALCIUM 80 MG/1
1 TABLET, FILM COATED ORAL
Qty: 0 | Refills: 0 | DISCHARGE

## 2023-10-28 RX ORDER — BUDESONIDE AND FORMOTEROL FUMARATE DIHYDRATE 160; 4.5 UG/1; UG/1
1 AEROSOL RESPIRATORY (INHALATION)
Refills: 0 | Status: DISCONTINUED | OUTPATIENT
Start: 2023-10-28 | End: 2023-10-31

## 2023-10-28 RX ORDER — ENOXAPARIN SODIUM 100 MG/ML
40 INJECTION SUBCUTANEOUS EVERY 24 HOURS
Refills: 0 | Status: DISCONTINUED | OUTPATIENT
Start: 2023-10-28 | End: 2023-11-20

## 2023-10-28 RX ADMIN — BUDESONIDE AND FORMOTEROL FUMARATE DIHYDRATE 1 PUFF(S): 160; 4.5 AEROSOL RESPIRATORY (INHALATION) at 21:32

## 2023-10-28 RX ADMIN — Medication 3 MILLILITER(S): at 15:56

## 2023-10-28 RX ADMIN — Medication 3 MILLILITER(S): at 01:31

## 2023-10-28 RX ADMIN — ATORVASTATIN CALCIUM 40 MILLIGRAM(S): 80 TABLET, FILM COATED ORAL at 21:32

## 2023-10-28 RX ADMIN — Medication 3 MILLILITER(S): at 20:30

## 2023-10-28 RX ADMIN — Medication 125 MILLIGRAM(S): at 01:31

## 2023-10-28 RX ADMIN — Medication 81 MILLIGRAM(S): at 13:54

## 2023-10-28 RX ADMIN — Medication 50 MILLIGRAM(S): at 18:30

## 2023-10-28 RX ADMIN — ENOXAPARIN SODIUM 40 MILLIGRAM(S): 100 INJECTION SUBCUTANEOUS at 18:29

## 2023-10-28 RX ADMIN — LISINOPRIL 20 MILLIGRAM(S): 2.5 TABLET ORAL at 13:55

## 2023-10-28 RX ADMIN — Medication 40 MILLIGRAM(S): at 12:28

## 2023-10-28 RX ADMIN — BUDESONIDE AND FORMOTEROL FUMARATE DIHYDRATE 1 PUFF(S): 160; 4.5 AEROSOL RESPIRATORY (INHALATION) at 12:28

## 2023-10-28 RX ADMIN — AZITHROMYCIN 255 MILLIGRAM(S): 500 TABLET, FILM COATED ORAL at 04:00

## 2023-10-28 RX ADMIN — PRASUGREL 10 MILLIGRAM(S): 5 TABLET, FILM COATED ORAL at 13:54

## 2023-10-28 NOTE — CONSULT NOTE ADULT - ATTENDING COMMENTS
Patient of Dr. Joaquin, followed for juxtarenal AAA, last followed up 8/2023, p/w respiratory complaints, abnormal VBG. Has history of COPD on home oxygen, SOB, CAD, cirrhosis and multiple medical problems. AAA repair has been deferred due to juxtarenal location of AAA with need for FEVAR for repair, size of aneurysm and multiple medical problems. CTA on admission shows AAA similar size to prior study. Abdomen benign.   -Care per primary team  -Follow up with Dr. Joaquin on 10/30/23 Patient of Dr. Joaquin, followed for juxtarenal AAA, last followed up 8/2023, p/w respiratory complaints, abnormal VBG. Has history of COPD on home oxygen, SOB, CAD, cirrhosis and multiple medical problems. AAA repair has been deferred due to juxtarenal location of AAA with need for FEVAR for repair, size of aneurysm and multiple medical problems. Patient also complains of chronic shooting pain in both legs that is self resolving. CTA on admission shows AAA similar size to prior study. Abdomen benign. Femoral signals present, pulses difficult to appreciate; right PT signal, left DP signal present.   -Care per primary team  -Follow up with Dr. Joaquin on 10/30/23

## 2023-10-28 NOTE — CONSULT NOTE ADULT - ASSESSMENT
71M PMHx COPD on home O2 2 L nasal cannula (~45pack year hx, quit 4 years ago), hypertension, known AAA - being followed by Dr. Joaquin, PAD, alcohol abuse complicated with liver cirrhosis, recently treated for a UTI, presents with COPD exacerbation. Vascular surgery consulted for new CT angio abd. Partially thrombosed infrarenal abdominal aortic aneurysm measures 5.0 x 5.8 x 12.3 cm (TRV x AP x CC), previously 5.3 x 4.8 x 11.5   cm (12/22)    PLAN  - No acute vascular surgical intervention  - Care per primary    To be discussed with Dr. Martinez on behalf of Dr. Jemal NAVA Team Vascular Surgery  [id number]     71M PMHx COPD on home O2 2 L nasal cannula (~45pack year hx, quit 4 years ago), hypertension, known AAA - being followed by Dr. Joaquin, PAD, alcohol abuse complicated with liver cirrhosis, recently treated for a UTI, presents with COPD exacerbation. Vascular surgery consulted for new CT angio abd. Partially thrombosed infrarenal abdominal aortic aneurysm measures 5.0 x 5.8 x 12.3 cm (TRV x AP x CC), previously 5.3 x 4.8 x 11.5 cm (12/22)    PLAN  - No acute vascular surgical intervention  - Care per primary    To be discussed with Dr. Martinez on behalf of Dr. Jemal NAVA Team Vascular Surgery  [id number]   71M PMHx COPD on home O2 2 L nasal cannula (~45pack year hx, quit 4 years ago), hypertension, known AAA - being followed by Dr. Joaquin, PAD, alcohol abuse complicated with liver cirrhosis, recently treated for a UTI, presents with COPD exacerbation. Vascular surgery consulted for new CT angio abd. Partially thrombosed infrarenal abdominal aortic aneurysm measures 5.0 x 5.8 x 12.3 cm (TRV x AP x CC), previously 5.3 x 4.8 x 11.5 cm (12/22)    PLAN  - No acute vascular surgical intervention  - Care per primary  - Final plan pending review of imaging    Discussed with Dr. Martinez on behalf of Dr. Renetta NAVA Team Vascular Surgery  [id number]

## 2023-10-28 NOTE — CONSULT NOTE ADULT - SUBJECTIVE AND OBJECTIVE BOX
Patient:  FRANCIS CHAUDHARY  [id number]    CHIEF COMPLAINT: SOB    HPI: 68-year-old male history of COPD on home O2 2 L nasal cannula, hypertension, known AAA, PAD, alcohol abuse complicated with liver cirrhosis, recently treated for a UTI, as per patient was referred to the emergency department by his primary care physician Dr. Remington Simms for  abnormal labs c/f EVANGELISTA and "low oxygen"   Patient reports exacerbation of his chronic low back pain.  Endorsing SOB, increased cough and sputum production. Denying fevers, chills, chest pain at this time.     MICU consulted for new bipap in setting of chronic hypercapnic respiratory failure with COPD.     PAST MEDICAL & SURGICAL HISTORY:  HTN (hypertension)      HLD (hyperlipidemia)      PAD (peripheral artery disease)      History of abdominal aortic aneurysm (AAA)      History of COPD      Gastritis      Hypothyroidism      No significant past surgical history          FAMILY HISTORY:      SOCIAL HISTORY:    Allergies    No Known Allergies    Intolerances        HOME MEDICATIONS:    REVIEW OF SYSTEMS:  [x] Negative except as stated in HPI    OBJECTIVE:  T(F): 98.5 (10-27-23 @ 21:15), Max: 98.5 (10-27-23 @ 21:15)  HR: 68 (10-27-23 @ 21:15) (55 - 68)  BP: 152/82 (10-27-23 @ 21:15) (152/82 - 174/81)  BP(mean): --  ABP: --  ABP(mean): --  RR: 17 (10-27-23 @ 21:15) (17 - 20)  SpO2: 100% (10-27-23 @ 21:15) (99% - 100%)    I/O Summary 24H    CAPILLARY BLOOD GLUCOSE          PHYSICAL EXAM:  GENERAL: NAD, lying in bed comfortably  HEAD:  Atraumatic, Normocephalic  EYES: EOMI, PERRLA, conjunctiva and sclera clear  ENT: Moist mucous membranes  NECK: Supple, No JVD  CHEST/LUNG: Diminished air movement throughout wiht crackles at left base, no wheezing noted  HEART: Regular rate and rhythm; No murmurs, rubs, or gallops  ABDOMEN: Bowel sounds present; Soft, Nontender, Nondistended. No hepatomegaly  EXTREMITIES:  2+ Peripheral Pulses, brisk capillary refill. No clubbing, cyanosis, or edema  NERVOUS SYSTEM:  Alert & Oriented X3, speech clear. No deficits   MSK: FROM all 4 extremities, full and equal strength  SKIN: No rashes or lesions    HOSPITAL MEDICATIONS:  MEDICATIONS  (STANDING):  albuterol/ipratropium for Nebulization 3 milliLiter(s) Nebulizer once  methylPREDNISolone sodium succinate Injectable 125 milliGRAM(s) IV Push once    MEDICATIONS  (PRN):      LABS:  CBC 10-27-23 @ 19:48                        12.0   11.23 )-----------( 209                   37.2       Hgb trend: 12.0 <--   WBC trend: 11.23 <--     CMP 10-27-23 @ 19:48    137  |  94<L>  |  10  ----------------------------<  101<H>  4.6   |  33<H>  |  0.49<L>    Ca    9.5      10-27-23 @ 19:48    TPro  7.5  /  Alb  4.3  /  TBili  0.6  /  DBili  x   /  AST  27  /  ALT  11  /  AlkPhos  100  10-27      Serum Cr trend: 0.49 <--   PT/INR - ( 27 Oct 2023 19:48 )   PT: 10.9 sec;   INR: 0.96 ratio         PTT - ( 27 Oct 2023 19:48 ):33.0 sec    ABG Trend:     VBG Trend:   10-27-23 @ 23:38 - pH: 7.35  | pCO2: 73    | pO2: 24    | HCO3: 40    | Lactate: 0.8        MICROBIOLOGY:       RADIOLOGY:  [x] Reviewed and interpreted by me

## 2023-10-28 NOTE — PATIENT PROFILE ADULT - PACKS PER DAY
Hpi Title: Evaluation of Skin Lesions Have Your Spot(S) Been Treated In The Past?: has not been treated 1

## 2023-10-28 NOTE — H&P ADULT - PROBLEM SELECTOR PLAN 1
Likely secondary to COPD exacerbation, pt has not seen Pulmonary as outpatient for a good while  - Seen by MICU given new BIPAP, apprec recs  - Being treated for likely COPD exacerbation  - C/w prednisone 40mg daily for 5 days, zithromax for 3 days and duonebs ATC  - Starting symbicort as well BID  - Repeat VBG with improved pCO2 to 61 (down from 73)  - Will transition to BIPAP 10/5 at bedtime for now, c/w NC 2 lts/min  - F/up repeat VBG in AM  - Pulmonary f/up Likely secondary to COPD exacerbation, pt has not seen Pulmonary as outpatient for a good while  - Seen by MICU given new BIPAP, apprec recs  - Being treated for likely COPD exacerbation  - C/w prednisone 40mg daily for 5 days, zithromax for 3 days and duonebs ATC  - Starting symbicort as well BID  - Repeat VBG with improved pCO2 to 61 (down from 73)  - Will transition to BIPAP 10/5 at bedtime for now, c/w NC 2 lts/min  - F/up repeat VBG in AM  -  F/up further Pulmonary recs

## 2023-10-28 NOTE — PHYSICAL THERAPY INITIAL EVALUATION ADULT - ADDITIONAL COMMENTS
As per patient he lives in a private home with 15 steps to enter, with no further steps to negotiate once inside. PAtient was ambulatory with a straight cane. Reports needing some assistance with ADL's, Denies any falls within the last 6 months.     PAtient left semi-reclined in bed, NAD, all lines and tubes intact, call bell within reach, 97% on room, RN made aware.

## 2023-10-28 NOTE — H&P ADULT - PROBLEM SELECTOR PLAN 2
On albuterol MDI and nebs prn as outpatient, has not seen Pulmonary for a long time  - As above, starting prednisone/zithromax and duonebs ATC  - Starting symbicort BID  - F/up repeat VBG in AM as above  - RVP is negative  - F/up further Pulmonary recs  - Needs Pulm f/up as outpatient  - Has a left upper lobe 4 mm nodule, to be f/up as outpatient  (301:54) appears new when compared to prior exam. On albuterol MDI and nebs prn as outpatient, has not seen Pulmonary for a long time  - As above, starting prednisone/zithromax and duonebs ATC  - Starting symbicort BID  - F/up repeat VBG in AM as above  - RVP is negative  - F/up further Pulmonary recs  - Needs Pulm f/up as outpatient  - Has a left upper lobe 4 mm nodule, to be f/up as outpatient

## 2023-10-28 NOTE — CONSULT NOTE ADULT - SUBJECTIVE AND OBJECTIVE BOX
C TEAM VASCULAR SURGERY CONSULT  71M PMHx COPD on home O2 2 L nasal cannula (~45pack year hx, quit 4 years ago), hypertension, known AAA - being followed by Dr. Joaquin, PAD, alcohol abuse complicated with liver cirrhosis, recently treated for a UTI, as per patient was referred to the emergency department by his primary care physician Dr. Remington Simms for abnormal labs. Patient reports exacerbation of his chronic low back pain. C/o b/l claudication from buttocks down at distances <50 feet. He has history of PAD s/p RLE bypass (?failed). Denies rest pain and poor wound healing. Denies abd pain, pulsatile mass, tearing pain.     PAST MEDICAL & SURGICAL HISTORY:  HTN (hypertension)      HLD (hyperlipidemia)      PAD (peripheral artery disease)      History of abdominal aortic aneurysm (AAA)      History of COPD      Gastritis      Hypothyroidism      No significant past surgical history          MEDICATIONS  (STANDING):  azithromycin  IVPB 500 milliGRAM(s) IV Intermittent once    MEDICATIONS  (PRN):      Allergies    No Known Allergies    Intolerances    Physical Exam:  General: NAD, resting comfortably  HEENT: NC/AT, EOMI, normal hearing, no oral lesions, no LAD, neck supple  Pulmonary: on CPAP, mentating well  Abdominal: soft, ND/NT, no organomegaly, no pulsatile mass  Extremities: WWP, normal strength, no clubbing/cyanosis/edema  Neuro: A/O x 3, CNs II-XII grossly intact, normal sensation, no focal deficits  Pulses: (p=palpable, s=signal)  - Right: femoral (p), popliteal (s), PT (s), DP (s)  - Left: femoral (p), popliteal (p), PT x(), DP (x), AT (s)      Vital Signs Last 24 Hrs  T(C): 36.9 (27 Oct 2023 21:15), Max: 36.9 (27 Oct 2023 18:18)  T(F): 98.5 (27 Oct 2023 21:15), Max: 98.5 (27 Oct 2023 21:15)  HR: 60 (28 Oct 2023 01:32) (55 - 68)  BP: 152/82 (27 Oct 2023 21:15) (152/82 - 174/81)  BP(mean): --  RR: 17 (27 Oct 2023 21:15) (17 - 20)  SpO2: 100% (28 Oct 2023 01:32) (99% - 100%)    LABS:                        12.0   11.23 )-----------( 209      ( 27 Oct 2023 19:48 )             37.2     10-27    137  |  94<L>  |  10  ----------------------------<  101<H>  4.6   |  33<H>  |  0.49<L>    Ca    9.5      27 Oct 2023 19:48    TPro  7.5  /  Alb  4.3  /  TBili  0.6  /  DBili  x   /  AST    /  ALT  11  /  AlkPhos  100  10-27    PT/INR - ( 27 Oct 2023 19:48 )   PT: 10.9 sec;   INR: 0.96 ratio         PTT - ( 27 Oct 2023 19:48 )  PTT:33.0 sec  Urinalysis Basic - ( 27 Oct 2023 19:48 )    Color: Yellow / Appearance: Turbid / S.018 / pH: x  Gluc: 101 mg/dL / Ketone: Negative mg/dL  / Bili: Negative / Urobili: 1.0 mg/dL   Blood: x / Protein: Trace mg/dL / Nitrite: Negative   Leuk Esterase: Small / RBC: 1 /HPF / WBC 18 /HPF   Sq Epi: x / Non Sq Epi: 0 /HPF / Bacteria: Negative /HPF    LIVER FUNCTIONS - ( 27 Oct 2023 19:48 )  Alb: 4.3 g/dL / Pro: 7.5 g/dL / ALK PHOS: 100 U/L / ALT: 11 U/L / AST: 27 U/L / GGT: x           RADIOLOGY & ADDITIONAL STUDIES:  < from: CT Angio Chest Aorta w/wo IV Cont (10.27.23 @ 23:11) >  ACC: [id number] EXAM:  CT ANGIO ABD PELV (W)AW IC   ORDERED BY: JOSÉ MANUEL FRANK     ACC: [id number] EXAM:  CT ANGIO CHEST AORTA WAWIC   ORDERED BY: HOANG SMITH     PROCEDURE DATE:  10/27/2023        < end of copied text >  < from: CT Angio Chest Aorta w/wo IV Cont (10.27.23 @ 23:11) >  VESSELS: Partially thrombosed infrarenal abdominal aortic aneurysm   measures 5.0 x 5.8 x 12.3 cm (TRV x AP x CC), previously 5.3 x 4.8 x 11.5   cm. Crescentic area of relative hyperattenuation within the thrombus   along the anterior aspect of the aneurysm at the level of the kidneys   (example 301, 153), suspicious for hemorrhage/leakage into thrombus. No   active extravasation identified. Similar ectasia of the proximal common   iliac arteries, up to 1.5 cm on the right. Intact right femoral bypass   graft. Atherosclerotic changes. No abdominal aortic dissection.  RETROPERITONEUM/LYMPH NODES: No lymphadenopathy.  ABDOMINAL WALL: Within normal limits.  BONES: Degenerative changes.    IMPRESSION:  No aortic dissection.    Increased size of partially thrombosed infrarenal abdominal aortic   aneurysm measuring up to 5.8 cm in greatest axial dimension. Crescentic   area of mild relative hyperattenuation within the thrombus along the   anterior aspect of the aneurysm at the level of the kidneys suspicious   for hemorrhage/leakage into the thrombus. No active extravasation   identified. Vascular surgery consult is recommended.      < end of copied text >  < from: CT Angio Abdomen and Pelvis w/ IV Cont (12.15.22 @ 17:29) >  VESSELS: Stable partially thrombosed infrarenal intra-abdominal aortic   aneurysm measures 5.3 x 4.8 x 11.5 cm ( TRV x AP x CC). No aortic   dissection is seen. Unchanged ectatic bilateral common iliac arteries.   Right common iliac artery measures up to 1.5 cm. Intact right femoral   bypass graft.  RETROPERITONEUM/LYMPH NODES: No lymphadenopathy.  ABDOMINAL WALL: Within normal limits.  BONES: Degenerative changes of the spine.    IMPRESSION:  No aortic dissection.    Stable 5.3 cm partially thrombosed infrarenal intra-abdominal aortic   aneurysm.    < end of copied text >   C TEAM VASCULAR SURGERY CONSULT  71M PMHx COPD on home O2 2 L nasal cannula (~45pack year hx, quit 4 years ago), hypertension, known AAA - being followed by Dr. Joaquin, PAD, alcohol abuse complicated with liver cirrhosis, recently treated for a UTI, as per patient was referred to the emergency department by his primary care physician Dr. Remington Simms for abnormal labs. Patient reports exacerbation of his chronic low back pain. C/o b/l claudication from buttocks down at distances <50 feet. He has history of PAD s/p RLE bypass (?failed). Denies rest pain and poor wound healing. Denies abd pain, pulsatile mass, tearing pain.     PAST MEDICAL & SURGICAL HISTORY:  HTN (hypertension)      HLD (hyperlipidemia)      PAD (peripheral artery disease)      History of abdominal aortic aneurysm (AAA)      History of COPD      Gastritis      Hypothyroidism      No significant past surgical history          MEDICATIONS  (STANDING):  azithromycin  IVPB 500 milliGRAM(s) IV Intermittent once    MEDICATIONS  (PRN):      Allergies    No Known Allergies    Intolerances    Physical Exam:  General: NAD, resting comfortably  HEENT: NC/AT, EOMI, normal hearing, no oral lesions, no LAD, neck supple  Pulmonary: on CPAP, mentating well  Abdominal: soft, ND/NT, no organomegaly, no pulsatile mass  Extremities: WWP, normal strength, no clubbing/cyanosis/edema  Neuro: A/O x 3, CNs II-XII grossly intact, normal sensation, no focal deficits  Pulses: (p=palpable, s=signal)  - Right: femoral (p), popliteal (s), PT (s), DP (s)  - Left: femoral (p), popliteal (p), PT (x), DP (x), AT (s)      Vital Signs Last 24 Hrs  T(C): 36.9 (27 Oct 2023 21:15), Max: 36.9 (27 Oct 2023 18:18)  T(F): 98.5 (27 Oct 2023 21:15), Max: 98.5 (27 Oct 2023 21:15)  HR: 60 (28 Oct 2023 01:32) (55 - 68)  BP: 152/82 (27 Oct 2023 21:15) (152/82 - 174/81)  BP(mean): --  RR: 17 (27 Oct 2023 21:15) (17 - 20)  SpO2: 100% (28 Oct 2023 01:32) (99% - 100%)    LABS:                        12.0   11.23 )-----------( 209      ( 27 Oct 2023 19:48 )             37.2     10-27    137  |  94<L>  |  10  ----------------------------<  101<H>  4.6   |  33<H>  |  0.49<L>    Ca    9.5      27 Oct 2023 19:48    TPro  7.5  /  Alb  4.3  /  TBili  0.6  /  DBili  x   /  AST    /  ALT  11  /  AlkPhos  100  10-27    PT/INR - ( 27 Oct 2023 19:48 )   PT: 10.9 sec;   INR: 0.96 ratio         PTT - ( 27 Oct 2023 19:48 )  PTT:33.0 sec  Urinalysis Basic - ( 27 Oct 2023 19:48 )    Color: Yellow / Appearance: Turbid / S.018 / pH: x  Gluc: 101 mg/dL / Ketone: Negative mg/dL  / Bili: Negative / Urobili: 1.0 mg/dL   Blood: x / Protein: Trace mg/dL / Nitrite: Negative   Leuk Esterase: Small / RBC: 1 /HPF / WBC 18 /HPF   Sq Epi: x / Non Sq Epi: 0 /HPF / Bacteria: Negative /HPF    LIVER FUNCTIONS - ( 27 Oct 2023 19:48 )  Alb: 4.3 g/dL / Pro: 7.5 g/dL / ALK PHOS: 100 U/L / ALT: 11 U/L / AST: 27 U/L / GGT: x           RADIOLOGY & ADDITIONAL STUDIES:  < from: CT Angio Chest Aorta w/wo IV Cont (10.27.23 @ 23:11) >  ACC: [id number] EXAM:  CT ANGIO ABD PELV (W)AW IC   ORDERED BY: JOSÉ MANUEL FRANK     ACC: [id number] EXAM:  CT ANGIO CHEST AORTA WAWIC   ORDERED BY: HOANG SMITH     PROCEDURE DATE:  10/27/2023        < end of copied text >  < from: CT Angio Chest Aorta w/wo IV Cont (10.27.23 @ 23:11) >  VESSELS: Partially thrombosed infrarenal abdominal aortic aneurysm   measures 5.0 x 5.8 x 12.3 cm (TRV x AP x CC), previously 5.3 x 4.8 x 11.5   cm. Crescentic area of relative hyperattenuation within the thrombus   along the anterior aspect of the aneurysm at the level of the kidneys   (example 301, 153), suspicious for hemorrhage/leakage into thrombus. No   active extravasation identified. Similar ectasia of the proximal common   iliac arteries, up to 1.5 cm on the right. Intact right femoral bypass   graft. Atherosclerotic changes. No abdominal aortic dissection.  RETROPERITONEUM/LYMPH NODES: No lymphadenopathy.  ABDOMINAL WALL: Within normal limits.  BONES: Degenerative changes.    IMPRESSION:  No aortic dissection.    Increased size of partially thrombosed infrarenal abdominal aortic   aneurysm measuring up to 5.8 cm in greatest axial dimension. Crescentic   area of mild relative hyperattenuation within the thrombus along the   anterior aspect of the aneurysm at the level of the kidneys suspicious   for hemorrhage/leakage into the thrombus. No active extravasation   identified. Vascular surgery consult is recommended.      < end of copied text >  < from: CT Angio Abdomen and Pelvis w/ IV Cont (12.15.22 @ 17:29) >  VESSELS: Stable partially thrombosed infrarenal intra-abdominal aortic   aneurysm measures 5.3 x 4.8 x 11.5 cm ( TRV x AP x CC). No aortic   dissection is seen. Unchanged ectatic bilateral common iliac arteries.   Right common iliac artery measures up to 1.5 cm. Intact right femoral   bypass graft.  RETROPERITONEUM/LYMPH NODES: No lymphadenopathy.  ABDOMINAL WALL: Within normal limits.  BONES: Degenerative changes of the spine.    IMPRESSION:  No aortic dissection.    Stable 5.3 cm partially thrombosed infrarenal intra-abdominal aortic   aneurysm.    < end of copied text >   C TEAM VASCULAR SURGERY CONSULT  71M PMHx COPD on home O2 2 L nasal cannula (~45pack year hx, quit 4 years ago), hypertension, known AAA - being followed by Dr. Joaquin, PAD, alcohol abuse complicated with liver cirrhosis, recently treated for a UTI, as per patient was referred to the emergency department by his primary care physician Dr. Remington Simms for abnormal labs. Patient reports exacerbation of his chronic low back pain. C/o b/l claudication from buttocks down at distances <50 feet. He has history of PAD s/p RLE bypass (?failed). Denies rest pain and poor wound healing. Denies abd pain, pulsatile mass, tearing pain.     PAST MEDICAL & SURGICAL HISTORY:  HTN (hypertension)      HLD (hyperlipidemia)      PAD (peripheral artery disease)      History of abdominal aortic aneurysm (AAA)      History of COPD      Gastritis      Hypothyroidism      No significant past surgical history          MEDICATIONS  (STANDING):  azithromycin  IVPB 500 milliGRAM(s) IV Intermittent once    MEDICATIONS  (PRN):      Allergies    No Known Allergies    Intolerances    Physical Exam:  General: NAD, resting comfortably  HEENT: NC/AT, EOMI, normal hearing, no oral lesions, no LAD, neck supple  Pulmonary: on CPAP, mentating well  Abdominal: soft, ND/NT, no organomegaly, no pulsatile mass  Extremities: strength/sensation intact, atrophied musculature  Neuro: A/O x 3, CNs II-XII grossly intact, normal sensation, no focal deficits  Pulses: (p=palpable, s=signal)  - Right: femoral (p), popliteal (s), PT (s), DP (s)  - Left: femoral (p), popliteal (p), PT (x), DP (x), AT (s)      Vital Signs Last 24 Hrs  T(C): 36.9 (27 Oct 2023 21:15), Max: 36.9 (27 Oct 2023 18:18)  T(F): 98.5 (27 Oct 2023 21:15), Max: 98.5 (27 Oct 2023 21:15)  HR: 60 (28 Oct 2023 01:32) (55 - 68)  BP: 152/82 (27 Oct 2023 21:15) (152/82 - 174/81)  BP(mean): --  RR: 17 (27 Oct 2023 21:15) (17 - 20)  SpO2: 100% (28 Oct 2023 01:32) (99% - 100%)    LABS:                        12.0   11.23 )-----------( 209      ( 27 Oct 2023 19:48 )             37.2     10-27    137  |  94<L>  |  10  ----------------------------<  101<H>  4.6   |  33<H>  |  0.49<L>    Ca    9.5      27 Oct 2023 19:48    TPro  7.5  /  Alb  4.3  /  TBili  0.6  /  DBili  x   /  AST    /  ALT  11  /  AlkPhos  100  10-27    PT/INR - ( 27 Oct 2023 19:48 )   PT: 10.9 sec;   INR: 0.96 ratio         PTT - ( 27 Oct 2023 19:48 )  PTT:33.0 sec  Urinalysis Basic - ( 27 Oct 2023 19:48 )    Color: Yellow / Appearance: Turbid / S.018 / pH: x  Gluc: 101 mg/dL / Ketone: Negative mg/dL  / Bili: Negative / Urobili: 1.0 mg/dL   Blood: x / Protein: Trace mg/dL / Nitrite: Negative   Leuk Esterase: Small / RBC: 1 /HPF / WBC 18 /HPF   Sq Epi: x / Non Sq Epi: 0 /HPF / Bacteria: Negative /HPF    LIVER FUNCTIONS - ( 27 Oct 2023 19:48 )  Alb: 4.3 g/dL / Pro: 7.5 g/dL / ALK PHOS: 100 U/L / ALT: 11 U/L / AST: 27 U/L / GGT: x           RADIOLOGY & ADDITIONAL STUDIES:  < from: CT Angio Chest Aorta w/wo IV Cont (10.27.23 @ 23:11) >  ACC: [id number] EXAM:  CT ANGIO ABD PELV (W)AW IC   ORDERED BY: JOSÉ MANUEL FRANK     ACC: [id number] EXAM:  CT ANGIO CHEST AORTA WAWIC   ORDERED BY: HOANG SMITH     PROCEDURE DATE:  10/27/2023        < end of copied text >  < from: CT Angio Chest Aorta w/wo IV Cont (10.27.23 @ 23:11) >  VESSELS: Partially thrombosed infrarenal abdominal aortic aneurysm   measures 5.0 x 5.8 x 12.3 cm (TRV x AP x CC), previously 5.3 x 4.8 x 11.5   cm. Crescentic area of relative hyperattenuation within the thrombus   along the anterior aspect of the aneurysm at the level of the kidneys   (example 301, 153), suspicious for hemorrhage/leakage into thrombus. No   active extravasation identified. Similar ectasia of the proximal common   iliac arteries, up to 1.5 cm on the right. Intact right femoral bypass   graft. Atherosclerotic changes. No abdominal aortic dissection.  RETROPERITONEUM/LYMPH NODES: No lymphadenopathy.  ABDOMINAL WALL: Within normal limits.  BONES: Degenerative changes.    IMPRESSION:  No aortic dissection.    Increased size of partially thrombosed infrarenal abdominal aortic   aneurysm measuring up to 5.8 cm in greatest axial dimension. Crescentic   area of mild relative hyperattenuation within the thrombus along the   anterior aspect of the aneurysm at the level of the kidneys suspicious   for hemorrhage/leakage into the thrombus. No active extravasation   identified. Vascular surgery consult is recommended.      < end of copied text >  < from: CT Angio Abdomen and Pelvis w/ IV Cont (12.15.22 @ 17:29) >  VESSELS: Stable partially thrombosed infrarenal intra-abdominal aortic   aneurysm measures 5.3 x 4.8 x 11.5 cm ( TRV x AP x CC). No aortic   dissection is seen. Unchanged ectatic bilateral common iliac arteries.   Right common iliac artery measures up to 1.5 cm. Intact right femoral   bypass graft.  RETROPERITONEUM/LYMPH NODES: No lymphadenopathy.  ABDOMINAL WALL: Within normal limits.  BONES: Degenerative changes of the spine.    IMPRESSION:  No aortic dissection.    Stable 5.3 cm partially thrombosed infrarenal intra-abdominal aortic   aneurysm.    < end of copied text >

## 2023-10-28 NOTE — H&P ADULT - PROBLEM SELECTOR PLAN 4
Known infrarenal AAA/PAD  - CTA Ao shows-Increased size of partially thrombosed infrarenal abdominal aortic aneurysm measuring up to 5.8 cm in greatest axial dimension. Crescentic area of mild relative hyperattenuation within the thrombus along the anterior aspect of the aneurysm at the level of the kidneys suspicious for hemorrhage/leakage into the thrombus. No active extravasation identified  - Seen by Vascular, apprec recs  - No acute vascular surgical intervention  - F/up Duplex Aorta done today  - C/w ASA/prasugrel/statin  - Per Vascular, AAA repair has been deferred due to juxtarenal location of AAA with need for FEVAR for repair, size of aneurysm and multiple medical problems. CTA on admission shows AAA similar size to prior study  - Follow up with Dr. Joaquin as outpatient on 10/30/23

## 2023-10-28 NOTE — H&P ADULT - NSHPPHYSICALEXAM_GEN_ALL_CORE
Vital Signs Last 24 Hrs  T(C): 36.9 (28 Oct 2023 05:10), Max: 36.9 (27 Oct 2023 18:18)  T(F): 98.4 (28 Oct 2023 05:10), Max: 98.5 (27 Oct 2023 21:15)  HR: 54 (28 Oct 2023 07:26) (54 - 75)  BP: 159/97 (28 Oct 2023 05:10) (152/82 - 174/81)  BP(mean): --  RR: 18 (28 Oct 2023 05:10) (17 - 20)  SpO2: 96% (28 Oct 2023 07:26) (96% - 100%)    Parameters below as of 28 Oct 2023 05:10  Patient On (Oxygen Delivery Method): BiPAP/CPAP      PHYSICAL EXAM:  GENERAL: NAD, well-groomed, thin, on NC 2 lts/min    HEAD:  Atraumatic, Normocephalic  EYES: EOMI, PERRLA, conjunctiva and sclera clear  ENMT: No tonsillar erythema, exudates, or enlargement; Moist mucous membranes, Good dentition, No lesions  NECK: Supple, No JVD, Normal thyroid  NERVOUS SYSTEM:  Alert & Oriented X3, Good concentration; Motor Strength 5/5 B/L upper and lower extremities; DTRs 2+ intact and symmetric  CHEST/LUNG: Clear to percussion bilaterally; No rales, rhonchi, wheezing, or rubs  HEART: Regular rate and rhythm; No murmurs, rubs, or gallops  ABDOMEN: Soft, Nontender, Nondistended; Bowel sounds present  EXTREMITIES:  2+ Peripheral Pulses, No clubbing, cyanosis, or edema  LYMPH: No lymphadenopathy noted  SKIN: No rashes or lesions Vital Signs Last 24 Hrs  T(C): 36.9 (28 Oct 2023 05:10), Max: 36.9 (27 Oct 2023 18:18)  T(F): 98.4 (28 Oct 2023 05:10), Max: 98.5 (27 Oct 2023 21:15)  HR: 54 (28 Oct 2023 07:26) (54 - 75)  BP: 159/97 (28 Oct 2023 05:10) (152/82 - 174/81)  BP(mean): --  RR: 18 (28 Oct 2023 05:10) (17 - 20)  SpO2: 96% (28 Oct 2023 07:26) (96% - 100%)    Parameters below as of 28 Oct 2023 05:10  Patient On (Oxygen Delivery Method): BiPAP/CPAP      PHYSICAL EXAM:  GENERAL: NAD, well-groomed, thin, on NC 2 lts/min  HEAD:  Atraumatic, Normocephalic  EYES: EOMI, PERRLA, conjunctiva and sclera clear  ENMT: No tonsillar erythema, exudates, or enlargement; Moist mucous membranes, Good dentition, No lesions  NECK: Supple, No JVD, Normal thyroid  NERVOUS SYSTEM:  Alert & Oriented X3, Good concentration; Motor Strength 5/5 B/L upper and lower extremities  CHEST/LUNG: Decreased AE and BS's B/L, no wheezing or rhonchi heard  HEART: Regular rate and rhythm; No murmurs, rubs, or gallops  ABDOMEN: Soft, Nontender, Nondistended; Bowel sounds present  EXTREMITIES:  2+ Peripheral Pulses, No clubbing, cyanosis, or edema  LYMPH: No lymphadenopathy noted  SKIN: No rashes or lesions

## 2023-10-28 NOTE — H&P ADULT - HISTORY OF PRESENT ILLNESS
71 year old male with PMHx of COPD on home O2 (2-3 L/NC), HTN, known AAA, PAD and alcohol use disorder complicated with liver cirrhosis (quit 1yr ago) who was told to come to ER for abnormal labs (elevated CO2 to 42 on recent labs). Patient overall feels well but c/o feeling tired most pronounced in past 1-2 weeks, baseline SOB unchanged, no dizziness or lightheadedness or headache. On/off productive cough (beige sputum). Denies chest pain. No recent fever or chills. No nausea or vomiting or abdominal pain. Normal BM's, last 2 days PTA. No urinary symptoms at present time but completed course of Abx for UTI recently (few days ago). Chronic LE pain. Seen by MICU (placed on BIPAP), Vascular and Cards in ER.      VSS in ER, NC 2 lts/min  Received IV solumedrol, duoneb, zithromax IV 71 year old male with PMHx of COPD on home O2 (2-3 L/NC), HTN, known AAA, PAD, hyperlipidemia and alcohol use disorder complicated with liver cirrhosis (quit 1yr ago) who was told to come to ER for abnormal labs (elevated CO2 to 42 on recent labs). Patient overall feels well but c/o feeling tired most pronounced in past 1-2 weeks, baseline SOB unchanged, no dizziness or lightheadedness or headache. On/off productive cough (beige sputum). Denies chest pain. No recent fever or chills. No nausea or vomiting or abdominal pain. Normal BM's, last 2 days PTA. No urinary symptoms at present time but completed course of Abx for UTI recently (few days ago). Chronic LE pain. Seen by MICU (placed on BIPAP), Vascular and Cards in ER.      VSS in ER, NC 2 lts/min  Received IV solumedrol, duoneb, zithromax IV

## 2023-10-28 NOTE — CONSULT NOTE ADULT - ATTENDING COMMENTS
68-year-old male history of COPD on home O2 2 L nasal cannula, hypertension, known AAA, PAD, alcohol abuse complicated with liver cirrhosis presenting for SOB. MICU consulted for new bipap in setting of acute on chronic hypercapnic respiratory failure likely in the setting of COPD exacerbation   patient currently on nasal cannula  would benefit from bipap 10/5, would repeat blood gas on bipap  steroids, nebs, azihtro  RVP  reconsult as needed

## 2023-10-28 NOTE — PHYSICAL THERAPY INITIAL EVALUATION ADULT - PREDICTED DURATION OF THERAPY (DAYS/WKS), PT EVAL
until discharge Tazorac Counseling:  Patient advised that medication is irritating and drying.  Patient may need to apply sparingly and wash off after an hour before eventually leaving it on overnight.  The patient verbalized understanding of the proper use and possible adverse effects of tazorac.  All of the patient's questions and concerns were addressed.

## 2023-10-28 NOTE — PATIENT PROFILE ADULT - DO YOU FEEL THREATENED BY OTHERS?
Spoke with pt and informed her Dr Elliott refilled her medication. Pt is scheduled for a follow up.   no

## 2023-10-28 NOTE — PATIENT PROFILE ADULT - FALL HARM RISK - HARM RISK INTERVENTIONS

## 2023-10-28 NOTE — H&P ADULT - ASSESSMENT
68-year-old male history of COPD on home O2 2 L nasal cannula, hypertension, known AAA, PAD, alcohol abuse complicated with liver cirrhosis presenting for increased dsypnea and SOB. MICU consulted for new bipap in setting of acute on chronic hypercapnic respiratory failure.     #Acute on Chronic Hypercapnic Respiratory Failure  Poor air flow throughout  pCO2 elevated to 73 with pH 7.35 on VBG   Patient likely to benefit from BIPAP; can start with settings 10/5   Concern for COPD exacerbation given increasing dyspnea, increased sputum and cough  Can consider coverage for CAP   Azithromycin for anti-inflammatory benefit     Patient not a MICU candidate at this time. Please reconsult as needed.     Case discussed with Dr. Coy.     Muriel Dave PGY2    68-year-old male history of COPD on home O2 2 L nasal cannula, hypertension, known AAA, PAD, alcohol abuse complicated with liver cirrhosis presenting for SOB. MICU consulted for new bipap in setting of acute on chronic hypercapnic respiratory failure likely in the setting of COPD exacerbation   patient currently on nasal cannula  would benefit from bipap 10/5, would repeat blood gas on bipap  steroids, nebs, azihtro  RVP  reconsult as needed .     Patient is critically ill, requiring critical care services.       71M PMHx COPD on home O2 2 L nasal cannula (~45pack year hx, quit 4 years ago), hypertension, known AAA - being followed by FREDRICK Lira, alcohol abuse complicated with liver cirrhosis, recently treated for a UTI, presents with COPD exacerbation. Vascular surgery consulted for new CT angio abd. Partially thrombosed infrarenal abdominal aortic aneurysm measures 5.0 x 5.8 x 12.3 cm (TRV x AP x CC), previously 5.3 x 4.8 x 11.5 cm (12/22)    PLAN  - No acute vascular surgical intervention  - Care per primary  - Final plan pending review of imaging    Discussed with Dr. Martinez on behalf of Dr. Jackson    1M PMHx COPD on home O2 2 L nasal cannula (~45pack year hx, quit 4 years ago), hypertension, known AAA - being followed by FREDRICK Lira, alcohol abuse complicated with liver cirrhosis, recently treated for a UTI, as per patient was referred to the emergency department by his primary care physician Dr. Remington Simms for abnormal labs. Patient reports exacerbation of his chronic low back pain. C/o b/l claudication from buttocks down at distances <50 feet. He has history of PAD s/p RLE bypass (?failed). Denies rest pain and poor wound healing. Denies abd pain, pulsatile mass, tearing pain.       Recs:  cardiac stable  dyspnea likely in setting of copd-e  avaps per pulm  vol status stable. diuretics prn to maintain euvolemic  obtain echo  resume gdmt for lv dysfunction  cw antiplatelets, statin and antianginals  f/u vascular surgery re partially thrombosed AAA  pulmonary consult  routine icd interrogation, can be done as op  will follow        Greater than 60 minutes spent on total encounter; more than 50% of the visit was spent counseling and/or coordinating care by the attending physician.   	  Vaughn Simms MD   Cardiovascular Diseases  [phone]     71 year old male with PMHx of COPD on home O2 (2-3 L/NC), HTN, known AAA, PAD, hyperlipidemia and alcohol use disorder complicated with liver cirrhosis (quit 1yr ago) who was told to come to ER for abnormal labs (elevated CO2 to 42 on recent labs), felt to be secondary to acute on chronic hypercapnic respiratory failure due to likely COPD exacerbation.

## 2023-10-28 NOTE — H&P ADULT - NSHPSOCIALHISTORY_GEN_ALL_CORE
Lives with wife  Quit smoking 4 1/2 years ago  Quit drinking one year ago  Occasional THC use  Uses a cane/walker as needed

## 2023-10-28 NOTE — PHYSICAL THERAPY INITIAL EVALUATION ADULT - NSPTDISCHREC_GEN_A_CORE
anticipated discharge to home with home PT services to address current functional limitations to optimize safety within the home environment and improve step negotiation/Home PT

## 2023-10-28 NOTE — H&P ADULT - NSHPREVIEWOFSYSTEMS_GEN_ALL_CORE
REVIEW OF SYSTEMS:    CONSTITUTIONAL: As HPI  EYES/ENT: No visual changes;  No vertigo or throat pain   NECK: No pain or stiffness  RESPIRATORY: As HPI  CARDIOVASCULAR: No chest pain or palpitations  GASTROINTESTINAL: No abdominal or epigastric pain. No nausea, vomiting, or hematemesis; No diarrhea or constipation. No melena or hematochezia.  GENITOURINARY: No dysuria, frequency or hematuria  NEUROLOGICAL: No numbness or weakness  SKIN: No itching, rashes

## 2023-10-28 NOTE — CONSULT NOTE ADULT - SUBJECTIVE AND OBJECTIVE BOX
CHIEF COMPLAINT: sob    HISTORY OF PRESENT ILLNESS:  71M PMHx COPD on home O2 2 L nasal cannula (~45pack year hx, quit 4 years ago), hypertension, known AAA - being followed by Dr. Joaquin, PAD, alcohol abuse complicated with liver cirrhosis, recently treated for a UTI, as per patient was referred to the emergency department by his primary care physician Dr. Remington Simms for abnormal labs. Patient reports exacerbation of his chronic low back pain. C/o b/l claudication from buttocks down at distances <50 feet. He has history of PAD s/p RLE bypass (?failed). Denies rest pain and poor wound healing. Denies abd pain, pulsatile mass, tearing pain.         Allergies    No Known Allergies    Intolerances    	    MEDICATIONS:  see med rec                PAST MEDICAL & SURGICAL HISTORY:  HTN (hypertension)      HLD (hyperlipidemia)      PAD (peripheral artery disease)      History of abdominal aortic aneurysm (AAA)      History of COPD      Gastritis      Hypothyroidism      No significant past surgical history          FAMILY HISTORY:      SOCIAL HISTORY:    see hpi      REVIEW OF SYSTEMS:  See HPI, otherwise complete 10 point review of systems negative    [ ] All others negative	      PHYSICAL EXAM:  T(C): 36.9 (10-28-23 @ 05:10), Max: 36.9 (10-27-23 @ 18:18)  HR: 75 (10-28-23 @ 05:10) (55 - 75)  BP: 159/97 (10-28-23 @ 05:10) (152/82 - 174/81)  RR: 18 (10-28-23 @ 05:10) (17 - 20)  SpO2: 98% (10-28-23 @ 05:10) (98% - 100%)  Wt(kg): --  I&O's Summary      Appearance: No Acute Distress	  HEENT:  Normal oral mucosa, PERRL, EOMI	  Cardiovascular: Normal S1 S2, No JVD, No murmurs/rubs/gallops  Respiratory: Lungs clear to auscultation bilaterally  Gastrointestinal:  Soft, Non-tender, + BS	  Skin: No rashes, No ecchymoses, No cyanosis	  Neurologic: Non-focal  Extremities: No clubbing, cyanosis or edema  Vascular: Peripheral pulses palpable 2+ bilaterally  Psychiatry: A & O x 3, Mood & affect appropriate    Laboratory Data:	 	    CBC Full  -  ( 27 Oct 2023 19:48 )  WBC Count : 11.23 K/uL  Hemoglobin : 12.0 g/dL  Hematocrit : 37.2 %  Platelet Count - Automated : 209 K/uL  Mean Cell Volume : 90.7 fL  Mean Cell Hemoglobin : 29.3 pg  Mean Cell Hemoglobin Concentration : 32.3 gm/dL  Auto Neutrophil # : x  Auto Lymphocyte # : x  Auto Monocyte # : x  Auto Eosinophil # : x  Auto Basophil # : x  Auto Neutrophil % : x  Auto Lymphocyte % : x  Auto Monocyte % : x  Auto Eosinophil % : x  Auto Basophil % : x    10-27    137  |  94<L>  |  10  ----------------------------<  101<H>  4.6   |  33<H>  |  0.49<L>    Ca    9.5      27 Oct 2023 19:48    TPro  7.5  /  Alb  4.3  /  TBili  0.6  /  DBili  x   /  AST  27  /  ALT  11  /  AlkPhos  100  10-27      proBNP:   Lipid Profile:   HgA1c:   TSH:       CARDIAC MARKERS:            Interpretation of Telemetry: 	    ECG:  	  RADIOLOGY:  OTHER: 	    PREVIOUS DIAGNOSTIC TESTING:    [ ] Echocardiogram:  [ ] Catheterization:  [ ] Stress Test:  	    Assessment:  71M PMHx COPD on home O2 2 L nasal cannula (~45pack year hx, quit 4 years ago), hypertension, known AAA - being followed by Dr. Joaquin, PAD, alcohol abuse complicated with liver cirrhosis, recently treated for a UTI, as per patient was referred to the emergency department by his primary care physician Dr. Remington Simms for abnormal labs. Patient reports exacerbation of his chronic low back pain. C/o b/l claudication from buttocks down at distances <50 feet. He has history of PAD s/p RLE bypass (?failed). Denies rest pain and poor wound healing. Denies abd pain, pulsatile mass, tearing pain.       Recs:  cardiac stable  dyspnea likely in setting of copd-e  avaps per pulm  vol status stable. diuretics prn to maintain euvolemic  obtain echo  resume gdmt for lv dysfunction  cw antiplatelets, statin and antianginals  f/u vascular surgery re partially thrombosed AAA  pulmonary consult  routine icd interrogation, can be done as op  will follow        Greater than 60 minutes spent on total encounter; more than 50% of the visit was spent counseling and/or coordinating care by the attending physician.   	  Vaughn Simsm MD   Cardiovascular Diseases  [phone]

## 2023-10-28 NOTE — PHYSICAL THERAPY INITIAL EVALUATION ADULT - GENERAL OBSERVATIONS, REHAB EVAL
Patient found semi-reclined in bed, NAD, A&Ox4, +2L NC, spO2 100% at rest, patient agreeable to participate in skilled PT evaluation

## 2023-10-28 NOTE — H&P ADULT - PROBLEM SELECTOR PLAN 3
On lisinopril and metoprolol as outpatient  - C/w home meds  - Seen by Cards, recs noted  - F/up TTE

## 2023-10-28 NOTE — CONSULT NOTE ADULT - ASSESSMENT
68-year-old male history of COPD on home O2 2 L nasal cannula, hypertension, known AAA, PAD, alcohol abuse complicated with liver cirrhosis presenting for increased dsypnea and SOB. MICU consulted for new bipap in setting of acute on chronic hypercapnic respiratory failure.     #Acute on Chronic Hypercapnic Respiratory Failure  Poor air flow throughout  pCO2 elevated to 73 with pH 7.35 on VBG   Patient likely to benefit from BIPAP; can start with settings 10/5   Concern for COPD exacerbation given increasing dyspnea, increased sputum and cough  Can consider coverage for CAP   Azithromycin for anti-inflammatory benefit     Patient not a MICU candidate at this time. Please reconsult as needed.     Case discussed with Dr. Coy.     Muriel Acosta PGY2

## 2023-10-28 NOTE — PHYSICAL THERAPY INITIAL EVALUATION ADULT - MANUAL MUSCLE TESTING RESULTS, REHAB EVAL
Patients bilateral upper extremity strength 3+/5, lower extremity strength grossly 3/5 upon MMT and functional assessments

## 2023-10-28 NOTE — H&P ADULT - NSHPLABSRESULTS_GEN_ALL_CORE
.  LABS:                         13.0   6.21  )-----------( 252      ( 28 Oct 2023 11:36 )             39.6     10-28    135  |  90<L>  |  13  ----------------------------<  198<H>  4.1   |  32<H>  |  0.47<L>    Ca    9.7      28 Oct 2023 11:36  Phos  3.3     10-28  Mg     1.80     10-28    TPro  7.5  /  Alb  4.3  /  TBili  0.6  /  DBili  x   /  AST  27  /  ALT  11  /  AlkPhos  100  10-27    PT/INR - ( 27 Oct 2023 19:48 )   PT: 10.9 sec;   INR: 0.96 ratio         PTT - ( 27 Oct 2023 19:48 )  PTT:33.0 sec  Urinalysis Basic - ( 28 Oct 2023 11:36 )    Color: x / Appearance: x / SG: x / pH: x  Gluc: 198 mg/dL / Ketone: x  / Bili: x / Urobili: x   Blood: x / Protein: x / Nitrite: x   Leuk Esterase: x / RBC: x / WBC x   Sq Epi: x / Non Sq Epi: x / Bacteria: x            RADIOLOGY, EKG & ADDITIONAL TESTS: Reviewed.     CTA chest Ao/Abd/pelvis:  LUNGS AND LARGE AIRWAYS: Small low density layering material in the   trachea, bilateral mainstem bronchi and bronchus intermedius. Emphysema.   Bilateral subcentimeter pulmonary nodules. A left upper lobe 4 mm nodule   (301:54) appears new when compared to prior exam.  PLEURA: No pleural effusion.  No aortic dissection.  Increased size of partially thrombosed infrarenal abdominal aortic   aneurysm measuring up to 5.8 cm in greatest axial dimension. Crescentic   area of mild relative hyperattenuation within the thrombus along the   anterior aspect of the aneurysm at the level of the kidneys suspicious   for hemorrhage/leakage into the thrombus. No active extravasation   identified    EKG: Reviewed by me, SR, HR 55, sinus bradycardia, no acute ST/T changes

## 2023-10-28 NOTE — PHYSICAL THERAPY INITIAL EVALUATION ADULT - PERTINENT HX OF CURRENT PROBLEM, REHAB EVAL
Patient is a 71 year old male with PMHx of COPD on home O2 (2-3 L/NC), HTN, known AAA, PAD, hyperlipidemia and alcohol use disorder complicated with liver cirrhosis (quit 1yr ago) who was told to come to ER for abnormal labs (elevated CO2 to 42 on recent labs). Patient overall feels well but complains of feeling tired most pronounced in past 1-2 weeks, baseline SOB unchanged, no dizziness or lightheadedness or headache. On/off productive cough (beige sputum). Denies chest pain. No recent fever or chills. No nausea or vomiting or abdominal pain. Normal BM's, last 2 days PTA. No urinary symptoms at present time but completed course of Abx for UTI recently. CTA on admission shows AAA similar size to prior study

## 2023-10-28 NOTE — H&P ADULT - TIME BILLING
Preparing to see the patient including review of tests and other providers' notes, confirming history with patient, performing medical examination and evaluation, counseling and educating the patient, ordering medications, tests and procedures, communicating with other health care professionals, documenting clinical information in the EMR, independently interpreting results and communicating results to the patient and care coordination.

## 2023-10-29 LAB
ANION GAP SERPL CALC-SCNC: 8 MMOL/L — SIGNIFICANT CHANGE UP (ref 7–14)
ANION GAP SERPL CALC-SCNC: 8 MMOL/L — SIGNIFICANT CHANGE UP (ref 7–14)
BASE EXCESS BLDV CALC-SCNC: 10.6 MMOL/L — HIGH (ref -2–3)
BASE EXCESS BLDV CALC-SCNC: 10.6 MMOL/L — HIGH (ref -2–3)
BUN SERPL-MCNC: 17 MG/DL — SIGNIFICANT CHANGE UP (ref 7–23)
BUN SERPL-MCNC: 17 MG/DL — SIGNIFICANT CHANGE UP (ref 7–23)
CA-I SERPL-SCNC: 1.25 MMOL/L — SIGNIFICANT CHANGE UP (ref 1.15–1.33)
CA-I SERPL-SCNC: 1.25 MMOL/L — SIGNIFICANT CHANGE UP (ref 1.15–1.33)
CALCIUM SERPL-MCNC: 9.3 MG/DL — SIGNIFICANT CHANGE UP (ref 8.4–10.5)
CALCIUM SERPL-MCNC: 9.3 MG/DL — SIGNIFICANT CHANGE UP (ref 8.4–10.5)
CHLORIDE BLDV-SCNC: 92 MMOL/L — LOW (ref 96–108)
CHLORIDE BLDV-SCNC: 92 MMOL/L — LOW (ref 96–108)
CHLORIDE SERPL-SCNC: 91 MMOL/L — LOW (ref 98–107)
CHLORIDE SERPL-SCNC: 91 MMOL/L — LOW (ref 98–107)
CO2 BLDV-SCNC: 40.7 MMOL/L — HIGH (ref 22–26)
CO2 BLDV-SCNC: 40.7 MMOL/L — HIGH (ref 22–26)
CO2 SERPL-SCNC: 33 MMOL/L — HIGH (ref 22–31)
CO2 SERPL-SCNC: 33 MMOL/L — HIGH (ref 22–31)
CREAT SERPL-MCNC: 0.58 MG/DL — SIGNIFICANT CHANGE UP (ref 0.5–1.3)
CREAT SERPL-MCNC: 0.58 MG/DL — SIGNIFICANT CHANGE UP (ref 0.5–1.3)
EGFR: 105 ML/MIN/1.73M2 — SIGNIFICANT CHANGE UP
EGFR: 105 ML/MIN/1.73M2 — SIGNIFICANT CHANGE UP
GAS PNL BLDV: 132 MMOL/L — LOW (ref 136–145)
GAS PNL BLDV: 132 MMOL/L — LOW (ref 136–145)
GAS PNL BLDV: SIGNIFICANT CHANGE UP
GAS PNL BLDV: SIGNIFICANT CHANGE UP
GLUCOSE BLDV-MCNC: 106 MG/DL — HIGH (ref 70–99)
GLUCOSE BLDV-MCNC: 106 MG/DL — HIGH (ref 70–99)
GLUCOSE SERPL-MCNC: 110 MG/DL — HIGH (ref 70–99)
GLUCOSE SERPL-MCNC: 110 MG/DL — HIGH (ref 70–99)
HCO3 BLDV-SCNC: 39 MMOL/L — HIGH (ref 22–29)
HCO3 BLDV-SCNC: 39 MMOL/L — HIGH (ref 22–29)
HCT VFR BLD CALC: 33.4 % — LOW (ref 39–50)
HCT VFR BLD CALC: 33.4 % — LOW (ref 39–50)
HCT VFR BLDA CALC: 34 % — LOW (ref 39–51)
HCT VFR BLDA CALC: 34 % — LOW (ref 39–51)
HGB BLD CALC-MCNC: 11.3 G/DL — LOW (ref 12.6–17.4)
HGB BLD CALC-MCNC: 11.3 G/DL — LOW (ref 12.6–17.4)
HGB BLD-MCNC: 11.1 G/DL — LOW (ref 13–17)
HGB BLD-MCNC: 11.1 G/DL — LOW (ref 13–17)
LACTATE BLDV-MCNC: 1.1 MMOL/L — SIGNIFICANT CHANGE UP (ref 0.5–2)
LACTATE BLDV-MCNC: 1.1 MMOL/L — SIGNIFICANT CHANGE UP (ref 0.5–2)
MAGNESIUM SERPL-MCNC: 2 MG/DL — SIGNIFICANT CHANGE UP (ref 1.6–2.6)
MAGNESIUM SERPL-MCNC: 2 MG/DL — SIGNIFICANT CHANGE UP (ref 1.6–2.6)
MCHC RBC-ENTMCNC: 29.3 PG — SIGNIFICANT CHANGE UP (ref 27–34)
MCHC RBC-ENTMCNC: 29.3 PG — SIGNIFICANT CHANGE UP (ref 27–34)
MCHC RBC-ENTMCNC: 33.2 GM/DL — SIGNIFICANT CHANGE UP (ref 32–36)
MCHC RBC-ENTMCNC: 33.2 GM/DL — SIGNIFICANT CHANGE UP (ref 32–36)
MCV RBC AUTO: 88.1 FL — SIGNIFICANT CHANGE UP (ref 80–100)
MCV RBC AUTO: 88.1 FL — SIGNIFICANT CHANGE UP (ref 80–100)
NRBC # BLD: 0 /100 WBCS — SIGNIFICANT CHANGE UP (ref 0–0)
NRBC # BLD: 0 /100 WBCS — SIGNIFICANT CHANGE UP (ref 0–0)
NRBC # FLD: 0 K/UL — SIGNIFICANT CHANGE UP (ref 0–0)
NRBC # FLD: 0 K/UL — SIGNIFICANT CHANGE UP (ref 0–0)
PCO2 BLDV: 70 MMHG — HIGH (ref 42–55)
PCO2 BLDV: 70 MMHG — HIGH (ref 42–55)
PH BLDV: 7.35 — SIGNIFICANT CHANGE UP (ref 7.32–7.43)
PH BLDV: 7.35 — SIGNIFICANT CHANGE UP (ref 7.32–7.43)
PHOSPHATE SERPL-MCNC: 2.8 MG/DL — SIGNIFICANT CHANGE UP (ref 2.5–4.5)
PHOSPHATE SERPL-MCNC: 2.8 MG/DL — SIGNIFICANT CHANGE UP (ref 2.5–4.5)
PLATELET # BLD AUTO: 236 K/UL — SIGNIFICANT CHANGE UP (ref 150–400)
PLATELET # BLD AUTO: 236 K/UL — SIGNIFICANT CHANGE UP (ref 150–400)
PO2 BLDV: 40 MMHG — SIGNIFICANT CHANGE UP (ref 25–45)
PO2 BLDV: 40 MMHG — SIGNIFICANT CHANGE UP (ref 25–45)
POTASSIUM BLDV-SCNC: 3.8 MMOL/L — SIGNIFICANT CHANGE UP (ref 3.5–5.1)
POTASSIUM BLDV-SCNC: 3.8 MMOL/L — SIGNIFICANT CHANGE UP (ref 3.5–5.1)
POTASSIUM SERPL-MCNC: 3.9 MMOL/L — SIGNIFICANT CHANGE UP (ref 3.5–5.3)
POTASSIUM SERPL-MCNC: 3.9 MMOL/L — SIGNIFICANT CHANGE UP (ref 3.5–5.3)
POTASSIUM SERPL-SCNC: 3.9 MMOL/L — SIGNIFICANT CHANGE UP (ref 3.5–5.3)
POTASSIUM SERPL-SCNC: 3.9 MMOL/L — SIGNIFICANT CHANGE UP (ref 3.5–5.3)
RBC # BLD: 3.79 M/UL — LOW (ref 4.2–5.8)
RBC # BLD: 3.79 M/UL — LOW (ref 4.2–5.8)
RBC # FLD: 15.5 % — HIGH (ref 10.3–14.5)
RBC # FLD: 15.5 % — HIGH (ref 10.3–14.5)
SAO2 % BLDV: 65 % — LOW (ref 67–88)
SAO2 % BLDV: 65 % — LOW (ref 67–88)
SODIUM SERPL-SCNC: 132 MMOL/L — LOW (ref 135–145)
SODIUM SERPL-SCNC: 132 MMOL/L — LOW (ref 135–145)
WBC # BLD: 16.43 K/UL — HIGH (ref 3.8–10.5)
WBC # BLD: 16.43 K/UL — HIGH (ref 3.8–10.5)
WBC # FLD AUTO: 16.43 K/UL — HIGH (ref 3.8–10.5)
WBC # FLD AUTO: 16.43 K/UL — HIGH (ref 3.8–10.5)

## 2023-10-29 RX ADMIN — Medication 50 MILLIGRAM(S): at 05:52

## 2023-10-29 RX ADMIN — Medication 3 MILLILITER(S): at 10:35

## 2023-10-29 RX ADMIN — Medication 3 MILLILITER(S): at 21:10

## 2023-10-29 RX ADMIN — Medication 81 MILLIGRAM(S): at 14:00

## 2023-10-29 RX ADMIN — AZITHROMYCIN 500 MILLIGRAM(S): 500 TABLET, FILM COATED ORAL at 05:52

## 2023-10-29 RX ADMIN — Medication 50 MILLIGRAM(S): at 20:12

## 2023-10-29 RX ADMIN — ENOXAPARIN SODIUM 40 MILLIGRAM(S): 100 INJECTION SUBCUTANEOUS at 20:12

## 2023-10-29 RX ADMIN — ATORVASTATIN CALCIUM 40 MILLIGRAM(S): 80 TABLET, FILM COATED ORAL at 21:53

## 2023-10-29 RX ADMIN — LISINOPRIL 20 MILLIGRAM(S): 2.5 TABLET ORAL at 05:53

## 2023-10-29 RX ADMIN — Medication 37.5 MICROGRAM(S): at 05:53

## 2023-10-29 RX ADMIN — Medication 40 MILLIGRAM(S): at 05:53

## 2023-10-29 RX ADMIN — BUDESONIDE AND FORMOTEROL FUMARATE DIHYDRATE 1 PUFF(S): 160; 4.5 AEROSOL RESPIRATORY (INHALATION) at 22:02

## 2023-10-29 RX ADMIN — Medication 3 MILLILITER(S): at 15:20

## 2023-10-29 RX ADMIN — Medication 3 MILLILITER(S): at 03:03

## 2023-10-29 RX ADMIN — PRASUGREL 10 MILLIGRAM(S): 5 TABLET, FILM COATED ORAL at 14:00

## 2023-10-29 NOTE — PROGRESS NOTE ADULT - ASSESSMENT
_________________________________________________________________________________________  ========>>  M E D I C A L   A T T E N D I N G    F O L L O W  U P  N O T E  <<=========  -----------------------------------------------------------------------------------------------------    - Patient seen and examined by me earlier today.   - In summary,  FRANCIS CHAUDHARY is a 68y year old man admitted with SOB  - Patient today overall doing ok, comfortable, eating OK. [id number] improved     ==================>> REVIEW OF SYSTEM <<=================    GEN: no fever, no chills, no pain  RESP: no significant SOB at rest on O2, occasional wheezing, no sputum  CVS: no chest pain, no palpitations  GI: no abdominal pain, no nausea, no constipation, no diarrhea  : no dysuria, no frequency  Neuro: no headache, no dizziness    ==================>> PHYSICAL EXAM <<=================    GEN: A&O X 3 , NAD , comfortable, pleasant, calm   HEENT: NCAT, PERRL, MMM, hearing intact., O2 via NC  CVS: S1S2 , regular , No M/R/G appreciated  PULM: decreased BS bilaterally, scattered mild rhonchi , no wheezing noted   ABD.: soft. non tender, non distended,  bowel sounds present  Extrem: intact pulses , no edema           ( Note written / Date of service 10-29-23 ( This is certified to be the same as "ENTERED" date above ( for billing purposes)))    ==================>> MEDICATIONS <<====================    MEDICATIONS  (STANDING):  albuterol/ipratropium for Nebulization 3 milliLiter(s) Nebulizer every 6 hours  aspirin enteric coated 81 milliGRAM(s) Oral daily  atorvastatin 40 milliGRAM(s) Oral at bedtime  azithromycin   Tablet 500 milliGRAM(s) Oral every 24 hours  budesonide  80 MICROgram(s)/formoterol 4.5 MICROgram(s) Inhaler 1 Puff(s) Inhalation two times a day  enoxaparin Injectable 40 milliGRAM(s) SubCutaneous every 24 hours  levothyroxine 37.5 MICROGram(s) Oral daily  lisinopril 20 milliGRAM(s) Oral daily  metoprolol tartrate 50 milliGRAM(s) Oral two times a day  prasugrel 10 milliGRAM(s) Oral daily  predniSONE   Tablet 40 milliGRAM(s) Oral daily    MEDICATIONS  (PRN):  acetaminophen     Tablet .. 650 milliGRAM(s) Oral every 6 hours PRN Temp greater or equal to 38C (100.4F), Mild Pain (1 - 3)    ___________  Active diet:  Diet, Regular:   DASH/TLC Sodium & Cholesterol Restricted (DASH)  ___________________    ==================>> VITAL SIGNS <<==================    T(C): 36.6 (10-29-23 @ 05:52), Max: 36.8 (10-28-23 @ 15:34)  HR: 105 (10-29-23 @ 11:03) (61 - 105)  BP: 151/85 (10-29-23 @ 05:52) (138/86 - 151/85)  RR: 18 (10-29-23 @ 05:52) (16 - 18)  SpO2: 100% (10-29-23 @ 11:03) (95% - 100%)     I&O's Summary    28 Oct 2023 07:01  -  29 Oct 2023 07:00  --------------------------------------------------------  IN: 480 mL / OUT: 626 mL / NET: -146 mL     ==================>> LAB AND IMAGING <<==================                        11.1   16.43 )-----------( 236      ( 29 Oct 2023 05:30 )             33.4        WBC count:   16.43 <<== ,  6.21 <<== ,  11.23 <<==  ( on steroids)     10-29    132<L>  |  91<L>  |  17  ----------------------------<  110<H>  3.9   |  33<H>  |  0.58    Sodium:   132  <==, 135  <==, 137  <==    Ca    9.3      29 Oct 2023 05:30  Phos  2.8     10-29  Mg     2.00     10-29    TPro  7.5  /  Alb  4.3  /  TBili  0.6  /  DBili  x   /  AST  27  /  ALT  11  /  AlkPhos  100  10-27    PT/INR - ( 27 Oct 2023 19:48 )   PT: 10.9 sec;   INR: 0.96 ratio    PTT - ( 27 Oct 2023 19:48 )  PTT:33.0 sec               Urinalysis Basic - ( 29 Oct 2023 05:30 )  Color: x / Appearance: x / SG: x / pH: x  Gluc: 110 mg/dL / Ketone: x  / Bili: x / Urobili: x   Blood: x / Protein: x / Nitrite: x   Leuk Esterase: x / RBC: x / WBC x   Sq Epi: x / Non Sq Epi: x / Bacteria: x    ___________________________________________________________________________________  ===============>>  A S S E S S M E N T   A N D   P L A N <<===============  ------------------------------------------------------------------------------------------    · Assessment	  71 year old male with PMHx of COPD on home O2 (2-3 L/NC), HTN, known AAA, PAD, hyperlipidemia and alcohol use disorder complicated with liver cirrhosis (quit 1yr ago) who was told to come to ER for abnormal labs (elevated CO2 to 42 on recent labs), felt to be secondary to acute on chronic hypercapnic respiratory failure due to likely COPD exacerbation.    Problem/Plan - 1:  ·  Problem: Acute on chronic respiratory failure with hypercapnia.   ·  Plan: Likely secondary to COPD exacerbation, pt has not seen Pulmonary as outpatient for a good while  - Seen by MICU given new BIPAP us, now off Bipap   - Being treated for likely COPD exacerbation  - C/w prednisone 40mg daily for 5 days, zithromax for 3 days and duonebs ATC  - Starting symbicort as well BID  - Repeat VBG with improved pCO2 to 61 (down from 73)  - Pulm consulted, to follow   - Has a left upper lobe 4 mm nodule, to be f/up as outpatient    Problem/Plan - 2:  ·  Problem: Hypertension.   ·  Plan: On lisinopril and metoprolol as outpatient  - C/w home meds  - Seen by Cards, recs noted  - F/up TTE.    Problem/Plan - 3:  ·  Problem: hyponatremia  diet changed  patient eating well  free fluid restriction ordered  monitor   renal if worsened     Problem/Plan - 4:  ·  Problem: AAA (abdominal aortic aneurysm).   - CTA Ao shows-Increased size of partially thrombosed infrarenal abdominal aortic aneurysm measuring up to 5.8 cm in greatest axial dimension. Crescentic area of mild relative hyperattenuation within the thrombus along the anterior aspect of the aneurysm at the level of the kidneys suspicious for hemorrhage/leakage into the thrombus. No active extravasation identified  - Seen by Vascular, no intervention at this time planned   - BP control   - C/w ASA/prasugrel/statin  - Follow up with Dr. Joaquin as outpatient on 10/30/23.  - Cardio to follow as well     Problem/Plan - 5:  ·  Problem: Hyperlipidemia.   ·  Plan: - C/w statin.    Problem/Plan - 6:  ·  Problem: Prophylactic measure.   ·  Plan: LMWH  PT evaluation.    --------------------------------------------  Case discussed with patient , pulm   Education given on findings and plan of care  ___________________________  H. JOJO Brown.  Pager: [phone]

## 2023-10-29 NOTE — PROGRESS NOTE ADULT - SUBJECTIVE AND OBJECTIVE BOX
SURGERY DAILY PROGRESS NOTE    SUBJECTIVE: Patient seen and examined on AM rounds. States he is currently comfortable but admits to constant bilateral leg pain described as "shooting and burning." Unsure what makes the pain better or worse. Denies chest pain, SOB, palpitations, HA, fever, chills, N/V.      OBJECTIVE:  Vital Signs Last 24 Hrs  T(C): 36.6 (29 Oct 2023 05:52), Max: 36.8 (28 Oct 2023 15:34)  T(F): 97.9 (29 Oct 2023 05:52), Max: 98.3 (28 Oct 2023 15:34)  HR: 61 (29 Oct 2023 05:52) (61 - 89)  BP: 151/85 (29 Oct 2023 05:52) (138/86 - 151/85)  BP(mean): --  RR: 18 (29 Oct 2023 05:52) (16 - 18)  SpO2: 98% (29 Oct 2023 05:52) (95% - 99%)    Parameters below as of 29 Oct 2023 05:52  Patient On (Oxygen Delivery Method): nasal cannula        I&O's Summary    28 Oct 2023 07:01  -  29 Oct 2023 07:00  --------------------------------------------------------  IN: 480 mL / OUT: 626 mL / NET: -146 mL        Physical Exam:  General Appearance: Appears well, NAD, A& O x 3  Neck: Supple  Chest: Equal expansion bilaterally, equal breath sounds  CV: Pulse regular presently  Abdomen: Softly distended, nontender  Vascular: Femoral pulse palpable bilaterally. R PT +ds, absent L DP/PT signals  Extremities: Grossly symmetric, WWP    LABS:                        11.1   16.43 )-----------( 236      ( 29 Oct 2023 05:30 )             33.4     10-29    132<L>  |  91<L>  |  17  ----------------------------<  110<H>  3.9   |  33<H>  |  0.58    Ca    9.3      29 Oct 2023 05:30  Phos  2.8     10-29  Mg     2.00     10-29    TPro  7.5  /  Alb  4.3  /  TBili  0.6  /  DBili  x   /  AST  27  /  ALT  11  /  AlkPhos  100  10-27    PT/INR - ( 27 Oct 2023 19:48 )   PT: 10.9 sec;   INR: 0.96 ratio         PTT - ( 27 Oct 2023 19:48 )  PTT:33.0 sec  Urinalysis Basic - ( 29 Oct 2023 05:30 )    Color: x / Appearance: x / SG: x / pH: x  Gluc: 110 mg/dL / Ketone: x  / Bili: x / Urobili: x   Blood: x / Protein: x / Nitrite: x   Leuk Esterase: x / RBC: x / WBC x   Sq Epi: x / Non Sq Epi: x / Bacteria: x        RADIOLOGY & ADDITIONAL STUDIES: SURGERY DAILY PROGRESS NOTE    SUBJECTIVE: Patient seen and examined on AM rounds. States he is currently comfortable but admits to constant bilateral leg pain described as "shooting and burning." Unsure what makes the pain better or worse. Denies chest pain, SOB, palpitations, HA, fever, chills, N/V.      OBJECTIVE:  Vital Signs Last 24 Hrs  T(C): 36.6 (29 Oct 2023 05:52), Max: 36.8 (28 Oct 2023 15:34)  T(F): 97.9 (29 Oct 2023 05:52), Max: 98.3 (28 Oct 2023 15:34)  HR: 61 (29 Oct 2023 05:52) (61 - 89)  BP: 151/85 (29 Oct 2023 05:52) (138/86 - 151/85)  BP(mean): --  RR: 18 (29 Oct 2023 05:52) (16 - 18)  SpO2: 98% (29 Oct 2023 05:52) (95% - 99%)    Parameters below as of 29 Oct 2023 05:52  Patient On (Oxygen Delivery Method): nasal cannula        I&O's Summary    28 Oct 2023 07:01  -  29 Oct 2023 07:00  --------------------------------------------------------  IN: 480 mL / OUT: 626 mL / NET: -146 mL        Physical Exam:  General Appearance: Appears well, NAD, A& O x 3  Neck: Supple  Chest: Equal expansion bilaterally, equal breath sounds  CV: Pulse regular presently  Abdomen: Softly distended, nontender  Vascular: Femoral pulse palpable bilaterally. R PT, L DP   Extremities: Grossly symmetric, WWP    LABS:                        11.1   16.43 )-----------( 236      ( 29 Oct 2023 05:30 )             33.4     10-29    132<L>  |  91<L>  |  17  ----------------------------<  110<H>  3.9   |  33<H>  |  0.58    Ca    9.3      29 Oct 2023 05:30  Phos  2.8     10-29  Mg     2.00     10-29    TPro  7.5  /  Alb  4.3  /  TBili  0.6  /  DBili  x   /  AST  27  /  ALT  11  /  AlkPhos  100  10-27    PT/INR - ( 27 Oct 2023 19:48 )   PT: 10.9 sec;   INR: 0.96 ratio         PTT - ( 27 Oct 2023 19:48 )  PTT:33.0 sec  Urinalysis Basic - ( 29 Oct 2023 05:30 )    Color: x / Appearance: x / SG: x / pH: x  Gluc: 110 mg/dL / Ketone: x  / Bili: x / Urobili: x   Blood: x / Protein: x / Nitrite: x   Leuk Esterase: x / RBC: x / WBC x   Sq Epi: x / Non Sq Epi: x / Bacteria: x        RADIOLOGY & ADDITIONAL STUDIES:

## 2023-10-29 NOTE — PROGRESS NOTE ADULT - ASSESSMENT
71M PMHx COPD on home O2 2 L nasal cannula (~45pack year hx, quit 4 years ago), hypertension, known AAA - being followed by Dr. Joaquin, PAD, alcohol abuse complicated with liver cirrhosis, recently treated for a UTI, presents with COPD exacerbation. Vascular surgery consulted for new CT angio abd. Partially thrombosed infrarenal abdominal aortic aneurysm measures 5.0 x 5.8 x 12.3 cm (TRV x AP x CC), previously 5.3 x 4.8 x 11.5 cm (12/22). Aneurysm sac measuring 5.6cm on duplex. Abdomen soft, patient hemodynamically stable. Patient's reported leg pain more consistent with neurogenic pain, no signs concerning for LE ischemia on exam.     PLAN  - No acute vascular surgical intervention  - Final plan pending review of imaging with attending  - Care per primary    Vascular Surgery  [id number]

## 2023-10-30 LAB
ANION GAP SERPL CALC-SCNC: 8 MMOL/L — SIGNIFICANT CHANGE UP (ref 7–14)
ANION GAP SERPL CALC-SCNC: 8 MMOL/L — SIGNIFICANT CHANGE UP (ref 7–14)
BASE EXCESS BLDV CALC-SCNC: 12.1 MMOL/L — HIGH (ref -2–3)
BASE EXCESS BLDV CALC-SCNC: 12.1 MMOL/L — HIGH (ref -2–3)
BUN SERPL-MCNC: 21 MG/DL — SIGNIFICANT CHANGE UP (ref 7–23)
BUN SERPL-MCNC: 21 MG/DL — SIGNIFICANT CHANGE UP (ref 7–23)
CA-I SERPL-SCNC: 1.27 MMOL/L — SIGNIFICANT CHANGE UP (ref 1.15–1.33)
CA-I SERPL-SCNC: 1.27 MMOL/L — SIGNIFICANT CHANGE UP (ref 1.15–1.33)
CALCIUM SERPL-MCNC: 9.4 MG/DL — SIGNIFICANT CHANGE UP (ref 8.4–10.5)
CALCIUM SERPL-MCNC: 9.4 MG/DL — SIGNIFICANT CHANGE UP (ref 8.4–10.5)
CHLORIDE BLDV-SCNC: 98 MMOL/L — SIGNIFICANT CHANGE UP (ref 96–108)
CHLORIDE BLDV-SCNC: 98 MMOL/L — SIGNIFICANT CHANGE UP (ref 96–108)
CHLORIDE SERPL-SCNC: 98 MMOL/L — SIGNIFICANT CHANGE UP (ref 98–107)
CHLORIDE SERPL-SCNC: 98 MMOL/L — SIGNIFICANT CHANGE UP (ref 98–107)
CO2 BLDV-SCNC: 42.5 MMOL/L — HIGH (ref 22–26)
CO2 BLDV-SCNC: 42.5 MMOL/L — HIGH (ref 22–26)
CO2 SERPL-SCNC: 35 MMOL/L — HIGH (ref 22–31)
CO2 SERPL-SCNC: 35 MMOL/L — HIGH (ref 22–31)
CREAT SERPL-MCNC: 0.67 MG/DL — SIGNIFICANT CHANGE UP (ref 0.5–1.3)
CREAT SERPL-MCNC: 0.67 MG/DL — SIGNIFICANT CHANGE UP (ref 0.5–1.3)
EGFR: 100 ML/MIN/1.73M2 — SIGNIFICANT CHANGE UP
EGFR: 100 ML/MIN/1.73M2 — SIGNIFICANT CHANGE UP
GAS PNL BLDV: 135 MMOL/L — LOW (ref 136–145)
GAS PNL BLDV: 135 MMOL/L — LOW (ref 136–145)
GAS PNL BLDV: SIGNIFICANT CHANGE UP
GAS PNL BLDV: SIGNIFICANT CHANGE UP
GLUCOSE BLDV-MCNC: 92 MG/DL — SIGNIFICANT CHANGE UP (ref 70–99)
GLUCOSE BLDV-MCNC: 92 MG/DL — SIGNIFICANT CHANGE UP (ref 70–99)
GLUCOSE SERPL-MCNC: 96 MG/DL — SIGNIFICANT CHANGE UP (ref 70–99)
GLUCOSE SERPL-MCNC: 96 MG/DL — SIGNIFICANT CHANGE UP (ref 70–99)
HCO3 BLDV-SCNC: 40 MMOL/L — HIGH (ref 22–29)
HCO3 BLDV-SCNC: 40 MMOL/L — HIGH (ref 22–29)
HCT VFR BLD CALC: 33.9 % — LOW (ref 39–50)
HCT VFR BLD CALC: 33.9 % — LOW (ref 39–50)
HCT VFR BLDA CALC: 33 % — LOW (ref 39–51)
HCT VFR BLDA CALC: 33 % — LOW (ref 39–51)
HGB BLD CALC-MCNC: 11.1 G/DL — LOW (ref 12.6–17.4)
HGB BLD CALC-MCNC: 11.1 G/DL — LOW (ref 12.6–17.4)
HGB BLD-MCNC: 10.9 G/DL — LOW (ref 13–17)
HGB BLD-MCNC: 10.9 G/DL — LOW (ref 13–17)
LACTATE BLDV-MCNC: 0.8 MMOL/L — SIGNIFICANT CHANGE UP (ref 0.5–2)
LACTATE BLDV-MCNC: 0.8 MMOL/L — SIGNIFICANT CHANGE UP (ref 0.5–2)
MAGNESIUM SERPL-MCNC: 1.9 MG/DL — SIGNIFICANT CHANGE UP (ref 1.6–2.6)
MAGNESIUM SERPL-MCNC: 1.9 MG/DL — SIGNIFICANT CHANGE UP (ref 1.6–2.6)
MCHC RBC-ENTMCNC: 29.4 PG — SIGNIFICANT CHANGE UP (ref 27–34)
MCHC RBC-ENTMCNC: 29.4 PG — SIGNIFICANT CHANGE UP (ref 27–34)
MCHC RBC-ENTMCNC: 32.2 GM/DL — SIGNIFICANT CHANGE UP (ref 32–36)
MCHC RBC-ENTMCNC: 32.2 GM/DL — SIGNIFICANT CHANGE UP (ref 32–36)
MCV RBC AUTO: 91.4 FL — SIGNIFICANT CHANGE UP (ref 80–100)
MCV RBC AUTO: 91.4 FL — SIGNIFICANT CHANGE UP (ref 80–100)
NRBC # BLD: 0 /100 WBCS — SIGNIFICANT CHANGE UP (ref 0–0)
NRBC # BLD: 0 /100 WBCS — SIGNIFICANT CHANGE UP (ref 0–0)
NRBC # FLD: 0 K/UL — SIGNIFICANT CHANGE UP (ref 0–0)
NRBC # FLD: 0 K/UL — SIGNIFICANT CHANGE UP (ref 0–0)
PCO2 BLDV: 73 MMHG — HIGH (ref 42–55)
PCO2 BLDV: 73 MMHG — HIGH (ref 42–55)
PH BLDV: 7.35 — SIGNIFICANT CHANGE UP (ref 7.32–7.43)
PH BLDV: 7.35 — SIGNIFICANT CHANGE UP (ref 7.32–7.43)
PHOSPHATE SERPL-MCNC: 3.1 MG/DL — SIGNIFICANT CHANGE UP (ref 2.5–4.5)
PHOSPHATE SERPL-MCNC: 3.1 MG/DL — SIGNIFICANT CHANGE UP (ref 2.5–4.5)
PLATELET # BLD AUTO: 233 K/UL — SIGNIFICANT CHANGE UP (ref 150–400)
PLATELET # BLD AUTO: 233 K/UL — SIGNIFICANT CHANGE UP (ref 150–400)
PO2 BLDV: 29 MMHG — SIGNIFICANT CHANGE UP (ref 25–45)
PO2 BLDV: 29 MMHG — SIGNIFICANT CHANGE UP (ref 25–45)
POTASSIUM BLDV-SCNC: 4.7 MMOL/L — SIGNIFICANT CHANGE UP (ref 3.5–5.1)
POTASSIUM BLDV-SCNC: 4.7 MMOL/L — SIGNIFICANT CHANGE UP (ref 3.5–5.1)
POTASSIUM SERPL-MCNC: 4.6 MMOL/L — SIGNIFICANT CHANGE UP (ref 3.5–5.3)
POTASSIUM SERPL-MCNC: 4.6 MMOL/L — SIGNIFICANT CHANGE UP (ref 3.5–5.3)
POTASSIUM SERPL-SCNC: 4.6 MMOL/L — SIGNIFICANT CHANGE UP (ref 3.5–5.3)
POTASSIUM SERPL-SCNC: 4.6 MMOL/L — SIGNIFICANT CHANGE UP (ref 3.5–5.3)
RBC # BLD: 3.71 M/UL — LOW (ref 4.2–5.8)
RBC # BLD: 3.71 M/UL — LOW (ref 4.2–5.8)
RBC # FLD: 15.9 % — HIGH (ref 10.3–14.5)
RBC # FLD: 15.9 % — HIGH (ref 10.3–14.5)
SAO2 % BLDV: 39.2 % — LOW (ref 67–88)
SAO2 % BLDV: 39.2 % — LOW (ref 67–88)
SODIUM SERPL-SCNC: 141 MMOL/L — SIGNIFICANT CHANGE UP (ref 135–145)
SODIUM SERPL-SCNC: 141 MMOL/L — SIGNIFICANT CHANGE UP (ref 135–145)
WBC # BLD: 12.36 K/UL — HIGH (ref 3.8–10.5)
WBC # BLD: 12.36 K/UL — HIGH (ref 3.8–10.5)
WBC # FLD AUTO: 12.36 K/UL — HIGH (ref 3.8–10.5)
WBC # FLD AUTO: 12.36 K/UL — HIGH (ref 3.8–10.5)

## 2023-10-30 PROCEDURE — 99233 SBSQ HOSP IP/OBS HIGH 50: CPT

## 2023-10-30 PROCEDURE — 93306 TTE W/DOPPLER COMPLETE: CPT | Mod: 26

## 2023-10-30 RX ADMIN — Medication 81 MILLIGRAM(S): at 12:52

## 2023-10-30 RX ADMIN — AZITHROMYCIN 500 MILLIGRAM(S): 500 TABLET, FILM COATED ORAL at 06:15

## 2023-10-30 RX ADMIN — Medication 50 MILLIGRAM(S): at 10:09

## 2023-10-30 RX ADMIN — ENOXAPARIN SODIUM 40 MILLIGRAM(S): 100 INJECTION SUBCUTANEOUS at 18:59

## 2023-10-30 RX ADMIN — Medication 40 MILLIGRAM(S): at 06:14

## 2023-10-30 RX ADMIN — Medication 37.5 MICROGRAM(S): at 05:17

## 2023-10-30 RX ADMIN — Medication 3 MILLILITER(S): at 10:25

## 2023-10-30 RX ADMIN — Medication 50 MILLIGRAM(S): at 21:16

## 2023-10-30 RX ADMIN — ATORVASTATIN CALCIUM 40 MILLIGRAM(S): 80 TABLET, FILM COATED ORAL at 23:50

## 2023-10-30 RX ADMIN — LISINOPRIL 20 MILLIGRAM(S): 2.5 TABLET ORAL at 06:13

## 2023-10-30 RX ADMIN — BUDESONIDE AND FORMOTEROL FUMARATE DIHYDRATE 1 PUFF(S): 160; 4.5 AEROSOL RESPIRATORY (INHALATION) at 21:16

## 2023-10-30 RX ADMIN — Medication 3 MILLILITER(S): at 03:34

## 2023-10-30 RX ADMIN — BUDESONIDE AND FORMOTEROL FUMARATE DIHYDRATE 1 PUFF(S): 160; 4.5 AEROSOL RESPIRATORY (INHALATION) at 10:10

## 2023-10-30 RX ADMIN — Medication 3 MILLILITER(S): at 16:24

## 2023-10-30 RX ADMIN — Medication 3 MILLILITER(S): at 21:28

## 2023-10-30 NOTE — PROGRESS NOTE ADULT - ASSESSMENT
_________________________________________________________________________________________  ========>>  M E D I C A L   A T T E N D I N G    F O L L O W  U P  N O T E  <<=========  -----------------------------------------------------------------------------------------------------    - Patient seen and examined by me earlier today.   - In summary,  FRANCIS CHAUDHARY is a 68y year old man admitted with SOB  - Patient today overall doing ok, comfortable, eating OK. breathing improved     ==================>> REVIEW OF SYSTEM <<=================    GEN: no fever, no chills, no pain  RESP: no significant SOB at rest on O2, no sputum  CVS: no chest pain, no palpitations  GI: no abdominal pain, no nausea  : no dysuria, no frequency  Neuro: no headache, no dizziness    ==================>> PHYSICAL EXAM <<=================    GEN: A&O X 3 , NAD , comfortable, pleasant, calm   HEENT: NCAT, PERRL, MMM, hearing intact., O2 via NC  CVS: S1S2 , regular , No M/R/G appreciated  PULM: decreased BS bilaterally, no wheezing noted   ABD.: soft. non tender, non distended,  bowel sounds present  Extrem: intact pulses , no edema        ( Note written / Date of service 10-30-23 ( This is certified to be the same as "ENTERED" date above ( for billing purposes)))    ==================>> MEDICATIONS <<====================    albuterol/ipratropium for Nebulization 3 milliLiter(s) Nebulizer every 6 hours  aspirin enteric coated 81 milliGRAM(s) Oral daily  atorvastatin 40 milliGRAM(s) Oral at bedtime  budesonide  80 MICROgram(s)/formoterol 4.5 MICROgram(s) Inhaler 1 Puff(s) Inhalation two times a day  enoxaparin Injectable 40 milliGRAM(s) SubCutaneous every 24 hours  levothyroxine 37.5 MICROGram(s) Oral daily  lisinopril 20 milliGRAM(s) Oral daily  metoprolol tartrate 50 milliGRAM(s) Oral two times a day  prasugrel 10 milliGRAM(s) Oral daily  predniSONE   Tablet 40 milliGRAM(s) Oral daily    MEDICATIONS  (PRN):  acetaminophen     Tablet .. 650 milliGRAM(s) Oral every 6 hours PRN Temp greater or equal to 38C (100.4F), Mild Pain (1 - 3)    ___________  Active diet:  Diet, Regular:   1200mL Fluid Restriction ([id number])  ___________________    ==================>> VITAL SIGNS <<==================    Vital Signs Last 24 HrsT(C): 36.6 (10-30-23 @ 10:37)  T(F): 97.8 (10-30-23 @ 10:37), Max: 98.5 (10-29-23 @ 18:00)  HR: 72 (10-30-23 @ 11:02) (54 - 101)  BP: 115/73 (10-30-23 @ 10:37)  RR: 19 (10-30-23 @ 10:37) (17 - 19)  SpO2: 93% (10-30-23 @ 11:02) (93% - 100%)      ==================>> LAB AND IMAGING <<==================                        10.9   12.36 )-----------( 233      ( 30 Oct 2023 02:45 )             33.9        10-30    141  |  98  |  21  ----------------------------<  96  4.6   |  35<H>  |  0.67    Ca    9.4      30 Oct 2023 02:45  Phos  3.1     10-30  Mg     1.90     10-30    WBC count:   12.36 <<== ,  16.43 <<== ,  6.21 <<== ,  11.23 <<==   Hemoglobin:   10.9 <<==,  11.1 <<==,  13.0 <<==,  12.0 <<==  platelets:  233 <==, 236 <==, 252 <==, 209 <==    Creatinine:  0.67  <<==, 0.58  <<==, 0.47  <<==, 0.49  <<==  Sodium:   141  <==, 132  <==, 135  <==, 137  <==    ___________________________________________________________________________________  ===============>>  A S S E S S M E N T   A N D   P L A N <<===============  ------------------------------------------------------------------------------------------    · Assessment	  71 year old male with PMHx of COPD on home O2 (2-3 L/NC), HTN, known AAA, PAD, hyperlipidemia and alcohol use disorder complicated with liver cirrhosis (quit 1yr ago) who was told to come to ER for abnormal labs (elevated CO2 to 42 on recent labs), felt to be secondary to acute on chronic hypercapnic respiratory failure due to likely COPD exacerbation.    Problem/Plan - 1:  ·  Problem: Acute on chronic respiratory failure with hypercapnia.   ·  Plan: Likely secondary to COPD exacerbation, pt has not seen Pulmonary as outpatient for a good while  - Being treated for likely COPD exacerbation  - C/w prednisone 40mg daily for 5 days, zithromax for 3 days and duonebs ATC  - Starting symbicort as well BID  - Pulm consulted, to follow   - Has a left upper lobe 4 mm nodule, to be f/up as outpatient    Problem/Plan - 2:  ·  Problem: Hypertension.   ·  Plan: On lisinopril and metoprolol as outpatient  - C/w home meds  - Seen by Cards, recs noted  - F/up TTE.    Problem/Plan - 3:  ·  Problem: hyponatremia >> improved  diet changed  patient eating well  free fluid restriction   monitor   renal if worsened     Problem/Plan - 4:  ·  Problem: AAA (abdominal aortic aneurysm).   - CTA Ao shows-Increased size of partially thrombosed infrarenal abdominal aortic aneurysm measuring up to 5.8 cm in greatest axial dimension. Crescentic area of mild relative hyperattenuation within the thrombus along the anterior aspect of the aneurysm at the level of the kidneys suspicious for hemorrhage/leakage into the thrombus. No active extravasation identified  - Seen by Vascular, no intervention at this time planned   - BP control   - C/w ASA/prasugrel/statin  - Follow up with Dr. Joaquin as outpatient on 10/30/23.  - Cardio on board      Problem/Plan - 5:  ·  Problem: Hyperlipidemia.   ·  Plan: - C/w statin.    Problem/Plan - 6:  ·  Problem: Prophylactic measure.   ·  Plan: LMWH  PT evaluation.    --------------------------------------------  Case discussed with patient   Education given on findings and plan of care  ___________________________  H. JJOO Brown.  Pager: [phone]

## 2023-10-30 NOTE — PROGRESS NOTE ADULT - ASSESSMENT
71M PMHx COPD on home O2 2 L nasal cannula (~45pack year hx, quit 4 years ago), hypertension, known AAA - being followed by Dr. Joaquin, PAD, alcohol abuse complicated with liver cirrhosis, recently treated for a UTI, presents with COPD exacerbation. Vascular surgery consulted for new CT angio abd. Partially thrombosed infrarenal abdominal aortic aneurysm measures 5.0 x 5.8 x 12.3 cm (TRV x AP x CC), previously 5.3 x 4.8 x 11.5 cm (12/22). Aneurysm sac measuring 5.6cm on duplex. Abdomen soft, patient hemodynamically stable. Patient's reported leg pain more consistent with neurogenic pain, no signs concerning for LE ischemia on exam.     PLAN  - No acute vascular surgical intervention  - No evidence of mesenteric ischemia   - Care per primary  - May follow up with Dr. Joaquin outpatient   - Re-page with any questions or concerns     Vascular Surgery  [id number]

## 2023-10-30 NOTE — PROGRESS NOTE ADULT - SUBJECTIVE AND OBJECTIVE BOX
PULMONARY PROGRESS NOTE    FRANCIS CHAUDHARY  MRN-[id number]    Patient is a 68y old  Male who presents with a chief complaint of Told to come for abnormal labs (30 Oct 2023 12:27)      HPI:  -67 yo male OPD on home O2 (2-3 L/NC), HTN, known AAA, PAD, hyperlipidemia and alcohol use disorder complicated with liver cirrhosis (quit 1yr ago) who was told to come to ER for abnormal labs (elevated CO2 to 42 on recent labs)  -    ROS:   -    ACTIVE MEDICATION LIST:  MEDICATIONS  (STANDING):  albuterol/ipratropium for Nebulization 3 milliLiter(s) Nebulizer every 6 hours  aspirin enteric coated 81 milliGRAM(s) Oral daily  atorvastatin 40 milliGRAM(s) Oral at bedtime  budesonide  80 MICROgram(s)/formoterol 4.5 MICROgram(s) Inhaler 1 Puff(s) Inhalation two times a day  enoxaparin Injectable 40 milliGRAM(s) SubCutaneous every 24 hours  levothyroxine 37.5 MICROGram(s) Oral daily  lisinopril 20 milliGRAM(s) Oral daily  metoprolol tartrate 50 milliGRAM(s) Oral two times a day  prasugrel 10 milliGRAM(s) Oral daily  predniSONE   Tablet 40 milliGRAM(s) Oral daily    MEDICATIONS  (PRN):  acetaminophen     Tablet .. 650 milliGRAM(s) Oral every 6 hours PRN Temp greater or equal to 38C (100.4F), Mild Pain (1 - 3)      EXAM:  Vital Signs Last 24 Hrs  T(C): 36.6 (30 Oct 2023 14:10), Max: 36.9 (29 Oct 2023 18:00)  T(F): 97.8 (30 Oct 2023 14:10), Max: 98.5 (29 Oct 2023 18:00)  HR: 57 (30 Oct 2023 14:10) (54 - 101)  BP: 118/70 (30 Oct 2023 14:10) (115/73 - 139/74)  BP(mean): --  RR: 18 (30 Oct 2023 14:10) (17 - 19)  SpO2: 98% (30 Oct 2023 14:10) (93% - 100%)    Parameters below as of 30 Oct 2023 14:10  Patient On (Oxygen Delivery Method): nasal cannula  O2 Flow (L/min): 3      GENERAL: The patient is awake and alert in no apparent distress.     LUNGS: Clear to auscultation without wheezing, rales or rhonchi; respirations unlabored    HEART: Regular rate and rhythm without murmur.                            10.9   12.36 )-----------( 233      ( 30 Oct 2023 02:45 )             33.9       10-30    141  |  98  |  21  ----------------------------<  96  4.6   |  35<H>  |  0.67    Ca    9.4      30 Oct 2023 02:45  Phos  3.1     10-30  Mg     1.90     10-30      ACC: [id number] EXAM:  CT ANGIO ABD PELV (W)AW IC   ORDERED BY: JOSÉ MANUEL FRANK     ACC: [id number] EXAM:  CT ANGIO CHEST AORTA WAWIC   ORDERED BY: HOANG SMITH     PROCEDURE DATE:  10/27/2023          INTERPRETATION:  INDICATION: Abdominal aortic aneurysm.    COMPARISON: CTA chest abdomen pelvis 12/15/2022.    CONTRAST/COMPLICATIONS:  IV Contrast: Omnipaque 350. 95 cc administered. 5 cc discarded.  Oral Contrast: None.  Complications: None reported at time of study completion.    PROCEDURE:  CT Angiography of the Chest, Abdomen and Pelvis.  Precontrast imaging was performed through the chest followed by arterial   phase imaging of the chest, abdomen and pelvis.  Sagittal and coronal reformats were performed as well as 3D (MIP)   reconstructions.    FINDINGS:  CHEST:  LUNGS AND LARGE AIRWAYS: Small low density layering material in the   trachea, bilateral mainstem bronchi and bronchus intermedius. Emphysema.   Bilateral subcentimeter pulmonary nodules. A left upper lobe 4 mm nodule   (301:54) appears new when compared to prior exam.  PLEURA: No pleural effusion.  VESSELS: No thoracic aortic dissection, aneurysm or intramural hematoma.   Aortic and coronary artery calcifications.  HEART: Heart size is normal. No pericardial effusion. AICD lead   terminates in the right ventricle.  MEDIASTINUM AND ALANNA: No lymphadenopathy.  CHEST WALL AND LOWER NECK: Left anterior chest wall AICD.    ABDOMEN AND PELVIS:  LIVER: Within normal limits.  BILE DUCTS: Normal caliber.  GALLBLADDER: Cholelithiasis.  SPLEEN: Within normal limits.  PANCREAS: Within normal limits.  ADRENALS: Similar indeterminate attenuation right adrenal nodule. Left   adrenal gland is normal.  KIDNEYS/URETERS: Symmetric enhancement. No hydronephrosis. 4 mm   nonobstructing right renal calculus. Left renal cortical   scarring/postsurgical change. Left renal cyst.    BLADDER: Within normal limits.  REPRODUCTIVE ORGANS: Prostate is enlarged.    BOWEL: No bowel obstruction. Appendix is normal.  PERITONEUM: No ascites.    VESSELS: Partially thrombosed infrarenal abdominal aortic aneurysm   measures 5.0 x 5.8 x 12.3 cm (TRV x AP x CC), previously 5.3 x 4.8 x 11.5   cm. Crescentic area of relative hyperattenuation within the thrombus   along the anterior aspect of the aneurysm at the level of the kidneys   (example 301, 153), suspicious for hemorrhage/leakage into thrombus. No   active extravasation identified. Similar ectasia of the proximal common   iliac arteries, up to 1.5 cm on the right. Intact right femoral bypass   graft. Atherosclerotic changes. No abdominal aortic dissection.  RETROPERITONEUM/LYMPH NODES: No lymphadenopathy.  ABDOMINAL WALL: Within normal limits.  BONES: Degenerative changes.    IMPRESSION:  No aortic dissection.    Increased size of partially thrombosed infrarenal abdominal aortic   aneurysm measuring up to 5.8 cm in greatest axial dimension. Crescentic   area of mild relative hyperattenuation within the thrombus along the   anterior aspect of the aneurysm at the level of the kidneys suspicious   for hemorrhage/leakage into the thrombus. No active extravasation   identified. Vascular surgery consult is recommended.    --- End of Report ---          TAYLOR GARNER MD; Resident Radiology  This document has been electronicallysigned.  MU OLSON MD; Attending Radiologist  This document has been electronically signed. Oct 28 2023  1:05AM  PROBLEM LIST:  68y Male with HEALTH ISSUES - PROBLEM Dx:  Acute on chronic respiratory failure with hypercapnia    COPD exacerbation    Hypertension    AAA (abdominal aortic aneurysm)    Prophylactic measure    Hyperlipidemia          RECS:        Please call with any questions.    Paris Warren, DO  [facility] Pulmonary/Sleep Medicine  [phone]   PULMONARY PROGRESS NOTE    FRANCIS CHAUDHARY  MRN-[id number]    Patient is a 68y old  Male who presents with a chief complaint of Told to come for abnormal labs (30 Oct 2023 12:27)      HPI:  -67 yo male COPD on home O2 (2-3 L/NC), HTN, known AAA, PAD, hyperlipidemia and alcohol use disorder complicated with liver cirrhosis (quit 1yr ago) , former smoker quit abotu 10 years ago with > 40 pack year, has nots een pulm in a few years  who was told to come to ER for abnormal labs (elevated CO2 to 42 on recent labs)    on 2L 98-99%. feels better.   no bpap or cpap machien at home. no history of emerson  no recent pfts  on prn isaac outpatient  on 2l at baseline at home and then would go up prior to movement      ROS:   -    ACTIVE MEDICATION LIST:  MEDICATIONS  (STANDING):  albuterol/ipratropium for Nebulization 3 milliLiter(s) Nebulizer every 6 hours  aspirin enteric coated 81 milliGRAM(s) Oral daily  atorvastatin 40 milliGRAM(s) Oral at bedtime  budesonide  80 MICROgram(s)/formoterol 4.5 MICROgram(s) Inhaler 1 Puff(s) Inhalation two times a day  enoxaparin Injectable 40 milliGRAM(s) SubCutaneous every 24 hours  levothyroxine 37.5 MICROGram(s) Oral daily  lisinopril 20 milliGRAM(s) Oral daily  metoprolol tartrate 50 milliGRAM(s) Oral two times a day  prasugrel 10 milliGRAM(s) Oral daily  predniSONE   Tablet 40 milliGRAM(s) Oral daily    MEDICATIONS  (PRN):  acetaminophen     Tablet .. 650 milliGRAM(s) Oral every 6 hours PRN Temp greater or equal to 38C (100.4F), Mild Pain (1 - 3)      EXAM:  Vital Signs Last 24 Hrs  T(C): 36.6 (30 Oct 2023 14:10), Max: 36.9 (29 Oct 2023 18:00)  T(F): 97.8 (30 Oct 2023 14:10), Max: 98.5 (29 Oct 2023 18:00)  HR: 57 (30 Oct 2023 14:10) (54 - 101)  BP: 118/70 (30 Oct 2023 14:10) (115/73 - 139/74)  BP(mean): --  RR: 18 (30 Oct 2023 14:10) (17 - 19)  SpO2: 98% (30 Oct 2023 14:10) (93% - 100%)    Parameters below as of 30 Oct 2023 14:10  Patient On (Oxygen Delivery Method): nasal cannula  O2 Flow (L/min): 3      GENERAL: The patient is awake and alert in no apparent distress.     LUNGS:  not labored  diminished b/l                          10.9   12.36 )-----------( 233      ( 30 Oct 2023 02:45 )             33.9       10-30    141  |  98  |  21  ----------------------------<  96  4.6   |  35<H>  |  0.67    Ca    9.4      30 Oct 2023 02:45  Phos  3.1     10-30  Mg     1.90     10-30      ACC: [id number] EXAM:  CT ANGIO ABD PELV (W)AW IC   ORDERED BY: JOSÉ MANUEL FRANK     ACC: [id number] EXAM:  CT ANGIO CHEST AORTA WAWIC   ORDERED BY: HOANG SMITH     PROCEDURE DATE:  10/27/2023          INTERPRETATION:  INDICATION: Abdominal aortic aneurysm.    COMPARISON: CTA chest abdomen pelvis 12/15/2022.    CONTRAST/COMPLICATIONS:  IV Contrast: Omnipaque 350. 95 cc administered. 5 cc discarded.  Oral Contrast: None.  Complications: None reported at time of study completion.    PROCEDURE:  CT Angiography of the Chest, Abdomen and Pelvis.  Precontrast imaging was performed through the chest followed by arterial   phase imaging of the chest, abdomen and pelvis.  Sagittal and coronal reformats were performed as well as 3D (MIP)   reconstructions.    FINDINGS:  CHEST:  LUNGS AND LARGE AIRWAYS: Small low density layering material in the   trachea, bilateral mainstem bronchi and bronchus intermedius. Emphysema.   Bilateral subcentimeter pulmonary nodules. A left upper lobe 4 mm nodule   (301:54) appears new when compared to prior exam.  PLEURA: No pleural effusion.  VESSELS: No thoracic aortic dissection, aneurysm or intramural hematoma.   Aortic and coronary artery calcifications.  HEART: Heart size is normal. No pericardial effusion. AICD lead   terminates in the right ventricle.  MEDIASTINUM AND ALANNA: No lymphadenopathy.  CHEST WALL AND LOWER NECK: Left anterior chest wall AICD.    ABDOMEN AND PELVIS:  LIVER: Within normal limits.  BILE DUCTS: Normal caliber.  GALLBLADDER: Cholelithiasis.  SPLEEN: Within normal limits.  PANCREAS: Within normal limits.  ADRENALS: Similar indeterminate attenuation right adrenal nodule. Left   adrenal gland is normal.  KIDNEYS/URETERS: Symmetric enhancement. No hydronephrosis. 4 mm   nonobstructing right renal calculus. Left renal cortical   scarring/postsurgical change. Left renal cyst.    BLADDER: Within normal limits.  REPRODUCTIVE ORGANS: Prostate is enlarged.    BOWEL: No bowel obstruction. Appendix is normal.  PERITONEUM: No ascites.    VESSELS: Partially thrombosed infrarenal abdominal aortic aneurysm   measures 5.0 x 5.8 x 12.3 cm (TRV x AP x CC), previously 5.3 x 4.8 x 11.5   cm. Crescentic area of relative hyperattenuation within the thrombus   along the anterior aspect of the aneurysm at the level of the kidneys   (example 301, 153), suspicious for hemorrhage/leakage into thrombus. No   active extravasation identified. Similar ectasia of the proximal common   iliac arteries, up to 1.5 cm on the right. Intact right femoral bypass   graft. Atherosclerotic changes. No abdominal aortic dissection.  RETROPERITONEUM/LYMPH NODES: No lymphadenopathy.  ABDOMINAL WALL: Within normal limits.  BONES: Degenerative changes.    IMPRESSION:  No aortic dissection.    Increased size of partially thrombosed infrarenal abdominal aortic   aneurysm measuring up to 5.8 cm in greatest axial dimension. Crescentic   area of mild relative hyperattenuation within the thrombus along the   anterior aspect of the aneurysm at the level of the kidneys suspicious   for hemorrhage/leakage into the thrombus. No active extravasation   identified. Vascular surgery consult is recommended.    --- End of Report ---          TAYLOR GARNER MD; Resident Radiology  This document has been electronicallysigned.  MU OLSON MD; Attending Radiologist  This document has been electronically signed. Oct 28 2023  1:05AM  PROBLEM LIST:  68y Male with HEALTH ISSUES - PROBLEM Dx:  Acute on chronic respiratory failure with hypercapnia    COPD exacerbation    Hypertension    AAA (abdominal aortic aneurysm)    Prophylactic measure    Hyperlipidemia          RECS:  try NIV tonight- repeat vbg tomorrow  speak to case management/  about NIV auth prior to dc  he needs outpatient pfts  he needs close f/u on the pulm nodule givensmoking history  can taper 02 to 1L when resting  avoid hyperoxemia  finish prednisone X 5 days  change symbicort to symbicor 160 BID  start spiriva 2.5  albuterol PRN    patient has chronic hypercarbic resp failure due to copd/ emphysema    needs pulm eval outpatient  gave him our office info         Please call with any questions.    Paris Warren DO  [facility] Pulmonary/Sleep Medicine  [phone]

## 2023-10-30 NOTE — PROGRESS NOTE ADULT - SUBJECTIVE AND OBJECTIVE BOX
Cardiovascular Disease Progress Note    Overnight events: No acute events overnight.  no new cardiac sx  Otherwise review of systems negative    Objective Findings:  T(C): 36.8 (10-30-23 @ 06:12), Max: 36.9 (10-29-23 @ 18:00)  HR: 87 (10-30-23 @ 06:12) (54 - 105)  BP: 139/74 (10-30-23 @ 06:12) (122/67 - 139/74)  RR: 19 (10-30-23 @ 06:12) (17 - 19)  SpO2: 96% (10-30-23 @ 06:12) (96% - 100%)  Wt(kg): --  Daily     Daily       Physical Exam:  Gen: NAD  HEENT: EOMI  CV: RRR, normal S1 + S2, no m/r/g  Lungs: CTAB  Abd: soft, non-tender  Ext: No edema    Telemetry:    Laboratory Data:                        10.9   12.36 )-----------( 233      ( 30 Oct 2023 02:45 )             33.9     10-30    141  |  98  |  21  ----------------------------<  96  4.6   |  35<H>  |  0.67    Ca    9.4      30 Oct 2023 02:45  Phos  3.1     10-30  Mg     1.90     10-30                Inpatient Medications:  MEDICATIONS  (STANDING):  albuterol/ipratropium for Nebulization 3 milliLiter(s) Nebulizer every 6 hours  aspirin enteric coated 81 milliGRAM(s) Oral daily  atorvastatin 40 milliGRAM(s) Oral at bedtime  budesonide  80 MICROgram(s)/formoterol 4.5 MICROgram(s) Inhaler 1 Puff(s) Inhalation two times a day  enoxaparin Injectable 40 milliGRAM(s) SubCutaneous every 24 hours  levothyroxine 37.5 MICROGram(s) Oral daily  lisinopril 20 milliGRAM(s) Oral daily  metoprolol tartrate 50 milliGRAM(s) Oral two times a day  prasugrel 10 milliGRAM(s) Oral daily  predniSONE   Tablet 40 milliGRAM(s) Oral daily      Assessment:  71M PMHx COPD on home O2 2 L nasal cannula (~45pack year hx, quit 4 years ago), hypertension, known AAA - being followed by Dr. Joaquin, PAD, alcohol abuse complicated with liver cirrhosis, recently treated for a UTI, as per patient was referred to the emergency department by his primary care physician Dr. Remington Simms for abnormal labs. Patient reports exacerbation of his chronic low back pain. C/o b/l claudication from buttocks down at distances <50 feet. He has history of PAD s/p RLE bypass (?failed). Denies rest pain and poor wound healing. Denies abd pain, pulsatile mass, tearing pain.       Recs:  cardiac stable  dyspnea likely in setting of copd-e  avaps per pulm  vol status stable. diuretics prn to maintain euvolemic  obtain echo  cw gdmt for lv dysfunction  cw antiplatelets, statin and antianginals  f/u vascular surgery re partially thrombosed AAA  pulmonary consult  routine icd interrogation, can be done as op  will follow          Over 25 minutes spent on total encounter; more than 50% of the visit was spent counseling and/or coordinating care by the attending physician.      Vaughn Simms MD   Cardiovascular Disease  [phone]

## 2023-10-30 NOTE — PROGRESS NOTE ADULT - SUBJECTIVE AND OBJECTIVE BOX
Surgery Progress Note    SUBJECTIVE: Pt seen and examined at bedside. Patient comfortable and in no-apparent distress. No nausea, vomiting, diarrhea. Pain is controlled. +Gas/+BM. Tolerating diet.    Vital Signs Last 24 Hrs  T(C): 36.8 (30 Oct 2023 06:12), Max: 36.9 (29 Oct 2023 18:00)  T(F): 98.3 (30 Oct 2023 06:12), Max: 98.5 (29 Oct 2023 18:00)  HR: 87 (30 Oct 2023 06:12) (54 - 105)  BP: 139/74 (30 Oct 2023 06:12) (122/67 - 139/74)  BP(mean): --  RR: 19 (30 Oct 2023 06:12) (17 - 19)  SpO2: 96% (30 Oct 2023 06:12) (96% - 100%)    Parameters below as of 30 Oct 2023 06:12  Patient On (Oxygen Delivery Method): nasal cannula  O2 Flow (L/min): 3      Physical Exam:  Physical Exam:  General Appearance: Appears well, NAD, A& O x 3  Neck: Supple  Chest: Equal expansion bilaterally, equal breath sounds  CV: Pulse regular presently  Abdomen: Softly distended, nontender  Vascular: Femoral pulse palpable bilaterally. R PT, L DP   Extremities: Grossly symmetric, WWP        LABS:                        10.9   12.36 )-----------( 233      ( 30 Oct 2023 02:45 )             33.9     10-30    141  |  98  |  21  ----------------------------<  96  4.6   |  35<H>  |  0.67    Ca    9.4      30 Oct 2023 02:45  Phos  3.1     10-30  Mg     1.90     10-30        Urinalysis Basic - ( 30 Oct 2023 02:45 )    Color: x / Appearance: x / SG: x / pH: x  Gluc: 96 mg/dL / Ketone: x  / Bili: x / Urobili: x   Blood: x / Protein: x / Nitrite: x   Leuk Esterase: x / RBC: x / WBC x   Sq Epi: x / Non Sq Epi: x / Bacteria: x        INs and OUTs:    10-29-23 @ 07:01  -  10-30-23 @ 07:00  --------------------------------------------------------  IN: 1060 mL / OUT: 800 mL / NET: 260 mL

## 2023-10-31 LAB
ANION GAP SERPL CALC-SCNC: 7 MMOL/L — SIGNIFICANT CHANGE UP (ref 7–14)
ANION GAP SERPL CALC-SCNC: 7 MMOL/L — SIGNIFICANT CHANGE UP (ref 7–14)
BASE EXCESS BLDV CALC-SCNC: 9.3 MMOL/L — HIGH (ref -2–3)
BASE EXCESS BLDV CALC-SCNC: 9.3 MMOL/L — HIGH (ref -2–3)
BLOOD GAS VENOUS COMPREHENSIVE RESULT: SIGNIFICANT CHANGE UP
BLOOD GAS VENOUS COMPREHENSIVE RESULT: SIGNIFICANT CHANGE UP
BUN SERPL-MCNC: 16 MG/DL — SIGNIFICANT CHANGE UP (ref 7–23)
BUN SERPL-MCNC: 16 MG/DL — SIGNIFICANT CHANGE UP (ref 7–23)
CALCIUM SERPL-MCNC: 9.2 MG/DL — SIGNIFICANT CHANGE UP (ref 8.4–10.5)
CALCIUM SERPL-MCNC: 9.2 MG/DL — SIGNIFICANT CHANGE UP (ref 8.4–10.5)
CHLORIDE BLDV-SCNC: 98 MMOL/L — SIGNIFICANT CHANGE UP (ref 96–108)
CHLORIDE BLDV-SCNC: 98 MMOL/L — SIGNIFICANT CHANGE UP (ref 96–108)
CHLORIDE SERPL-SCNC: 99 MMOL/L — SIGNIFICANT CHANGE UP (ref 98–107)
CHLORIDE SERPL-SCNC: 99 MMOL/L — SIGNIFICANT CHANGE UP (ref 98–107)
CO2 BLDV-SCNC: 37.7 MMOL/L — HIGH (ref 22–26)
CO2 BLDV-SCNC: 37.7 MMOL/L — HIGH (ref 22–26)
CO2 SERPL-SCNC: 32 MMOL/L — HIGH (ref 22–31)
CO2 SERPL-SCNC: 32 MMOL/L — HIGH (ref 22–31)
CREAT SERPL-MCNC: 0.54 MG/DL — SIGNIFICANT CHANGE UP (ref 0.5–1.3)
CREAT SERPL-MCNC: 0.54 MG/DL — SIGNIFICANT CHANGE UP (ref 0.5–1.3)
EGFR: 107 ML/MIN/1.73M2 — SIGNIFICANT CHANGE UP
EGFR: 107 ML/MIN/1.73M2 — SIGNIFICANT CHANGE UP
GAS PNL BLDV: 134 MMOL/L — LOW (ref 136–145)
GAS PNL BLDV: 134 MMOL/L — LOW (ref 136–145)
GLUCOSE BLDV-MCNC: 95 MG/DL — SIGNIFICANT CHANGE UP (ref 70–99)
GLUCOSE BLDV-MCNC: 95 MG/DL — SIGNIFICANT CHANGE UP (ref 70–99)
GLUCOSE SERPL-MCNC: 102 MG/DL — HIGH (ref 70–99)
GLUCOSE SERPL-MCNC: 102 MG/DL — HIGH (ref 70–99)
HCO3 BLDV-SCNC: 36 MMOL/L — HIGH (ref 22–29)
HCO3 BLDV-SCNC: 36 MMOL/L — HIGH (ref 22–29)
HCT VFR BLD CALC: 35.5 % — LOW (ref 39–50)
HCT VFR BLD CALC: 35.5 % — LOW (ref 39–50)
HCT VFR BLDA CALC: 34 % — LOW (ref 39–51)
HCT VFR BLDA CALC: 34 % — LOW (ref 39–51)
HGB BLD CALC-MCNC: 11.4 G/DL — LOW (ref 12.6–17.4)
HGB BLD CALC-MCNC: 11.4 G/DL — LOW (ref 12.6–17.4)
HGB BLD-MCNC: 11.5 G/DL — LOW (ref 13–17)
HGB BLD-MCNC: 11.5 G/DL — LOW (ref 13–17)
LACTATE BLDV-MCNC: 0.7 MMOL/L — SIGNIFICANT CHANGE UP (ref 0.5–2)
LACTATE BLDV-MCNC: 0.7 MMOL/L — SIGNIFICANT CHANGE UP (ref 0.5–2)
MAGNESIUM SERPL-MCNC: 1.8 MG/DL — SIGNIFICANT CHANGE UP (ref 1.6–2.6)
MAGNESIUM SERPL-MCNC: 1.8 MG/DL — SIGNIFICANT CHANGE UP (ref 1.6–2.6)
MCHC RBC-ENTMCNC: 29.6 PG — SIGNIFICANT CHANGE UP (ref 27–34)
MCHC RBC-ENTMCNC: 29.6 PG — SIGNIFICANT CHANGE UP (ref 27–34)
MCHC RBC-ENTMCNC: 32.4 GM/DL — SIGNIFICANT CHANGE UP (ref 32–36)
MCHC RBC-ENTMCNC: 32.4 GM/DL — SIGNIFICANT CHANGE UP (ref 32–36)
MCV RBC AUTO: 91.5 FL — SIGNIFICANT CHANGE UP (ref 80–100)
MCV RBC AUTO: 91.5 FL — SIGNIFICANT CHANGE UP (ref 80–100)
NRBC # BLD: 0 /100 WBCS — SIGNIFICANT CHANGE UP (ref 0–0)
NRBC # BLD: 0 /100 WBCS — SIGNIFICANT CHANGE UP (ref 0–0)
NRBC # FLD: 0 K/UL — SIGNIFICANT CHANGE UP (ref 0–0)
NRBC # FLD: 0 K/UL — SIGNIFICANT CHANGE UP (ref 0–0)
PCO2 BLDV: 58 MMHG — HIGH (ref 42–55)
PCO2 BLDV: 58 MMHG — HIGH (ref 42–55)
PH BLDV: 7.4 — SIGNIFICANT CHANGE UP (ref 7.32–7.43)
PH BLDV: 7.4 — SIGNIFICANT CHANGE UP (ref 7.32–7.43)
PHOSPHATE SERPL-MCNC: 2.8 MG/DL — SIGNIFICANT CHANGE UP (ref 2.5–4.5)
PHOSPHATE SERPL-MCNC: 2.8 MG/DL — SIGNIFICANT CHANGE UP (ref 2.5–4.5)
PLATELET # BLD AUTO: 229 K/UL — SIGNIFICANT CHANGE UP (ref 150–400)
PLATELET # BLD AUTO: 229 K/UL — SIGNIFICANT CHANGE UP (ref 150–400)
PO2 BLDV: 91 MMHG — HIGH (ref 25–45)
PO2 BLDV: 91 MMHG — HIGH (ref 25–45)
POTASSIUM BLDV-SCNC: 3.2 MMOL/L — LOW (ref 3.5–5.1)
POTASSIUM BLDV-SCNC: 3.2 MMOL/L — LOW (ref 3.5–5.1)
POTASSIUM SERPL-MCNC: 3.4 MMOL/L — LOW (ref 3.5–5.3)
POTASSIUM SERPL-MCNC: 3.4 MMOL/L — LOW (ref 3.5–5.3)
POTASSIUM SERPL-SCNC: 3.4 MMOL/L — LOW (ref 3.5–5.3)
POTASSIUM SERPL-SCNC: 3.4 MMOL/L — LOW (ref 3.5–5.3)
RBC # BLD: 3.88 M/UL — LOW (ref 4.2–5.8)
RBC # BLD: 3.88 M/UL — LOW (ref 4.2–5.8)
RBC # FLD: 15.8 % — HIGH (ref 10.3–14.5)
RBC # FLD: 15.8 % — HIGH (ref 10.3–14.5)
SAO2 % BLDV: 98.1 % — HIGH (ref 67–88)
SAO2 % BLDV: 98.1 % — HIGH (ref 67–88)
SODIUM SERPL-SCNC: 138 MMOL/L — SIGNIFICANT CHANGE UP (ref 135–145)
SODIUM SERPL-SCNC: 138 MMOL/L — SIGNIFICANT CHANGE UP (ref 135–145)
WBC # BLD: 13.43 K/UL — HIGH (ref 3.8–10.5)
WBC # BLD: 13.43 K/UL — HIGH (ref 3.8–10.5)
WBC # FLD AUTO: 13.43 K/UL — HIGH (ref 3.8–10.5)
WBC # FLD AUTO: 13.43 K/UL — HIGH (ref 3.8–10.5)

## 2023-10-31 RX ORDER — POLYETHYLENE GLYCOL 3350 17 G/17G
17 POWDER, FOR SOLUTION ORAL ONCE
Refills: 0 | Status: COMPLETED | OUTPATIENT
Start: 2023-10-31 | End: 2023-10-31

## 2023-10-31 RX ORDER — TIOTROPIUM BROMIDE 18 UG/1
2 CAPSULE ORAL; RESPIRATORY (INHALATION) DAILY
Refills: 0 | Status: DISCONTINUED | OUTPATIENT
Start: 2023-10-31 | End: 2023-11-20

## 2023-10-31 RX ORDER — POTASSIUM CHLORIDE 20 MEQ
40 PACKET (EA) ORAL ONCE
Refills: 0 | Status: COMPLETED | OUTPATIENT
Start: 2023-10-31 | End: 2023-10-31

## 2023-10-31 RX ORDER — ALBUTEROL 90 UG/1
2 AEROSOL, METERED ORAL EVERY 6 HOURS
Refills: 0 | Status: DISCONTINUED | OUTPATIENT
Start: 2023-10-31 | End: 2023-11-20

## 2023-10-31 RX ORDER — BUDESONIDE AND FORMOTEROL FUMARATE DIHYDRATE 160; 4.5 UG/1; UG/1
2 AEROSOL RESPIRATORY (INHALATION)
Refills: 0 | Status: DISCONTINUED | OUTPATIENT
Start: 2023-10-31 | End: 2023-11-20

## 2023-10-31 RX ADMIN — Medication 37.5 MICROGRAM(S): at 05:09

## 2023-10-31 RX ADMIN — Medication 81 MILLIGRAM(S): at 09:03

## 2023-10-31 RX ADMIN — PRASUGREL 10 MILLIGRAM(S): 5 TABLET, FILM COATED ORAL at 08:59

## 2023-10-31 RX ADMIN — BUDESONIDE AND FORMOTEROL FUMARATE DIHYDRATE 2 PUFF(S): 160; 4.5 AEROSOL RESPIRATORY (INHALATION) at 09:00

## 2023-10-31 RX ADMIN — BUDESONIDE AND FORMOTEROL FUMARATE DIHYDRATE 2 PUFF(S): 160; 4.5 AEROSOL RESPIRATORY (INHALATION) at 22:40

## 2023-10-31 RX ADMIN — Medication 3 MILLILITER(S): at 03:55

## 2023-10-31 RX ADMIN — Medication 40 MILLIGRAM(S): at 06:13

## 2023-10-31 RX ADMIN — TIOTROPIUM BROMIDE 2 PUFF(S): 18 CAPSULE ORAL; RESPIRATORY (INHALATION) at 19:03

## 2023-10-31 RX ADMIN — Medication 50 MILLIGRAM(S): at 09:09

## 2023-10-31 RX ADMIN — ENOXAPARIN SODIUM 40 MILLIGRAM(S): 100 INJECTION SUBCUTANEOUS at 18:51

## 2023-10-31 RX ADMIN — ATORVASTATIN CALCIUM 40 MILLIGRAM(S): 80 TABLET, FILM COATED ORAL at 22:43

## 2023-10-31 RX ADMIN — POLYETHYLENE GLYCOL 3350 17 GRAM(S): 17 POWDER, FOR SOLUTION ORAL at 06:14

## 2023-10-31 RX ADMIN — Medication 40 MILLIEQUIVALENT(S): at 19:03

## 2023-10-31 RX ADMIN — LISINOPRIL 20 MILLIGRAM(S): 2.5 TABLET ORAL at 06:08

## 2023-10-31 RX ADMIN — Medication 50 MILLIGRAM(S): at 22:44

## 2023-10-31 NOTE — PROGRESS NOTE ADULT - SUBJECTIVE AND OBJECTIVE BOX
Cardiovascular Disease Progress Note    Overnight events: No acute events overnight.  no new cardiac sx  Otherwise review of systems negative    Objective Findings:  T(C): 36.4 (10-31-23 @ 05:09), Max: 36.6 (10-30-23 @ 10:37)  HR: 57 (10-31-23 @ 05:09) (49 - 74)  BP: 143/76 (10-31-23 @ 05:09) (115/73 - 143/76)  RR: 19 (10-31-23 @ 05:09) (18 - 19)  SpO2: 100% (10-31-23 @ 05:09) (93% - 100%)  Wt(kg): --  Daily Height in cm: 172.72 (31 Oct 2023 05:09)    Daily       Physical Exam:  Gen: NAD  HEENT: EOMI  CV: RRR, normal S1 + S2, no m/r/g  Lungs: CTAB  Abd: soft, non-tender  Ext: No edema    Telemetry:    Laboratory Data:                        10.9   12.36 )-----------( 233      ( 30 Oct 2023 02:45 )             33.9     10-30    141  |  98  |  21  ----------------------------<  96  4.6   |  35<H>  |  0.67    Ca    9.4      30 Oct 2023 02:45  Phos  3.1     10-30  Mg     1.90     10-30                Inpatient Medications:  MEDICATIONS  (STANDING):  aspirin enteric coated 81 milliGRAM(s) Oral daily  atorvastatin 40 milliGRAM(s) Oral at bedtime  budesonide 160 MICROgram(s)/formoterol 4.5 MICROgram(s) Inhaler 2 Puff(s) Inhalation two times a day  enoxaparin Injectable 40 milliGRAM(s) SubCutaneous every 24 hours  levothyroxine 37.5 MICROGram(s) Oral daily  lisinopril 20 milliGRAM(s) Oral daily  metoprolol tartrate 50 milliGRAM(s) Oral two times a day  prasugrel 10 milliGRAM(s) Oral daily  predniSONE   Tablet 40 milliGRAM(s) Oral daily  tiotropium 2.5 MICROgram(s) Inhaler 2 Puff(s) Inhalation daily     1. Left ventricular wall thickness is normal. Left ventricular systolic function is mildly decreased with an ejection fraction of 49 % by Vazquez's method of disks. Regional wall motion abnormalities present.   2. There is hypokinesis of the apical and apical septal walls.   3. Normal right ventricular cavity size and systolic function.   4. The aortic valve appears trileaflet with reduced systolic excursion. There is mild to moderate aortic stenosis. The peak transaortic velocity is 2.58 m/s, peak transaortic gradient is 26.6 mmHg and mean transaortic gradient is 12.0 mmHg with an LVOT/aortic valve VTI ratio of 0.45. The aortic valve area is estimated at 1.41 cm² by the continuity equation. There is trace aortic regurgitation.   5. Normal atria.   6. The inferior vena cava is normal in size measuring 1.62 cm in diameter, (normal <2.1cm) with normal inspiratory collapse (normal >50%) consistent with normal right atrial pressure (~3, range 0-5mmHg).   7. No pericardial effusion seen.      Assessment:  71M PMHx COPD on home O2 2 L nasal cannula (~45pack year hx, quit 4 years ago), hypertension, known AAA - being followed by Dr. Joaquin, PAD, alcohol abuse complicated with liver cirrhosis, recently treated for a UTI, as per patient was referred to the emergency department by his primary care physician Dr. Remington Simms for abnormal labs. Patient reports exacerbation of his chronic low back pain. C/o b/l claudication from buttocks down at distances <50 feet. He has history of PAD s/p RLE bypass (?failed). Denies rest pain and poor wound healing. Denies abd pain, pulsatile mass, tearing pain.       Recs:  cardiac stable  dyspnea likely in setting of copd-e  avaps per pulm  vol status stable. diuretics prn to maintain euvolemic  echo mild lv dysfunction, normal rv fxn, mild-mod as  cw gdmt for lv dysfunction  cw antiplatelets, statin and antianginals  f/u vascular surgery re partially thrombosed AAA  pulmonary consult  routine icd interrogation, can be done as op  will follow        Over 25 minutes spent on total encounter; more than 50% of the visit was spent counseling and/or coordinating care by the attending physician.      Vaughn Simms MD   Cardiovascular Disease  [phone]

## 2023-10-31 NOTE — PROGRESS NOTE ADULT - ASSESSMENT
_________________________________________________________________________________________  ========>>  M E D I C A L   A T T E N D I N G    F O L L O W  U P  N O T E  <<=========  -----------------------------------------------------------------------------------------------------    - Patient seen and examined by me earlier today.   - In summary,  FRANCIS CHAUDHARY is a 68y year old man admitted with SOB  - Patient today overall doing ok, comfortable, eating OK. breathing improved     ==================>> REVIEW OF SYSTEM <<=================    GEN: no fever, no chills, no pain  RESP: no significant SOB at rest on O2, no sputum  CVS: no chest pain, no palpitations  GI: no abdominal pain, no nausea  : no dysuria, no frequency  Neuro: no headache, no dizziness    ==================>> PHYSICAL EXAM <<=================    GEN: A&O X 3 , NAD , comfortable, pleasant, calm   HEENT: NCAT, PERRL, MMM, hearing intact., O2 via NC  CVS: S1S2 , regular , No M/R/G appreciated  PULM: decreased BS bilaterally, no wheezing noted   ABD.: soft. non tender, non distended,  bowel sounds present  Extrem: intact pulses , no edema         ( Note written / Date of service 10-31-23 ( This is certified to be the same as "ENTERED" date above ( for billing purposes)))    ==================>> MEDICATIONS <<====================    aspirin enteric coated 81 milliGRAM(s) Oral daily  atorvastatin 40 milliGRAM(s) Oral at bedtime  budesonide 160 MICROgram(s)/formoterol 4.5 MICROgram(s) Inhaler 2 Puff(s) Inhalation two times a day  enoxaparin Injectable 40 milliGRAM(s) SubCutaneous every 24 hours  levothyroxine 37.5 MICROGram(s) Oral daily  lisinopril 20 milliGRAM(s) Oral daily  metoprolol tartrate 50 milliGRAM(s) Oral two times a day  potassium chloride    Tablet ER 40 milliEquivalent(s) Oral once  prasugrel 10 milliGRAM(s) Oral daily  predniSONE   Tablet 40 milliGRAM(s) Oral daily  tiotropium 2.5 MICROgram(s) Inhaler 2 Puff(s) Inhalation daily    MEDICATIONS  (PRN):  acetaminophen     Tablet .. 650 milliGRAM(s) Oral every 6 hours PRN Temp greater or equal to 38C (100.4F), Mild Pain (1 - 3)  albuterol    90 MICROgram(s) HFA Inhaler 2 Puff(s) Inhalation every 6 hours PRN Shortness of Breath and/or Wheezing    ___________  Active diet:  Diet, Regular:   1200mL Fluid Restriction ([id number])  ___________________    ==================>> VITAL SIGNS <<==================  Height (cm): 172.7  Weight (kg): 63.458  BMI (kg/m2): 21.3  Vital Signs Last 24 HrsT(C): 36.6 (10-31-23 @ 14:00)  T(F): 97.9 (10-31-23 @ 14:00), Max: 98.6 (10-31-23 @ 10:10)  HR: 57 (10-31-23 @ 14:00) (49 - 73)  BP: 141/66 (10-31-23 @ 14:00)  RR: 18 (10-31-23 @ 14:00) (18 - 19)  SpO2: 100% (10-31-23 @ 14:00) (94% - 100%)      CAPILLARY BLOOD GLUCOSE         ==================>> LAB AND IMAGING <<==================                        11.5   13.43 )-----------( 229      ( 31 Oct 2023 07:53 )             35.5        10-31    138  |  99  |  16  ----------------------------<  102<H>  3.4<L>   |  32<H>  |  0.54    Ca    9.2      31 Oct 2023 07:53  Phos  2.8     10-31  Mg     1.80     10-31      WBC count:   13.43 <<== ,  12.36 <<== ,  16.43 <<== ,  6.21 <<== ,  11.23 <<==   Hemoglobin:   11.5 <<==,  10.9 <<==,  11.1 <<==,  13.0 <<==,  12.0 <<==  platelets:  229 <==, 233 <==, 236 <==, 252 <==, 209 <==    Creatinine:  0.54  <<==, 0.67  <<==, 0.58  <<==, 0.47  <<==, 0.49  <<==  Sodium:   138  <==, 141  <==, 132  <==, 135  <==, 137  <==      ___________________________________________________________________________________  ===============>>  A S S E S S M E N T   A N D   P L A N <<===============  ------------------------------------------------------------------------------------------    · Assessment	  71 year old male with PMHx of COPD on home O2 (2-3 L/NC), HTN, known AAA, PAD, hyperlipidemia and alcohol use disorder complicated with liver cirrhosis (quit 1yr ago) who was told to come to ER for abnormal labs (elevated CO2 to 42 on recent labs), felt to be secondary to acute on chronic hypercapnic respiratory failure due to likely COPD exacerbation.    Problem/Plan - 1:  ·  Problem: Acute on chronic respiratory failure with hypercapnia.   ·  Plan: Likely secondary to COPD exacerbation, pt has not seen Pulmonary as outpatient for a good while  - Being treated for likely COPD exacerbation  - C/w prednisone 40mg daily for 5 days, zithromax for 3 days and duonebs ATC  - Starting symbicort as well BID  - Pulm consulted, to follow   - Has a left upper lobe 4 mm nodule, to be f/up as outpatient    Problem/Plan - 2:  ·  Problem: Hypertension.   ·  Plan: On lisinopril and metoprolol as outpatient  - C/w home meds  - Seen by Cards, recs noted  - F/up TTE.    Problem/Plan - 3:  ·  Problem: hyponatremia >> improved  diet changed  patient eating well  free fluid restriction   monitor   renal if worsened     Problem/Plan - 4:  ·  Problem: AAA (abdominal aortic aneurysm).   - CTA Ao shows-Increased size of partially thrombosed infrarenal abdominal aortic aneurysm measuring up to 5.8 cm in greatest axial dimension. Crescentic area of mild relative hyperattenuation within the thrombus along the anterior aspect of the aneurysm at the level of the kidneys suspicious for hemorrhage/leakage into the thrombus. No active extravasation identified  - Seen by Vascular, no intervention at this time planned   - BP control   - C/w ASA/prasugrel/statin  - Follow up with Dr. Joaquin as outpatient on 10/30/23.  - Cardio on board      Problem/Plan - 5:  ·  Problem: Hyperlipidemia.   ·  Plan: - C/w statin.    Problem/Plan - 6:  ·  Problem: Prophylactic measure.   ·  Plan: LMWH  PT evaluation.    --------------------------------------------  Case discussed with patient   Education given on findings and plan of care  ___________________________  H. JOJO Brown.  Pager: [phone]       _________________________________________________________________________________________  ========>>  M E D I C A L   A T T E N D I N G    F O L L O W  U P  N O T E  <<=========  -----------------------------------------------------------------------------------------------------    - Patient seen and examined by me earlier today.   - Patient today overall doing ok, comfortable, eating OK. breathing improved      Patient complaining of chronic leg pains, following with vascular surgery regarding possible interventions        this is limiting patients ambulation, a lot more than his breathing    ==================>> REVIEW OF SYSTEM <<=================    GEN: no fever, no chills, no pain  RESP: no significant SOB at rest on O2, no sputum  CVS: no chest pain, no palpitations  GI: no abdominal pain, no nausea  : no dysuria, no frequency  Neuro: no headache, no dizziness    ==================>> PHYSICAL EXAM <<=================    GEN: A&O X 3 , NAD , comfortable, pleasant, calm   HEENT: NCAT, PERRL, MMM, hearing intact., O2 via NC  CVS: S1S2 , regular , No M/R/G appreciated  PULM: decreased BS bilaterally, no wheezing noted   ABD.: soft. non tender, non distended,  bowel sounds present  Extrem: intact pulses , no edema         ( Note written / Date of service 10-31-23 ( This is certified to be the same as "ENTERED" date above ( for billing purposes)))    ==================>> MEDICATIONS <<====================    aspirin enteric coated 81 milliGRAM(s) Oral daily  atorvastatin 40 milliGRAM(s) Oral at bedtime  budesonide 160 MICROgram(s)/formoterol 4.5 MICROgram(s) Inhaler 2 Puff(s) Inhalation two times a day  enoxaparin Injectable 40 milliGRAM(s) SubCutaneous every 24 hours  levothyroxine 37.5 MICROGram(s) Oral daily  lisinopril 20 milliGRAM(s) Oral daily  metoprolol tartrate 50 milliGRAM(s) Oral two times a day  potassium chloride    Tablet ER 40 milliEquivalent(s) Oral once  prasugrel 10 milliGRAM(s) Oral daily  predniSONE   Tablet 40 milliGRAM(s) Oral daily  tiotropium 2.5 MICROgram(s) Inhaler 2 Puff(s) Inhalation daily    MEDICATIONS  (PRN):  acetaminophen     Tablet .. 650 milliGRAM(s) Oral every 6 hours PRN Temp greater or equal to 38C (100.4F), Mild Pain (1 - 3)  albuterol    90 MICROgram(s) HFA Inhaler 2 Puff(s) Inhalation every 6 hours PRN Shortness of Breath and/or Wheezing    ___________  Active diet:  Diet, Regular:   1200mL Fluid Restriction ([id number])  ___________________    ==================>> VITAL SIGNS <<==================  Height (cm): 172.7  Weight (kg): 63.458  BMI (kg/m2): 21.3  Vital Signs Last 24 HrsT(C): 36.6 (10-31-23 @ 14:00)  T(F): 97.9 (10-31-23 @ 14:00), Max: 98.6 (10-31-23 @ 10:10)  HR: 57 (10-31-23 @ 14:00) (49 - 73)  BP: 141/66 (10-31-23 @ 14:00)  RR: 18 (10-31-23 @ 14:00) (18 - 19)  SpO2: 100% (10-31-23 @ 14:00) (94% - 100%)         ==================>> LAB AND IMAGING <<==================                        11.5   13.43 )-----------( 229      ( 31 Oct 2023 07:53 )             35.5        10-31    138  |  99  |  16  ----------------------------<  102<H>  3.4<L>   |  32<H>  |  0.54    Ca    9.2      31 Oct 2023 07:53  Phos  2.8     10-31  Mg     1.80     10-31      WBC count:   13.43 <<== ,  12.36 <<== ,  16.43 <<== ,  6.21 <<== ,  11.23 <<==   Hemoglobin:   11.5 <<==,  10.9 <<==,  11.1 <<==,  13.0 <<==,  12.0 <<==  platelets:  229 <==, 233 <==, 236 <==, 252 <==, 209 <==    Creatinine:  0.54  <<==, 0.67  <<==, 0.58  <<==, 0.47  <<==, 0.49  <<==  Sodium:   138  <==, 141  <==, 132  <==, 135  <==, 137  <==      < from: TTE W or WO Ultrasound Enhancing Agent (10.30.23 @ 15:17) >  CONCLUSIONS:    1. Left ventricular wall thickness is normal. Left ventricular systolic function is mildly decreased with an ejection fraction of 49 % by Vazquez's method of disks. Regional wall motion abnormalities present.   2. There is hypokinesis of the apical and apical septal walls.   3. Normal right ventricular cavity size and systolic function.   4. The aortic valve appears trileaflet with reduced systolic excursion. There is mild to moderate aortic stenosis. The peak transaortic velocity is 2.58 m/s, peak transaortic gradient is 26.6 mmHg and mean transaortic gradient is 12.0 mmHg with an LVOT/aortic valve VTI ratio of 0.45. The aortic valve area is estimated at 1.41 cm² by the continuity equation. There is trace aortic regurgitation.   5. Normal atria.   6. The inferior vena cava is normal in size measuring 1.62 cm in diameter, (normal <2.1cm) with normal inspiratory collapse (normal >50%) consistent with normal right atrial pressure (~3, range 0-5mmHg).   7. No pericardial effusion seen.  < end of copied text >    ___________________________________________________________________________________  ===============>>  A S S E S S M E N T   A N D   P L A N <<===============  ------------------------------------------------------------------------------------------    · Assessment	  71 year old male with PMHx of COPD on home O2 (2-3 L/NC), HTN, known AAA, PAD, hyperlipidemia and alcohol use disorder complicated with liver cirrhosis (quit 1yr ago) who was told to come to ER for abnormal labs (elevated CO2 to 42 on recent labs), felt to be secondary to acute on chronic hypercapnic respiratory failure due to likely COPD exacerbation.    Problem/Plan - 1:  ·  Problem: Acute on chronic respiratory failure with hypercapnia.   ·  Plan: Likely secondary to COPD exacerbation, pt has not seen Pulmonary as outpatient for a good while  - Being treated for likely COPD exacerbation  - Pulmonary follow-up, management and optimization appreciated  - AVAPS being tried >> will need to go home with per pulm  - Has a left upper lobe 4 mm nodule, to be f/up as outpatient     We also need a PFT as a patient with pulmonary follow-up    Problem/Plan - 2:  ·  Problem: Hypertension.   ·  Plan: On lisinopril and metoprolol as outpatient  - C/w home meds  - Seen by Cards, recs noted  - Echocardiogram as above     Further management and optimization as per cardiology given above Abnormalities    Problem/Plan - 3:  ·  Problem: hyponatremia >> improved  diet changed  patient eating well  free fluid restriction   monitor   renal if worsened     Problem/Plan - 4:  ·  Problem: AAA (abdominal aortic aneurysm).   - CTA Ao shows-Increased size of partially thrombosed infrarenal abdominal aortic aneurysm measuring up to 5.8 cm in greatest axial dimension. Crescentic area of mild relative hyperattenuation within the thrombus along the anterior aspect of the aneurysm at the level of the kidneys suspicious for hemorrhage/leakage into the thrombus. No active extravasation identified  - Seen by Vascular, no intervention at this time planned   - BP control   - C/w ASA/prasugrel/statin  - Follow up with Dr. Joaquin as outpatient on 10/30/23 per team   - Cardio on board    - Patient with symptoms, likely from claudication >> Need close vascular follow-up     Encouraged increased activity as able    Problem/Plan - 5:  ·  Problem: Hyperlipidemia.   ·  Plan: - C/w statin.    Problem/Plan - 6:  ·  Problem: Prophylactic measure.   ·  Plan: LMWH  PT evaluation.    --------------------------------------------  Case discussed with patient , team.   Education given on findings and plan of care  ___________________________  H. JOJO Brown.  Pager: [phone]

## 2023-11-01 LAB
ANION GAP SERPL CALC-SCNC: 6 MMOL/L — LOW (ref 7–14)
ANION GAP SERPL CALC-SCNC: 6 MMOL/L — LOW (ref 7–14)
BASOPHILS # BLD AUTO: 0.03 K/UL — SIGNIFICANT CHANGE UP (ref 0–0.2)
BASOPHILS # BLD AUTO: 0.03 K/UL — SIGNIFICANT CHANGE UP (ref 0–0.2)
BASOPHILS NFR BLD AUTO: 0.3 % — SIGNIFICANT CHANGE UP (ref 0–2)
BASOPHILS NFR BLD AUTO: 0.3 % — SIGNIFICANT CHANGE UP (ref 0–2)
BLOOD GAS ARTERIAL COMPREHENSIVE RESULT: SIGNIFICANT CHANGE UP
BLOOD GAS ARTERIAL COMPREHENSIVE RESULT: SIGNIFICANT CHANGE UP
BUN SERPL-MCNC: 18 MG/DL — SIGNIFICANT CHANGE UP (ref 7–23)
BUN SERPL-MCNC: 18 MG/DL — SIGNIFICANT CHANGE UP (ref 7–23)
CALCIUM SERPL-MCNC: 9 MG/DL — SIGNIFICANT CHANGE UP (ref 8.4–10.5)
CALCIUM SERPL-MCNC: 9 MG/DL — SIGNIFICANT CHANGE UP (ref 8.4–10.5)
CHLORIDE SERPL-SCNC: 99 MMOL/L — SIGNIFICANT CHANGE UP (ref 98–107)
CHLORIDE SERPL-SCNC: 99 MMOL/L — SIGNIFICANT CHANGE UP (ref 98–107)
CO2 SERPL-SCNC: 35 MMOL/L — HIGH (ref 22–31)
CO2 SERPL-SCNC: 35 MMOL/L — HIGH (ref 22–31)
CREAT SERPL-MCNC: 0.65 MG/DL — SIGNIFICANT CHANGE UP (ref 0.5–1.3)
CREAT SERPL-MCNC: 0.65 MG/DL — SIGNIFICANT CHANGE UP (ref 0.5–1.3)
EGFR: 101 ML/MIN/1.73M2 — SIGNIFICANT CHANGE UP
EGFR: 101 ML/MIN/1.73M2 — SIGNIFICANT CHANGE UP
EOSINOPHIL # BLD AUTO: 0.24 K/UL — SIGNIFICANT CHANGE UP (ref 0–0.5)
EOSINOPHIL # BLD AUTO: 0.24 K/UL — SIGNIFICANT CHANGE UP (ref 0–0.5)
EOSINOPHIL NFR BLD AUTO: 2.3 % — SIGNIFICANT CHANGE UP (ref 0–6)
EOSINOPHIL NFR BLD AUTO: 2.3 % — SIGNIFICANT CHANGE UP (ref 0–6)
GLUCOSE SERPL-MCNC: 92 MG/DL — SIGNIFICANT CHANGE UP (ref 70–99)
GLUCOSE SERPL-MCNC: 92 MG/DL — SIGNIFICANT CHANGE UP (ref 70–99)
HCT VFR BLD CALC: 34.3 % — LOW (ref 39–50)
HCT VFR BLD CALC: 34.3 % — LOW (ref 39–50)
HGB BLD-MCNC: 10.8 G/DL — LOW (ref 13–17)
HGB BLD-MCNC: 10.8 G/DL — LOW (ref 13–17)
IANC: 7.09 K/UL — SIGNIFICANT CHANGE UP (ref 1.8–7.4)
IANC: 7.09 K/UL — SIGNIFICANT CHANGE UP (ref 1.8–7.4)
IMM GRANULOCYTES NFR BLD AUTO: 0.5 % — SIGNIFICANT CHANGE UP (ref 0–0.9)
IMM GRANULOCYTES NFR BLD AUTO: 0.5 % — SIGNIFICANT CHANGE UP (ref 0–0.9)
LYMPHOCYTES # BLD AUTO: 2.22 K/UL — SIGNIFICANT CHANGE UP (ref 1–3.3)
LYMPHOCYTES # BLD AUTO: 2.22 K/UL — SIGNIFICANT CHANGE UP (ref 1–3.3)
LYMPHOCYTES # BLD AUTO: 21.1 % — SIGNIFICANT CHANGE UP (ref 13–44)
LYMPHOCYTES # BLD AUTO: 21.1 % — SIGNIFICANT CHANGE UP (ref 13–44)
MAGNESIUM SERPL-MCNC: 1.8 MG/DL — SIGNIFICANT CHANGE UP (ref 1.6–2.6)
MAGNESIUM SERPL-MCNC: 1.8 MG/DL — SIGNIFICANT CHANGE UP (ref 1.6–2.6)
MCHC RBC-ENTMCNC: 29.6 PG — SIGNIFICANT CHANGE UP (ref 27–34)
MCHC RBC-ENTMCNC: 29.6 PG — SIGNIFICANT CHANGE UP (ref 27–34)
MCHC RBC-ENTMCNC: 31.5 GM/DL — LOW (ref 32–36)
MCHC RBC-ENTMCNC: 31.5 GM/DL — LOW (ref 32–36)
MCV RBC AUTO: 94 FL — SIGNIFICANT CHANGE UP (ref 80–100)
MCV RBC AUTO: 94 FL — SIGNIFICANT CHANGE UP (ref 80–100)
MONOCYTES # BLD AUTO: 0.9 K/UL — SIGNIFICANT CHANGE UP (ref 0–0.9)
MONOCYTES # BLD AUTO: 0.9 K/UL — SIGNIFICANT CHANGE UP (ref 0–0.9)
MONOCYTES NFR BLD AUTO: 8.5 % — SIGNIFICANT CHANGE UP (ref 2–14)
MONOCYTES NFR BLD AUTO: 8.5 % — SIGNIFICANT CHANGE UP (ref 2–14)
NEUTROPHILS # BLD AUTO: 7.09 K/UL — SIGNIFICANT CHANGE UP (ref 1.8–7.4)
NEUTROPHILS # BLD AUTO: 7.09 K/UL — SIGNIFICANT CHANGE UP (ref 1.8–7.4)
NEUTROPHILS NFR BLD AUTO: 67.3 % — SIGNIFICANT CHANGE UP (ref 43–77)
NEUTROPHILS NFR BLD AUTO: 67.3 % — SIGNIFICANT CHANGE UP (ref 43–77)
NRBC # BLD: 0 /100 WBCS — SIGNIFICANT CHANGE UP (ref 0–0)
NRBC # BLD: 0 /100 WBCS — SIGNIFICANT CHANGE UP (ref 0–0)
NRBC # FLD: 0 K/UL — SIGNIFICANT CHANGE UP (ref 0–0)
NRBC # FLD: 0 K/UL — SIGNIFICANT CHANGE UP (ref 0–0)
PHOSPHATE SERPL-MCNC: 2.9 MG/DL — SIGNIFICANT CHANGE UP (ref 2.5–4.5)
PHOSPHATE SERPL-MCNC: 2.9 MG/DL — SIGNIFICANT CHANGE UP (ref 2.5–4.5)
PLATELET # BLD AUTO: 249 K/UL — SIGNIFICANT CHANGE UP (ref 150–400)
PLATELET # BLD AUTO: 249 K/UL — SIGNIFICANT CHANGE UP (ref 150–400)
POTASSIUM SERPL-MCNC: 4.1 MMOL/L — SIGNIFICANT CHANGE UP (ref 3.5–5.3)
POTASSIUM SERPL-MCNC: 4.1 MMOL/L — SIGNIFICANT CHANGE UP (ref 3.5–5.3)
POTASSIUM SERPL-SCNC: 4.1 MMOL/L — SIGNIFICANT CHANGE UP (ref 3.5–5.3)
POTASSIUM SERPL-SCNC: 4.1 MMOL/L — SIGNIFICANT CHANGE UP (ref 3.5–5.3)
RBC # BLD: 3.65 M/UL — LOW (ref 4.2–5.8)
RBC # BLD: 3.65 M/UL — LOW (ref 4.2–5.8)
RBC # FLD: 15.6 % — HIGH (ref 10.3–14.5)
RBC # FLD: 15.6 % — HIGH (ref 10.3–14.5)
SODIUM SERPL-SCNC: 140 MMOL/L — SIGNIFICANT CHANGE UP (ref 135–145)
SODIUM SERPL-SCNC: 140 MMOL/L — SIGNIFICANT CHANGE UP (ref 135–145)
WBC # BLD: 10.53 K/UL — HIGH (ref 3.8–10.5)
WBC # BLD: 10.53 K/UL — HIGH (ref 3.8–10.5)
WBC # FLD AUTO: 10.53 K/UL — HIGH (ref 3.8–10.5)
WBC # FLD AUTO: 10.53 K/UL — HIGH (ref 3.8–10.5)

## 2023-11-01 PROCEDURE — 93282 PRGRMG EVAL IMPLANTABLE DFB: CPT | Mod: 26

## 2023-11-01 PROCEDURE — 93010 ELECTROCARDIOGRAM REPORT: CPT

## 2023-11-01 RX ORDER — LIDOCAINE 4 G/100G
1 CREAM TOPICAL ONCE
Refills: 0 | Status: COMPLETED | OUTPATIENT
Start: 2023-11-01 | End: 2023-11-11

## 2023-11-01 RX ORDER — LIDOCAINE 4 G/100G
1 CREAM TOPICAL ONCE
Refills: 0 | Status: DISCONTINUED | OUTPATIENT
Start: 2023-11-01 | End: 2023-11-01

## 2023-11-01 RX ADMIN — ENOXAPARIN SODIUM 40 MILLIGRAM(S): 100 INJECTION SUBCUTANEOUS at 18:22

## 2023-11-01 RX ADMIN — ATORVASTATIN CALCIUM 40 MILLIGRAM(S): 80 TABLET, FILM COATED ORAL at 21:45

## 2023-11-01 RX ADMIN — BUDESONIDE AND FORMOTEROL FUMARATE DIHYDRATE 2 PUFF(S): 160; 4.5 AEROSOL RESPIRATORY (INHALATION) at 10:55

## 2023-11-01 RX ADMIN — PRASUGREL 10 MILLIGRAM(S): 5 TABLET, FILM COATED ORAL at 15:37

## 2023-11-01 RX ADMIN — Medication 37.5 MICROGRAM(S): at 05:56

## 2023-11-01 RX ADMIN — Medication 50 MILLIGRAM(S): at 10:55

## 2023-11-01 RX ADMIN — BUDESONIDE AND FORMOTEROL FUMARATE DIHYDRATE 2 PUFF(S): 160; 4.5 AEROSOL RESPIRATORY (INHALATION) at 21:45

## 2023-11-01 RX ADMIN — Medication 81 MILLIGRAM(S): at 15:37

## 2023-11-01 RX ADMIN — LISINOPRIL 20 MILLIGRAM(S): 2.5 TABLET ORAL at 06:46

## 2023-11-01 RX ADMIN — TIOTROPIUM BROMIDE 2 PUFF(S): 18 CAPSULE ORAL; RESPIRATORY (INHALATION) at 15:37

## 2023-11-01 RX ADMIN — Medication 50 MILLIGRAM(S): at 21:45

## 2023-11-01 RX ADMIN — Medication 40 MILLIGRAM(S): at 06:46

## 2023-11-01 NOTE — PROCEDURE NOTE - ADDITIONAL PROCEDURE DETAILS
Single chamber ICD in VVI  mode   Normal sensing and pacing via iterative testing  Excellent threshold capture  No events recorded   No reprogramming   Battery capacity at 33%  Device implanted on 11/25/2015 by Dr. Combs at [facility]    hx of COPD, HTN, known AAA, PAD, HLD is admitted for acute on chronic hypercapnic respiratory failure due to likely COPD exacerbation.

## 2023-11-01 NOTE — PROGRESS NOTE ADULT - ASSESSMENT
_________________________________________________________________________________________  ========>>  M E D I C A L   A T T E N D I N G    F O L L O W  U P  N O T E  <<=========  -----------------------------------------------------------------------------------------------------    - Patient seen and examined by me earlier today.   - Patient today overall doing ok, comfortable, eating OK. breathing improved      Patient complaining of chronic leg pains, following with vascular surgery regarding possible interventions        this is limiting patients ambulation, a lot more than his breathing    ==================>> REVIEW OF SYSTEM <<=================    GEN: no fever, no chills, no pain  RESP: no significant SOB at rest on O2, no sputum  CVS: no chest pain, no palpitations  GI: no abdominal pain, no nausea  : no dysuria, no frequency  Neuro: no headache, no dizziness    ==================>> PHYSICAL EXAM <<=================    GEN: A&O X 3 , NAD , comfortable, pleasant, calm   HEENT: NCAT, PERRL, MMM, hearing intact., O2 via NC  CVS: S1S2 , regular , No M/R/G appreciated  PULM: decreased BS bilaterally, no wheezing noted   ABD.: soft. non tender, non distended,  bowel sounds present  Extrem: intact pulses , no edema         ( Note written / Date of service 11-01-23 ( This is certified to be the same as "ENTERED" date above ( for billing purposes)))    ==================>> MEDICATIONS <<====================    aspirin enteric coated 81 milliGRAM(s) Oral daily  atorvastatin 40 milliGRAM(s) Oral at bedtime  budesonide 160 MICROgram(s)/formoterol 4.5 MICROgram(s) Inhaler 2 Puff(s) Inhalation two times a day  enoxaparin Injectable 40 milliGRAM(s) SubCutaneous every 24 hours  levothyroxine 37.5 MICROGram(s) Oral daily  lisinopril 20 milliGRAM(s) Oral daily  metoprolol tartrate 50 milliGRAM(s) Oral two times a day  prasugrel 10 milliGRAM(s) Oral daily  predniSONE   Tablet 40 milliGRAM(s) Oral daily  tiotropium 2.5 MICROgram(s) Inhaler 2 Puff(s) Inhalation daily    MEDICATIONS  (PRN):  acetaminophen     Tablet .. 650 milliGRAM(s) Oral every 6 hours PRN Temp greater or equal to 38C (100.4F), Mild Pain (1 - 3)  albuterol    90 MICROgram(s) HFA Inhaler 2 Puff(s) Inhalation every 6 hours PRN Shortness of Breath and/or Wheezing    ___________  Active diet:  Diet, Regular:   1200mL Fluid Restriction ([id number])  ___________________    ==================>> VITAL SIGNS <<==================  Height (cm): 172.7  Weight (kg): 63.458  BMI (kg/m2): 21.3  Vital Signs Last 24 HrsT(C): 36.6 (11-01-23 @ 10:15)  T(F): 97.8 (11-01-23 @ 10:15), Max: 98 (10-31-23 @ 21:15)  HR: 65 (11-01-23 @ 10:15) (48 - 65)  BP: 142/66 (11-01-23 @ 10:15)  RR: 17 (11-01-23 @ 10:15) (17 - 18)  SpO2: 94% (11-01-23 @ 10:15) (94% - 100%)      CAPILLARY BLOOD GLUCOSE         ==================>> LAB AND IMAGING <<==================                        10.8   10.53 )-----------( 249      ( 01 Nov 2023 05:20 )             34.3        11-01    140  |  99  |  18  ----------------------------<  92  4.1   |  35<H>  |  0.65    Ca    9.0      01 Nov 2023 05:20  Phos  2.9     11-01  Mg     1.80     11-01      WBC count:   10.53 <<== ,  13.43 <<== ,  12.36 <<== ,  16.43 <<== ,  6.21 <<== ,  11.23 <<==   Hemoglobin:   10.8 <<==,  11.5 <<==,  10.9 <<==,  11.1 <<==,  13.0 <<==,  12.0 <<==  platelets:  249 <==, 229 <==, 233 <==, 236 <==, 252 <==, 209 <==    Creatinine:  0.65  <<==, 0.54  <<==, 0.67  <<==, 0.58  <<==, 0.47  <<==, 0.49  <<==  Sodium:   140  <==, 138  <==, 141  <==, 132  <==, 135  <==, 137  <==       AST:          27 <==      ALT:        11  <==      AP:        100  <=     Bili:        0.6  <=    ____________________________    M I C R O B I O L O G Y :      SARS-CoV-2: NotDetec (10-28-23 @ 12:12)    < from: TTE W or WO Ultrasound Enhancing Agent (10.30.23 @ 15:17) >  CONCLUSIONS:    1. Left ventricular wall thickness is normal. Left ventricular systolic function is mildly decreased with an ejection fraction of 49 % by Vazquez's method of disks. Regional wall motion abnormalities present.   2. There is hypokinesis of the apical and apical septal walls.   3. Normal right ventricular cavity size and systolic function.   4. The aortic valve appears trileaflet with reduced systolic excursion. There is mild to moderate aortic stenosis. The peak transaortic velocity is 2.58 m/s, peak transaortic gradient is 26.6 mmHg and mean transaortic gradient is 12.0 mmHg with an LVOT/aortic valve VTI ratio of 0.45. The aortic valve area is estimated at 1.41 cm² by the continuity equation. There is trace aortic regurgitation.   5. Normal atria.   6. The inferior vena cava is normal in size measuring 1.62 cm in diameter, (normal <2.1cm) with normal inspiratory collapse (normal >50%) consistent with normal right atrial pressure (~3, range 0-5mmHg).   7. No pericardial effusion seen.  < end of copied text >    ___________________________________________________________________________________  ===============>>  A S S E S S M E N T   A N D   P L A N <<===============  ------------------------------------------------------------------------------------------    · Assessment	  71 year old male with PMHx of COPD on home O2 (2-3 L/NC), HTN, known AAA, PAD, hyperlipidemia and alcohol use disorder complicated with liver cirrhosis (quit 1yr ago) who was told to come to ER for abnormal labs (elevated CO2 to 42 on recent labs), felt to be secondary to acute on chronic hypercapnic respiratory failure due to likely COPD exacerbation.    Problem/Plan - 1:  ·  Problem: Acute on chronic respiratory failure with hypercapnia.   ·  Plan: Likely secondary to COPD exacerbation, pt has not seen Pulmonary as outpatient for a good while  - Being treated for likely COPD exacerbation  - Pulmonary follow-up, management and optimization appreciated  - AVAPS being tried >> will need to go home with per pulm  - Has a left upper lobe 4 mm nodule, to be f/up as outpatient     We also need a PFT as a patient with pulmonary follow-up    Problem/Plan - 2:  ·  Problem: Hypertension.   ·  Plan: On lisinopril and metoprolol as outpatient  - C/w home meds  - Seen by Cards, recs noted  - Echocardiogram as above     Further management and optimization as per cardiology given above Abnormalities    Problem/Plan - 3:  ·  Problem: hyponatremia >> improved  diet changed  patient eating well  free fluid restriction   monitor   renal if worsened     Problem/Plan - 4:  ·  Problem: AAA (abdominal aortic aneurysm).   - CTA Ao shows-Increased size of partially thrombosed infrarenal abdominal aortic aneurysm measuring up to 5.8 cm in greatest axial dimension. Crescentic area of mild relative hyperattenuation within the thrombus along the anterior aspect of the aneurysm at the level of the kidneys suspicious for hemorrhage/leakage into the thrombus. No active extravasation identified  - Seen by Vascular, no intervention at this time planned   - BP control   - C/w ASA/prasugrel/statin  - Follow up with Dr. Joaquin as outpatient on 10/30/23 per team   - Cardio on board    - Patient with symptoms, likely from claudication >> Need close vascular follow-up     Encouraged increased activity as able    Problem/Plan - 5:  ·  Problem: Hyperlipidemia.   ·  Plan: - C/w statin.    Problem/Plan - 6:  ·  Problem: Prophylactic measure.   ·  Plan: LMWH  PT evaluation.    --------------------------------------------  Case discussed with patient , team.   Education given on findings and plan of care  ___________________________  H. JOJO Brown.  Pager: [phone]       _________________________________________________________________________________________  ========>>  M E D I C A L   A T T E N D I N G    F O L L O W  U P  N O T E  <<=========  -----------------------------------------------------------------------------------------------------    - Patient seen and examined by me earlier today.   - Patient today overall doing ok, comfortable, eating OK. breathing ok      Patient complaining of chronic leg pains, following with vascular surgery regarding possible interventions       there have been difficulties getting an ABG ( needed to approve an AVAPs machine for home)>> to reattempt..     ==================>> REVIEW OF SYSTEM <<=================    GEN: no fever, no chills, no pain  RESP: no significant SOB at rest on O2, no sputum  CVS: no chest pain, no palpitations  GI: no abdominal pain, no nausea  : no dysuria, no frequency  Neuro: no headache, no dizziness    ==================>> PHYSICAL EXAM <<=================    GEN: A&O X 3 , NAD , comfortable, pleasant, calm in bed with son at bedside..  encouraged out of bed to chair with assistance as needed.   HEENT: NCAT, PERRL, MMM, hearing intact., O2 via NC  CVS: S1S2 , regular , No M/R/G appreciated  PULM: decreased BS bilaterally, no wheezing noted   ABD.: soft. non tender, non distended,  bowel sounds present  Extrem: intact pulses , no edema         ( Note written / Date of service 11-01-23 ( This is certified to be the same as "ENTERED" date above ( for billing purposes)))    ==================>> MEDICATIONS <<====================    aspirin enteric coated 81 milliGRAM(s) Oral daily  atorvastatin 40 milliGRAM(s) Oral at bedtime  budesonide 160 MICROgram(s)/formoterol 4.5 MICROgram(s) Inhaler 2 Puff(s) Inhalation two times a day  enoxaparin Injectable 40 milliGRAM(s) SubCutaneous every 24 hours  levothyroxine 37.5 MICROGram(s) Oral daily  lisinopril 20 milliGRAM(s) Oral daily  metoprolol tartrate 50 milliGRAM(s) Oral two times a day  prasugrel 10 milliGRAM(s) Oral daily  predniSONE   Tablet 40 milliGRAM(s) Oral daily  tiotropium 2.5 MICROgram(s) Inhaler 2 Puff(s) Inhalation daily    MEDICATIONS  (PRN):  acetaminophen     Tablet .. 650 milliGRAM(s) Oral every 6 hours PRN Temp greater or equal to 38C (100.4F), Mild Pain (1 - 3)  albuterol    90 MICROgram(s) HFA Inhaler 2 Puff(s) Inhalation every 6 hours PRN Shortness of Breath and/or Wheezing    ___________  Active diet:  Diet, Regular:   1200mL Fluid Restriction ([id number])  ___________________    ==================>> VITAL SIGNS <<==================  Height (cm): 172.7  Weight (kg): 63.458  BMI (kg/m2): 21.3  Vital Signs Last 24 HrsT(C): 36.6 (11-01-23 @ 10:15)  T(F): 97.8 (11-01-23 @ 10:15), Max: 98 (10-31-23 @ 21:15)  HR: 65 (11-01-23 @ 10:15) (48 - 65)  BP: 142/66 (11-01-23 @ 10:15)  RR: 17 (11-01-23 @ 10:15) (17 - 18)  SpO2: 94% (11-01-23 @ 10:15) (94% - 100%)       ==================>> LAB AND IMAGING <<==================                        10.8   10.53 )-----------( 249      ( 01 Nov 2023 05:20 )             34.3        11-01    140  |  99  |  18  ----------------------------<  92  4.1   |  35<H>  |  0.65    Ca    9.0      01 Nov 2023 05:20  Phos  2.9     11-01  Mg     1.80     11-01      WBC count:   10.53 <<== ,  13.43 <<== ,  12.36 <<== ,  16.43 <<== ,  6.21 <<== ,  11.23 <<==   Hemoglobin:   10.8 <<==,  11.5 <<==,  10.9 <<==,  11.1 <<==,  13.0 <<==,  12.0 <<==  platelets:  249 <==, 229 <==, 233 <==, 236 <==, 252 <==, 209 <==    Creatinine:  0.65  <<==, 0.54  <<==, 0.67  <<==, 0.58  <<==, 0.47  <<==, 0.49  <<==  Sodium:   140  <==, 138  <==, 141  <==, 132  <==, 135  <==, 137  <==      < from: TTE W or WO Ultrasound Enhancing Agent (10.30.23 @ 15:17) >  CONCLUSIONS:    1. Left ventricular wall thickness is normal. Left ventricular systolic function is mildly decreased with an ejection fraction of 49 % by Vazquez's method of disks. Regional wall motion abnormalities present.   2. There is hypokinesis of the apical and apical septal walls.   3. Normal right ventricular cavity size and systolic function.   4. The aortic valve appears trileaflet with reduced systolic excursion. There is mild to moderate aortic stenosis. The peak transaortic velocity is 2.58 m/s, peak transaortic gradient is 26.6 mmHg and mean transaortic gradient is 12.0 mmHg with an LVOT/aortic valve VTI ratio of 0.45. The aortic valve area is estimated at 1.41 cm² by the continuity equation. There is trace aortic regurgitation.   5. Normal atria.   6. The inferior vena cava is normal in size measuring 1.62 cm in diameter, (normal <2.1cm) with normal inspiratory collapse (normal >50%) consistent with normal right atrial pressure (~3, range 0-5mmHg).   7. No pericardial effusion seen.  < end of copied text >    ___________________________________________________________________________________  ===============>>  A S S E S S M E N T   A N D   P L A N <<===============  ------------------------------------------------------------------------------------------    · Assessment	  71 year old male with PMHx of COPD on home O2 (2-3 L/NC), HTN, known AAA, PAD, hyperlipidemia and alcohol use disorder complicated with liver cirrhosis (quit 1yr ago) who was told to come to ER for abnormal labs (elevated CO2 to 42 on recent labs), felt to be secondary to acute on chronic hypercapnic respiratory failure due to likely COPD exacerbation.    Problem/Plan - 1:  ·  Problem: Acute on chronic respiratory failure with hypercapnia.   ·  Plan: Likely secondary to COPD exacerbation, pt has not seen Pulmonary as outpatient for a good while  - Being treated for likely COPD exacerbation  - Pulmonary follow-up, management and optimization appreciated  - AVAPS being tried >> will need to go home with per pulm  - Has a left upper lobe 4 mm nodule, to be f/up as outpatient     We also need a PFT as a patient with pulmonary follow-up    Problem/Plan - 2:  ·  Problem: Hypertension.   ·  Plan: On lisinopril and metoprolol as outpatient  - C/w home meds  - Seen by Cards, recs noted  - Echocardiogram as above     Further management and optimization as per cardiology given above Abnormalities    Problem/Plan - 3:  ·  Problem: hyponatremia >> improved  diet changed  patient eating well  free fluid restriction   monitor   renal if worsened     Problem/Plan - 4:  ·  Problem: AAA (abdominal aortic aneurysm).   - CTA Ao shows-Increased size of partially thrombosed infrarenal abdominal aortic aneurysm measuring up to 5.8 cm in greatest axial dimension. Crescentic area of mild relative hyperattenuation within the thrombus along the anterior aspect of the aneurysm at the level of the kidneys suspicious for hemorrhage/leakage into the thrombus. No active extravasation identified  - Seen by Vascular, no intervention at this time planned   - BP control   - C/w ASA/prasugrel/statin  - Follow up with Dr. Joaquin as outpatient on 10/30/23 per team   - Cardio on board    - Patient with symptoms, likely from claudication >> Need close vascular follow-up     Encouraged increased activity as able    Problem/Plan - 5:  ·  Problem: Hyperlipidemia.   ·  Plan: - C/w statin.    Problem/Plan - 6:  ·  Problem: Prophylactic measure.   ·  Plan: LMWH  PT  // OOB to chair daily / increase activity as able     --------------------------------------------  Case discussed with patient, son , ACP   Education given on findings and plan of care  ___________________________  H. JOJO Brown.  Pager: [phone]

## 2023-11-02 DIAGNOSIS — J96.10 CHRONIC RESPIRATORY FAILURE, UNSPECIFIED WHETHER WITH HYPOXIA OR HYPERCAPNIA: ICD-10-CM

## 2023-11-02 LAB
ANION GAP SERPL CALC-SCNC: 4 MMOL/L — LOW (ref 7–14)
ANION GAP SERPL CALC-SCNC: 4 MMOL/L — LOW (ref 7–14)
BUN SERPL-MCNC: 15 MG/DL — SIGNIFICANT CHANGE UP (ref 7–23)
BUN SERPL-MCNC: 15 MG/DL — SIGNIFICANT CHANGE UP (ref 7–23)
CALCIUM SERPL-MCNC: 9.1 MG/DL — SIGNIFICANT CHANGE UP (ref 8.4–10.5)
CALCIUM SERPL-MCNC: 9.1 MG/DL — SIGNIFICANT CHANGE UP (ref 8.4–10.5)
CHLORIDE SERPL-SCNC: 95 MMOL/L — LOW (ref 98–107)
CHLORIDE SERPL-SCNC: 95 MMOL/L — LOW (ref 98–107)
CO2 SERPL-SCNC: 35 MMOL/L — HIGH (ref 22–31)
CO2 SERPL-SCNC: 35 MMOL/L — HIGH (ref 22–31)
CREAT SERPL-MCNC: 0.57 MG/DL — SIGNIFICANT CHANGE UP (ref 0.5–1.3)
CREAT SERPL-MCNC: 0.57 MG/DL — SIGNIFICANT CHANGE UP (ref 0.5–1.3)
EGFR: 105 ML/MIN/1.73M2 — SIGNIFICANT CHANGE UP
EGFR: 105 ML/MIN/1.73M2 — SIGNIFICANT CHANGE UP
GLUCOSE BLDC GLUCOMTR-MCNC: 100 MG/DL — HIGH (ref 70–99)
GLUCOSE BLDC GLUCOMTR-MCNC: 100 MG/DL — HIGH (ref 70–99)
GLUCOSE SERPL-MCNC: 93 MG/DL — SIGNIFICANT CHANGE UP (ref 70–99)
GLUCOSE SERPL-MCNC: 93 MG/DL — SIGNIFICANT CHANGE UP (ref 70–99)
HCT VFR BLD CALC: 32.5 % — LOW (ref 39–50)
HCT VFR BLD CALC: 32.5 % — LOW (ref 39–50)
HGB BLD-MCNC: 10.5 G/DL — LOW (ref 13–17)
HGB BLD-MCNC: 10.5 G/DL — LOW (ref 13–17)
MAGNESIUM SERPL-MCNC: 2 MG/DL — SIGNIFICANT CHANGE UP (ref 1.6–2.6)
MAGNESIUM SERPL-MCNC: 2 MG/DL — SIGNIFICANT CHANGE UP (ref 1.6–2.6)
MCHC RBC-ENTMCNC: 29.3 PG — SIGNIFICANT CHANGE UP (ref 27–34)
MCHC RBC-ENTMCNC: 29.3 PG — SIGNIFICANT CHANGE UP (ref 27–34)
MCHC RBC-ENTMCNC: 32.3 GM/DL — SIGNIFICANT CHANGE UP (ref 32–36)
MCHC RBC-ENTMCNC: 32.3 GM/DL — SIGNIFICANT CHANGE UP (ref 32–36)
MCV RBC AUTO: 90.8 FL — SIGNIFICANT CHANGE UP (ref 80–100)
MCV RBC AUTO: 90.8 FL — SIGNIFICANT CHANGE UP (ref 80–100)
NRBC # BLD: 0 /100 WBCS — SIGNIFICANT CHANGE UP (ref 0–0)
NRBC # BLD: 0 /100 WBCS — SIGNIFICANT CHANGE UP (ref 0–0)
NRBC # FLD: 0 K/UL — SIGNIFICANT CHANGE UP (ref 0–0)
NRBC # FLD: 0 K/UL — SIGNIFICANT CHANGE UP (ref 0–0)
PHOSPHATE SERPL-MCNC: 3.3 MG/DL — SIGNIFICANT CHANGE UP (ref 2.5–4.5)
PHOSPHATE SERPL-MCNC: 3.3 MG/DL — SIGNIFICANT CHANGE UP (ref 2.5–4.5)
PLATELET # BLD AUTO: 252 K/UL — SIGNIFICANT CHANGE UP (ref 150–400)
PLATELET # BLD AUTO: 252 K/UL — SIGNIFICANT CHANGE UP (ref 150–400)
POTASSIUM SERPL-MCNC: 3.5 MMOL/L — SIGNIFICANT CHANGE UP (ref 3.5–5.3)
POTASSIUM SERPL-MCNC: 3.5 MMOL/L — SIGNIFICANT CHANGE UP (ref 3.5–5.3)
POTASSIUM SERPL-SCNC: 3.5 MMOL/L — SIGNIFICANT CHANGE UP (ref 3.5–5.3)
POTASSIUM SERPL-SCNC: 3.5 MMOL/L — SIGNIFICANT CHANGE UP (ref 3.5–5.3)
RBC # BLD: 3.58 M/UL — LOW (ref 4.2–5.8)
RBC # BLD: 3.58 M/UL — LOW (ref 4.2–5.8)
RBC # FLD: 15.7 % — HIGH (ref 10.3–14.5)
RBC # FLD: 15.7 % — HIGH (ref 10.3–14.5)
SODIUM SERPL-SCNC: 134 MMOL/L — LOW (ref 135–145)
SODIUM SERPL-SCNC: 134 MMOL/L — LOW (ref 135–145)
WBC # BLD: 11.71 K/UL — HIGH (ref 3.8–10.5)
WBC # BLD: 11.71 K/UL — HIGH (ref 3.8–10.5)
WBC # FLD AUTO: 11.71 K/UL — HIGH (ref 3.8–10.5)
WBC # FLD AUTO: 11.71 K/UL — HIGH (ref 3.8–10.5)

## 2023-11-02 PROCEDURE — 99233 SBSQ HOSP IP/OBS HIGH 50: CPT

## 2023-11-02 RX ORDER — POLYETHYLENE GLYCOL 3350 17 G/17G
17 POWDER, FOR SOLUTION ORAL ONCE
Refills: 0 | Status: COMPLETED | OUTPATIENT
Start: 2023-11-02 | End: 2023-11-02

## 2023-11-02 RX ADMIN — Medication 50 MILLIGRAM(S): at 22:09

## 2023-11-02 RX ADMIN — PRASUGREL 10 MILLIGRAM(S): 5 TABLET, FILM COATED ORAL at 13:52

## 2023-11-02 RX ADMIN — ENOXAPARIN SODIUM 40 MILLIGRAM(S): 100 INJECTION SUBCUTANEOUS at 17:22

## 2023-11-02 RX ADMIN — ATORVASTATIN CALCIUM 40 MILLIGRAM(S): 80 TABLET, FILM COATED ORAL at 22:07

## 2023-11-02 RX ADMIN — BUDESONIDE AND FORMOTEROL FUMARATE DIHYDRATE 2 PUFF(S): 160; 4.5 AEROSOL RESPIRATORY (INHALATION) at 22:06

## 2023-11-02 RX ADMIN — Medication 37.5 MICROGRAM(S): at 06:27

## 2023-11-02 RX ADMIN — TIOTROPIUM BROMIDE 2 PUFF(S): 18 CAPSULE ORAL; RESPIRATORY (INHALATION) at 12:00

## 2023-11-02 RX ADMIN — POLYETHYLENE GLYCOL 3350 17 GRAM(S): 17 POWDER, FOR SOLUTION ORAL at 12:02

## 2023-11-02 RX ADMIN — BUDESONIDE AND FORMOTEROL FUMARATE DIHYDRATE 2 PUFF(S): 160; 4.5 AEROSOL RESPIRATORY (INHALATION) at 08:41

## 2023-11-02 RX ADMIN — Medication 81 MILLIGRAM(S): at 12:00

## 2023-11-02 RX ADMIN — Medication 1 DROP(S): at 17:22

## 2023-11-02 RX ADMIN — LISINOPRIL 20 MILLIGRAM(S): 2.5 TABLET ORAL at 06:26

## 2023-11-02 NOTE — PROGRESS NOTE ADULT - SUBJECTIVE AND OBJECTIVE BOX
Cardiovascular Disease Progress Note    Overnight events: No acute events overnight. no new cardiac sx   Otherwise review of systems negative    Objective Findings:  T(C): 36.3 (11-02-23 @ 06:26), Max: 36.6 (11-01-23 @ 10:15)  HR: 58 (11-02-23 @ 06:26) (54 - 66)  BP: 133/73 (11-02-23 @ 06:26) (110/72 - 152/81)  RR: 16 (11-02-23 @ 06:26) (16 - 17)  SpO2: 95% (11-02-23 @ 06:26) (94% - 100%)  Wt(kg): --  Daily     Daily       Physical Exam:  Gen: NAD  HEENT: EOMI  CV: RRR, normal S1 + S2, no m/r/g  Lungs: CTAB  Abd: soft, non-tender  Ext: No edema    Telemetry:    Laboratory Data:                        10.5   11.71 )-----------( 252      ( 02 Nov 2023 06:22 )             32.5     11-02    134<L>  |  95<L>  |  15  ----------------------------<  93  3.5   |  35<H>  |  0.57    Ca    9.1      02 Nov 2023 06:22  Phos  3.3     11-02  Mg     2.00     11-02                Inpatient Medications:  MEDICATIONS  (STANDING):  aspirin enteric coated 81 milliGRAM(s) Oral daily  atorvastatin 40 milliGRAM(s) Oral at bedtime  budesonide 160 MICROgram(s)/formoterol 4.5 MICROgram(s) Inhaler 2 Puff(s) Inhalation two times a day  enoxaparin Injectable 40 milliGRAM(s) SubCutaneous every 24 hours  levothyroxine 37.5 MICROGram(s) Oral daily  lidocaine 4% Cream 1 Application(s) Topical once  lisinopril 20 milliGRAM(s) Oral daily  metoprolol tartrate 50 milliGRAM(s) Oral two times a day  prasugrel 10 milliGRAM(s) Oral daily  tiotropium 2.5 MICROgram(s) Inhaler 2 Puff(s) Inhalation daily      Assessment:  71M PMHx COPD on home O2 2 L nasal cannula (~45pack year hx, quit 4 years ago), hypertension, known AAA - being followed by Dr. Joaquin, PAD, alcohol abuse complicated with liver cirrhosis, recently treated for a UTI, as per patient was referred to the emergency department by his primary care physician Dr. Remington Simms for abnormal labs. Patient reports exacerbation of his chronic low back pain. C/o b/l claudication from buttocks down at distances <50 feet. He has history of PAD s/p RLE bypass (?failed). Denies rest pain and poor wound healing. Denies abd pain, pulsatile mass, tearing pain.       Recs:  cardiac stable  dyspnea likely in setting of copd-e  avaps per pulm  vol status stable. diuretics prn to maintain euvolemic  echo mild lv dysfunction, normal rv fxn, mild-mod as  cw gdmt for lv dysfunction  cw antiplatelets, statin and antianginals  f/u vascular surgery re partially thrombosed AAA  pulmonary f/u  icd interrogation --> no events  will follow        Over 25 minutes spent on total encounter; more than 50% of the visit was spent counseling and/or coordinating care by the attending physician.      Vaughn Simms MD   Cardiovascular Disease  [phone]

## 2023-11-02 NOTE — CHART NOTE - NSCHARTNOTEFT_GEN_A_CORE
Called [phone] to ensure pt is on the list for Bedside Spirometry  They stated they will see the patient

## 2023-11-02 NOTE — PROGRESS NOTE ADULT - SUBJECTIVE AND OBJECTIVE BOX
PULMONARY PROGRESS NOTE    FRANCIS CHAUDHARY  MRN-[id number]    Patient is a 68y old  Male who presents with a chief complaint of Told to come for abnormal labs (02 Nov 2023 07:58)      HPI:  -  -on 2 L  eating breakfast  on NIV last night  ABG done    ROS:   -    ACTIVE MEDICATION LIST:  MEDICATIONS  (STANDING):  aspirin enteric coated 81 milliGRAM(s) Oral daily  atorvastatin 40 milliGRAM(s) Oral at bedtime  budesonide 160 MICROgram(s)/formoterol 4.5 MICROgram(s) Inhaler 2 Puff(s) Inhalation two times a day  enoxaparin Injectable 40 milliGRAM(s) SubCutaneous every 24 hours  levothyroxine 37.5 MICROGram(s) Oral daily  lidocaine 4% Cream 1 Application(s) Topical once  lisinopril 20 milliGRAM(s) Oral daily  metoprolol tartrate 50 milliGRAM(s) Oral two times a day  prasugrel 10 milliGRAM(s) Oral daily  tiotropium 2.5 MICROgram(s) Inhaler 2 Puff(s) Inhalation daily    MEDICATIONS  (PRN):  acetaminophen     Tablet .. 650 milliGRAM(s) Oral every 6 hours PRN Temp greater or equal to 38C (100.4F), Mild Pain (1 - 3)  albuterol    90 MICROgram(s) HFA Inhaler 2 Puff(s) Inhalation every 6 hours PRN Shortness of Breath and/or Wheezing      EXAM:  Vital Signs Last 24 Hrs  T(C): 36.3 (02 Nov 2023 06:26), Max: 36.6 (01 Nov 2023 10:15)  T(F): 97.4 (02 Nov 2023 06:26), Max: 97.8 (01 Nov 2023 10:15)  HR: 56 (02 Nov 2023 08:46) (54 - 66)  BP: 149/64 (02 Nov 2023 08:46) (110/72 - 152/81)  BP(mean): --  RR: 16 (02 Nov 2023 06:26) (16 - 17)  SpO2: 100% (02 Nov 2023 08:46) (94% - 100%)    Parameters below as of 02 Nov 2023 08:46  Patient On (Oxygen Delivery Method): nasal cannula  O2 Flow (L/min): 2      GENERAL: The patient is awake and alert in no apparent distress.     LUNGS: Clear to auscultation without wheezing                             10.5   11.71 )-----------( 252      ( 02 Nov 2023 06:22 )             32.5       11-02    134<L>  |  95<L>  |  15  ----------------------------<  93  3.5   |  35<H>  |  0.57    Ca    9.1      02 Nov 2023 06:22  Phos  3.3     11-02  Mg     2.00     11-02       < from: CT Angio Chest Aorta w/wo IV Cont (10.27.23 @ 23:11) >    ACC: [id number] EXAM:  CT ANGIO ABD PELV (W)AW IC   ORDERED BY: JOSÉ MANUEL FRANK     ACC: [id number] EXAM:  CT ANGIO CHEST AORTA WAWIC   ORDERED BY: HOANG SMITH     PROCEDURE DATE:  10/27/2023          INTERPRETATION:  INDICATION: Abdominal aortic aneurysm.    COMPARISON: CTA chest abdomen pelvis 12/15/2022.    CONTRAST/COMPLICATIONS:  IV Contrast: Omnipaque 350. 95 cc administered. 5 cc discarded.  Oral Contrast: None.  Complications: None reported at time of study completion.    PROCEDURE:  CT Angiography of the Chest, Abdomen and Pelvis.  Precontrast imaging was performed through the chest followed by arterial   phase imaging of the chest, abdomen and pelvis.  Sagittal and coronal reformats were performed as well as 3D (MIP)   reconstructions.    FINDINGS:  CHEST:  LUNGS AND LARGE AIRWAYS: Small low density layering material in the   trachea, bilateral mainstem bronchi and bronchus intermedius. Emphysema.   Bilateral subcentimeter pulmonary nodules. A left upper lobe 4 mm nodule   (301:54) appears new when compared to prior exam.  PLEURA: No pleural effusion.  VESSELS: No thoracic aortic dissection, aneurysm or intramural hematoma.   Aortic and coronary artery calcifications.  HEART: Heart size is normal. No pericardial effusion. AICD lead   terminates in the right ventricle.  MEDIASTINUM AND ALANNA: No lymphadenopathy.  CHEST WALL AND LOWER NECK: Left anterior chest wall AICD.    ABDOMEN AND PELVIS:  LIVER: Within normal limits.  BILE DUCTS: Normal caliber.  GALLBLADDER: Cholelithiasis.  SPLEEN: Within normal limits.  PANCREAS: Within normal limits.  ADRENALS: Similar indeterminate attenuation right adrenal nodule. Left   adrenal gland is normal.  KIDNEYS/URETERS: Symmetric enhancement. No hydronephrosis. 4 mm   nonobstructing right renal calculus. Left renal cortical   scarring/postsurgical change. Left renal cyst.    BLADDER: Within normal limits.  REPRODUCTIVE ORGANS: Prostate is enlarged.    BOWEL: No bowel obstruction. Appendix is normal.  PERITONEUM: No ascites.    VESSELS: Partially thrombosed infrarenal abdominal aortic aneurysm   measures 5.0 x 5.8 x 12.3 cm (TRV x AP x CC), previously 5.3 x 4.8 x 11.5   cm. Crescentic area of relative hyperattenuation within the thrombus   along the anterior aspect of the aneurysm at the level of the kidneys   (example 301, 153), suspicious for hemorrhage/leakage into thrombus. No   active extravasation identified. Similar ectasia of the proximal common   iliac arteries, up to 1.5 cm on the right. Intact right femoral bypass   graft. Atherosclerotic changes. No abdominal aortic dissection.  RETROPERITONEUM/LYMPH NODES: No lymphadenopathy.  ABDOMINAL WALL: Within normal limits.  BONES: Degenerative changes.    IMPRESSION:  No aortic dissection.    Increased size of partially thrombosed infrarenal abdominal aortic   aneurysm measuring up to 5.8 cm in greatest axial dimension. Crescentic   area of mild relative hyperattenuation within the thrombus along the   anterior aspect of the aneurysm at the level of the kidneys suspicious   for hemorrhage/leakage into the thrombus. No active extravasation   identified. Vascular surgery consult is recommended.    --- End of Report ---          TAYLOR GARNER MD; Resident Radiology  This document has been electronicallysigned.  MU OLSON MD; Attending Radiologist  This document has been electronically signed. Oct 28 2023  1:05AM    < end of copied text >  < from: TTE W or WO Ultrasound Enhancing Agent (10.30.23 @ 15:17) >  CONCLUSIONS:      1. Left ventricular wall thickness is normal. Left ventricular systolic function is mildly decreased with an ejection fraction of 49 % by Vazquez's method of disks. Regional wall motion abnormalities present.   2. There is hypokinesis of the apical and apical septal walls.   3. Normal right ventricular cavity size and systolic function.   4. The aortic valve appears trileaflet with reduced systolic excursion. There is mild to moderate aortic stenosis. The peak transaortic velocity is 2.58 m/s, peak transaortic gradient is 26.6 mmHg and mean transaortic gradient is 12.0 mmHg with an LVOT/aortic valve VTI ratio of 0.45. The aortic valve area is estimated at 1.41 cm² by the continuity equation. There is trace aortic regurgitation.   5. Normal atria.   6. The inferior vena cava is normal in size measuring 1.62 cm in diameter, (normal <2.1cm) with normal inspiratory collapse (normal >50%) consistent with normal right atrial pressure (~3, range 0-5mmHg).   7. No pericardial effusion seen.    ________________________________________________________________________________________    < end of copied text >    PROBLEM LIST:  68y Male with HEALTH ISSUES - PROBLEM Dx:  Acute on chronic respiratory failure with hypercapnia    COPD exacerbation    Hypertension    AAA (abdominal aortic aneurysm)    Prophylactic measure    Hyperlipidemia              RECS:  patient with hypercarbic resp failure due to copd/ emphysema/ former smoker, which did no improve on BPAP  on NIVV, improved hypercarbia  should have NIVV prior to dc given risk of hypercarbic resp failure, decrease risk of readmission  taper off his 02  avoid HYPEROXEMIA  continue symbicort- told him to rinse mouth  continue spiriva  contiue NIVV qhs/ whenever sleeping  fnish prednisone    I have been communicating with DME- bedside spirometry was ordered for this am- if still needed please ask RT to complete      Please call with any questions.    Paris Warren, DO  [facility]  [phone]   PULMONARY PROGRESS NOTE    FRANCIS CHAUDHARY  MRN-[id number]    Patient is a 68y old  Male who presents with a chief complaint of Told to come for abnormal labs (02 Nov 2023 07:58)      HPI:  -  -on 2 L  eating breakfast  on NIV last night  ABG done    ROS:   -    ACTIVE MEDICATION LIST:  MEDICATIONS  (STANDING):  aspirin enteric coated 81 milliGRAM(s) Oral daily  atorvastatin 40 milliGRAM(s) Oral at bedtime  budesonide 160 MICROgram(s)/formoterol 4.5 MICROgram(s) Inhaler 2 Puff(s) Inhalation two times a day  enoxaparin Injectable 40 milliGRAM(s) SubCutaneous every 24 hours  levothyroxine 37.5 MICROGram(s) Oral daily  lidocaine 4% Cream 1 Application(s) Topical once  lisinopril 20 milliGRAM(s) Oral daily  metoprolol tartrate 50 milliGRAM(s) Oral two times a day  prasugrel 10 milliGRAM(s) Oral daily  tiotropium 2.5 MICROgram(s) Inhaler 2 Puff(s) Inhalation daily    MEDICATIONS  (PRN):  acetaminophen     Tablet .. 650 milliGRAM(s) Oral every 6 hours PRN Temp greater or equal to 38C (100.4F), Mild Pain (1 - 3)  albuterol    90 MICROgram(s) HFA Inhaler 2 Puff(s) Inhalation every 6 hours PRN Shortness of Breath and/or Wheezing      EXAM:  Vital Signs Last 24 Hrs  T(C): 36.3 (02 Nov 2023 06:26), Max: 36.6 (01 Nov 2023 10:15)  T(F): 97.4 (02 Nov 2023 06:26), Max: 97.8 (01 Nov 2023 10:15)  HR: 56 (02 Nov 2023 08:46) (54 - 66)  BP: 149/64 (02 Nov 2023 08:46) (110/72 - 152/81)  BP(mean): --  RR: 16 (02 Nov 2023 06:26) (16 - 17)  SpO2: 100% (02 Nov 2023 08:46) (94% - 100%)    Parameters below as of 02 Nov 2023 08:46  Patient On (Oxygen Delivery Method): nasal cannula  O2 Flow (L/min): 2      GENERAL: The patient is awake and alert in no apparent distress.     LUNGS: Clear to auscultation without wheezing                             10.5   11.71 )-----------( 252      ( 02 Nov 2023 06:22 )             32.5       11-02    134<L>  |  95<L>  |  15  ----------------------------<  93  3.5   |  35<H>  |  0.57    Ca    9.1      02 Nov 2023 06:22  Phos  3.3     11-02  Mg     2.00     11-02       < from: CT Angio Chest Aorta w/wo IV Cont (10.27.23 @ 23:11) >    ACC: [id number] EXAM:  CT ANGIO ABD PELV (W)AW IC   ORDERED BY: JOSÉ MANUEL FRANK     ACC: [id number] EXAM:  CT ANGIO CHEST AORTA WAWIC   ORDERED BY: HOANG SMITH     PROCEDURE DATE:  10/27/2023          INTERPRETATION:  INDICATION: Abdominal aortic aneurysm.    COMPARISON: CTA chest abdomen pelvis 12/15/2022.    CONTRAST/COMPLICATIONS:  IV Contrast: Omnipaque 350. 95 cc administered. 5 cc discarded.  Oral Contrast: None.  Complications: None reported at time of study completion.    PROCEDURE:  CT Angiography of the Chest, Abdomen and Pelvis.  Precontrast imaging was performed through the chest followed by arterial   phase imaging of the chest, abdomen and pelvis.  Sagittal and coronal reformats were performed as well as 3D (MIP)   reconstructions.    FINDINGS:  CHEST:  LUNGS AND LARGE AIRWAYS: Small low density layering material in the   trachea, bilateral mainstem bronchi and bronchus intermedius. Emphysema.   Bilateral subcentimeter pulmonary nodules. A left upper lobe 4 mm nodule   (301:54) appears new when compared to prior exam.  PLEURA: No pleural effusion.  VESSELS: No thoracic aortic dissection, aneurysm or intramural hematoma.   Aortic and coronary artery calcifications.  HEART: Heart size is normal. No pericardial effusion. AICD lead   terminates in the right ventricle.  MEDIASTINUM AND ALANNA: No lymphadenopathy.  CHEST WALL AND LOWER NECK: Left anterior chest wall AICD.    ABDOMEN AND PELVIS:  LIVER: Within normal limits.  BILE DUCTS: Normal caliber.  GALLBLADDER: Cholelithiasis.  SPLEEN: Within normal limits.  PANCREAS: Within normal limits.  ADRENALS: Similar indeterminate attenuation right adrenal nodule. Left   adrenal gland is normal.  KIDNEYS/URETERS: Symmetric enhancement. No hydronephrosis. 4 mm   nonobstructing right renal calculus. Left renal cortical   scarring/postsurgical change. Left renal cyst.    BLADDER: Within normal limits.  REPRODUCTIVE ORGANS: Prostate is enlarged.    BOWEL: No bowel obstruction. Appendix is normal.  PERITONEUM: No ascites.    VESSELS: Partially thrombosed infrarenal abdominal aortic aneurysm   measures 5.0 x 5.8 x 12.3 cm (TRV x AP x CC), previously 5.3 x 4.8 x 11.5   cm. Crescentic area of relative hyperattenuation within the thrombus   along the anterior aspect of the aneurysm at the level of the kidneys   (example 301, 153), suspicious for hemorrhage/leakage into thrombus. No   active extravasation identified. Similar ectasia of the proximal common   iliac arteries, up to 1.5 cm on the right. Intact right femoral bypass   graft. Atherosclerotic changes. No abdominal aortic dissection.  RETROPERITONEUM/LYMPH NODES: No lymphadenopathy.  ABDOMINAL WALL: Within normal limits.  BONES: Degenerative changes.    IMPRESSION:  No aortic dissection.    Increased size of partially thrombosed infrarenal abdominal aortic   aneurysm measuring up to 5.8 cm in greatest axial dimension. Crescentic   area of mild relative hyperattenuation within the thrombus along the   anterior aspect of the aneurysm at the level of the kidneys suspicious   for hemorrhage/leakage into the thrombus. No active extravasation   identified. Vascular surgery consult is recommended.    --- End of Report ---          TAYLOR GARNER MD; Resident Radiology  This document has been electronicallysigned.  MU OLSON MD; Attending Radiologist  This document has been electronically signed. Oct 28 2023  1:05AM    < end of copied text >  < from: TTE W or WO Ultrasound Enhancing Agent (10.30.23 @ 15:17) >  CONCLUSIONS:      1. Left ventricular wall thickness is normal. Left ventricular systolic function is mildly decreased with an ejection fraction of 49 % by Vazquez's method of disks. Regional wall motion abnormalities present.   2. There is hypokinesis of the apical and apical septal walls.   3. Normal right ventricular cavity size and systolic function.   4. The aortic valve appears trileaflet with reduced systolic excursion. There is mild to moderate aortic stenosis. The peak transaortic velocity is 2.58 m/s, peak transaortic gradient is 26.6 mmHg and mean transaortic gradient is 12.0 mmHg with an LVOT/aortic valve VTI ratio of 0.45. The aortic valve area is estimated at 1.41 cm² by the continuity equation. There is trace aortic regurgitation.   5. Normal atria.   6. The inferior vena cava is normal in size measuring 1.62 cm in diameter, (normal <2.1cm) with normal inspiratory collapse (normal >50%) consistent with normal right atrial pressure (~3, range 0-5mmHg).   7. No pericardial effusion seen.    ________________________________________________________________________________________    < end of copied text >    PROBLEM LIST:  68y Male with HEALTH ISSUES - PROBLEM Dx:  Acute on chronic respiratory failure with hypercapnia    COPD exacerbation    Hypertension    AAA (abdominal aortic aneurysm)    Prophylactic measure    Hyperlipidemia              RECS:  patient with CHRONIC HYPERCARBIC RESP FAILURE due to copd/ emphysema/ former smoker  hypercarbia  did nt  improve on BPAP   on NIVV, improved hypercarbia noted on blood work  should have NIVV prior to dc given risk of hypercarbic resp failure, decrease risk of readmission  taper off his 02 - avoid HYPEROXEMIA  continue symbicort- told him to rinse mouth  continue spiriva  contiue NIVV qhs/ whenever sleeping  fnish prednisone    I have been communicating with DME- bedside spirometry was ordered for this am- if still needed please ask RT to complete      the patient requires home medical ventilator device capcable of delivering pressure trageted, and/or volume present ventilation cannot be done on any home care BPAP device due to their chronic respiratory failure consequent to the COPD.  The patient will need home medical ventilator with battery back up continuous enhanced alamar monitoring mouth piece ventilation, home resp. therapist set up adn continued visits for continued unse up to 24hrs/day an access to RT 24/7 for urgent trouble shooting machine repairs & ongoing titration or adjustments.  We have attempted avaps mode on this patient. he has been admitted for chronic resp failure with hypercapnia secondayr to copd. the patient's condition will likely deteriorate without a home medical ventilator and this may cause him to be rehospitalized or cause serious clinical harm    Please call with any questions.    Paris Warren DO  [facility] Pulmonary/Sleep Medicine  [phone]

## 2023-11-02 NOTE — PROGRESS NOTE ADULT - ASSESSMENT
_________________________________________________________________________________________  ========>>  M E D I C A L   A T T E N D I N G    F O L L O W  U P  N O T E  <<=========  -----------------------------------------------------------------------------------------------------    - Patient seen and examined by me earlier today.   - Patient today overall doing ok, comfortable, eating OK. breathing ok      Patient complaining of chronic leg pains, following with vascular surgery regarding possible interventions       there have been difficulties getting an ABG ( needed to approve an AVAPs machine for home)>> to reattempt..     ==================>> REVIEW OF SYSTEM <<=================    GEN: no fever, no chills, no pain  RESP: no significant SOB at rest on O2, no sputum  CVS: no chest pain, no palpitations  GI: no abdominal pain, no nausea  : no dysuria, no frequency  Neuro: no headache, no dizziness    ==================>> PHYSICAL EXAM <<=================    GEN: A&O X 3 , NAD , comfortable, pleasant, calm in bed with son at bedside..  encouraged out of bed to chair with assistance as needed.   HEENT: NCAT, PERRL, MMM, hearing intact., O2 via NC  CVS: S1S2 , regular , No M/R/G appreciated  PULM: decreased BS bilaterally, no wheezing noted   ABD.: soft. non tender, non distended,  bowel sounds present  Extrem: intact pulses , no edema        ( Note written / Date of service 11-02-23 ( This is certified to be the same as "ENTERED" date above ( for billing purposes)))    ==================>> MEDICATIONS <<====================    artificial tears (preservative free) Ophthalmic Solution 1 Drop(s) Both EYES two times a day  aspirin enteric coated 81 milliGRAM(s) Oral daily  atorvastatin 40 milliGRAM(s) Oral at bedtime  budesonide 160 MICROgram(s)/formoterol 4.5 MICROgram(s) Inhaler 2 Puff(s) Inhalation two times a day  enoxaparin Injectable 40 milliGRAM(s) SubCutaneous every 24 hours  levothyroxine 37.5 MICROGram(s) Oral daily  lidocaine 4% Cream 1 Application(s) Topical once  lisinopril 20 milliGRAM(s) Oral daily  metoprolol tartrate 50 milliGRAM(s) Oral two times a day  prasugrel 10 milliGRAM(s) Oral daily  tiotropium 2.5 MICROgram(s) Inhaler 2 Puff(s) Inhalation daily    MEDICATIONS  (PRN):  acetaminophen     Tablet .. 650 milliGRAM(s) Oral every 6 hours PRN Temp greater or equal to 38C (100.4F), Mild Pain (1 - 3)  albuterol    90 MICROgram(s) HFA Inhaler 2 Puff(s) Inhalation every 6 hours PRN Shortness of Breath and/or Wheezing    ___________  Active diet:  Diet, Regular:   1200mL Fluid Restriction ([id number])  ___________________    ==================>> VITAL SIGNS <<==================  Height (cm): 172.7  Weight (kg): 63.458  BMI (kg/m2): 21.3  Vital Signs Last 24 HrsT(C): 36.3 (11-02-23 @ 14:00)  T(F): 97.4 (11-02-23 @ 14:00), Max: 97.6 (11-01-23 @ 21:45)  HR: 67 (11-02-23 @ 14:00) (54 - 67)  BP: 149/84 (11-02-23 @ 14:00)  RR: 17 (11-02-23 @ 14:00) (16 - 17)  SpO2: 100% (11-02-23 @ 14:00) (95% - 100%)      CAPILLARY BLOOD GLUCOSE         ==================>> LAB AND IMAGING <<==================                        10.5   11.71 )-----------( 252      ( 02 Nov 2023 06:22 )             32.5        11-02    134<L>  |  95<L>  |  15  ----------------------------<  93  3.5   |  35<H>  |  0.57    Ca    9.1      02 Nov 2023 06:22  Phos  3.3     11-02  Mg     2.00     11-02      WBC count:   11.71 <<== ,  10.53 <<== ,  13.43 <<== ,  12.36 <<== ,  16.43 <<==   Hemoglobin:   10.5 <<==,  10.8 <<==,  11.5 <<==,  10.9 <<==,  11.1 <<==  platelets:  252 <==, 249 <==, 229 <==, 233 <==, 236 <==, 252 <==, 209 <==    Creatinine:  0.57  <<==, 0.65  <<==, 0.54  <<==, 0.67  <<==, 0.58  <<==, 0.47  <<==  Sodium:   134  <==, 140  <==, 138  <==, 141  <==, 132  <==, 135  <==, 137  <==       AST:          27 <==      ALT:        11  <==      AP:        100  <=     Bili:        0.6  <=    ____________________________    M I C R O B I O L O G Y :      SARS-CoV-2: Sang (10-28-23 @ 12:12)      < from: TTE W or WO Ultrasound Enhancing Agent (10.30.23 @ 15:17) >  CONCLUSIONS:    1. Left ventricular wall thickness is normal. Left ventricular systolic function is mildly decreased with an ejection fraction of 49 % by Vazquez's method of disks. Regional wall motion abnormalities present.   2. There is hypokinesis of the apical and apical septal walls.   3. Normal right ventricular cavity size and systolic function.   4. The aortic valve appears trileaflet with reduced systolic excursion. There is mild to moderate aortic stenosis. The peak transaortic velocity is 2.58 m/s, peak transaortic gradient is 26.6 mmHg and mean transaortic gradient is 12.0 mmHg with an LVOT/aortic valve VTI ratio of 0.45. The aortic valve area is estimated at 1.41 cm² by the continuity equation. There is trace aortic regurgitation.   5. Normal atria.   6. The inferior vena cava is normal in size measuring 1.62 cm in diameter, (normal <2.1cm) with normal inspiratory collapse (normal >50%) consistent with normal right atrial pressure (~3, range 0-5mmHg).   7. No pericardial effusion seen.  < end of copied text >    ___________________________________________________________________________________  ===============>>  A S S E S S M E N T   A N D   P L A N <<===============  ------------------------------------------------------------------------------------------    · Assessment	  71 year old male with PMHx of COPD on home O2 (2-3 L/NC), HTN, known AAA, PAD, hyperlipidemia and alcohol use disorder complicated with liver cirrhosis (quit 1yr ago) who was told to come to ER for abnormal labs (elevated CO2 to 42 on recent labs), felt to be secondary to acute on chronic hypercapnic respiratory failure due to likely COPD exacerbation.    Problem/Plan - 1:  ·  Problem: Acute on chronic respiratory failure with hypercapnia.   ·  Plan: Likely secondary to COPD exacerbation, pt has not seen Pulmonary as outpatient for a good while  - Being treated for likely COPD exacerbation  - Pulmonary follow-up, management and optimization appreciated  - AVAPS being tried >> will need to go home with per pulm  - Has a left upper lobe 4 mm nodule, to be f/up as outpatient     We also need a PFT as a patient with pulmonary follow-up    Problem/Plan - 2:  ·  Problem: Hypertension.   ·  Plan: On lisinopril and metoprolol as outpatient  - C/w home meds  - Seen by Cards, recs noted  - Echocardiogram as above     Further management and optimization as per cardiology given above Abnormalities    Problem/Plan - 3:  ·  Problem: hyponatremia >> improved  diet changed  patient eating well  free fluid restriction   monitor   renal if worsened     Problem/Plan - 4:  ·  Problem: AAA (abdominal aortic aneurysm).   - CTA Ao shows-Increased size of partially thrombosed infrarenal abdominal aortic aneurysm measuring up to 5.8 cm in greatest axial dimension. Crescentic area of mild relative hyperattenuation within the thrombus along the anterior aspect of the aneurysm at the level of the kidneys suspicious for hemorrhage/leakage into the thrombus. No active extravasation identified  - Seen by Vascular, no intervention at this time planned   - BP control   - C/w ASA/prasugrel/statin  - Follow up with Dr. Joaquin as outpatient on 10/30/23 per team   - Cardio on board    - Patient with symptoms, likely from claudication >> Need close vascular follow-up     Encouraged increased activity as able    Problem/Plan - 5:  ·  Problem: Hyperlipidemia.   ·  Plan: - C/w statin.    Problem/Plan - 6:  ·  Problem: Prophylactic measure.   ·  Plan: LMWH  PT  // OOB to chair daily / increase activity as able     --------------------------------------------  Case discussed with patient, son , ACP   Education given on findings and plan of care  ___________________________  H. JOJO Brown.  Pager: [phone]       _________________________________________________________________________________________  ========>>  M E D I C A L   A T T E N D I N G    F O L L O W  U P  N O T E  <<=========  -----------------------------------------------------------------------------------------------------    - Patient seen and examined by me earlier today.   - Patient today overall doing ok, comfortable, eating OK. breathing ok      overall better today..     ==================>> REVIEW OF SYSTEM <<=================    GEN: no fever, no chills, no pain  RESP: no significant SOB at rest on O2, no sputum  CVS: no chest pain, no palpitations  GI: no abdominal pain, no nausea  : no dysuria, no frequency  Neuro: no headache, no dizziness    ==================>> PHYSICAL EXAM <<=================    GEN: A&O X 3 , NAD , comfortable, pleasant, calm in chair   HEENT: NCAT, PERRL, MMM, hearing intact., O2 via NC  CVS: S1S2 , regular , No M/R/G appreciated  PULM: decreased BS bilaterally, no wheezing noted   ABD.: soft. non tender, non distended,  bowel sounds present  Extrem: intact pulses , no edema        ( Note written / Date of service 11-02-23 ( This is certified to be the same as "ENTERED" date above ( for billing purposes)))    ==================>> MEDICATIONS <<====================    artificial tears (preservative free) Ophthalmic Solution 1 Drop(s) Both EYES two times a day  aspirin enteric coated 81 milliGRAM(s) Oral daily  atorvastatin 40 milliGRAM(s) Oral at bedtime  budesonide 160 MICROgram(s)/formoterol 4.5 MICROgram(s) Inhaler 2 Puff(s) Inhalation two times a day  enoxaparin Injectable 40 milliGRAM(s) SubCutaneous every 24 hours  levothyroxine 37.5 MICROGram(s) Oral daily  lidocaine 4% Cream 1 Application(s) Topical once  lisinopril 20 milliGRAM(s) Oral daily  metoprolol tartrate 50 milliGRAM(s) Oral two times a day  prasugrel 10 milliGRAM(s) Oral daily  tiotropium 2.5 MICROgram(s) Inhaler 2 Puff(s) Inhalation daily    MEDICATIONS  (PRN):  acetaminophen     Tablet .. 650 milliGRAM(s) Oral every 6 hours PRN Temp greater or equal to 38C (100.4F), Mild Pain (1 - 3)  albuterol    90 MICROgram(s) HFA Inhaler 2 Puff(s) Inhalation every 6 hours PRN Shortness of Breath and/or Wheezing    ___________  Active diet:  Diet, Regular:   1200mL Fluid Restriction ([id number])  ___________________    ==================>> VITAL SIGNS <<==================  Height (cm): 172.7  Weight (kg): 63.458  BMI (kg/m2): 21.3  Vital Signs Last 24 HrsT(C): 36.3 (11-02-23 @ 14:00)  T(F): 97.4 (11-02-23 @ 14:00), Max: 97.6 (11-01-23 @ 21:45)  HR: 67 (11-02-23 @ 14:00) (54 - 67)  BP: 149/84 (11-02-23 @ 14:00)  RR: 17 (11-02-23 @ 14:00) (16 - 17)  SpO2: 100% (11-02-23 @ 14:00) (95% - 100%)       ==================>> LAB AND IMAGING <<==================                        10.5   11.71 )-----------( 252      ( 02 Nov 2023 06:22 )             32.5        11-02    134<L>  |  95<L>  |  15  ----------------------------<  93  3.5   |  35<H>  |  0.57    Ca    9.1      02 Nov 2023 06:22  Phos  3.3     11-02  Mg     2.00     11-02      WBC count:   11.71 <<== ,  10.53 <<== ,  13.43 <<== ,  12.36 <<== ,  16.43 <<==   Hemoglobin:   10.5 <<==,  10.8 <<==,  11.5 <<==,  10.9 <<==,  11.1 <<==  platelets:  252 <==, 249 <==, 229 <==, 233 <==, 236 <==, 252 <==, 209 <==    Creatinine:  0.57  <<==, 0.65  <<==, 0.54  <<==, 0.67  <<==, 0.58  <<==, 0.47  <<==  Sodium:   134  <==, 140  <==, 138  <==, 141  <==, 132  <==, 135  <==, 137  <==    ABG noted     < from: TTE W or WO Ultrasound Enhancing Agent (10.30.23 @ 15:17) >  CONCLUSIONS:    1. Left ventricular wall thickness is normal. Left ventricular systolic function is mildly decreased with an ejection fraction of 49 % by Vazquez's method of disks. Regional wall motion abnormalities present.   2. There is hypokinesis of the apical and apical septal walls.   3. Normal right ventricular cavity size and systolic function.   4. The aortic valve appears trileaflet with reduced systolic excursion. There is mild to moderate aortic stenosis. The peak transaortic velocity is 2.58 m/s, peak transaortic gradient is 26.6 mmHg and mean transaortic gradient is 12.0 mmHg with an LVOT/aortic valve VTI ratio of 0.45. The aortic valve area is estimated at 1.41 cm² by the continuity equation. There is trace aortic regurgitation.   5. Normal atria.   6. The inferior vena cava is normal in size measuring 1.62 cm in diameter, (normal <2.1cm) with normal inspiratory collapse (normal >50%) consistent with normal right atrial pressure (~3, range 0-5mmHg).   7. No pericardial effusion seen.  < end of copied text >    ___________________________________________________________________________________  ===============>>  A S S E S S M E N T   A N D   P L A N <<===============  ------------------------------------------------------------------------------------------    · Assessment	  71 year old male with PMHx of COPD on home O2 (2-3 L/NC), HTN, known AAA, PAD, hyperlipidemia and alcohol use disorder complicated with liver cirrhosis (quit 1yr ago) who was told to come to ER for abnormal labs (elevated CO2 to 42 on recent labs), felt to be secondary to acute on chronic hypercapnic respiratory failure due to likely COPD exacerbation.    Problem/Plan - 1:  ·  Problem: Acute on chronic respiratory failure with hypercapnia.   ·  Plan: Likely secondary to COPD exacerbation, pt has not seen Pulmonary as outpatient for a good while  - Being treated for likely COPD exacerbation  - Pulmonary follow-up, management and optimization appreciated  - AVAPS being tried >> will need to go home with per pulm  - Has a left upper lobe 4 mm nodule, to be f/up as outpatient     We also need a PFT as a patient with pulmonary follow-up    Problem/Plan - 2:  ·  Problem: Hypertension.   ·  Plan: On lisinopril and metoprolol as outpatient  - C/w home meds  - Seen by Cards, recs noted  - Echocardiogram as above     Further management and optimization as per cardiology given above Abnormalities    Problem/Plan - 3:  ·  Problem: hyponatremia >> improved  diet changed  patient eating well  free fluid restriction   monitor   renal if worsened     Problem/Plan - 4:  ·  Problem: AAA (abdominal aortic aneurysm).   - CTA Ao shows-Increased size of partially thrombosed infrarenal abdominal aortic aneurysm measuring up to 5.8 cm in greatest axial dimension. Crescentic area of mild relative hyperattenuation within the thrombus along the anterior aspect of the aneurysm at the level of the kidneys suspicious for hemorrhage/leakage into the thrombus. No active extravasation identified  - Seen by Vascular, no intervention at this time planned   - BP control   - C/w ASA/prasugrel/statin  - Follow up with Dr. Joaquin as outpatient on 10/30/23 per team   - Cardio on board    - Patient with symptoms, likely from claudication >> Need close vascular follow-up as discussed      Encouraged increased activity as able    Problem/Plan - 5:  ·  Problem: Hyperlipidemia.   ·  Plan: - C/w statin.    Problem/Plan - 6:  ·  Problem: Prophylactic measure.   ·  Plan: LMWH  PT  // OOB to chair daily / increase activity as able     DC planing home pending above     --------------------------------------------  Case discussed with patient,   Education given on findings and plan of care  ___________________________  HKrupa Brown D.O.  Pager: [phone]

## 2023-11-03 LAB
ANION GAP SERPL CALC-SCNC: 8 MMOL/L — SIGNIFICANT CHANGE UP (ref 7–14)
ANION GAP SERPL CALC-SCNC: 8 MMOL/L — SIGNIFICANT CHANGE UP (ref 7–14)
BUN SERPL-MCNC: 14 MG/DL — SIGNIFICANT CHANGE UP (ref 7–23)
BUN SERPL-MCNC: 14 MG/DL — SIGNIFICANT CHANGE UP (ref 7–23)
CALCIUM SERPL-MCNC: 9.2 MG/DL — SIGNIFICANT CHANGE UP (ref 8.4–10.5)
CALCIUM SERPL-MCNC: 9.2 MG/DL — SIGNIFICANT CHANGE UP (ref 8.4–10.5)
CHLORIDE SERPL-SCNC: 96 MMOL/L — LOW (ref 98–107)
CHLORIDE SERPL-SCNC: 96 MMOL/L — LOW (ref 98–107)
CO2 SERPL-SCNC: 33 MMOL/L — HIGH (ref 22–31)
CO2 SERPL-SCNC: 33 MMOL/L — HIGH (ref 22–31)
CREAT SERPL-MCNC: 0.57 MG/DL — SIGNIFICANT CHANGE UP (ref 0.5–1.3)
CREAT SERPL-MCNC: 0.57 MG/DL — SIGNIFICANT CHANGE UP (ref 0.5–1.3)
EGFR: 105 ML/MIN/1.73M2 — SIGNIFICANT CHANGE UP
EGFR: 105 ML/MIN/1.73M2 — SIGNIFICANT CHANGE UP
GLUCOSE SERPL-MCNC: 81 MG/DL — SIGNIFICANT CHANGE UP (ref 70–99)
GLUCOSE SERPL-MCNC: 81 MG/DL — SIGNIFICANT CHANGE UP (ref 70–99)
HCT VFR BLD CALC: 34.1 % — LOW (ref 39–50)
HCT VFR BLD CALC: 34.1 % — LOW (ref 39–50)
HGB BLD-MCNC: 11 G/DL — LOW (ref 13–17)
HGB BLD-MCNC: 11 G/DL — LOW (ref 13–17)
MAGNESIUM SERPL-MCNC: 1.7 MG/DL — SIGNIFICANT CHANGE UP (ref 1.6–2.6)
MAGNESIUM SERPL-MCNC: 1.7 MG/DL — SIGNIFICANT CHANGE UP (ref 1.6–2.6)
MCHC RBC-ENTMCNC: 28.9 PG — SIGNIFICANT CHANGE UP (ref 27–34)
MCHC RBC-ENTMCNC: 28.9 PG — SIGNIFICANT CHANGE UP (ref 27–34)
MCHC RBC-ENTMCNC: 32.3 GM/DL — SIGNIFICANT CHANGE UP (ref 32–36)
MCHC RBC-ENTMCNC: 32.3 GM/DL — SIGNIFICANT CHANGE UP (ref 32–36)
MCV RBC AUTO: 89.5 FL — SIGNIFICANT CHANGE UP (ref 80–100)
MCV RBC AUTO: 89.5 FL — SIGNIFICANT CHANGE UP (ref 80–100)
NRBC # BLD: 0 /100 WBCS — SIGNIFICANT CHANGE UP (ref 0–0)
NRBC # BLD: 0 /100 WBCS — SIGNIFICANT CHANGE UP (ref 0–0)
NRBC # FLD: 0 K/UL — SIGNIFICANT CHANGE UP (ref 0–0)
NRBC # FLD: 0 K/UL — SIGNIFICANT CHANGE UP (ref 0–0)
PHOSPHATE SERPL-MCNC: 3.6 MG/DL — SIGNIFICANT CHANGE UP (ref 2.5–4.5)
PHOSPHATE SERPL-MCNC: 3.6 MG/DL — SIGNIFICANT CHANGE UP (ref 2.5–4.5)
PLATELET # BLD AUTO: 282 K/UL — SIGNIFICANT CHANGE UP (ref 150–400)
PLATELET # BLD AUTO: 282 K/UL — SIGNIFICANT CHANGE UP (ref 150–400)
POTASSIUM SERPL-MCNC: 3.8 MMOL/L — SIGNIFICANT CHANGE UP (ref 3.5–5.3)
POTASSIUM SERPL-MCNC: 3.8 MMOL/L — SIGNIFICANT CHANGE UP (ref 3.5–5.3)
POTASSIUM SERPL-SCNC: 3.8 MMOL/L — SIGNIFICANT CHANGE UP (ref 3.5–5.3)
POTASSIUM SERPL-SCNC: 3.8 MMOL/L — SIGNIFICANT CHANGE UP (ref 3.5–5.3)
RBC # BLD: 3.81 M/UL — LOW (ref 4.2–5.8)
RBC # BLD: 3.81 M/UL — LOW (ref 4.2–5.8)
RBC # FLD: 15.6 % — HIGH (ref 10.3–14.5)
RBC # FLD: 15.6 % — HIGH (ref 10.3–14.5)
SODIUM SERPL-SCNC: 137 MMOL/L — SIGNIFICANT CHANGE UP (ref 135–145)
SODIUM SERPL-SCNC: 137 MMOL/L — SIGNIFICANT CHANGE UP (ref 135–145)
WBC # BLD: 10.02 K/UL — SIGNIFICANT CHANGE UP (ref 3.8–10.5)
WBC # BLD: 10.02 K/UL — SIGNIFICANT CHANGE UP (ref 3.8–10.5)
WBC # FLD AUTO: 10.02 K/UL — SIGNIFICANT CHANGE UP (ref 3.8–10.5)
WBC # FLD AUTO: 10.02 K/UL — SIGNIFICANT CHANGE UP (ref 3.8–10.5)

## 2023-11-03 RX ADMIN — Medication 50 MILLIGRAM(S): at 09:41

## 2023-11-03 RX ADMIN — ENOXAPARIN SODIUM 40 MILLIGRAM(S): 100 INJECTION SUBCUTANEOUS at 17:32

## 2023-11-03 RX ADMIN — Medication 81 MILLIGRAM(S): at 11:55

## 2023-11-03 RX ADMIN — Medication 50 MILLIGRAM(S): at 22:35

## 2023-11-03 RX ADMIN — PRASUGREL 10 MILLIGRAM(S): 5 TABLET, FILM COATED ORAL at 11:55

## 2023-11-03 RX ADMIN — ATORVASTATIN CALCIUM 40 MILLIGRAM(S): 80 TABLET, FILM COATED ORAL at 22:35

## 2023-11-03 RX ADMIN — Medication 1 DROP(S): at 17:32

## 2023-11-03 RX ADMIN — TIOTROPIUM BROMIDE 2 PUFF(S): 18 CAPSULE ORAL; RESPIRATORY (INHALATION) at 09:45

## 2023-11-03 RX ADMIN — BUDESONIDE AND FORMOTEROL FUMARATE DIHYDRATE 2 PUFF(S): 160; 4.5 AEROSOL RESPIRATORY (INHALATION) at 22:35

## 2023-11-03 RX ADMIN — Medication 37.5 MICROGRAM(S): at 06:18

## 2023-11-03 RX ADMIN — Medication 1 DROP(S): at 06:17

## 2023-11-03 RX ADMIN — LISINOPRIL 20 MILLIGRAM(S): 2.5 TABLET ORAL at 06:18

## 2023-11-03 RX ADMIN — BUDESONIDE AND FORMOTEROL FUMARATE DIHYDRATE 2 PUFF(S): 160; 4.5 AEROSOL RESPIRATORY (INHALATION) at 09:42

## 2023-11-03 NOTE — PROGRESS NOTE ADULT - ASSESSMENT
_________________________________________________________________________________________  ========>>  M E D I C A L   A T T E N D I N G    F O L L O W  U P  N O T E  <<=========  -----------------------------------------------------------------------------------------------------    - Patient seen and examined by me earlier today.   - Patient today overall doing ok, comfortable, eating OK. breathing ok      overall better today..     ==================>> REVIEW OF SYSTEM <<=================    GEN: no fever, no chills, no pain  RESP: no significant SOB at rest on O2, no sputum  CVS: no chest pain, no palpitations  GI: no abdominal pain, no nausea  : no dysuria, no frequency  Neuro: no headache, no dizziness    ==================>> PHYSICAL EXAM <<=================    GEN: A&O X 3 , NAD , comfortable, pleasant, calm in chair   HEENT: NCAT, PERRL, MMM, hearing intact., O2 via NC  CVS: S1S2 , regular , No M/R/G appreciated  PULM: decreased BS bilaterally, no wheezing noted   ABD.: soft. non tender, non distended,  bowel sounds present  Extrem: intact pulses , no edema         ( Note written / Date of service 11-03-23 ( This is certified to be the same as "ENTERED" date above ( for billing purposes)))    ==================>> MEDICATIONS <<====================    artificial tears (preservative free) Ophthalmic Solution 1 Drop(s) Both EYES two times a day  aspirin enteric coated 81 milliGRAM(s) Oral daily  atorvastatin 40 milliGRAM(s) Oral at bedtime  budesonide 160 MICROgram(s)/formoterol 4.5 MICROgram(s) Inhaler 2 Puff(s) Inhalation two times a day  enoxaparin Injectable 40 milliGRAM(s) SubCutaneous every 24 hours  levothyroxine 37.5 MICROGram(s) Oral daily  lidocaine 4% Cream 1 Application(s) Topical once  lisinopril 20 milliGRAM(s) Oral daily  metoprolol tartrate 50 milliGRAM(s) Oral two times a day  prasugrel 10 milliGRAM(s) Oral daily  tiotropium 2.5 MICROgram(s) Inhaler 2 Puff(s) Inhalation daily    MEDICATIONS  (PRN):  acetaminophen     Tablet .. 650 milliGRAM(s) Oral every 6 hours PRN Temp greater or equal to 38C (100.4F), Mild Pain (1 - 3)  albuterol    90 MICROgram(s) HFA Inhaler 2 Puff(s) Inhalation every 6 hours PRN Shortness of Breath and/or Wheezing    ___________  Active diet:  Diet, Regular:   1200mL Fluid Restriction ([id number])  ___________________    ==================>> VITAL SIGNS <<==================    Vital Signs Last 24 HrsT(C): 36.5 (11-03-23 @ 13:30)  T(F): 97.7 (11-03-23 @ 13:30), Max: 97.9 (11-02-23 @ 18:00)  HR: 52 (11-03-23 @ 13:30) (52 - 65)  BP: 118/68 (11-03-23 @ 13:30)  RR: 17 (11-03-23 @ 13:30) (17 - 18)  SpO2: 100% (11-03-23 @ 13:30) (98% - 100%)      CAPILLARY BLOOD GLUCOSE      POCT Blood Glucose.: 100 mg/dL (02 Nov 2023 17:13)     ==================>> LAB AND IMAGING <<==================                        11.0   10.02 )-----------( 282      ( 03 Nov 2023 05:20 )             34.1        11-03    137  |  96<L>  |  14  ----------------------------<  81  3.8   |  33<H>  |  0.57    Ca    9.2      03 Nov 2023 05:20  Phos  3.6     11-03  Mg     1.70     11-03      WBC count:   10.02 <<== ,  11.71 <<== ,  10.53 <<== ,  13.43 <<== ,  12.36 <<==   Hemoglobin:   11.0 <<==,  10.5 <<==,  10.8 <<==,  11.5 <<==,  10.9 <<==  platelets:  282 <==, 252 <==, 249 <==, 229 <==, 233 <==, 236 <==    Creatinine:  0.57  <<==, 0.57  <<==, 0.65  <<==, 0.54  <<==, 0.67  <<==, 0.58  <<==  Sodium:   137  <==, 134  <==, 140  <==, 138  <==, 141  <==, 132  <==      < from: TTE W or WO Ultrasound Enhancing Agent (10.30.23 @ 15:17) >  CONCLUSIONS:    1. Left ventricular wall thickness is normal. Left ventricular systolic function is mildly decreased with an ejection fraction of 49 % by Vazquez's method of disks. Regional wall motion abnormalities present.   2. There is hypokinesis of the apical and apical septal walls.   3. Normal right ventricular cavity size and systolic function.   4. The aortic valve appears trileaflet with reduced systolic excursion. There is mild to moderate aortic stenosis. The peak transaortic velocity is 2.58 m/s, peak transaortic gradient is 26.6 mmHg and mean transaortic gradient is 12.0 mmHg with an LVOT/aortic valve VTI ratio of 0.45. The aortic valve area is estimated at 1.41 cm² by the continuity equation. There is trace aortic regurgitation.   5. Normal atria.   6. The inferior vena cava is normal in size measuring 1.62 cm in diameter, (normal <2.1cm) with normal inspiratory collapse (normal >50%) consistent with normal right atrial pressure (~3, range 0-5mmHg).   7. No pericardial effusion seen.  < end of copied text >    ___________________________________________________________________________________  ===============>>  A S S E S S M E N T   A N D   P L A N <<===============  ------------------------------------------------------------------------------------------    · Assessment	  71 year old male with PMHx of COPD on home O2 (2-3 L/NC), HTN, known AAA, PAD, hyperlipidemia and alcohol use disorder complicated with liver cirrhosis (quit 1yr ago) who was told to come to ER for abnormal labs (elevated CO2 to 42 on recent labs), felt to be secondary to acute on chronic hypercapnic respiratory failure due to likely COPD exacerbation.    Problem/Plan - 1:  ·  Problem: Acute on chronic respiratory failure with hypercapnia.   ·  Plan: Likely secondary to COPD exacerbation, pt has not seen Pulmonary as outpatient for a good while  - Being treated for likely COPD exacerbation  - Pulmonary follow-up, management and optimization appreciated  - AVAPS being tried >> will need to go home with per pulm  - Has a left upper lobe 4 mm nodule, to be f/up as outpatient     We also need a PFT as a patient with pulmonary follow-up    Problem/Plan - 2:  ·  Problem: Hypertension.   ·  Plan: On lisinopril and metoprolol as outpatient  - C/w home meds  - Seen by Cards, recs noted  - Echocardiogram as above     Further management and optimization as per cardiology given above Abnormalities    Problem/Plan - 3:  ·  Problem: hyponatremia >> improved  diet changed  patient eating well  free fluid restriction   monitor   renal if worsened     Problem/Plan - 4:  ·  Problem: AAA (abdominal aortic aneurysm).   - CTA Ao shows-Increased size of partially thrombosed infrarenal abdominal aortic aneurysm measuring up to 5.8 cm in greatest axial dimension. Crescentic area of mild relative hyperattenuation within the thrombus along the anterior aspect of the aneurysm at the level of the kidneys suspicious for hemorrhage/leakage into the thrombus. No active extravasation identified  - Seen by Vascular, no intervention at this time planned   - BP control   - C/w ASA/prasugrel/statin  - Follow up with Dr. Joaquin as outpatient on 10/30/23 per team   - Cardio on board    - Patient with symptoms, likely from claudication >> Need close vascular follow-up as discussed      Encouraged increased activity as able    Problem/Plan - 5:  ·  Problem: Hyperlipidemia.   ·  Plan: - C/w statin.    Problem/Plan - 6:  ·  Problem: Prophylactic measure.   ·  Plan: LMWH  PT  // OOB to chair daily / increase activity as able     DC planing home pending above     --------------------------------------------  Case discussed with patient,   Education given on findings and plan of care  ___________________________  HKrupa Brown D.O.  Pager: [phone]       _________________________________________________________________________________________  ========>>  M E D I C A L   A T T E N D I N G    F O L L O W  U P  N O T E  <<=========  -----------------------------------------------------------------------------------------------------    - Patient seen and examined by me earlier today.   - Patient today overall doing ok, comfortable, eating OK. breathing ok      overall better today..  Ambulating independently from bathroom to bed with a portable oxygen    ==================>> REVIEW OF SYSTEM <<=================    GEN: no fever, no chills, no pain  RESP: no significant SOB at rest on O2, no sputum  CVS: no chest pain, no palpitations  GI: no abdominal pain, no nausea  : no dysuria, no frequency  Neuro: no headache, no dizziness    ==================>> PHYSICAL EXAM <<=================    GEN: A&O X 3 , NAD , comfortable, pleasant, calm , Sitting on the edge of the bed  HEENT: NCAT, PERRL, MMM, hearing intact., O2 via NC  CVS: S1S2 , regular , No M/R/G appreciated  PULM: decreased BS bilaterally, no wheezing noted   ABD.: soft. non tender, non distended,  bowel sounds present  Extrem: intact pulses , no edema         ( Note written / Date of service 11-03-23 ( This is certified to be the same as "ENTERED" date above ( for billing purposes)))    ==================>> MEDICATIONS <<====================    artificial tears (preservative free) Ophthalmic Solution 1 Drop(s) Both EYES two times a day  aspirin enteric coated 81 milliGRAM(s) Oral daily  atorvastatin 40 milliGRAM(s) Oral at bedtime  budesonide 160 MICROgram(s)/formoterol 4.5 MICROgram(s) Inhaler 2 Puff(s) Inhalation two times a day  enoxaparin Injectable 40 milliGRAM(s) SubCutaneous every 24 hours  levothyroxine 37.5 MICROGram(s) Oral daily  lidocaine 4% Cream 1 Application(s) Topical once  lisinopril 20 milliGRAM(s) Oral daily  metoprolol tartrate 50 milliGRAM(s) Oral two times a day  prasugrel 10 milliGRAM(s) Oral daily  tiotropium 2.5 MICROgram(s) Inhaler 2 Puff(s) Inhalation daily    MEDICATIONS  (PRN):  acetaminophen     Tablet .. 650 milliGRAM(s) Oral every 6 hours PRN Temp greater or equal to 38C (100.4F), Mild Pain (1 - 3)  albuterol    90 MICROgram(s) HFA Inhaler 2 Puff(s) Inhalation every 6 hours PRN Shortness of Breath and/or Wheezing    ___________  Active diet:  Diet, Regular:   1200mL Fluid Restriction ([id number])  ___________________    ==================>> VITAL SIGNS <<==================    Vital Signs Last 24 HrsT(C): 36.5 (11-03-23 @ 13:30)  T(F): 97.7 (11-03-23 @ 13:30), Max: 97.9 (11-02-23 @ 18:00)  HR: 52 (11-03-23 @ 13:30) (52 - 65)  BP: 118/68 (11-03-23 @ 13:30)  RR: 17 (11-03-23 @ 13:30) (17 - 18)  SpO2: 100% (11-03-23 @ 13:30) (98% - 100%)      POCT Blood Glucose.: 100 mg/dL (02 Nov 2023 17:13)     ==================>> LAB AND IMAGING <<==================                        11.0   10.02 )-----------( 282      ( 03 Nov 2023 05:20 )             34.1        11-03    137  |  96<L>  |  14  ----------------------------<  81  3.8   |  33<H>  |  0.57    Ca    9.2      03 Nov 2023 05:20  Phos  3.6     11-03  Mg     1.70     11-03    WBC count:   10.02 <<== ,  11.71 <<== ,  10.53 <<== ,  13.43 <<== ,  12.36 <<==   Hemoglobin:   11.0 <<==,  10.5 <<==,  10.8 <<==,  11.5 <<==,  10.9 <<==  platelets:  282 <==, 252 <==, 249 <==, 229 <==, 233 <==, 236 <==    Creatinine:  0.57  <<==, 0.57  <<==, 0.65  <<==, 0.54  <<==, 0.67  <<==, 0.58  <<==  Sodium:   137  <==, 134  <==, 140  <==, 138  <==, 141  <==, 132  <==      < from: TTE W or WO Ultrasound Enhancing Agent (10.30.23 @ 15:17) >  CONCLUSIONS:    1. Left ventricular wall thickness is normal. Left ventricular systolic function is mildly decreased with an ejection fraction of 49 % by Vazquez's method of disks. Regional wall motion abnormalities present.   2. There is hypokinesis of the apical and apical septal walls.   3. Normal right ventricular cavity size and systolic function.   4. The aortic valve appears trileaflet with reduced systolic excursion. There is mild to moderate aortic stenosis. The peak transaortic velocity is 2.58 m/s, peak transaortic gradient is 26.6 mmHg and mean transaortic gradient is 12.0 mmHg with an LVOT/aortic valve VTI ratio of 0.45. The aortic valve area is estimated at 1.41 cm² by the continuity equation. There is trace aortic regurgitation.   5. Normal atria.   6. The inferior vena cava is normal in size measuring 1.62 cm in diameter, (normal <2.1cm) with normal inspiratory collapse (normal >50%) consistent with normal right atrial pressure (~3, range 0-5mmHg).   7. No pericardial effusion seen.  < end of copied text >    ___________________________________________________________________________________  ===============>>  A S S E S S M E N T   A N D   P L A N <<===============  ------------------------------------------------------------------------------------------    · Assessment	  71 year old male with PMHx of COPD on home O2 (2-3 L/NC), HTN, known AAA, PAD, hyperlipidemia and alcohol use disorder complicated with liver cirrhosis (quit 1yr ago) who was told to come to ER for abnormal labs (elevated CO2 to 42 on recent labs), felt to be secondary to acute on chronic hypercapnic respiratory failure due to likely COPD exacerbation.    Problem/Plan - 1:  ·  Problem: Acute on chronic respiratory failure with hypercapnia.   - Being treated for likely COPD exacerbation  - Pulmonary follow-up, management and optimization appreciated  - AVAPS being tried >> will need to go home with per pulm >> awaiting insurance company approval undiluted by equipment company prior to going home:: Discussed with case management  - Has a left upper lobe 4 mm nodule, to be f/up as outpatient     We also need a PFT as a patient with pulmonary follow-up    Problem/Plan - 2:  ·  Problem: Hypertension.   ·  Plan: On lisinopril and metoprolol as outpatient  - C/w home meds  - Seen by Cards, recs noted  - Echocardiogram as above     Further management and optimization as per cardiology given above Abnormalities    Problem/Plan - 3:  ·  Problem: hyponatremia >> improved  diet changed  patient eating well  free fluid restriction   monitor   renal if worsened     Problem/Plan - 4:  ·  Problem: AAA (abdominal aortic aneurysm).   - CTA Ao shows-Increased size of partially thrombosed infrarenal abdominal aortic aneurysm measuring up to 5.8 cm in greatest axial dimension. Crescentic area of mild relative hyperattenuation within the thrombus along the anterior aspect of the aneurysm at the level of the kidneys suspicious for hemorrhage/leakage into the thrombus. No active extravasation identified  - Seen by Vascular, no intervention at this time planned   - BP control   - C/w ASA/prasugrel/statin  - Follow up with Dr. Joaquin as outpatient on 10/30/23 per team   - Cardio on board    - Patient with symptoms, likely from claudication >> Need close vascular follow-up as discussed      Encouraged increased activity as able    Problem/Plan - 5:  ·  Problem: Hyperlipidemia.   ·  Plan: - C/w statin.    Problem/Plan - 6:  ·  Problem: Prophylactic measure.   ·  Plan: LMWH  PT  // OOB to chair daily / increase activity as able     DC planing home pending above     --------------------------------------------  Case discussed with patient, ACP, Case management  Education given on findings and plan of care  ___________________________  JACQUELINE Brown D.O.  Pager: [phone]

## 2023-11-03 NOTE — PROGRESS NOTE ADULT - SUBJECTIVE AND OBJECTIVE BOX
Cardiovascular Disease Progress Note    Overnight events: No acute events overnight.  no new cardiac sx  Otherwise review of systems negative    Objective Findings:  T(C): 36.4 (11-03-23 @ 06:00), Max: 36.6 (11-02-23 @ 18:00)  HR: 54 (11-03-23 @ 06:00) (54 - 67)  BP: 141/67 (11-03-23 @ 06:00) (141/67 - 150/90)  RR: 18 (11-03-23 @ 06:00) (17 - 18)  SpO2: 98% (11-03-23 @ 06:00) (98% - 100%)  Wt(kg): --  Daily     Daily       Physical Exam:  Gen: NAD  HEENT: EOMI  CV: RRR, normal S1 + S2, no m/r/g  Lungs: CTAB  Abd: soft, non-tender  Ext: No edema    Telemetry:    Laboratory Data:                        11.0   10.02 )-----------( 282      ( 03 Nov 2023 05:20 )             34.1     11-03    137  |  96<L>  |  14  ----------------------------<  81  3.8   |  33<H>  |  0.57    Ca    9.2      03 Nov 2023 05:20  Phos  3.6     11-03  Mg     1.70     11-03                Inpatient Medications:  MEDICATIONS  (STANDING):  artificial tears (preservative free) Ophthalmic Solution 1 Drop(s) Both EYES two times a day  aspirin enteric coated 81 milliGRAM(s) Oral daily  atorvastatin 40 milliGRAM(s) Oral at bedtime  budesonide 160 MICROgram(s)/formoterol 4.5 MICROgram(s) Inhaler 2 Puff(s) Inhalation two times a day  enoxaparin Injectable 40 milliGRAM(s) SubCutaneous every 24 hours  levothyroxine 37.5 MICROGram(s) Oral daily  lidocaine 4% Cream 1 Application(s) Topical once  lisinopril 20 milliGRAM(s) Oral daily  metoprolol tartrate 50 milliGRAM(s) Oral two times a day  prasugrel 10 milliGRAM(s) Oral daily  tiotropium 2.5 MICROgram(s) Inhaler 2 Puff(s) Inhalation daily      Assessment:  71M PMHx COPD on home O2 2 L nasal cannula (~45pack year hx, quit 4 years ago), hypertension, known AAA - being followed by Dr. Joaquin, PAD, alcohol abuse complicated with liver cirrhosis, recently treated for a UTI, as per patient was referred to the emergency department by his primary care physician Dr. Remington Simms for abnormal labs. Patient reports exacerbation of his chronic low back pain. C/o b/l claudication from buttocks down at distances <50 feet. He has history of PAD s/p RLE bypass (?failed). Denies rest pain and poor wound healing. Denies abd pain, pulsatile mass, tearing pain.       Recs:  cardiac stable  dyspnea likely in setting of copd-e  avaps per pulm  vol status stable. diuretics prn to maintain euvolemic  echo mild lv dysfunction, normal rv fxn, mild-mod as  cw gdmt for lv dysfunction  cw antiplatelets, statin and antianginals  f/u vascular surgery re partially thrombosed AAA  pulmonary f/u  icd interrogation --> no events  will follow  dcp with home AVAPS        Over 25 minutes spent on total encounter; more than 50% of the visit was spent counseling and/or coordinating care by the attending physician.      Vaughn Simms MD   Cardiovascular Disease  [phone]

## 2023-11-04 PROCEDURE — 99233 SBSQ HOSP IP/OBS HIGH 50: CPT

## 2023-11-04 RX ADMIN — PRASUGREL 10 MILLIGRAM(S): 5 TABLET, FILM COATED ORAL at 11:11

## 2023-11-04 RX ADMIN — ATORVASTATIN CALCIUM 40 MILLIGRAM(S): 80 TABLET, FILM COATED ORAL at 23:35

## 2023-11-04 RX ADMIN — Medication 81 MILLIGRAM(S): at 11:11

## 2023-11-04 RX ADMIN — TIOTROPIUM BROMIDE 2 PUFF(S): 18 CAPSULE ORAL; RESPIRATORY (INHALATION) at 09:22

## 2023-11-04 RX ADMIN — Medication 50 MILLIGRAM(S): at 09:22

## 2023-11-04 RX ADMIN — BUDESONIDE AND FORMOTEROL FUMARATE DIHYDRATE 2 PUFF(S): 160; 4.5 AEROSOL RESPIRATORY (INHALATION) at 23:34

## 2023-11-04 RX ADMIN — Medication 37.5 MICROGRAM(S): at 06:38

## 2023-11-04 RX ADMIN — ENOXAPARIN SODIUM 40 MILLIGRAM(S): 100 INJECTION SUBCUTANEOUS at 17:40

## 2023-11-04 RX ADMIN — Medication 1 DROP(S): at 17:14

## 2023-11-04 RX ADMIN — Medication 1 DROP(S): at 06:39

## 2023-11-04 RX ADMIN — LISINOPRIL 20 MILLIGRAM(S): 2.5 TABLET ORAL at 06:38

## 2023-11-04 RX ADMIN — BUDESONIDE AND FORMOTEROL FUMARATE DIHYDRATE 2 PUFF(S): 160; 4.5 AEROSOL RESPIRATORY (INHALATION) at 10:12

## 2023-11-04 RX ADMIN — Medication 50 MILLIGRAM(S): at 23:34

## 2023-11-04 NOTE — PROGRESS NOTE ADULT - SUBJECTIVE AND OBJECTIVE BOX
Date of Service  : 11-04-23     INTERVAL HPI/OVERNIGHT EVENTS: I feel much better.  Vital Signs Last 24 Hrs  T(C): 36.7 (04 Nov 2023 09:33), Max: 36.7 (04 Nov 2023 09:33)  T(F): 98.1 (04 Nov 2023 09:33), Max: 98.1 (04 Nov 2023 09:33)  HR: 65 (04 Nov 2023 09:33) (51 - 79)  BP: 122/64 (04 Nov 2023 09:33) (118/68 - 158/79)  BP(mean): --  RR: 18 (04 Nov 2023 09:33) (17 - 18)  SpO2: 100% (04 Nov 2023 09:33) (98% - 100%)    Parameters below as of 04 Nov 2023 09:33  Patient On (Oxygen Delivery Method): nasal cannula      I&O's Summary    03 Nov 2023 07:01  -  04 Nov 2023 07:00  --------------------------------------------------------  IN: 1280 mL / OUT: 1150 mL / NET: 130 mL    04 Nov 2023 08:01  -  04 Nov 2023 12:47  --------------------------------------------------------  IN: 150 mL / OUT: 600 mL / NET: -450 mL      MEDICATIONS  (STANDING):  artificial tears (preservative free) Ophthalmic Solution 1 Drop(s) Both EYES two times a day  aspirin enteric coated 81 milliGRAM(s) Oral daily  atorvastatin 40 milliGRAM(s) Oral at bedtime  budesonide 160 MICROgram(s)/formoterol 4.5 MICROgram(s) Inhaler 2 Puff(s) Inhalation two times a day  enoxaparin Injectable 40 milliGRAM(s) SubCutaneous every 24 hours  levothyroxine 37.5 MICROGram(s) Oral daily  lidocaine 4% Cream 1 Application(s) Topical once  lisinopril 20 milliGRAM(s) Oral daily  metoprolol tartrate 50 milliGRAM(s) Oral two times a day  prasugrel 10 milliGRAM(s) Oral daily  tiotropium 2.5 MICROgram(s) Inhaler 2 Puff(s) Inhalation daily    MEDICATIONS  (PRN):  acetaminophen     Tablet .. 650 milliGRAM(s) Oral every 6 hours PRN Temp greater or equal to 38C (100.4F), Mild Pain (1 - 3)  albuterol    90 MICROgram(s) HFA Inhaler 2 Puff(s) Inhalation every 6 hours PRN Shortness of Breath and/or Wheezing    LABS:                        11.0   10.02 )-----------( 282      ( 03 Nov 2023 05:20 )             34.1     11-03    137  |  96<L>  |  14  ----------------------------<  81  3.8   |  33<H>  |  0.57    Ca    9.2      03 Nov 2023 05:20  Phos  3.6     11-03  Mg     1.70     11-03        Urinalysis Basic - ( 03 Nov 2023 05:20 )    Color: x / Appearance: x / SG: x / pH: x  Gluc: 81 mg/dL / Ketone: x  / Bili: x / Urobili: x   Blood: x / Protein: x / Nitrite: x   Leuk Esterase: x / RBC: x / WBC x   Sq Epi: x / Non Sq Epi: x / Bacteria: x      CAPILLARY BLOOD GLUCOSE            Urinalysis Basic - ( 03 Nov 2023 05:20 )    Color: x / Appearance: x / SG: x / pH: x  Gluc: 81 mg/dL / Ketone: x  / Bili: x / Urobili: x   Blood: x / Protein: x / Nitrite: x   Leuk Esterase: x / RBC: x / WBC x   Sq Epi: x / Non Sq Epi: x / Bacteria: x      REVIEW OF SYSTEMS:  CONSTITUTIONAL: No fever, weight loss, or fatigue  EYES: No eye pain, visual disturbances, or discharge  ENMT:  No difficulty hearing, tinnitus, vertigo; No sinus or throat pain  NECK: No pain or stiffness  RESPIRATORY: No cough, wheezing, chills or hemoptysis; No shortness of breath  CARDIOVASCULAR: No chest pain, palpitations, dizziness, or leg swelling  GASTROINTESTINAL: No abdominal or epigastric pain. No nausea, vomiting, or hematemesis; No diarrhea or constipation. No melena or hematochezia.  GENITOURINARY: No dysuria, frequency, hematuria, or incontinence  NEUROLOGICAL: No headaches, memory loss, loss of strength, numbness, or tremors      Consultant(s) Notes Reviewed:  [x ] YES  [ ] NO    PHYSICAL EXAM:  GENERAL: Not in any distress ,Oxygen   HEAD:  Atraumatic, Normocephalic  EYES: EOMI, PERRLA, conjunctiva and sclera clear  ENMT: No tonsillar erythema, exudates, or enlargement; Moist mucous membranes, Good dentition, No lesions  NECK: Supple, No JVD, Normal thyroid  NERVOUS SYSTEM:  Alert & Oriented X3, No focal deficit   CHEST/LUNG: Good air entry bilateral with no  rales, rhonchi, wheezing, or rubs  HEART: Regular rate and rhythm; No murmurs, rubs, or gallops  ABDOMEN: Soft, Nontender, Nondistended; Bowel sounds present  EXTREMITIES:  2+ Peripheral Pulses, No clubbing, cyanosis, or edema    Care Discussed with Consultants/Other Providers [ x] YES  [ ] NO

## 2023-11-04 NOTE — PROGRESS NOTE ADULT - SUBJECTIVE AND OBJECTIVE BOX
PULMONARY PROGRESS NOTE    FRANCIS CHAUDHARY  MRN-[id number]    Patient is a 68y old  Male who presents with a chief complaint of Told to come for abnormal labs (03 Nov 2023 14:28)      HPI:  -on 2L 100%  avaps tolerated  feels comfortable with machine  bedspiro done    ROS:   -    ACTIVE MEDICATION LIST:  MEDICATIONS  (STANDING):  artificial tears (preservative free) Ophthalmic Solution 1 Drop(s) Both EYES two times a day  aspirin enteric coated 81 milliGRAM(s) Oral daily  atorvastatin 40 milliGRAM(s) Oral at bedtime  budesonide 160 MICROgram(s)/formoterol 4.5 MICROgram(s) Inhaler 2 Puff(s) Inhalation two times a day  enoxaparin Injectable 40 milliGRAM(s) SubCutaneous every 24 hours  levothyroxine 37.5 MICROGram(s) Oral daily  lidocaine 4% Cream 1 Application(s) Topical once  lisinopril 20 milliGRAM(s) Oral daily  metoprolol tartrate 50 milliGRAM(s) Oral two times a day  prasugrel 10 milliGRAM(s) Oral daily  tiotropium 2.5 MICROgram(s) Inhaler 2 Puff(s) Inhalation daily    MEDICATIONS  (PRN):  acetaminophen     Tablet .. 650 milliGRAM(s) Oral every 6 hours PRN Temp greater or equal to 38C (100.4F), Mild Pain (1 - 3)  albuterol    90 MICROgram(s) HFA Inhaler 2 Puff(s) Inhalation every 6 hours PRN Shortness of Breath and/or Wheezing      EXAM:  Vital Signs Last 24 Hrs  T(C): 36.2 (04 Nov 2023 06:00), Max: 36.6 (03 Nov 2023 22:00)  T(F): 97.1 (04 Nov 2023 06:00), Max: 97.8 (03 Nov 2023 22:00)  HR: 52 (04 Nov 2023 06:00) (51 - 79)  BP: 145/74 (04 Nov 2023 06:00) (118/68 - 158/79)  BP(mean): --  RR: 18 (04 Nov 2023 06:00) (17 - 18)  SpO2: 100% (04 Nov 2023 06:00) (98% - 100%)    Parameters below as of 04 Nov 2023 06:00  Patient On (Oxygen Delivery Method): BiPAP/CPAP        GENERAL: The patient is awake and alert in no apparent distress.     LUNGS: ; respirations unlabored                             11.0   10.02 )-----------( 282      ( 03 Nov 2023 05:20 )             34.1       11-03    137  |  96<L>  |  14  ----------------------------<  81  3.8   |  33<H>  |  0.57    Ca    9.2      03 Nov 2023 05:20  Phos  3.6     11-03  Mg     1.70     11-03     < from: CT Angio Chest Aorta w/wo IV Cont (10.27.23 @ 23:11) >    ACC: [id number] EXAM:  CT ANGIO ABD PELV (W)AW IC   ORDERED BY: JOSÉ MANUEL FRANK     ACC: [id number] EXAM:  CT ANGIO CHEST AORTA WAWIC   ORDERED BY: HOANG SMITH     PROCEDURE DATE:  10/27/2023          INTERPRETATION:  INDICATION: Abdominal aortic aneurysm.    COMPARISON: CTA chest abdomen pelvis 12/15/2022.    CONTRAST/COMPLICATIONS:  IV Contrast: Omnipaque 350. 95 cc administered. 5 cc discarded.  Oral Contrast: None.  Complications: None reported at time of study completion.    PROCEDURE:  CT Angiography of the Chest, Abdomen and Pelvis.  Precontrast imaging was performed through the chest followed by arterial   phase imaging of the chest, abdomen and pelvis.  Sagittal and coronal reformats were performed as well as 3D (MIP)   reconstructions.    FINDINGS:  CHEST:  LUNGS AND LARGE AIRWAYS: Small low density layering material in the   trachea, bilateral mainstem bronchi and bronchus intermedius. Emphysema.   Bilateral subcentimeter pulmonary nodules. A left upper lobe 4 mm nodule   (301:54) appears new when compared to prior exam.  PLEURA: No pleural effusion.  VESSELS: No thoracic aortic dissection, aneurysm or intramural hematoma.   Aortic and coronary artery calcifications.  HEART: Heart size is normal. No pericardial effusion. AICD lead   terminates in the right ventricle.  MEDIASTINUM AND ALANNA: No lymphadenopathy.  CHEST WALL AND LOWER NECK: Left anterior chest wall AICD.    ABDOMEN AND PELVIS:  LIVER: Within normal limits.  BILE DUCTS: Normal caliber.  GALLBLADDER: Cholelithiasis.  SPLEEN: Within normal limits.  PANCREAS: Within normal limits.  ADRENALS: Similar indeterminate attenuation right adrenal nodule. Left   adrenal gland is normal.  KIDNEYS/URETERS: Symmetric enhancement. No hydronephrosis. 4 mm   nonobstructing right renal calculus. Left renal cortical   scarring/postsurgical change. Left renal cyst.    BLADDER: Within normal limits.  REPRODUCTIVE ORGANS: Prostate is enlarged.    BOWEL: No bowel obstruction. Appendix is normal.  PERITONEUM: No ascites.    VESSELS: Partially thrombosed infrarenal abdominal aortic aneurysm   measures 5.0 x 5.8 x 12.3 cm (TRV x AP x CC), previously 5.3 x 4.8 x 11.5   cm. Crescentic area of relative hyperattenuation within the thrombus   along the anterior aspect of the aneurysm at the level of the kidneys   (example 301, 153), suspicious for hemorrhage/leakage into thrombus. No   active extravasation identified. Similar ectasia of the proximal common   iliac arteries, up to 1.5 cm on the right. Intact right femoral bypass   graft. Atherosclerotic changes. No abdominal aortic dissection.  RETROPERITONEUM/LYMPH NODES: No lymphadenopathy.  ABDOMINAL WALL: Within normal limits.  BONES: Degenerative changes.    IMPRESSION:  No aortic dissection.    Increased size of partially thrombosed infrarenal abdominal aortic   aneurysm measuring up to 5.8 cm in greatest axial dimension. Crescentic   area of mild relative hyperattenuation within the thrombus along the   anterior aspect of the aneurysm at the level of the kidneys suspicious   for hemorrhage/leakage into the thrombus. No active extravasation   identified. Vascular surgery consult is recommended.    --- End of Report ---          TAYLOR GARNER MD; Resident Radiology  This document has been electronicallysigned.  MU OLSON MD; Attending Radiologist  This document has been electronically signed. Oct 28 2023  1:05AM    < end of copied text >      PROBLEM LIST:  68y Male with HEALTH ISSUES - PROBLEM Dx:  Acute on chronic respiratory failure with hypercapnia    COPD exacerbation    Hypertension    AAA (abdominal aortic aneurysm)    Prophylactic measure    Hyperlipidemia    Chronic respiratory failure              RECS:  patient with CHRONIC HYPERCARBIC RESP FAILURE due to copd/ emphysema/ former smoker  hypercarbia  did not  improve on BPAP   on NIVV, improved hypercarbia noted on blood work   taper off his 02 - avoid HYPEROXEMIA  continue symbicort- told him to rinse mouth  continue spiriva  contiue NIVV qhs/ whenever sleeping  finish prednisone 5 days  BEDSIDE SPIROMETRY shows severe COPD          the patient requires home medical ventilator device capable of delivering pressure targeted and/or volume present ventilation cannot be done on any home care BPAP device due to their chronic respiratory failure consequent to the COPD.  The patient will need home medical ventilator with battery back up continuous enhanced alarm monitoring mouth piece ventilation, home resp. therapist set up andcontinued visits for continued use to 24hrs/day an access to RT 24/7 for urgent trouble shooting machine repairs & ongoing titration or adjustments.  We have attempted avaps mode on this patient. he has been admitted for chronic resp failure with hypercapnia secondary to copd. the patient's condition will likely deteriorate without a home medical ventilator and this may cause him to be rehospitalized or cause serious clinical harm    Please call with any questions over the weekend      Paris Warren DO  [facility] Pulmonary/Sleep Medicine  [phone]

## 2023-11-04 NOTE — PROGRESS NOTE ADULT - ASSESSMENT
71 year old male with PMHx of COPD on home O2 (2-3 L/NC), HTN, known AAA, PAD, hyperlipidemia and alcohol use disorder complicated with liver cirrhosis (quit 1yr ago) who was told to come to ER for abnormal labs (elevated CO2 to 42 on recent labs), felt to be secondary to acute on chronic hypercapnic respiratory failure due to likely COPD exacerbation.     Problem/Plan - 1:  ·  Problem: Acute on chronic respiratory failure with hypercapnia.   - Being treated for likely COPD exacerbation  - Pulmonary follow-up, management and optimization appreciated  - AVAPS being tried >> will need to go home with per pulm >> awaiting insurance company approval undiluted by equipment company prior to going home:: Discussed with case management  - Has a left upper lobe 4 mm nodule, to be f/up as outpatient     We also need a PFT as a patient with pulmonary follow-up     Problem/Plan - 2:  ·  Problem: Hypertension.   ·  Plan: On lisinopril and metoprolol as outpatient  - C/w home meds  - Seen by Cards, recs noted  - Echocardiogram as above     Further management and optimization as per cardiology given above Abnormalities    Problem/Plan - 3:  ·  Problem: hyponatremia >> improved  diet changed  patient eating well  free fluid restriction   monitor   renal if worsened      Problem/Plan - 4:  ·  Problem: AAA (abdominal aortic aneurysm).   - CTA Ao shows-Increased size of partially thrombosed infrarenal abdominal aortic aneurysm measuring up to 5.8 cm in greatest axial dimension. Crescentic area of mild relative hyperattenuation within the thrombus along the anterior aspect of the aneurysm at the level of the kidneys suspicious for hemorrhage/leakage into the thrombus. No active extravasation identified  - Seen by Vascular, no intervention at this time planned   - BP control   - C/w ASA/prasugrel/statin  - Follow up with Dr. Joaquin as outpatient on 10/30/23 per team   - Cardio on board    - Patient with symptoms, likely from claudication >> Need close vascular follow-up as discussed      Encouraged increased activity as able     Problem/Plan - 5:  ·  Problem: Hyperlipidemia.   ·  Plan: - C/w statin.     Problem/Plan - 6:  ·  Problem: Prophylactic measure.   ·  Plan: LMWH  PT  // OOB to chair daily / increase activity as able     DC planing home pending above

## 2023-11-04 NOTE — PROGRESS NOTE ADULT - SUBJECTIVE AND OBJECTIVE BOX
Cardiovascular Disease Progress Note    Overnight events: No acute events overnight.  no new cardiac sx  Otherwise review of systems negative    Objective Findings:  T(C): 36.2 (11-04-23 @ 06:00), Max: 36.6 (11-03-23 @ 22:00)  HR: 52 (11-04-23 @ 06:00) (51 - 79)  BP: 145/74 (11-04-23 @ 06:00) (118/68 - 158/79)  RR: 18 (11-04-23 @ 06:00) (17 - 18)  SpO2: 100% (11-04-23 @ 06:00) (98% - 100%)  Wt(kg): --  Daily     Daily       Physical Exam:  Gen: NAD  HEENT: EOMI  CV: RRR, normal S1 + S2, no m/r/g  Lungs: CTAB  Abd: soft, non-tender  Ext: No edema    Telemetry:    Laboratory Data:                        11.0   10.02 )-----------( 282      ( 03 Nov 2023 05:20 )             34.1     11-03    137  |  96<L>  |  14  ----------------------------<  81  3.8   |  33<H>  |  0.57    Ca    9.2      03 Nov 2023 05:20  Phos  3.6     11-03  Mg     1.70     11-03                Inpatient Medications:  MEDICATIONS  (STANDING):  artificial tears (preservative free) Ophthalmic Solution 1 Drop(s) Both EYES two times a day  aspirin enteric coated 81 milliGRAM(s) Oral daily  atorvastatin 40 milliGRAM(s) Oral at bedtime  budesonide 160 MICROgram(s)/formoterol 4.5 MICROgram(s) Inhaler 2 Puff(s) Inhalation two times a day  enoxaparin Injectable 40 milliGRAM(s) SubCutaneous every 24 hours  levothyroxine 37.5 MICROGram(s) Oral daily  lidocaine 4% Cream 1 Application(s) Topical once  lisinopril 20 milliGRAM(s) Oral daily  metoprolol tartrate 50 milliGRAM(s) Oral two times a day  prasugrel 10 milliGRAM(s) Oral daily  tiotropium 2.5 MICROgram(s) Inhaler 2 Puff(s) Inhalation daily      Assessment:  71M PMHx COPD on home O2 2 L nasal cannula (~45pack year hx, quit 4 years ago), hypertension, known AAA - being followed by Dr. Joaquin, PAD, alcohol abuse complicated with liver cirrhosis, recently treated for a UTI, as per patient was referred to the emergency department by his primary care physician Dr. Remington Simms for abnormal labs. Patient reports exacerbation of his chronic low back pain. C/o b/l claudication from buttocks down at distances <50 feet. He has history of PAD s/p RLE bypass (?failed). Denies rest pain and poor wound healing. Denies abd pain, pulsatile mass, tearing pain.       Recs:  cardiac stable  dyspnea likely in setting of copd-e  avaps per pulm  vol status stable. diuretics prn to maintain euvolemic  echo mild lv dysfunction, normal rv fxn, mild-mod as  cw gdmt for lv dysfunction  cw antiplatelets, statin and antianginals  f/u vascular surgery re partially thrombosed AAA  pulmonary f/u  icd interrogation --> no events  dcp with home AVAPS      Over 25 minutes spent on total encounter; more than 50% of the visit was spent counseling and/or coordinating care by the attending physician.      Vaughn Simms MD   Cardiovascular Disease  [phone]

## 2023-11-05 RX ORDER — MAGNESIUM OXIDE 400 MG ORAL TABLET 241.3 MG
400 TABLET ORAL ONCE
Refills: 0 | Status: COMPLETED | OUTPATIENT
Start: 2023-11-05 | End: 2023-11-05

## 2023-11-05 RX ADMIN — BUDESONIDE AND FORMOTEROL FUMARATE DIHYDRATE 2 PUFF(S): 160; 4.5 AEROSOL RESPIRATORY (INHALATION) at 21:09

## 2023-11-05 RX ADMIN — Medication 37.5 MICROGRAM(S): at 06:57

## 2023-11-05 RX ADMIN — Medication 81 MILLIGRAM(S): at 11:51

## 2023-11-05 RX ADMIN — Medication 1 DROP(S): at 19:08

## 2023-11-05 RX ADMIN — MAGNESIUM OXIDE 400 MG ORAL TABLET 400 MILLIGRAM(S): 241.3 TABLET ORAL at 19:08

## 2023-11-05 RX ADMIN — Medication 50 MILLIGRAM(S): at 21:09

## 2023-11-05 RX ADMIN — ATORVASTATIN CALCIUM 40 MILLIGRAM(S): 80 TABLET, FILM COATED ORAL at 21:09

## 2023-11-05 RX ADMIN — BUDESONIDE AND FORMOTEROL FUMARATE DIHYDRATE 2 PUFF(S): 160; 4.5 AEROSOL RESPIRATORY (INHALATION) at 08:49

## 2023-11-05 RX ADMIN — LISINOPRIL 20 MILLIGRAM(S): 2.5 TABLET ORAL at 06:57

## 2023-11-05 RX ADMIN — TIOTROPIUM BROMIDE 2 PUFF(S): 18 CAPSULE ORAL; RESPIRATORY (INHALATION) at 08:50

## 2023-11-05 RX ADMIN — ENOXAPARIN SODIUM 40 MILLIGRAM(S): 100 INJECTION SUBCUTANEOUS at 19:08

## 2023-11-05 RX ADMIN — PRASUGREL 10 MILLIGRAM(S): 5 TABLET, FILM COATED ORAL at 11:51

## 2023-11-05 RX ADMIN — Medication 1 DROP(S): at 06:57

## 2023-11-05 NOTE — PROGRESS NOTE ADULT - ASSESSMENT
_________________________________________________________________________________________  ========>>  M E D I C A L   A T T E N D I N G    F O L L O W  U P  N O T E  <<=========  -----------------------------------------------------------------------------------------------------    - Patient seen and examined by me earlier today.   - Patient today overall doing ok, comfortable, eating OK. breathing ok      overall better today..  Ambulating independently from bathroom to bed with a portable oxygen    ==================>> REVIEW OF SYSTEM <<=================    GEN: no fever, no chills, no pain  RESP: no significant SOB at rest on O2, no sputum  CVS: no chest pain, no palpitations  GI: no abdominal pain, no nausea  : no dysuria, no frequency  Neuro: no headache, no dizziness    ==================>> PHYSICAL EXAM <<=================    GEN: A&O X 3 , NAD , comfortable, pleasant, calm , Sitting on the edge of the bed  HEENT: NCAT, PERRL, MMM, hearing intact., O2 via NC  CVS: S1S2 , regular , No M/R/G appreciated  PULM: decreased BS bilaterally, no wheezing noted   ABD.: soft. non tender, non distended,  bowel sounds present  Extrem: intact pulses , no edema          ( Note written / Date of service 11-05-23 ( This is certified to be the same as "ENTERED" date above ( for billing purposes)))    ==================>> MEDICATIONS <<====================    artificial tears (preservative free) Ophthalmic Solution 1 Drop(s) Both EYES two times a day  aspirin enteric coated 81 milliGRAM(s) Oral daily  atorvastatin 40 milliGRAM(s) Oral at bedtime  budesonide 160 MICROgram(s)/formoterol 4.5 MICROgram(s) Inhaler 2 Puff(s) Inhalation two times a day  enoxaparin Injectable 40 milliGRAM(s) SubCutaneous every 24 hours  levothyroxine 37.5 MICROGram(s) Oral daily  lidocaine 4% Cream 1 Application(s) Topical once  lisinopril 20 milliGRAM(s) Oral daily  metoprolol tartrate 50 milliGRAM(s) Oral two times a day  prasugrel 10 milliGRAM(s) Oral daily  tiotropium 2.5 MICROgram(s) Inhaler 2 Puff(s) Inhalation daily    MEDICATIONS  (PRN):  acetaminophen     Tablet .. 650 milliGRAM(s) Oral every 6 hours PRN Temp greater or equal to 38C (100.4F), Mild Pain (1 - 3)  albuterol    90 MICROgram(s) HFA Inhaler 2 Puff(s) Inhalation every 6 hours PRN Shortness of Breath and/or Wheezing    ___________  Active diet:  Diet, Regular:   1200mL Fluid Restriction ([id number])  ___________________    ==================>> VITAL SIGNS <<==================    Vital Signs Last 24 HrsT(C): 36.6 (11-05-23 @ 09:04)  T(F): 97.9 (11-05-23 @ 09:04), Max: 98 (11-04-23 @ 23:50)  HR: 65 (11-05-23 @ 09:04) (48 - 65)  BP: 148/90 (11-05-23 @ 09:04)  RR: 18 (11-05-23 @ 09:04) (18 - 18)  SpO2: 100% (11-05-23 @ 09:04) (98% - 100%)      CAPILLARY BLOOD GLUCOSE         ==================>> LAB AND IMAGING <<==================             WBC count:   10.02 <<== ,  11.71 <<== ,  10.53 <<==   Hemoglobin:   11.0 <<==,  10.5 <<==,  10.8 <<==  platelets:  282 <==, 252 <==, 249 <==, 229 <==    Creatinine:  0.57  <<==, 0.57  <<==, 0.65  <<==, 0.54  <<==  Sodium:   137  <==, 134  <==, 140  <==, 138  <==       AST:               ALT:             AP:             Bili:            ____________________________    M I C R O B I O L O G Y :      SARS-CoV-2: NotDetec (10-28-23 @ 12:12)        < from: TTE W or WO Ultrasound Enhancing Agent (10.30.23 @ 15:17) >  CONCLUSIONS:    1. Left ventricular wall thickness is normal. Left ventricular systolic function is mildly decreased with an ejection fraction of 49 % by Vazquez's method of disks. Regional wall motion abnormalities present.   2. There is hypokinesis of the apical and apical septal walls.   3. Normal right ventricular cavity size and systolic function.   4. The aortic valve appears trileaflet with reduced systolic excursion. There is mild to moderate aortic stenosis. The peak transaortic velocity is 2.58 m/s, peak transaortic gradient is 26.6 mmHg and mean transaortic gradient is 12.0 mmHg with an LVOT/aortic valve VTI ratio of 0.45. The aortic valve area is estimated at 1.41 cm² by the continuity equation. There is trace aortic regurgitation.   5. Normal atria.   6. The inferior vena cava is normal in size measuring 1.62 cm in diameter, (normal <2.1cm) with normal inspiratory collapse (normal >50%) consistent with normal right atrial pressure (~3, range 0-5mmHg).   7. No pericardial effusion seen.  < end of copied text >    ___________________________________________________________________________________  ===============>>  A S S E S S M E N T   A N D   P L A N <<===============  ------------------------------------------------------------------------------------------    · Assessment	  71 year old male with PMHx of COPD on home O2 (2-3 L/NC), HTN, known AAA, PAD, hyperlipidemia and alcohol use disorder complicated with liver cirrhosis (quit 1yr ago) who was told to come to ER for abnormal labs (elevated CO2 to 42 on recent labs), felt to be secondary to acute on chronic hypercapnic respiratory failure due to likely COPD exacerbation.    Problem/Plan - 1:  ·  Problem: Acute on chronic respiratory failure with hypercapnia.   - Being treated for likely COPD exacerbation  - Pulmonary follow-up, management and optimization appreciated  - AVAPS being tried >> will need to go home with per pulm >> awaiting insurance company approval undiluted by equipment company prior to going home:: Discussed with case management  - Has a left upper lobe 4 mm nodule, to be f/up as outpatient     We also need a PFT as a patient with pulmonary follow-up    Problem/Plan - 2:  ·  Problem: Hypertension.   ·  Plan: On lisinopril and metoprolol as outpatient  - C/w home meds  - Seen by Cards, recs noted  - Echocardiogram as above     Further management and optimization as per cardiology given above Abnormalities    Problem/Plan - 3:  ·  Problem: hyponatremia >> improved  diet changed  patient eating well  free fluid restriction   monitor   renal if worsened     Problem/Plan - 4:  ·  Problem: AAA (abdominal aortic aneurysm).   - CTA Ao shows-Increased size of partially thrombosed infrarenal abdominal aortic aneurysm measuring up to 5.8 cm in greatest axial dimension. Crescentic area of mild relative hyperattenuation within the thrombus along the anterior aspect of the aneurysm at the level of the kidneys suspicious for hemorrhage/leakage into the thrombus. No active extravasation identified  - Seen by Vascular, no intervention at this time planned   - BP control   - C/w ASA/prasugrel/statin  - Follow up with Dr. Joaquin as outpatient on 10/30/23 per team   - Cardio on board    - Patient with symptoms, likely from claudication >> Need close vascular follow-up as discussed      Encouraged increased activity as able    Problem/Plan - 5:  ·  Problem: Hyperlipidemia.   ·  Plan: - C/w statin.    Problem/Plan - 6:  ·  Problem: Prophylactic measure.   ·  Plan: LMWH  PT  // OOB to chair daily / increase activity as able     DC planing home pending above     --------------------------------------------  Case discussed with patient, ACP, Case management  Education given on findings and plan of care  ___________________________  JACQUELINE Brown D.O.  Pager: [phone]       _________________________________________________________________________________________  ========>>  M E D I C A L   A T T E N D I N G    F O L L O W  U P  N O T E  <<=========  -----------------------------------------------------------------------------------------------------    - Patient seen and examined by me earlier today.   - Patient today overall doing ok, comfortable, eating OK. breathing ok      overall better today..  Slept well, no other complaints today    ==================>> REVIEW OF SYSTEM <<=================    GEN: no fever, no chills, no pain  RESP: no significant SOB at rest on O2, Occasional sputum  CVS: no chest pain, no palpitations  GI: no abdominal pain, no nausea  : no dysuria, no frequency  Neuro: no headache, no dizziness    ==================>> PHYSICAL EXAM <<=================    GEN: A&O X 3 , NAD , comfortable, pleasant, calm , Sitting on the edge of the bed  HEENT: NCAT, PERRL, MMM, hearing intact., O2 via NC  CVS: S1S2 , regular , No M/R/G appreciated  PULM: decreased BS bilaterally, no wheezing noted   ABD.: soft. non tender, non distended,  bowel sounds present  Extrem: intact pulses , no edema          ( Note written / Date of service 11-05-23 ( This is certified to be the same as "ENTERED" date above ( for billing purposes)))    ==================>> MEDICATIONS <<====================    artificial tears (preservative free) Ophthalmic Solution 1 Drop(s) Both EYES two times a day  aspirin enteric coated 81 milliGRAM(s) Oral daily  atorvastatin 40 milliGRAM(s) Oral at bedtime  budesonide 160 MICROgram(s)/formoterol 4.5 MICROgram(s) Inhaler 2 Puff(s) Inhalation two times a day  enoxaparin Injectable 40 milliGRAM(s) SubCutaneous every 24 hours  levothyroxine 37.5 MICROGram(s) Oral daily  lidocaine 4% Cream 1 Application(s) Topical once  lisinopril 20 milliGRAM(s) Oral daily  metoprolol tartrate 50 milliGRAM(s) Oral two times a day  prasugrel 10 milliGRAM(s) Oral daily  tiotropium 2.5 MICROgram(s) Inhaler 2 Puff(s) Inhalation daily    MEDICATIONS  (PRN):  acetaminophen     Tablet .. 650 milliGRAM(s) Oral every 6 hours PRN Temp greater or equal to 38C (100.4F), Mild Pain (1 - 3)  albuterol    90 MICROgram(s) HFA Inhaler 2 Puff(s) Inhalation every 6 hours PRN Shortness of Breath and/or Wheezing    ___________  Active diet:  Diet, Regular:   1200mL Fluid Restriction ([id number])  ___________________    ==================>> VITAL SIGNS <<==================    Vital Signs Last 24 HrsT(C): 36.6 (11-05-23 @ 09:04)  T(F): 97.9 (11-05-23 @ 09:04), Max: 98 (11-04-23 @ 23:50)  HR: 65 (11-05-23 @ 09:04) (48 - 65)  BP: 148/90 (11-05-23 @ 09:04)  RR: 18 (11-05-23 @ 09:04) (18 - 18)  SpO2: 100% (11-05-23 @ 09:04) (98% - 100%)       ==================>> LAB AND IMAGING <<==================       No labs today    WBC count:   10.02 <<== ,  11.71 <<== ,  10.53 <<==   Hemoglobin:   11.0 <<==,  10.5 <<==,  10.8 <<==  platelets:  282 <==, 252 <==, 249 <==, 229 <==    Creatinine:  0.57  <<==, 0.57  <<==, 0.65  <<==, 0.54  <<==  Sodium:   137  <==, 134  <==, 140  <==, 138  <==      < from: TTE W or WO Ultrasound Enhancing Agent (10.30.23 @ 15:17) >  CONCLUSIONS:    1. Left ventricular wall thickness is normal. Left ventricular systolic function is mildly decreased with an ejection fraction of 49 % by Vazquez's method of disks. Regional wall motion abnormalities present.   2. There is hypokinesis of the apical and apical septal walls.   3. Normal right ventricular cavity size and systolic function.   4. The aortic valve appears trileaflet with reduced systolic excursion. There is mild to moderate aortic stenosis. The peak transaortic velocity is 2.58 m/s, peak transaortic gradient is 26.6 mmHg and mean transaortic gradient is 12.0 mmHg with an LVOT/aortic valve VTI ratio of 0.45. The aortic valve area is estimated at 1.41 cm² by the continuity equation. There is trace aortic regurgitation.   5. Normal atria.   6. The inferior vena cava is normal in size measuring 1.62 cm in diameter, (normal <2.1cm) with normal inspiratory collapse (normal >50%) consistent with normal right atrial pressure (~3, range 0-5mmHg).   7. No pericardial effusion seen.  < end of copied text >    ___________________________________________________________________________________  ===============>>  A S S E S S M E N T   A N D   P L A N <<===============  ------------------------------------------------------------------------------------------    · Assessment	  71 year old male with PMHx of COPD on home O2 (2-3 L/NC), HTN, known AAA, PAD, hyperlipidemia and alcohol use disorder complicated with liver cirrhosis (quit 1yr ago) who was told to come to ER for abnormal labs (elevated CO2 to 42 on recent labs), felt to be secondary to acute on chronic hypercapnic respiratory failure due to likely COPD exacerbation.    Problem/Plan - 1:  ·  Problem: Acute on chronic respiratory failure with hypercapnia.   - Being treated for likely COPD exacerbation  - Pulmonary follow-up, management and optimization appreciated  - AVAPS being tried >> will need to go home with per pulm >> awaiting insurance company approval undiluted by equipment company prior to going home:: Discussed with case management  - Has a left upper lobe 4 mm nodule, to be f/up as outpatient     We also need a PFT as a patient with pulmonary follow-up    Problem/Plan - 2:  ·  Problem: Hypertension.   ·  Plan: On lisinopril and metoprolol as outpatient  - C/w home meds  - Seen by Cards, recs noted  - Echocardiogram as above     Further management and optimization as per cardiology given above Abnormalities    Problem/Plan - 3:  ·  Problem: hyponatremia >> improved  diet changed  patient eating well  free fluid restriction   monitor     Problem/Plan - 4:  ·  Problem: AAA (abdominal aortic aneurysm).   - CTA Ao shows-Increased size of partially thrombosed infrarenal abdominal aortic aneurysm measuring up to 5.8 cm in greatest axial dimension. Crescentic area of mild relative hyperattenuation within the thrombus along the anterior aspect of the aneurysm at the level of the kidneys suspicious for hemorrhage/leakage into the thrombus. No active extravasation identified  - Seen by Vascular, no intervention at this time planned   - BP control   - C/w ASA/prasugrel/statin  - Follow up with Dr. Joaquin as outpatient on 10/30/23 per team   - Cardio on board    - Patient with symptoms, likely from claudication >> Need close vascular follow-up as discussed      Encouraged increased activity as able    Problem/Plan - 5:  ·  Problem: Hyperlipidemia.   ·  Plan: - C/w statin.    Problem/Plan - 6:  ·  Problem: Prophylactic measure.   ·  Plan: LMWH  PT  // OOB to chair daily / increase activity as able     DC planing home pending above     --------------------------------------------  Case discussed with patient  Education given on findings and plan of care  ___________________________  HKrupa Brown D.O.  Pager: [phone]

## 2023-11-06 LAB
GLUCOSE BLDC GLUCOMTR-MCNC: 183 MG/DL — HIGH (ref 70–99)
GLUCOSE BLDC GLUCOMTR-MCNC: 183 MG/DL — HIGH (ref 70–99)

## 2023-11-06 RX ORDER — DIPHENHYDRAMINE HYDROCHLORIDE AND LIDOCAINE HYDROCHLORIDE AND ALUMINUM HYDROXIDE AND MAGNESIUM HYDRO
5 KIT
Refills: 0 | Status: COMPLETED | OUTPATIENT
Start: 2023-11-07 | End: 2023-11-10

## 2023-11-06 RX ADMIN — PRASUGREL 10 MILLIGRAM(S): 5 TABLET, FILM COATED ORAL at 13:56

## 2023-11-06 RX ADMIN — TIOTROPIUM BROMIDE 2 PUFF(S): 18 CAPSULE ORAL; RESPIRATORY (INHALATION) at 09:51

## 2023-11-06 RX ADMIN — LISINOPRIL 20 MILLIGRAM(S): 2.5 TABLET ORAL at 06:23

## 2023-11-06 RX ADMIN — Medication 50 MILLIGRAM(S): at 21:04

## 2023-11-06 RX ADMIN — Medication 50 MILLIGRAM(S): at 09:53

## 2023-11-06 RX ADMIN — ENOXAPARIN SODIUM 40 MILLIGRAM(S): 100 INJECTION SUBCUTANEOUS at 17:34

## 2023-11-06 RX ADMIN — BUDESONIDE AND FORMOTEROL FUMARATE DIHYDRATE 2 PUFF(S): 160; 4.5 AEROSOL RESPIRATORY (INHALATION) at 09:50

## 2023-11-06 RX ADMIN — Medication 1 DROP(S): at 06:22

## 2023-11-06 RX ADMIN — BUDESONIDE AND FORMOTEROL FUMARATE DIHYDRATE 2 PUFF(S): 160; 4.5 AEROSOL RESPIRATORY (INHALATION) at 21:04

## 2023-11-06 RX ADMIN — Medication 81 MILLIGRAM(S): at 13:56

## 2023-11-06 RX ADMIN — Medication 1 DROP(S): at 17:34

## 2023-11-06 RX ADMIN — Medication 37.5 MICROGRAM(S): at 06:22

## 2023-11-06 RX ADMIN — ATORVASTATIN CALCIUM 40 MILLIGRAM(S): 80 TABLET, FILM COATED ORAL at 21:04

## 2023-11-06 NOTE — PROGRESS NOTE ADULT - SUBJECTIVE AND OBJECTIVE BOX
Cardiovascular Disease Progress Note    Overnight events: No acute events overnight.  no new cardiac sx  Otherwise review of systems negative    Objective Findings:  T(C): 36.3 (11-06-23 @ 06:22), Max: 36.8 (11-05-23 @ 17:58)  HR: 59 (11-06-23 @ 06:22) (59 - 79)  BP: 132/79 (11-06-23 @ 06:22) (132/79 - 152/66)  RR: 18 (11-06-23 @ 06:22) (18 - 18)  SpO2: 95% (11-06-23 @ 06:22) (95% - 100%)  Wt(kg): --  Daily     Daily       Physical Exam:  Gen: NAD  HEENT: EOMI  CV: RRR, normal S1 + S2, no m/r/g  Lungs: CTAB  Abd: soft, non-tender  Ext: No edema    Telemetry:    Laboratory Data:                    Inpatient Medications:  MEDICATIONS  (STANDING):  artificial tears (preservative free) Ophthalmic Solution 1 Drop(s) Both EYES two times a day  aspirin enteric coated 81 milliGRAM(s) Oral daily  atorvastatin 40 milliGRAM(s) Oral at bedtime  budesonide 160 MICROgram(s)/formoterol 4.5 MICROgram(s) Inhaler 2 Puff(s) Inhalation two times a day  enoxaparin Injectable 40 milliGRAM(s) SubCutaneous every 24 hours  levothyroxine 37.5 MICROGram(s) Oral daily  lidocaine 4% Cream 1 Application(s) Topical once  lisinopril 20 milliGRAM(s) Oral daily  metoprolol tartrate 50 milliGRAM(s) Oral two times a day  prasugrel 10 milliGRAM(s) Oral daily  tiotropium 2.5 MICROgram(s) Inhaler 2 Puff(s) Inhalation daily      Assessment:  71M PMHx COPD on home O2 2 L nasal cannula (~45pack year hx, quit 4 years ago), hypertension, known AAA - being followed by Dr. Joaquin, PAD, alcohol abuse complicated with liver cirrhosis, recently treated for a UTI, as per patient was referred to the emergency department by his primary care physician Dr. Remington Simms for abnormal labs. Patient reports exacerbation of his chronic low back pain. C/o b/l claudication from buttocks down at distances <50 feet. He has history of PAD s/p RLE bypass (?failed). Denies rest pain and poor wound healing. Denies abd pain, pulsatile mass, tearing pain.       Recs:  cardiac stable  dyspnea likely in setting of copd-e  avaps per pulm  vol status stable. diuretics prn to maintain euvolemic  echo mild lv dysfunction, normal rv fxn, mild-mod as  cw gdmt for lv dysfunction  cw antiplatelets, statin and antianginals  f/u vascular surgery re partially thrombosed AAA  pulmonary f/u  icd interrogation --> no events  dcp         Over 25 minutes spent on total encounter; more than 50% of the visit was spent counseling and/or coordinating care by the attending physician.      Vaughn Simms MD   Cardiovascular Disease  [phone]

## 2023-11-06 NOTE — PROGRESS NOTE ADULT - ASSESSMENT
_________________________________________________________________________________________  ========>>  M E D I C A L   A T T E N D I N G    F O L L O W  U P  N O T E  <<=========  -----------------------------------------------------------------------------------------------------    - Patient seen and examined by me earlier today.   - Patient today overall doing ok, comfortable, eating OK. breathing ok      overall better today..  Slept well, no other complaints today    ==================>> REVIEW OF SYSTEM <<=================    GEN: no fever, no chills, no pain  RESP: no significant SOB at rest on O2, Occasional sputum  CVS: no chest pain, no palpitations  GI: no abdominal pain, no nausea  : no dysuria, no frequency  Neuro: no headache, no dizziness    ==================>> PHYSICAL EXAM <<=================    GEN: A&O X 3 , NAD , comfortable, pleasant, calm , Sitting on the edge of the bed  HEENT: NCAT, PERRL, MMM, hearing intact., O2 via NC  CVS: S1S2 , regular , No M/R/G appreciated  PULM: decreased BS bilaterally, no wheezing noted   ABD.: soft. non tender, non distended,  bowel sounds present  Extrem: intact pulses , no edema            ( Note written / Date of service 11-06-23 ( This is certified to be the same as "ENTERED" date above ( for billing purposes)))    ==================>> MEDICATIONS <<====================    artificial tears (preservative free) Ophthalmic Solution 1 Drop(s) Both EYES two times a day  aspirin enteric coated 81 milliGRAM(s) Oral daily  atorvastatin 40 milliGRAM(s) Oral at bedtime  budesonide 160 MICROgram(s)/formoterol 4.5 MICROgram(s) Inhaler 2 Puff(s) Inhalation two times a day  enoxaparin Injectable 40 milliGRAM(s) SubCutaneous every 24 hours  levothyroxine 37.5 MICROGram(s) Oral daily  lidocaine 4% Cream 1 Application(s) Topical once  lisinopril 20 milliGRAM(s) Oral daily  metoprolol tartrate 50 milliGRAM(s) Oral two times a day  prasugrel 10 milliGRAM(s) Oral daily  tiotropium 2.5 MICROgram(s) Inhaler 2 Puff(s) Inhalation daily    MEDICATIONS  (PRN):  acetaminophen     Tablet .. 650 milliGRAM(s) Oral every 6 hours PRN Temp greater or equal to 38C (100.4F), Mild Pain (1 - 3)  albuterol    90 MICROgram(s) HFA Inhaler 2 Puff(s) Inhalation every 6 hours PRN Shortness of Breath and/or Wheezing    ___________  Active diet:  Diet, Regular:   1200mL Fluid Restriction ([id number])  ___________________    ==================>> VITAL SIGNS <<==================    Vital Signs Last 24 HrsT(C): 36.6 (11-06-23 @ 18:00)  T(F): 97.9 (11-06-23 @ 18:00), Max: 98.1 (11-05-23 @ 21:38)  HR: 72 (11-06-23 @ 18:00) (59 - 79)  BP: 159/75 (11-06-23 @ 18:00)  RR: 18 (11-06-23 @ 18:00) (18 - 18)  SpO2: 100% (11-06-23 @ 18:00) (95% - 100%)      CAPILLARY BLOOD GLUCOSE      POCT Blood Glucose.: 183 mg/dL (06 Nov 2023 12:28)     ==================>> LAB AND IMAGING <<==================             WBC count:   10.02 <<== ,  11.71 <<==   Hemoglobin:   11.0 <<==,  10.5 <<==  platelets:  282 <==, 252 <==, 249 <==    Creatinine:  0.57  <<==, 0.57  <<==, 0.65  <<==  Sodium:   137  <==, 134  <==, 140  <==       AST:               ALT:             AP:             Bili:            ____________________________    M I C R O B I O L O G Y :      SARS-CoV-2: NotDetec (10-28-23 @ 12:12)        < from: TTE W or WO Ultrasound Enhancing Agent (10.30.23 @ 15:17) >  CONCLUSIONS:    1. Left ventricular wall thickness is normal. Left ventricular systolic function is mildly decreased with an ejection fraction of 49 % by Vazquez's method of disks. Regional wall motion abnormalities present.   2. There is hypokinesis of the apical and apical septal walls.   3. Normal right ventricular cavity size and systolic function.   4. The aortic valve appears trileaflet with reduced systolic excursion. There is mild to moderate aortic stenosis. The peak transaortic velocity is 2.58 m/s, peak transaortic gradient is 26.6 mmHg and mean transaortic gradient is 12.0 mmHg with an LVOT/aortic valve VTI ratio of 0.45. The aortic valve area is estimated at 1.41 cm² by the continuity equation. There is trace aortic regurgitation.   5. Normal atria.   6. The inferior vena cava is normal in size measuring 1.62 cm in diameter, (normal <2.1cm) with normal inspiratory collapse (normal >50%) consistent with normal right atrial pressure (~3, range 0-5mmHg).   7. No pericardial effusion seen.  < end of copied text >    ___________________________________________________________________________________  ===============>>  A S S E S S M E N T   A N D   P L A N <<===============  ------------------------------------------------------------------------------------------    · Assessment	  71 year old male with PMHx of COPD on home O2 (2-3 L/NC), HTN, known AAA, PAD, hyperlipidemia and alcohol use disorder complicated with liver cirrhosis (quit 1yr ago) who was told to come to ER for abnormal labs (elevated CO2 to 42 on recent labs), felt to be secondary to acute on chronic hypercapnic respiratory failure due to likely COPD exacerbation.    Problem/Plan - 1:  ·  Problem: Acute on chronic respiratory failure with hypercapnia.   - Being treated for likely COPD exacerbation  - Pulmonary follow-up, management and optimization appreciated  - AVAPS being tried >> will need to go home with per pulm >> awaiting insurance company approval undiluted by equipment company prior to going home:: Discussed with case management  - Has a left upper lobe 4 mm nodule, to be f/up as outpatient     We also need a PFT as a patient with pulmonary follow-up    Problem/Plan - 2:  ·  Problem: Hypertension.   ·  Plan: On lisinopril and metoprolol as outpatient  - C/w home meds  - Seen by Cards, recs noted  - Echocardiogram as above     Further management and optimization as per cardiology given above Abnormalities    Problem/Plan - 3:  ·  Problem: hyponatremia >> improved  diet changed  patient eating well  free fluid restriction   monitor     Problem/Plan - 4:  ·  Problem: AAA (abdominal aortic aneurysm).   - CTA Ao shows-Increased size of partially thrombosed infrarenal abdominal aortic aneurysm measuring up to 5.8 cm in greatest axial dimension. Crescentic area of mild relative hyperattenuation within the thrombus along the anterior aspect of the aneurysm at the level of the kidneys suspicious for hemorrhage/leakage into the thrombus. No active extravasation identified  - Seen by Vascular, no intervention at this time planned   - BP control   - C/w ASA/prasugrel/statin  - Follow up with Dr. Joaquin as outpatient on 10/30/23 per team   - Cardio on board    - Patient with symptoms, likely from claudication >> Need close vascular follow-up as discussed      Encouraged increased activity as able    Problem/Plan - 5:  ·  Problem: Hyperlipidemia.   ·  Plan: - C/w statin.    Problem/Plan - 6:  ·  Problem: Prophylactic measure.   ·  Plan: LMWH  PT  // OOB to chair daily / increase activity as able     DC planing home pending above     --------------------------------------------  Case discussed with patient  Education given on findings and plan of care  ___________________________  HKrupa Brown D.O.  Pager: [phone]       _________________________________________________________________________________________  ========>>  M E D I C A L   A T T E N D I N G    F O L L O W  U P  N O T E  <<=========  -----------------------------------------------------------------------------------------------------    - Patient seen and examined by me earlier today.   - Patient today overall doing ok, comfortable, eating OK. breathing ok      overall better today..  Slept well, no other complaints today.. Still awaiting approval of and delivery of AVAPS    Patient complains of some discomfort on Lower gums>> Magic mouthwash ordered    ==================>> REVIEW OF SYSTEM <<=================    GEN: no fever, no chills, no pain  RESP: no significant SOB at rest on O2, Occasional sputum  CVS: no chest pain, no palpitations  GI: no abdominal pain, no nausea  : no dysuria, no frequency  Neuro: no headache, no dizziness    ==================>> PHYSICAL EXAM <<=================    GEN: A&O X 3 , NAD , comfortable, pleasant, calm , Sitting on the edge of the bed  HEENT: NCAT, PERRL, MMM, hearing intact., O2 via NC  CVS: S1S2 , regular , No M/R/G appreciated  PULM: decreased BS bilaterally, no wheezing noted   ABD.: soft. non tender, non distended,  bowel sounds present  Extrem: intact pulses , no edema            ( Note written / Date of service 11-06-23 ( This is certified to be the same as "ENTERED" date above ( for billing purposes)))    ==================>> MEDICATIONS <<====================    artificial tears (preservative free) Ophthalmic Solution 1 Drop(s) Both EYES two times a day  aspirin enteric coated 81 milliGRAM(s) Oral daily  atorvastatin 40 milliGRAM(s) Oral at bedtime  budesonide 160 MICROgram(s)/formoterol 4.5 MICROgram(s) Inhaler 2 Puff(s) Inhalation two times a day  enoxaparin Injectable 40 milliGRAM(s) SubCutaneous every 24 hours  levothyroxine 37.5 MICROGram(s) Oral daily  lidocaine 4% Cream 1 Application(s) Topical once  lisinopril 20 milliGRAM(s) Oral daily  metoprolol tartrate 50 milliGRAM(s) Oral two times a day  prasugrel 10 milliGRAM(s) Oral daily  tiotropium 2.5 MICROgram(s) Inhaler 2 Puff(s) Inhalation daily    MEDICATIONS  (PRN):  acetaminophen     Tablet .. 650 milliGRAM(s) Oral every 6 hours PRN Temp greater or equal to 38C (100.4F), Mild Pain (1 - 3)  albuterol    90 MICROgram(s) HFA Inhaler 2 Puff(s) Inhalation every 6 hours PRN Shortness of Breath and/or Wheezing    ___________  Active diet:  Diet, Regular:   1200mL Fluid Restriction ([id number])  ___________________    ==================>> VITAL SIGNS <<==================    Vital Signs Last 24 HrsT(C): 36.6 (11-06-23 @ 18:00)  T(F): 97.9 (11-06-23 @ 18:00), Max: 98.1 (11-05-23 @ 21:38)  HR: 72 (11-06-23 @ 18:00) (59 - 79)  BP: 159/75 (11-06-23 @ 18:00)  RR: 18 (11-06-23 @ 18:00) (18 - 18)  SpO2: 100% (11-06-23 @ 18:00) (95% - 100%)      POCT Blood Glucose.: 183 mg/dL (06 Nov 2023 12:28)     ==================>> LAB AND IMAGING <<==================       no labs today    < from: TTE W or WO Ultrasound Enhancing Agent (10.30.23 @ 15:17) >  CONCLUSIONS:    1. Left ventricular wall thickness is normal. Left ventricular systolic function is mildly decreased with an ejection fraction of 49 % by Vazquez's method of disks. Regional wall motion abnormalities present.   2. There is hypokinesis of the apical and apical septal walls.   3. Normal right ventricular cavity size and systolic function.   4. The aortic valve appears trileaflet with reduced systolic excursion. There is mild to moderate aortic stenosis. The peak transaortic velocity is 2.58 m/s, peak transaortic gradient is 26.6 mmHg and mean transaortic gradient is 12.0 mmHg with an LVOT/aortic valve VTI ratio of 0.45. The aortic valve area is estimated at 1.41 cm² by the continuity equation. There is trace aortic regurgitation.   5. Normal atria.   6. The inferior vena cava is normal in size measuring 1.62 cm in diameter, (normal <2.1cm) with normal inspiratory collapse (normal >50%) consistent with normal right atrial pressure (~3, range 0-5mmHg).   7. No pericardial effusion seen.  < end of copied text >    ___________________________________________________________________________________  ===============>>  A S S E S S M E N T   A N D   P L A N <<===============  ------------------------------------------------------------------------------------------    · Assessment	  71 year old male with PMHx of COPD on home O2 (2-3 L/NC), HTN, known AAA, PAD, hyperlipidemia and alcohol use disorder complicated with liver cirrhosis (quit 1yr ago) who was told to come to ER for abnormal labs (elevated CO2 to 42 on recent labs), felt to be secondary to acute on chronic hypercapnic respiratory failure due to likely COPD exacerbation.    Problem/Plan - 1:  ·  Problem: Acute on chronic respiratory failure with hypercapnia.   - Being treated for likely COPD exacerbation  - Pulmonary follow-up, management and optimization appreciated  - AVAPS::  will need to go home with per pulm >> awaiting insurance company approval undiluted by equipment company prior to going home:: Discussed with case management  - Has a left upper lobe 4 mm nodule, to be f/up as outpatient     We also need a PFT as a patient with pulmonary follow-up    Problem/Plan - 2:  ·  Problem: Hypertension.   ·  Plan: On lisinopril and metoprolol as outpatient  - C/w home meds  - Seen by Cards, recs noted  - Echocardiogram as above     Further management and optimization as per cardiology given above Abnormalities    Problem/Plan - 3:  ·  Problem: hyponatremia >> improved  diet changed  patient eating well  free fluid restriction   monitor     Problem/Plan - 4:  ·  Problem: AAA (abdominal aortic aneurysm).   - CTA Ao shows-Increased size of partially thrombosed infrarenal abdominal aortic aneurysm measuring up to 5.8 cm in greatest axial dimension. Crescentic area of mild relative hyperattenuation within the thrombus along the anterior aspect of the aneurysm at the level of the kidneys suspicious for hemorrhage/leakage into the thrombus. No active extravasation identified  - Seen by Vascular, no intervention at this time planned   - BP control   - C/w ASA/prasugrel/statin  - Follow up with Dr. Joaquin as outpatient on 10/30/23 per team   - Cardio on board    - Patient with symptoms, likely from claudication >> Need close vascular follow-up as discussed      Encouraged increased activity as able    Problem/Plan - 5:  ·  Problem: Hyperlipidemia.   ·  Plan: - C/w statin.    Problem/Plan - 6:  ·  Problem: Prophylactic measure.   ·  Plan: LMWH  PT  // OOB to chair daily / increase activity as able     DC planing home pending above     --------------------------------------------  Case discussed with patient, son at bedside   Education given on findings and plan of care  ___________________________  H. JOJO Brown.  Pager: [phone]

## 2023-11-07 PROCEDURE — 99233 SBSQ HOSP IP/OBS HIGH 50: CPT

## 2023-11-07 RX ORDER — HYDRALAZINE HCL 50 MG
10 TABLET ORAL ONCE
Refills: 0 | Status: COMPLETED | OUTPATIENT
Start: 2023-11-07 | End: 2023-11-07

## 2023-11-07 RX ADMIN — BUDESONIDE AND FORMOTEROL FUMARATE DIHYDRATE 2 PUFF(S): 160; 4.5 AEROSOL RESPIRATORY (INHALATION) at 10:01

## 2023-11-07 RX ADMIN — DIPHENHYDRAMINE HYDROCHLORIDE AND LIDOCAINE HYDROCHLORIDE AND ALUMINUM HYDROXIDE AND MAGNESIUM HYDRO 5 MILLILITER(S): KIT at 23:08

## 2023-11-07 RX ADMIN — DIPHENHYDRAMINE HYDROCHLORIDE AND LIDOCAINE HYDROCHLORIDE AND ALUMINUM HYDROXIDE AND MAGNESIUM HYDRO 5 MILLILITER(S): KIT at 12:44

## 2023-11-07 RX ADMIN — DIPHENHYDRAMINE HYDROCHLORIDE AND LIDOCAINE HYDROCHLORIDE AND ALUMINUM HYDROXIDE AND MAGNESIUM HYDRO 5 MILLILITER(S): KIT at 18:48

## 2023-11-07 RX ADMIN — LISINOPRIL 20 MILLIGRAM(S): 2.5 TABLET ORAL at 06:17

## 2023-11-07 RX ADMIN — ENOXAPARIN SODIUM 40 MILLIGRAM(S): 100 INJECTION SUBCUTANEOUS at 18:48

## 2023-11-07 RX ADMIN — DIPHENHYDRAMINE HYDROCHLORIDE AND LIDOCAINE HYDROCHLORIDE AND ALUMINUM HYDROXIDE AND MAGNESIUM HYDRO 5 MILLILITER(S): KIT at 00:23

## 2023-11-07 RX ADMIN — Medication 37.5 MICROGRAM(S): at 06:17

## 2023-11-07 RX ADMIN — Medication 1 DROP(S): at 19:04

## 2023-11-07 RX ADMIN — Medication 81 MILLIGRAM(S): at 12:44

## 2023-11-07 RX ADMIN — BUDESONIDE AND FORMOTEROL FUMARATE DIHYDRATE 2 PUFF(S): 160; 4.5 AEROSOL RESPIRATORY (INHALATION) at 21:14

## 2023-11-07 RX ADMIN — Medication 1 DROP(S): at 06:18

## 2023-11-07 RX ADMIN — Medication 10 MILLIGRAM(S): at 18:48

## 2023-11-07 RX ADMIN — PRASUGREL 10 MILLIGRAM(S): 5 TABLET, FILM COATED ORAL at 12:45

## 2023-11-07 RX ADMIN — Medication 50 MILLIGRAM(S): at 21:14

## 2023-11-07 RX ADMIN — TIOTROPIUM BROMIDE 2 PUFF(S): 18 CAPSULE ORAL; RESPIRATORY (INHALATION) at 11:22

## 2023-11-07 RX ADMIN — ATORVASTATIN CALCIUM 40 MILLIGRAM(S): 80 TABLET, FILM COATED ORAL at 21:14

## 2023-11-07 RX ADMIN — DIPHENHYDRAMINE HYDROCHLORIDE AND LIDOCAINE HYDROCHLORIDE AND ALUMINUM HYDROXIDE AND MAGNESIUM HYDRO 5 MILLILITER(S): KIT at 06:16

## 2023-11-07 NOTE — PROGRESS NOTE ADULT - ASSESSMENT
_________________________________________________________________________________________  ========>>  M E D I C A L   A T T E N D I N G    F O L L O W  U P  N O T E  <<=========  -----------------------------------------------------------------------------------------------------    - Patient seen and examined by me earlier today.   - Patient today overall doing ok, comfortable, eating OK. breathing ok      overall better today..  Slept well, no other complaints today.. Still awaiting approval of and delivery of AVAPS       discomfort on Lower gums>> Improved with Magic mouthwash     ==================>> REVIEW OF SYSTEM <<=================    GEN: no fever, no chills, no pain  RESP: no significant SOB at rest on O2, Occasional sputum  CVS: no chest pain, no palpitations  GI: no abdominal pain, no nausea  : no dysuria, no frequency  Neuro: no headache, no dizziness    ==================>> PHYSICAL EXAM <<=================    GEN: A&O X 3 , NAD , comfortable, pleasant, calm , Sitting on the edge of the bed  HEENT: NCAT, PERRL, MMM, hearing intact., O2 via NC  CVS: S1S2 , regular , No M/R/G appreciated  PULM: decreased BS bilaterally, no wheezing noted   ABD.: soft. non tender, non distended,  bowel sounds present  Extrem: intact pulses , no edema           ( Note written / Date of service 11-07-23 ( This is certified to be the same as "ENTERED" date above ( for billing purposes)))    ==================>> MEDICATIONS <<====================    artificial tears (preservative free) Ophthalmic Solution 1 Drop(s) Both EYES two times a day  aspirin enteric coated 81 milliGRAM(s) Oral daily  atorvastatin 40 milliGRAM(s) Oral at bedtime  budesonide 160 MICROgram(s)/formoterol 4.5 MICROgram(s) Inhaler 2 Puff(s) Inhalation two times a day  enoxaparin Injectable 40 milliGRAM(s) SubCutaneous every 24 hours  FIRST- Mouthwash  BLM 5 milliLiter(s) Swish and Spit four times a day  hydrALAZINE 10 milliGRAM(s) Oral once  levothyroxine 37.5 MICROGram(s) Oral daily  lidocaine 4% Cream 1 Application(s) Topical once  lisinopril 20 milliGRAM(s) Oral daily  metoprolol tartrate 50 milliGRAM(s) Oral two times a day  prasugrel 10 milliGRAM(s) Oral daily  tiotropium 2.5 MICROgram(s) Inhaler 2 Puff(s) Inhalation daily    MEDICATIONS  (PRN):  acetaminophen     Tablet .. 650 milliGRAM(s) Oral every 6 hours PRN Temp greater or equal to 38C (100.4F), Mild Pain (1 - 3)  albuterol    90 MICROgram(s) HFA Inhaler 2 Puff(s) Inhalation every 6 hours PRN Shortness of Breath and/or Wheezing    ___________  Active diet:  Diet, Regular:   1200mL Fluid Restriction ([id number])  ___________________    ==================>> VITAL SIGNS <<==================     Vital Signs Last 24 HrsT(C): 36.7 (11-07-23 @ 17:45)  T(F): 98 (11-07-23 @ 17:45), Max: 98 (11-07-23 @ 09:55)  HR: 59 (11-07-23 @ 17:45) (55 - 85)  BP: 179/87 (11-07-23 @ 17:45)  RR: 18 (11-07-23 @ 17:45) (17 - 18)  SpO2: 99% (11-07-23 @ 17:45) (96% - 100%)       ==================>> LAB AND IMAGING <<==================       no labs today    < from: TTE W or WO Ultrasound Enhancing Agent (10.30.23 @ 15:17) >  CONCLUSIONS:    1. Left ventricular wall thickness is normal. Left ventricular systolic function is mildly decreased with an ejection fraction of 49 % by Vazquez's method of disks. Regional wall motion abnormalities present.   2. There is hypokinesis of the apical and apical septal walls.   3. Normal right ventricular cavity size and systolic function.   4. The aortic valve appears trileaflet with reduced systolic excursion. There is mild to moderate aortic stenosis. The peak transaortic velocity is 2.58 m/s, peak transaortic gradient is 26.6 mmHg and mean transaortic gradient is 12.0 mmHg with an LVOT/aortic valve VTI ratio of 0.45. The aortic valve area is estimated at 1.41 cm² by the continuity equation. There is trace aortic regurgitation.   5. Normal atria.   6. The inferior vena cava is normal in size measuring 1.62 cm in diameter, (normal <2.1cm) with normal inspiratory collapse (normal >50%) consistent with normal right atrial pressure (~3, range 0-5mmHg).   7. No pericardial effusion seen.  < end of copied text >    ___________________________________________________________________________________  ===============>>  A S S E S S M E N T   A N D   P L A N <<===============  ------------------------------------------------------------------------------------------    · Assessment	  71 year old male with PMHx of COPD on home O2 (2-3 L/NC), HTN, known AAA, PAD, hyperlipidemia and alcohol use disorder complicated with liver cirrhosis (quit 1yr ago) who was told to come to ER for abnormal labs (elevated CO2 to 42 on recent labs), felt to be secondary to acute on chronic hypercapnic respiratory failure due to likely COPD exacerbation.    Problem/Plan - 1:  ·  Problem: Acute on chronic respiratory failure with hypercapnia.   - Being treated for likely COPD exacerbation  - Pulmonary follow-up, management and optimization appreciated  - AVAPS::  will need to go home with per pulm >> awaiting insurance company approval undiluted by equipment company prior to going home:: Discussed with case management  - Has a left upper lobe 4 mm nodule, to be f/up as outpatient    Problem/Plan - 2:  ·  Problem: Hypertension.   ·  Plan: On lisinopril and metoprolol as outpatient  - C/w home meds  - Seen by Cards, recs noted  - Echocardiogram as above     Further management and optimization as per cardiology given above Abnormalities    Problem/Plan - 3:  ·  Problem: hyponatremia >> improved  diet changed  patient eating well  free fluid restriction   monitor     Problem/Plan - 4:  ·  Problem: AAA (abdominal aortic aneurysm).   - CTA Ao shows-Increased size of partially thrombosed infrarenal abdominal aortic aneurysm measuring up to 5.8 cm in greatest axial dimension. Crescentic area of mild relative hyperattenuation within the thrombus along the anterior aspect of the aneurysm at the level of the kidneys suspicious for hemorrhage/leakage into the thrombus. No active extravasation identified  - Seen by Vascular, no intervention at this time planned   - BP control   - C/w ASA/prasugrel/statin  - Follow up with Dr. Joaquin as outpatient on 10/30/23 per team   - Cardio on board    - Patient with symptoms, likely from claudication >> Need close vascular follow-up as discussed      Encouraged increased activity as able    Problem/Plan - 5:  ·  Problem: Hyperlipidemia.   ·  Plan: - C/w statin.    Problem/Plan - 6:  ·  Problem: Prophylactic measure.   ·  Plan: LMWH  PT  // OOB to chair daily / increase activity as able     DC planing home pending above     --------------------------------------------  Case discussed with patient,   Education given on findings and plan of care  ___________________________  H. JOJO Brown.  Pager: [phone]

## 2023-11-07 NOTE — PROGRESS NOTE ADULT - SUBJECTIVE AND OBJECTIVE BOX
Cardiovascular Disease Progress Note    Overnight events: No acute events overnight.  no new cardiac sx  Otherwise review of systems negative    Objective Findings:  T(C): 36.4 (11-07-23 @ 06:00), Max: 36.6 (11-06-23 @ 14:03)  HR: 59 (11-07-23 @ 06:00) (59 - 72)  BP: 146/67 (11-07-23 @ 06:00) (140/79 - 159/75)  RR: 18 (11-07-23 @ 06:00) (17 - 18)  SpO2: 96% (11-07-23 @ 06:00) (96% - 100%)  Wt(kg): --  Daily     Daily       Physical Exam:  Gen: NAD  HEENT: EOMI  CV: RRR, normal S1 + S2, no m/r/g  Lungs: CTAB  Abd: soft, non-tender  Ext: No edema    Telemetry:    Laboratory Data:                    Inpatient Medications:  MEDICATIONS  (STANDING):  artificial tears (preservative free) Ophthalmic Solution 1 Drop(s) Both EYES two times a day  aspirin enteric coated 81 milliGRAM(s) Oral daily  atorvastatin 40 milliGRAM(s) Oral at bedtime  budesonide 160 MICROgram(s)/formoterol 4.5 MICROgram(s) Inhaler 2 Puff(s) Inhalation two times a day  enoxaparin Injectable 40 milliGRAM(s) SubCutaneous every 24 hours  FIRST- Mouthwash  BLM 5 milliLiter(s) Swish and Spit four times a day  levothyroxine 37.5 MICROGram(s) Oral daily  lidocaine 4% Cream 1 Application(s) Topical once  lisinopril 20 milliGRAM(s) Oral daily  metoprolol tartrate 50 milliGRAM(s) Oral two times a day  prasugrel 10 milliGRAM(s) Oral daily  tiotropium 2.5 MICROgram(s) Inhaler 2 Puff(s) Inhalation daily      Assessment:  71M PMHx COPD on home O2 2 L nasal cannula (~45pack year hx, quit 4 years ago), hypertension, known AAA - being followed by Dr. Joaquin, PAD, alcohol abuse complicated with liver cirrhosis, recently treated for a UTI, as per patient was referred to the emergency department by his primary care physician Dr. Remington Simms for abnormal labs. Patient reports exacerbation of his chronic low back pain. C/o b/l claudication from buttocks down at distances <50 feet. He has history of PAD s/p RLE bypass (?failed). Denies rest pain and poor wound healing. Denies abd pain, pulsatile mass, tearing pain.       Recs:  cardiac stable  dyspnea likely in setting of copd-e. no e/o chf  avaps per pulm  vol status stable. diuretics prn to maintain euvolemic  echo mild lv dysfunction, normal rv fxn, mild-mod as  cw gdmt for lv dysfunction  cw antiplatelets, statin and antianginals  f/u vascular surgery re partially thrombosed AAA  pulmonary f/u  icd interrogation --> no events  dcp  with AVAPS        Over 25 minutes spent on total encounter; more than 50% of the visit was spent counseling and/or coordinating care by the attending physician.      Vaughn Simms MD   Cardiovascular Disease  [phone]

## 2023-11-07 NOTE — PROGRESS NOTE ADULT - SUBJECTIVE AND OBJECTIVE BOX
PULMONARY PROGRESS NOTE    FRANCIS CHAUDHARY  MRN-[id number]    Patient is a 68y old  Male who presents with a chief complaint of Told to come for abnormal labs (07 Nov 2023 07:10)      HPI:  seen today on 2L  feels ok  tolerating NIVV     ROS:   -    ACTIVE MEDICATION LIST:  MEDICATIONS  (STANDING):  artificial tears (preservative free) Ophthalmic Solution 1 Drop(s) Both EYES two times a day  aspirin enteric coated 81 milliGRAM(s) Oral daily  atorvastatin 40 milliGRAM(s) Oral at bedtime  budesonide 160 MICROgram(s)/formoterol 4.5 MICROgram(s) Inhaler 2 Puff(s) Inhalation two times a day  enoxaparin Injectable 40 milliGRAM(s) SubCutaneous every 24 hours  FIRST- Mouthwash  BLM 5 milliLiter(s) Swish and Spit four times a day  levothyroxine 37.5 MICROGram(s) Oral daily  lidocaine 4% Cream 1 Application(s) Topical once  lisinopril 20 milliGRAM(s) Oral daily  metoprolol tartrate 50 milliGRAM(s) Oral two times a day  prasugrel 10 milliGRAM(s) Oral daily  tiotropium 2.5 MICROgram(s) Inhaler 2 Puff(s) Inhalation daily    MEDICATIONS  (PRN):  acetaminophen     Tablet .. 650 milliGRAM(s) Oral every 6 hours PRN Temp greater or equal to 38C (100.4F), Mild Pain (1 - 3)  albuterol    90 MICROgram(s) HFA Inhaler 2 Puff(s) Inhalation every 6 hours PRN Shortness of Breath and/or Wheezing      EXAM:  Vital Signs Last 24 Hrs  T(C): 36.7 (07 Nov 2023 09:55), Max: 36.7 (07 Nov 2023 09:55)  T(F): 98 (07 Nov 2023 09:55), Max: 98 (07 Nov 2023 09:55)  HR: 85 (07 Nov 2023 11:25) (55 - 85)  BP: 151/80 (07 Nov 2023 09:55) (140/79 - 159/75)  BP(mean): --  RR: 18 (07 Nov 2023 09:55) (17 - 18)  SpO2: 98% (07 Nov 2023 11:25) (96% - 100%)    Parameters below as of 07 Nov 2023 09:55  Patient On (Oxygen Delivery Method): nasal cannula        GENERAL: The patient is awake and alert in no apparent distress.     LUNGS: Clear to auscultation without wheezing, rales or rhonchi; respirations unlabored   < from: CT Angio Chest Aorta w/wo IV Cont (10.27.23 @ 23:11) >    ACC: [id number] EXAM:  CT ANGIO ABD PELV (W)AW IC   ORDERED BY: JOSÉ MANUEL FRANK     ACC: [id number] EXAM:  CT ANGIO CHEST AORTA WAWIC   ORDERED BY: HOANG SMITH     PROCEDURE DATE:  10/27/2023          INTERPRETATION:  INDICATION: Abdominal aortic aneurysm.    COMPARISON: CTA chest abdomen pelvis 12/15/2022.    CONTRAST/COMPLICATIONS:  IV Contrast: Omnipaque 350. 95 cc administered. 5 cc discarded.  Oral Contrast: None.  Complications: None reported at time of study completion.    PROCEDURE:  CT Angiography of the Chest, Abdomen and Pelvis.  Precontrast imaging was performed through the chest followed by arterial   phase imaging of the chest, abdomen and pelvis.  Sagittal and coronal reformats were performed as well as 3D (MIP)   reconstructions.    FINDINGS:  CHEST:  LUNGS AND LARGE AIRWAYS: Small low density layering material in the   trachea, bilateral mainstem bronchi and bronchus intermedius. Emphysema.   Bilateral subcentimeter pulmonary nodules. A left upper lobe 4 mm nodule   (301:54) appears new when compared to prior exam.  PLEURA: No pleural effusion.  VESSELS: No thoracic aortic dissection, aneurysm or intramural hematoma.   Aortic and coronary artery calcifications.  HEART: Heart size is normal. No pericardial effusion. AICD lead   terminates in the right ventricle.  MEDIASTINUM AND ALANNA: No lymphadenopathy.  CHEST WALL AND LOWER NECK: Left anterior chest wall AICD.    ABDOMEN AND PELVIS:  LIVER: Within normal limits.  BILE DUCTS: Normal caliber.  GALLBLADDER: Cholelithiasis.  SPLEEN: Within normal limits.  PANCREAS: Within normal limits.  ADRENALS: Similar indeterminate attenuation right adrenal nodule. Left   adrenal gland is normal.  KIDNEYS/URETERS: Symmetric enhancement. No hydronephrosis. 4 mm   nonobstructing right renal calculus. Left renal cortical   scarring/postsurgical change. Left renal cyst.    BLADDER: Within normal limits.  REPRODUCTIVE ORGANS: Prostate is enlarged.    BOWEL: No bowel obstruction. Appendix is normal.  PERITONEUM: No ascites.    VESSELS: Partially thrombosed infrarenal abdominal aortic aneurysm   measures 5.0 x 5.8 x 12.3 cm (TRV x AP x CC), previously 5.3 x 4.8 x 11.5   cm. Crescentic area of relative hyperattenuation within the thrombus   along the anterior aspect of the aneurysm at the level of the kidneys   (example 301, 153), suspicious for hemorrhage/leakage into thrombus. No   active extravasation identified. Similar ectasia of the proximal common   iliac arteries, up to 1.5 cm on the right. Intact right femoral bypass   graft. Atherosclerotic changes. No abdominal aortic dissection.  RETROPERITONEUM/LYMPH NODES: No lymphadenopathy.  ABDOMINAL WALL: Within normal limits.  BONES: Degenerative changes.    IMPRESSION:  No aortic dissection.    Increased size of partially thrombosed infrarenal abdominal aortic   aneurysm measuring up to 5.8 cm in greatest axial dimension. Crescentic   area of mild relative hyperattenuation within the thrombus along the   anterior aspect of the aneurysm at the level of the kidneys suspicious   for hemorrhage/leakage into the thrombus. No active extravasation   identified. Vascular surgery consult is recommended.    --- End of Report ---          TAYLOR GARNER MD; Resident Radiology  This document has been electronicallysigned.  MU OLSON MD; Attending Radiologist  This document has been electronically signed. Oct 28 2023  1:05AM    < end of copied text >      PROBLEM LIST:  68y Male with HEALTH ISSUES - PROBLEM Dx:  Acute on chronic respiratory failure with hypercapnia    COPD exacerbation    Hypertension    AAA (abdominal aortic aneurysm)    Prophylactic measure    Hyperlipidemia    Chronic respiratory failure              RECS:  patient with CHRONIC HYPERCARBIC RESP FAILURE due to copd/ emphysema/ former smoker  hypercarbia  did not  improve on BPAP , on NIVV, improved hypercarbia noted on blood work   taper off his 02 - avoid HYPEROXEMIA  continue symbicort- told him to rinse mouth  continue spiriva  contiue NIVV qhs/ whenever sleeping  s/p prednisone 5 days  BEDSIDE SPIROMETRY shows severe COPD    outpatient f/u on the pulm nodule    the patient requires home medical ventilator device capable of delivering pressure targeted and/or volume present ventilation cannot be done on any home care BPAP device due to their chronic respiratory failure consequent to the COPD.  The patient will need home medical ventilator with battery back up continuous enhanced alarm monitoring mouth piece ventilation, home resp. therapist set up andcontinued visits for continued use to 24hrs/day an access to RT 24/7 for urgent trouble shooting machine repairs & ongoing titration or adjustments.  We have attempted avaps mode on this patient. he has been admitted for chronic resp failure with hypercapnia secondary to copd. the patient's condition will likely deteriorate without a home medical ventilator and this may cause him to be rehospitalized or cause serious clinical harm        Please call with any questions.    Paris Warren DO  [facility] Pulmonary/Sleep Medicine  [phone]

## 2023-11-08 LAB
ANION GAP SERPL CALC-SCNC: 11 MMOL/L — SIGNIFICANT CHANGE UP (ref 7–14)
ANION GAP SERPL CALC-SCNC: 11 MMOL/L — SIGNIFICANT CHANGE UP (ref 7–14)
BUN SERPL-MCNC: 17 MG/DL — SIGNIFICANT CHANGE UP (ref 7–23)
BUN SERPL-MCNC: 17 MG/DL — SIGNIFICANT CHANGE UP (ref 7–23)
CALCIUM SERPL-MCNC: 9.4 MG/DL — SIGNIFICANT CHANGE UP (ref 8.4–10.5)
CALCIUM SERPL-MCNC: 9.4 MG/DL — SIGNIFICANT CHANGE UP (ref 8.4–10.5)
CHLORIDE SERPL-SCNC: 95 MMOL/L — LOW (ref 98–107)
CHLORIDE SERPL-SCNC: 95 MMOL/L — LOW (ref 98–107)
CO2 SERPL-SCNC: 29 MMOL/L — SIGNIFICANT CHANGE UP (ref 22–31)
CO2 SERPL-SCNC: 29 MMOL/L — SIGNIFICANT CHANGE UP (ref 22–31)
CREAT SERPL-MCNC: 0.67 MG/DL — SIGNIFICANT CHANGE UP (ref 0.5–1.3)
CREAT SERPL-MCNC: 0.67 MG/DL — SIGNIFICANT CHANGE UP (ref 0.5–1.3)
EGFR: 100 ML/MIN/1.73M2 — SIGNIFICANT CHANGE UP
EGFR: 100 ML/MIN/1.73M2 — SIGNIFICANT CHANGE UP
GLUCOSE SERPL-MCNC: 96 MG/DL — SIGNIFICANT CHANGE UP (ref 70–99)
GLUCOSE SERPL-MCNC: 96 MG/DL — SIGNIFICANT CHANGE UP (ref 70–99)
HCT VFR BLD CALC: 36 % — LOW (ref 39–50)
HCT VFR BLD CALC: 36 % — LOW (ref 39–50)
HGB BLD-MCNC: 11.7 G/DL — LOW (ref 13–17)
HGB BLD-MCNC: 11.7 G/DL — LOW (ref 13–17)
MAGNESIUM SERPL-MCNC: 1.8 MG/DL — SIGNIFICANT CHANGE UP (ref 1.6–2.6)
MAGNESIUM SERPL-MCNC: 1.8 MG/DL — SIGNIFICANT CHANGE UP (ref 1.6–2.6)
MCHC RBC-ENTMCNC: 29.6 PG — SIGNIFICANT CHANGE UP (ref 27–34)
MCHC RBC-ENTMCNC: 29.6 PG — SIGNIFICANT CHANGE UP (ref 27–34)
MCHC RBC-ENTMCNC: 32.5 GM/DL — SIGNIFICANT CHANGE UP (ref 32–36)
MCHC RBC-ENTMCNC: 32.5 GM/DL — SIGNIFICANT CHANGE UP (ref 32–36)
MCV RBC AUTO: 91.1 FL — SIGNIFICANT CHANGE UP (ref 80–100)
MCV RBC AUTO: 91.1 FL — SIGNIFICANT CHANGE UP (ref 80–100)
NRBC # BLD: 0 /100 WBCS — SIGNIFICANT CHANGE UP (ref 0–0)
NRBC # BLD: 0 /100 WBCS — SIGNIFICANT CHANGE UP (ref 0–0)
NRBC # FLD: 0 K/UL — SIGNIFICANT CHANGE UP (ref 0–0)
NRBC # FLD: 0 K/UL — SIGNIFICANT CHANGE UP (ref 0–0)
PHOSPHATE SERPL-MCNC: 3.2 MG/DL — SIGNIFICANT CHANGE UP (ref 2.5–4.5)
PHOSPHATE SERPL-MCNC: 3.2 MG/DL — SIGNIFICANT CHANGE UP (ref 2.5–4.5)
PLATELET # BLD AUTO: 350 K/UL — SIGNIFICANT CHANGE UP (ref 150–400)
PLATELET # BLD AUTO: 350 K/UL — SIGNIFICANT CHANGE UP (ref 150–400)
POTASSIUM SERPL-MCNC: 5.4 MMOL/L — HIGH (ref 3.5–5.3)
POTASSIUM SERPL-MCNC: 5.4 MMOL/L — HIGH (ref 3.5–5.3)
POTASSIUM SERPL-SCNC: 5.4 MMOL/L — HIGH (ref 3.5–5.3)
POTASSIUM SERPL-SCNC: 5.4 MMOL/L — HIGH (ref 3.5–5.3)
RBC # BLD: 3.95 M/UL — LOW (ref 4.2–5.8)
RBC # BLD: 3.95 M/UL — LOW (ref 4.2–5.8)
RBC # FLD: 15.4 % — HIGH (ref 10.3–14.5)
RBC # FLD: 15.4 % — HIGH (ref 10.3–14.5)
SODIUM SERPL-SCNC: 135 MMOL/L — SIGNIFICANT CHANGE UP (ref 135–145)
SODIUM SERPL-SCNC: 135 MMOL/L — SIGNIFICANT CHANGE UP (ref 135–145)
WBC # BLD: 10.97 K/UL — HIGH (ref 3.8–10.5)
WBC # BLD: 10.97 K/UL — HIGH (ref 3.8–10.5)
WBC # FLD AUTO: 10.97 K/UL — HIGH (ref 3.8–10.5)
WBC # FLD AUTO: 10.97 K/UL — HIGH (ref 3.8–10.5)

## 2023-11-08 PROCEDURE — 99232 SBSQ HOSP IP/OBS MODERATE 35: CPT

## 2023-11-08 RX ADMIN — ATORVASTATIN CALCIUM 40 MILLIGRAM(S): 80 TABLET, FILM COATED ORAL at 21:48

## 2023-11-08 RX ADMIN — Medication 37.5 MICROGRAM(S): at 06:37

## 2023-11-08 RX ADMIN — Medication 1 DROP(S): at 06:37

## 2023-11-08 RX ADMIN — Medication 50 MILLIGRAM(S): at 09:36

## 2023-11-08 RX ADMIN — TIOTROPIUM BROMIDE 2 PUFF(S): 18 CAPSULE ORAL; RESPIRATORY (INHALATION) at 09:36

## 2023-11-08 RX ADMIN — Medication 81 MILLIGRAM(S): at 12:24

## 2023-11-08 RX ADMIN — PRASUGREL 10 MILLIGRAM(S): 5 TABLET, FILM COATED ORAL at 12:24

## 2023-11-08 RX ADMIN — DIPHENHYDRAMINE HYDROCHLORIDE AND LIDOCAINE HYDROCHLORIDE AND ALUMINUM HYDROXIDE AND MAGNESIUM HYDRO 5 MILLILITER(S): KIT at 12:24

## 2023-11-08 RX ADMIN — Medication 1 DROP(S): at 18:32

## 2023-11-08 RX ADMIN — DIPHENHYDRAMINE HYDROCHLORIDE AND LIDOCAINE HYDROCHLORIDE AND ALUMINUM HYDROXIDE AND MAGNESIUM HYDRO 5 MILLILITER(S): KIT at 18:32

## 2023-11-08 RX ADMIN — BUDESONIDE AND FORMOTEROL FUMARATE DIHYDRATE 2 PUFF(S): 160; 4.5 AEROSOL RESPIRATORY (INHALATION) at 21:49

## 2023-11-08 RX ADMIN — ENOXAPARIN SODIUM 40 MILLIGRAM(S): 100 INJECTION SUBCUTANEOUS at 18:32

## 2023-11-08 RX ADMIN — DIPHENHYDRAMINE HYDROCHLORIDE AND LIDOCAINE HYDROCHLORIDE AND ALUMINUM HYDROXIDE AND MAGNESIUM HYDRO 5 MILLILITER(S): KIT at 06:36

## 2023-11-08 RX ADMIN — Medication 50 MILLIGRAM(S): at 23:33

## 2023-11-08 RX ADMIN — DIPHENHYDRAMINE HYDROCHLORIDE AND LIDOCAINE HYDROCHLORIDE AND ALUMINUM HYDROXIDE AND MAGNESIUM HYDRO 5 MILLILITER(S): KIT at 23:34

## 2023-11-08 RX ADMIN — BUDESONIDE AND FORMOTEROL FUMARATE DIHYDRATE 2 PUFF(S): 160; 4.5 AEROSOL RESPIRATORY (INHALATION) at 09:36

## 2023-11-08 RX ADMIN — LISINOPRIL 20 MILLIGRAM(S): 2.5 TABLET ORAL at 06:37

## 2023-11-08 NOTE — CHART NOTE - NSCHARTNOTEFT_GEN_A_CORE
Medicine NP note   Notified by RN at 4 pm that patient has spasms b/l legs. patient stated he had it before, but it get worse recently, Attending Dr. Brown, notified , cbc, bmp with mg and ph ordere, endorsed to [facility] to follow up on results . Possible low level of magnesium.     Mary Jo Gallego, NP-C  Department of Medicine- ACP  In House Pager #[phone] Medicine NP note   Notified by RN at 5 pm that patient has spasms b/l legs. patient stated he had it before, but it get worse recently, Attending Dr. Brown, notified , cbc, bmp with mg and ph ordered, endorsed to [facility] to follow up on results . Possible low level of magnesium.     Mary Jo Gallego, NP-C  Department of Medicine- ACP  In House Pager #[phone] Medicine NP note   Notified by RN at 5 pm that patient has spasms b/l legs. Patient stated he had it before, but it get worse recently, Attending Dr. Brown, notified , cbc, bmp with mg and ph ordered, endorsed to [facility] to follow up on results . Possible low level of magnesium.     Mary Jo Gallego, NP-C  Department of Medicine- ACP  In House Pager #[phone]

## 2023-11-08 NOTE — DIETITIAN INITIAL EVALUATION ADULT - OTHER INFO
Met with patient at bedside. Endorses good appetite and PO intake >75% of meals. Patient denies any nausea/vomiting/diarrhea/constipation or difficulty chewing and swallowing. Importance of having well balanced, nutrient and protein dense foods discussed.  No questions or concerns voiced at this time. RD remains available, patient made aware.

## 2023-11-08 NOTE — PROGRESS NOTE ADULT - SUBJECTIVE AND OBJECTIVE BOX
Cardiovascular Disease Progress Note    Overnight events: No acute events overnight.  no new cardiac sx  Otherwise review of systems negative    Objective Findings:  T(C): 36.5 (11-08-23 @ 06:36), Max: 36.7 (11-07-23 @ 09:55)  HR: 67 (11-08-23 @ 06:36) (55 - 85)  BP: 158/96 (11-08-23 @ 06:36) (151/80 - 179/87)  RR: 18 (11-08-23 @ 06:36) (18 - 18)  SpO2: 98% (11-08-23 @ 06:36) (98% - 100%)  Wt(kg): --  Daily     Daily       Physical Exam:  Gen: NAD  HEENT: EOMI  CV: RRR, normal S1 + S2, no m/r/g  Lungs: CTAB  Abd: soft, non-tender  Ext: No edema    Telemetry:    Laboratory Data:                    Inpatient Medications:  MEDICATIONS  (STANDING):  artificial tears (preservative free) Ophthalmic Solution 1 Drop(s) Both EYES two times a day  aspirin enteric coated 81 milliGRAM(s) Oral daily  atorvastatin 40 milliGRAM(s) Oral at bedtime  budesonide 160 MICROgram(s)/formoterol 4.5 MICROgram(s) Inhaler 2 Puff(s) Inhalation two times a day  enoxaparin Injectable 40 milliGRAM(s) SubCutaneous every 24 hours  FIRST- Mouthwash  BLM 5 milliLiter(s) Swish and Spit four times a day  levothyroxine 37.5 MICROGram(s) Oral daily  lidocaine 4% Cream 1 Application(s) Topical once  lisinopril 20 milliGRAM(s) Oral daily  metoprolol tartrate 50 milliGRAM(s) Oral two times a day  prasugrel 10 milliGRAM(s) Oral daily  tiotropium 2.5 MICROgram(s) Inhaler 2 Puff(s) Inhalation daily      Assessment:  71M PMHx COPD on home O2 2 L nasal cannula (~45pack year hx, quit 4 years ago), hypertension, known AAA - being followed by Dr. Joaquin, PAD, alcohol abuse complicated with liver cirrhosis, recently treated for a UTI, as per patient was referred to the emergency department by his primary care physician Dr. Remington Simms for abnormal labs. Patient reports exacerbation of his chronic low back pain. C/o b/l claudication from buttocks down at distances <50 feet. He has history of PAD s/p RLE bypass (?failed). Denies rest pain and poor wound healing. Denies abd pain, pulsatile mass, tearing pain.       Recs:  cardiac stable  dyspnea likely in setting of copd-e. no e/o chf  avaps per pulm  vol status stable. diuretics prn to maintain euvolemic  echo mild lv dysfunction, normal rv fxn, mild-mod as  cw gdmt for lv dysfunction  cw antiplatelets, statin and antianginals  f/u vascular surgery re partially thrombosed AAA  pulmonary f/u  icd interrogation --> no events  dcp pending home AVAPS        Over 25 minutes spent on total encounter; more than 50% of the visit was spent counseling and/or coordinating care by the attending physician.      Vaughn Simms MD   Cardiovascular Disease  [phone]

## 2023-11-08 NOTE — DIETITIAN INITIAL EVALUATION ADULT - REASON FOR ADMISSION
Chronic obstructive pulmonary disease with acute exacerbation    Per chart review, 71 year old male with PMHx of COPD on home O2 (2-3 L/NC), HTN, known AAA, PAD, hyperlipidemia and alcohol use disorder complicated with liver cirrhosis (quit 1yr ago) who was told to come to ER for abnormal labs (elevated CO2 to 42 on recent labs), felt to be secondary to acute on chronic hypercapnic respiratory failure due to likely COPD exacerbation.

## 2023-11-08 NOTE — PROGRESS NOTE ADULT - ASSESSMENT
_________________________________________________________________________________________  ========>>  M E D I C A L   A T T E N D I N G    F O L L O W  U P  N O T E  <<=========  -----------------------------------------------------------------------------------------------------    - Patient seen and examined by me earlier today.   - Patient today overall doing ok, comfortable, eating OK. breathing ok     no new events.. awaiting to go home..     ==================>> REVIEW OF SYSTEM <<=================    GEN: no fever, no chills, no pain  RESP: no significant SOB at rest on O2, Occasional sputum  CVS: no chest pain, no palpitations  GI: no abdominal pain, no nausea  : no dysuria, no frequency  Neuro: no headache, no dizziness    ==================>> PHYSICAL EXAM <<=================    GEN: A&O X 3 , NAD , comfortable, pleasant, calm , Sitting on the edge of the bed  HEENT: NCAT, PERRL, MMM, hearing intact., O2 via NC  CVS: S1S2 , regular , No M/R/G appreciated  PULM: decreased BS bilaterally, no wheezing noted   ABD.: soft. non tender, non distended,  bowel sounds present  Extrem: intact pulses , no edema           ( Note written / Date of service 11-08-23 ( This is certified to be the same as "ENTERED" date above ( for billing purposes)))    ==================>> MEDICATIONS <<====================    artificial tears (preservative free) Ophthalmic Solution 1 Drop(s) Both EYES two times a day  aspirin enteric coated 81 milliGRAM(s) Oral daily  atorvastatin 40 milliGRAM(s) Oral at bedtime  budesonide 160 MICROgram(s)/formoterol 4.5 MICROgram(s) Inhaler 2 Puff(s) Inhalation two times a day  enoxaparin Injectable 40 milliGRAM(s) SubCutaneous every 24 hours  FIRST- Mouthwash  BLM 5 milliLiter(s) Swish and Spit four times a day  levothyroxine 37.5 MICROGram(s) Oral daily  lidocaine 4% Cream 1 Application(s) Topical once  lisinopril 20 milliGRAM(s) Oral daily  metoprolol tartrate 50 milliGRAM(s) Oral two times a day  prasugrel 10 milliGRAM(s) Oral daily  tiotropium 2.5 MICROgram(s) Inhaler 2 Puff(s) Inhalation daily    MEDICATIONS  (PRN):  acetaminophen     Tablet .. 650 milliGRAM(s) Oral every 6 hours PRN Temp greater or equal to 38C (100.4F), Mild Pain (1 - 3)  albuterol    90 MICROgram(s) HFA Inhaler 2 Puff(s) Inhalation every 6 hours PRN Shortness of Breath and/or Wheezing    ___________  Active diet:  Diet, Regular:   1200mL Fluid Restriction ([id number])  ___________________    ==================>> VITAL SIGNS <<==================     Vital Signs Last 24 HrsT(C): 36.8 (11-08-23 @ 09:11)  T(F): 98.2 (11-08-23 @ 09:11), Max: 98.2 (11-08-23 @ 09:11)  HR: 72 (11-08-23 @ 09:11) (59 - 85)  BP: 156/92 (11-08-23 @ 09:11)  RR: 18 (11-08-23 @ 09:11) (18 - 18)  SpO2: 96% (11-08-23 @ 09:11) (96% - 100%)       ==================>> LAB AND IMAGING <<==================       no labs today    < from: TTE W or WO Ultrasound Enhancing Agent (10.30.23 @ 15:17) >  CONCLUSIONS:    1. Left ventricular wall thickness is normal. Left ventricular systolic function is mildly decreased with an ejection fraction of 49 % by Vazquez's method of disks. Regional wall motion abnormalities present.   2. There is hypokinesis of the apical and apical septal walls.   3. Normal right ventricular cavity size and systolic function.   4. The aortic valve appears trileaflet with reduced systolic excursion. There is mild to moderate aortic stenosis. The peak transaortic velocity is 2.58 m/s, peak transaortic gradient is 26.6 mmHg and mean transaortic gradient is 12.0 mmHg with an LVOT/aortic valve VTI ratio of 0.45. The aortic valve area is estimated at 1.41 cm² by the continuity equation. There is trace aortic regurgitation.   5. Normal atria.   6. The inferior vena cava is normal in size measuring 1.62 cm in diameter, (normal <2.1cm) with normal inspiratory collapse (normal >50%) consistent with normal right atrial pressure (~3, range 0-5mmHg).   7. No pericardial effusion seen.  < end of copied text >    ___________________________________________________________________________________  ===============>>  A S S E S S M E N T   A N D   P L A N <<===============  ------------------------------------------------------------------------------------------    · Assessment	  71 year old male with PMHx of COPD on home O2 (2-3 L/NC), HTN, known AAA, PAD, hyperlipidemia and alcohol use disorder complicated with liver cirrhosis (quit 1yr ago) who was told to come to ER for abnormal labs (elevated CO2 to 42 on recent labs), felt to be secondary to acute on chronic hypercapnic respiratory failure due to likely COPD exacerbation.    Problem/Plan - 1:  ·  Problem: Acute on chronic respiratory failure with hypercapnia.   - Being treated for likely COPD exacerbation  - Pulmonary follow-up, management and optimization appreciated  - AVAPS::  will need to go home with per pulm >> awaiting insurance company approval undiluted by [employer] company prior to going home:: Discussed with case management  - Has a left upper lobe 4 mm nodule, to be f/up as outpatient    Problem/Plan - 2:  ·  Problem: Hypertension.   - C/w home meds  - Echocardiogram as above     Further management and optimization as per cardiology given above Abnormalities    Problem/Plan - 3:  ·  Problem: hyponatremia >> improved  diet changed  patient eating well  free fluid restriction   monitor     Problem/Plan - 4:  ·  Problem: AAA (abdominal aortic aneurysm).   - CTA Ao shows-Increased size of partially thrombosed infrarenal abdominal aortic aneurysm measuring up to 5.8 cm in greatest axial dimension. Crescentic area of mild relative hyperattenuation within the thrombus along the anterior aspect of the aneurysm at the level of the kidneys suspicious for hemorrhage/leakage into the thrombus. No active extravasation identified  - Seen by Vascular, no intervention at this time planned   - BP control   - C/w ASA/prasugrel/statin  - Follow up with Dr. Joaquin as outpatient on 10/30/23 per team   - Cardio on board    - Patient with symptoms, likely from claudication >> Need close vascular follow-up as discussed      Encouraged increased activity as able    Problem/Plan - 5:  ·  Problem: Hyperlipidemia.   ·  Plan: - C/w statin.    Problem/Plan - 6:  ·  Problem: Prophylactic measure.   ·  Plan: LMWH  PT  // OOB to chair daily / increase activity as able     DC planing home pending above     --------------------------------------------  Case discussed with patient,   Education given on findings and plan of care  ___________________________  H. JOJO Brown.  Pager: [phone]

## 2023-11-08 NOTE — DIETITIAN INITIAL EVALUATION ADULT - PERTINENT MEDS FT
MEDICATIONS  (STANDING):  artificial tears (preservative free) Ophthalmic Solution 1 Drop(s) Both EYES two times a day  aspirin enteric coated 81 milliGRAM(s) Oral daily  atorvastatin 40 milliGRAM(s) Oral at bedtime  budesonide 160 MICROgram(s)/formoterol 4.5 MICROgram(s) Inhaler 2 Puff(s) Inhalation two times a day  enoxaparin Injectable 40 milliGRAM(s) SubCutaneous every 24 hours  FIRST- Mouthwash  BLM 5 milliLiter(s) Swish and Spit four times a day  levothyroxine 37.5 MICROGram(s) Oral daily  lidocaine 4% Cream 1 Application(s) Topical once  lisinopril 20 milliGRAM(s) Oral daily  metoprolol tartrate 50 milliGRAM(s) Oral two times a day  prasugrel 10 milliGRAM(s) Oral daily  tiotropium 2.5 MICROgram(s) Inhaler 2 Puff(s) Inhalation daily    MEDICATIONS  (PRN):  acetaminophen     Tablet .. 650 milliGRAM(s) Oral every 6 hours PRN Temp greater or equal to 38C (100.4F), Mild Pain (1 - 3)  albuterol    90 MICROgram(s) HFA Inhaler 2 Puff(s) Inhalation every 6 hours PRN Shortness of Breath and/or Wheezing

## 2023-11-08 NOTE — DIETITIAN INITIAL EVALUATION ADULT - PROBLEM SELECTOR PLAN 2
On albuterol MDI and nebs prn as outpatient, has not seen Pulmonary for a long time  - As above, starting prednisone/zithromax and duonebs ATC  - Starting symbicort BID  - F/up repeat VBG in AM as above  - RVP is negative  - F/up further Pulmonary recs  - Needs Pulm f/up as outpatient  - Has a left upper lobe 4 mm nodule, to be f/up as outpatient

## 2023-11-08 NOTE — PROGRESS NOTE ADULT - SUBJECTIVE AND OBJECTIVE BOX
PULMONARY PROGRESS NOTE    FRANCIS CHAUDHARY  MRN-[id number]    Patient is a 68y old  Male who presents with a chief complaint of Chronic obstructive pulmonary disease with acute exacerbation    Per chart review, 71 year old male with PMHx of COPD on home O2 (2-3 L/NC), HTN, known AAA, PAD, hyperlipidemia and alcohol use disorder complicated with liver cirrhosis (quit 1yr ago) who was told to come to ER for abnormal labs (elevated CO2 to 42 on recent labs), felt to be secondary to acute on chronic hypercapnic respiratory failure due to likely COPD exacerbation.     (08 Nov 2023 12:36)      HPI:  no acute overnight events      ROS:   -    ACTIVE MEDICATION LIST:  MEDICATIONS  (STANDING):  artificial tears (preservative free) Ophthalmic Solution 1 Drop(s) Both EYES two times a day  aspirin enteric coated 81 milliGRAM(s) Oral daily  atorvastatin 40 milliGRAM(s) Oral at bedtime  budesonide 160 MICROgram(s)/formoterol 4.5 MICROgram(s) Inhaler 2 Puff(s) Inhalation two times a day  enoxaparin Injectable 40 milliGRAM(s) SubCutaneous every 24 hours  FIRST- Mouthwash  BLM 5 milliLiter(s) Swish and Spit four times a day  levothyroxine 37.5 MICROGram(s) Oral daily  lidocaine 4% Cream 1 Application(s) Topical once  lisinopril 20 milliGRAM(s) Oral daily  metoprolol tartrate 50 milliGRAM(s) Oral two times a day  prasugrel 10 milliGRAM(s) Oral daily  tiotropium 2.5 MICROgram(s) Inhaler 2 Puff(s) Inhalation daily    MEDICATIONS  (PRN):  acetaminophen     Tablet .. 650 milliGRAM(s) Oral every 6 hours PRN Temp greater or equal to 38C (100.4F), Mild Pain (1 - 3)  albuterol    90 MICROgram(s) HFA Inhaler 2 Puff(s) Inhalation every 6 hours PRN Shortness of Breath and/or Wheezing      EXAM:  Vital Signs Last 24 Hrs  T(C): 36.8 (08 Nov 2023 09:11), Max: 36.8 (08 Nov 2023 09:11)  T(F): 98.2 (08 Nov 2023 09:11), Max: 98.2 (08 Nov 2023 09:11)  HR: 72 (08 Nov 2023 09:11) (59 - 72)  BP: 156/92 (08 Nov 2023 09:11) (154/75 - 179/87)  BP(mean): --  RR: 18 (08 Nov 2023 09:11) (18 - 18)  SpO2: 96% (08 Nov 2023 09:11) (96% - 100%)    Parameters below as of 08 Nov 2023 09:11  Patient On (Oxygen Delivery Method): nasal cannula        GENERAL: The patient is awake and alert in no apparent distress.     LUNGS:  respirations unlabored                  PROBLEM LIST:  68y Male with HEALTH ISSUES - PROBLEM Dx:  Acute on chronic respiratory failure with hypercapnia    COPD exacerbation    Hypertension    AAA (abdominal aortic aneurysm)    Prophylactic measure    Hyperlipidemia    Chronic respiratory failure              RECS:  patient with CHRONIC HYPERCARBIC RESP FAILURE due to copd/ emphysema/ former smoker  hypercarbia  did not  improve on BPAP , on NIVV, improved hypercarbia noted on blood work   taper off his 02 - avoid HYPEROXEMIA  continue symbicort- told him to rinse mouth  continue spiriva  contiue NIVV qhs/ whenever sleeping  s/p prednisone 5 days  BEDSIDE SPIROMETRY shows severe COPD    outpatient f/u on the pulm nodule    the patient requires home medical ventilator device capable of delivering pressure targeted and/or volume present ventilation cannot be done on any home care BPAP device due to their chronic respiratory failure consequent to the COPD.  The patient will need home medical ventilator with battery back up continuous enhanced alarm monitoring mouth piece ventilation, home resp. therapist set up andcontinued visits for continued use to 24hrs/day an access to RT 24/7 for urgent trouble shooting machine repairs & ongoing titration or adjustments.  We have attempted avaps mode on this patient. he has been admitted for chronic resp failure with hypercapnia secondary to copd. the patient's condition will likely deteriorate without a home medical ventilator and this may cause him to be rehospitalized or cause serious clinical harm          Please call with any questions.    Paris Warren, DO  [facility] Pulmonary/Sleep Medicine  [phone]

## 2023-11-08 NOTE — DIETITIAN INITIAL EVALUATION ADULT - ORAL INTAKE PTA/DIET HISTORY
Patient reports good appetite and po intake PTA. NKFA. Regular Diet. Denies any weight changes. Usual weight reported to be 140lbs. Patient with current weight of 139.9lbs.

## 2023-11-08 NOTE — DIETITIAN INITIAL EVALUATION ADULT - PROBLEM SELECTOR PLAN 1
Likely secondary to COPD exacerbation, pt has not seen Pulmonary as outpatient for a good while  - Seen by MICU given new BIPAP, apprec recs  - Being treated for likely COPD exacerbation  - C/w prednisone 40mg daily for 5 days, zithromax for 3 days and duonebs ATC  - Starting symbicort as well BID  - Repeat VBG with improved pCO2 to 61 (down from 73)  - Will transition to BIPAP 10/5 at bedtime for now, c/w NC 2 lts/min  - F/up repeat VBG in AM  -  F/up further Pulmonary recs

## 2023-11-09 RX ORDER — MAGNESIUM OXIDE 400 MG ORAL TABLET 241.3 MG
400 TABLET ORAL AT BEDTIME
Refills: 0 | Status: DISCONTINUED | OUTPATIENT
Start: 2023-11-09 | End: 2023-11-20

## 2023-11-09 RX ADMIN — Medication 81 MILLIGRAM(S): at 11:32

## 2023-11-09 RX ADMIN — Medication 50 MILLIGRAM(S): at 21:51

## 2023-11-09 RX ADMIN — MAGNESIUM OXIDE 400 MG ORAL TABLET 400 MILLIGRAM(S): 241.3 TABLET ORAL at 21:51

## 2023-11-09 RX ADMIN — LISINOPRIL 20 MILLIGRAM(S): 2.5 TABLET ORAL at 05:21

## 2023-11-09 RX ADMIN — ENOXAPARIN SODIUM 40 MILLIGRAM(S): 100 INJECTION SUBCUTANEOUS at 18:23

## 2023-11-09 RX ADMIN — BUDESONIDE AND FORMOTEROL FUMARATE DIHYDRATE 2 PUFF(S): 160; 4.5 AEROSOL RESPIRATORY (INHALATION) at 21:52

## 2023-11-09 RX ADMIN — Medication 1 DROP(S): at 18:23

## 2023-11-09 RX ADMIN — TIOTROPIUM BROMIDE 2 PUFF(S): 18 CAPSULE ORAL; RESPIRATORY (INHALATION) at 09:57

## 2023-11-09 RX ADMIN — DIPHENHYDRAMINE HYDROCHLORIDE AND LIDOCAINE HYDROCHLORIDE AND ALUMINUM HYDROXIDE AND MAGNESIUM HYDRO 5 MILLILITER(S): KIT at 18:23

## 2023-11-09 RX ADMIN — PRASUGREL 10 MILLIGRAM(S): 5 TABLET, FILM COATED ORAL at 13:01

## 2023-11-09 RX ADMIN — ATORVASTATIN CALCIUM 40 MILLIGRAM(S): 80 TABLET, FILM COATED ORAL at 21:51

## 2023-11-09 RX ADMIN — Medication 1 DROP(S): at 05:26

## 2023-11-09 RX ADMIN — DIPHENHYDRAMINE HYDROCHLORIDE AND LIDOCAINE HYDROCHLORIDE AND ALUMINUM HYDROXIDE AND MAGNESIUM HYDRO 5 MILLILITER(S): KIT at 11:32

## 2023-11-09 RX ADMIN — BUDESONIDE AND FORMOTEROL FUMARATE DIHYDRATE 2 PUFF(S): 160; 4.5 AEROSOL RESPIRATORY (INHALATION) at 09:58

## 2023-11-09 RX ADMIN — Medication 37.5 MICROGRAM(S): at 05:21

## 2023-11-09 RX ADMIN — DIPHENHYDRAMINE HYDROCHLORIDE AND LIDOCAINE HYDROCHLORIDE AND ALUMINUM HYDROXIDE AND MAGNESIUM HYDRO 5 MILLILITER(S): KIT at 05:21

## 2023-11-09 NOTE — PROGRESS NOTE ADULT - SUBJECTIVE AND OBJECTIVE BOX
Cardiovascular Disease Progress Note    Overnight events: No acute events overnight.  had calf pain last night. no new cardiac sx  Otherwise review of systems negative    Objective Findings:  T(C): 36.9 (11-09-23 @ 05:28), Max: 36.9 (11-09-23 @ 05:28)  HR: 62 (11-09-23 @ 05:28) (44 - 72)  BP: 144/86 (11-09-23 @ 05:28) (116/64 - 169/79)  RR: 16 (11-09-23 @ 05:28) (16 - 18)  SpO2: 98% (11-09-23 @ 05:28) (96% - 100%)  Wt(kg): --  Daily     Daily       Physical Exam:  Gen: NAD  HEENT: EOMI  CV: RRR, normal S1 + S2, no m/r/g  Lungs: CTAB  Abd: soft, non-tender  Ext: No edema    Telemetry:    Laboratory Data:                        11.7   10.97 )-----------( 350      ( 08 Nov 2023 17:38 )             36.0     11-08    135  |  95<L>  |  17  ----------------------------<  96  5.4<H>   |  29  |  0.67    Ca    9.4      08 Nov 2023 17:38  Phos  3.2     11-08  Mg     1.80     11-08                Inpatient Medications:  MEDICATIONS  (STANDING):  artificial tears (preservative free) Ophthalmic Solution 1 Drop(s) Both EYES two times a day  aspirin enteric coated 81 milliGRAM(s) Oral daily  atorvastatin 40 milliGRAM(s) Oral at bedtime  budesonide 160 MICROgram(s)/formoterol 4.5 MICROgram(s) Inhaler 2 Puff(s) Inhalation two times a day  enoxaparin Injectable 40 milliGRAM(s) SubCutaneous every 24 hours  FIRST- Mouthwash  BLM 5 milliLiter(s) Swish and Spit four times a day  levothyroxine 37.5 MICROGram(s) Oral daily  lidocaine 4% Cream 1 Application(s) Topical once  lisinopril 20 milliGRAM(s) Oral daily  metoprolol tartrate 50 milliGRAM(s) Oral two times a day  prasugrel 10 milliGRAM(s) Oral daily  tiotropium 2.5 MICROgram(s) Inhaler 2 Puff(s) Inhalation daily      Assessment:  71M PMHx COPD on home O2 2 L nasal cannula (~45pack year hx, quit 4 years ago), hypertension, known AAA - being followed by Dr. Joaquin, PAD, alcohol abuse complicated with liver cirrhosis, recently treated for a UTI, as per patient was referred to the emergency department by his primary care physician Dr. Remington Simms for abnormal labs. Patient reports exacerbation of his chronic low back pain. C/o b/l claudication from buttocks down at distances <50 feet. He has history of PAD s/p RLE bypass (?failed). Denies rest pain and poor wound healing. Denies abd pain, pulsatile mass, tearing pain.       Recs:  cardiac stable  dyspnea likely in setting of copd-e. no e/o chf  avaps per pulm  vol status stable. diuretics prn to maintain euvolemic  echo mild lv dysfunction, normal rv fxn, mild-mod as  cw gdmt for lv dysfunction  cw antiplatelets, statin and antianginals  f/u vascular surgery re partially thrombosed AAA and LE PAD  pulmonary f/u  icd interrogation --> no events  dcp pending home AVAPS        Over 25 minutes spent on total encounter; more than 50% of the visit was spent counseling and/or coordinating care by the attending physician.      Vaughn Simms MD   Cardiovascular Disease  [phone]

## 2023-11-09 NOTE — PROGRESS NOTE ADULT - ASSESSMENT
_________________________________________________________________________________________  ========>>  M E D I C A L   A T T E N D I N G    F O L L O W  U P  N O T E  <<=========  -----------------------------------------------------------------------------------------------------    - Patient seen and examined by me earlier today.   - Patient today overall doing ok, comfortable, eating OK. breathing ok     no new events.. awaiting to go home..     ==================>> REVIEW OF SYSTEM <<=================    GEN: no fever, no chills, no pain  RESP: no significant SOB at rest on O2, Occasional sputum  CVS: no chest pain, no palpitations  GI: no abdominal pain, no nausea  : no dysuria, no frequency  Neuro: no headache, no dizziness    ==================>> PHYSICAL EXAM <<=================    GEN: A&O X 3 , NAD , comfortable, pleasant, calm , Sitting on the edge of the bed  HEENT: NCAT, PERRL, MMM, hearing intact., O2 via NC  CVS: S1S2 , regular , No M/R/G appreciated  PULM: decreased BS bilaterally, no wheezing noted   ABD.: soft. non tender, non distended,  bowel sounds present  Extrem: intact pulses , no edema               ( Note written / Date of service 11-09-23 ( This is certified to be the same as "ENTERED" date above ( for billing purposes)))    ==================>> MEDICATIONS <<====================    artificial tears (preservative free) Ophthalmic Solution 1 Drop(s) Both EYES two times a day  aspirin enteric coated 81 milliGRAM(s) Oral daily  atorvastatin 40 milliGRAM(s) Oral at bedtime  budesonide 160 MICROgram(s)/formoterol 4.5 MICROgram(s) Inhaler 2 Puff(s) Inhalation two times a day  enoxaparin Injectable 40 milliGRAM(s) SubCutaneous every 24 hours  FIRST- Mouthwash  BLM 5 milliLiter(s) Swish and Spit four times a day  levothyroxine 37.5 MICROGram(s) Oral daily  lidocaine 4% Cream 1 Application(s) Topical once  lisinopril 20 milliGRAM(s) Oral daily  metoprolol tartrate 50 milliGRAM(s) Oral two times a day  prasugrel 10 milliGRAM(s) Oral daily  tiotropium 2.5 MICROgram(s) Inhaler 2 Puff(s) Inhalation daily    MEDICATIONS  (PRN):  acetaminophen     Tablet .. 650 milliGRAM(s) Oral every 6 hours PRN Temp greater or equal to 38C (100.4F), Mild Pain (1 - 3)  albuterol    90 MICROgram(s) HFA Inhaler 2 Puff(s) Inhalation every 6 hours PRN Shortness of Breath and/or Wheezing    ___________  Active diet:  Diet, Regular:   1200mL Fluid Restriction ([id number])  ___________________    ==================>> VITAL SIGNS <<==================     Vital Signs Last 24 HrsT(C): 36.8 (11-09-23 @ 09:29)  T(F): 98.2 (11-09-23 @ 09:29), Max: 98.4 (11-09-23 @ 05:28)  HR: 56 (11-09-23 @ 09:29) (44 - 62)  BP: 161/79 (11-09-23 @ 09:29)  RR: 17 (11-09-23 @ 09:29) (16 - 18)  SpO2: 97% (11-09-23 @ 11:10) (97% - 100%)      CAPILLARY BLOOD GLUCOSE         ==================>> LAB AND IMAGING <<==================                        11.7   10.97 )-----------( 350      ( 08 Nov 2023 17:38 )             36.0        11-08    135  |  95<L>  |  17  ----------------------------<  96  5.4<H>   |  29  |  0.67    Ca    9.4      08 Nov 2023 17:38  Phos  3.2     11-08  Mg     1.80     11-08           no labs today    < from: TTE W or WO Ultrasound Enhancing Agent (10.30.23 @ 15:17) >  CONCLUSIONS:    1. Left ventricular wall thickness is normal. Left ventricular systolic function is mildly decreased with an ejection fraction of 49 % by Vazquez's method of disks. Regional wall motion abnormalities present.   2. There is hypokinesis of the apical and apical septal walls.   3. Normal right ventricular cavity size and systolic function.   4. The aortic valve appears trileaflet with reduced systolic excursion. There is mild to moderate aortic stenosis. The peak transaortic velocity is 2.58 m/s, peak transaortic gradient is 26.6 mmHg and mean transaortic gradient is 12.0 mmHg with an LVOT/aortic valve VTI ratio of 0.45. The aortic valve area is estimated at 1.41 cm² by the continuity equation. There is trace aortic regurgitation.   5. Normal atria.   6. The inferior vena cava is normal in size measuring 1.62 cm in diameter, (normal <2.1cm) with normal inspiratory collapse (normal >50%) consistent with normal right atrial pressure (~3, range 0-5mmHg).   7. No pericardial effusion seen.  < end of copied text >    ___________________________________________________________________________________  ===============>>  A S S E S S M E N T   A N D   P L A N <<===============  ------------------------------------------------------------------------------------------    · Assessment	  71 year old male with PMHx of COPD on home O2 (2-3 L/NC), HTN, known AAA, PAD, hyperlipidemia and alcohol use disorder complicated with liver cirrhosis (quit 1yr ago) who was told to come to ER for abnormal labs (elevated CO2 to 42 on recent labs), felt to be secondary to acute on chronic hypercapnic respiratory failure due to likely COPD exacerbation.    Problem/Plan - 1:  ·  Problem: Acute on chronic respiratory failure with hypercapnia.   - Being treated for likely COPD exacerbation  - Pulmonary follow-up, management and optimization appreciated  - AVAPS::  will need to go home with per pulm >> awaiting insurance company approval undiluted by equipment company prior to going home:: Discussed with case management  - Has a left upper lobe 4 mm nodule, to be f/up as outpatient    Problem/Plan - 2:  ·  Problem: Hypertension.   - C/w home meds  - Echocardiogram as above     Further management and optimization as per cardiology given above Abnormalities    Problem/Plan - 3:  ·  Problem: hyponatremia >> improved  diet changed  patient eating well  free fluid restriction   monitor     Problem/Plan - 4:  ·  Problem: AAA (abdominal aortic aneurysm).   - CTA Ao shows-Increased size of partially thrombosed infrarenal abdominal aortic aneurysm measuring up to 5.8 cm in greatest axial dimension. Crescentic area of mild relative hyperattenuation within the thrombus along the anterior aspect of the aneurysm at the level of the kidneys suspicious for hemorrhage/leakage into the thrombus. No active extravasation identified  - Seen by Vascular, no intervention at this time planned   - BP control   - C/w ASA/prasugrel/statin  - Follow up with Dr. Joaquin as outpatient on 10/30/23 per team   - Cardio on board    - Patient with symptoms, likely from claudication >> Need close vascular follow-up as discussed      Encouraged increased activity as able    Problem/Plan - 5:  ·  Problem: Hyperlipidemia.   ·  Plan: - C/w statin.    Problem/Plan - 6:  ·  Problem: Prophylactic measure.   ·  Plan: LMWH  PT  // OOB to chair daily / increase activity as able     DC planing home pending above     --------------------------------------------  Case discussed with patient,   Education given on findings and plan of care  ___________________________  H. JOJO Brown.  Pager: [phone]       _________________________________________________________________________________________  ========>>  M E D I C A L   A T T E N D I N G    F O L L O W  U P  N O T E  <<=========  -----------------------------------------------------------------------------------------------------    - Patient seen and examined by me earlier today.   - Patient today overall doing ok, comfortable, eating OK. breathing ok     no new events.. awaiting to go home..   patient had a lot of leg cramping last PM >> asking for magnesium > ordered   > ordered >> patient contacted vascular sx as well, awaiting follow up    ==================>> REVIEW OF SYSTEM <<=================    GEN: no fever, no chills, no pain  RESP: no significant SOB at rest on O2, Occasional sputum  CVS: no chest pain, no palpitations  GI: no abdominal pain, no nausea  : no dysuria, no frequency  Neuro: no headache, no dizziness    ==================>> PHYSICAL EXAM <<=================    GEN: A&O X 3 , NAD , comfortable, pleasant, calm , Sitting on the edge of the bed  HEENT: NCAT, PERRL, MMM, hearing intact., O2 via NC  CVS: S1S2 , regular , No M/R/G appreciated  PULM: decreased BS bilaterally, no wheezing noted   ABD.: soft. non tender, non distended,  bowel sounds present  Extrem: intact pulses , no edema               ( Note written / Date of service 11-09-23 ( This is certified to be the same as "ENTERED" date above ( for billing purposes)))    ==================>> MEDICATIONS <<====================    artificial tears (preservative free) Ophthalmic Solution 1 Drop(s) Both EYES two times a day  aspirin enteric coated 81 milliGRAM(s) Oral daily  atorvastatin 40 milliGRAM(s) Oral at bedtime  budesonide 160 MICROgram(s)/formoterol 4.5 MICROgram(s) Inhaler 2 Puff(s) Inhalation two times a day  enoxaparin Injectable 40 milliGRAM(s) SubCutaneous every 24 hours  FIRST- Mouthwash  BLM 5 milliLiter(s) Swish and Spit four times a day  levothyroxine 37.5 MICROGram(s) Oral daily  lidocaine 4% Cream 1 Application(s) Topical once  lisinopril 20 milliGRAM(s) Oral daily  metoprolol tartrate 50 milliGRAM(s) Oral two times a day  prasugrel 10 milliGRAM(s) Oral daily  tiotropium 2.5 MICROgram(s) Inhaler 2 Puff(s) Inhalation daily    MEDICATIONS  (PRN):  acetaminophen     Tablet .. 650 milliGRAM(s) Oral every 6 hours PRN Temp greater or equal to 38C (100.4F), Mild Pain (1 - 3)  albuterol    90 MICROgram(s) HFA Inhaler 2 Puff(s) Inhalation every 6 hours PRN Shortness of Breath and/or Wheezing    ___________  Active diet:  Diet, Regular:   1200mL Fluid Restriction ([id number])  ___________________    ==================>> VITAL SIGNS <<==================     Vital Signs Last 24 HrsT(C): 36.8 (11-09-23 @ 09:29)  T(F): 98.2 (11-09-23 @ 09:29), Max: 98.4 (11-09-23 @ 05:28)  HR: 56 (11-09-23 @ 09:29) (44 - 62)  BP: 161/79 (11-09-23 @ 09:29)  RR: 17 (11-09-23 @ 09:29) (16 - 18)  SpO2: 97% (11-09-23 @ 11:10) (97% - 100%)         ==================>> LAB AND IMAGING <<==================                        11.7   10.97 )-----------( 350      ( 08 Nov 2023 17:38 )             36.0        11-08    135  |  95<L>  |  17  ----------------------------<  96  5.4<H>   |  29  |  0.67    Ca    9.4      08 Nov 2023 17:38  Phos  3.2     11-08  Mg     1.80     11-08        < from: TTE W or WO Ultrasound Enhancing Agent (10.30.23 @ 15:17) >  CONCLUSIONS:    1. Left ventricular wall thickness is normal. Left ventricular systolic function is mildly decreased with an ejection fraction of 49 % by Vazquez's method of disks. Regional wall motion abnormalities present.   2. There is hypokinesis of the apical and apical septal walls.   3. Normal right ventricular cavity size and systolic function.   4. The aortic valve appears trileaflet with reduced systolic excursion. There is mild to moderate aortic stenosis. The peak transaortic velocity is 2.58 m/s, peak transaortic gradient is 26.6 mmHg and mean transaortic gradient is 12.0 mmHg with an LVOT/aortic valve VTI ratio of 0.45. The aortic valve area is estimated at 1.41 cm² by the continuity equation. There is trace aortic regurgitation.   5. Normal atria.   6. The inferior vena cava is normal in size measuring 1.62 cm in diameter, (normal <2.1cm) with normal inspiratory collapse (normal >50%) consistent with normal right atrial pressure (~3, range 0-5mmHg).   7. No pericardial effusion seen.  < end of copied text >    ___________________________________________________________________________________  ===============>>  A S S E S S M E N T   A N D   P L A N <<===============  ------------------------------------------------------------------------------------------    · Assessment	  71 year old male with PMHx of COPD on home O2 (2-3 L/NC), HTN, known AAA, PAD, hyperlipidemia and alcohol use disorder complicated with liver cirrhosis (quit 1yr ago) who was told to come to ER for abnormal labs (elevated CO2 to 42 on recent labs), felt to be secondary to acute on chronic hypercapnic respiratory failure due to likely COPD exacerbation.    Problem/Plan - 1:  ·  Problem: Acute on chronic respiratory failure with hypercapnia.   - Being treated for likely COPD exacerbation  - Pulmonary follow-up, management and optimization appreciated  - AVAPS::  will need to go home with per pulm >> awaiting insurance company approval undiluted by equipment company prior to going home:: Discussed with case management  - Has a left upper lobe 4 mm nodule, to be f/up as outpatient    Problem/Plan - 2:  ·  Problem: Hypertension.   - C/w home meds  - Echocardiogram as above     Further management and optimization as per cardiology given above Abnormalities    Problem/Plan - 3:  ·  Problem: hyponatremia >> improved / stable   diet changed  patient eating well  free fluid restriction   monitor BMP given hyperkalemia   magnesium supplements as needed for cramping     Problem/Plan - 4:  ·  Problem: AAA (abdominal aortic aneurysm).   - CTA Ao shows-Increased size of partially thrombosed infrarenal abdominal aortic aneurysm measuring up to 5.8 cm in greatest axial dimension. Crescentic area of mild relative hyperattenuation within the thrombus along the anterior aspect of the aneurysm at the level of the kidneys suspicious for hemorrhage/leakage into the thrombus. No active extravasation identified  - Seen by Vascular, no intervention at this time planned   - BP control   - C/w ASA/prasugrel/statin  - Follow up with Dr. Joaquin as outpatient on 10/30/23 per team   - Cardio on board    - Patient with symptoms, likely from claudication >> Need close vascular follow-up as discussed      Encouraged increased activity as able    Problem/Plan - 5:  ·  Problem: Hyperlipidemia.   ·  Plan: - C/w statin.    Problem/Plan - 6:  ·  Problem: Prophylactic measure.   ·  Plan: LMWH  PT  // OOB to chair daily / increase activity as able     DC planing home pending above     --------------------------------------------  Case discussed with patient,   Education given on findings and plan of care  ___________________________  H. JOJO Brown.  Pager: [phone]

## 2023-11-10 LAB
ALBUMIN SERPL ELPH-MCNC: 3.9 G/DL — SIGNIFICANT CHANGE UP (ref 3.3–5)
ALBUMIN SERPL ELPH-MCNC: 3.9 G/DL — SIGNIFICANT CHANGE UP (ref 3.3–5)
ALP SERPL-CCNC: 82 U/L — SIGNIFICANT CHANGE UP (ref 40–120)
ALP SERPL-CCNC: 82 U/L — SIGNIFICANT CHANGE UP (ref 40–120)
ALT FLD-CCNC: 16 U/L — SIGNIFICANT CHANGE UP (ref 4–41)
ALT FLD-CCNC: 16 U/L — SIGNIFICANT CHANGE UP (ref 4–41)
ANION GAP SERPL CALC-SCNC: 8 MMOL/L — SIGNIFICANT CHANGE UP (ref 7–14)
ANION GAP SERPL CALC-SCNC: 8 MMOL/L — SIGNIFICANT CHANGE UP (ref 7–14)
AST SERPL-CCNC: 19 U/L — SIGNIFICANT CHANGE UP (ref 4–40)
AST SERPL-CCNC: 19 U/L — SIGNIFICANT CHANGE UP (ref 4–40)
BASE EXCESS BLDV CALC-SCNC: 7 MMOL/L — HIGH (ref -2–3)
BASE EXCESS BLDV CALC-SCNC: 7 MMOL/L — HIGH (ref -2–3)
BILIRUB SERPL-MCNC: 0.5 MG/DL — SIGNIFICANT CHANGE UP (ref 0.2–1.2)
BILIRUB SERPL-MCNC: 0.5 MG/DL — SIGNIFICANT CHANGE UP (ref 0.2–1.2)
BUN SERPL-MCNC: 11 MG/DL — SIGNIFICANT CHANGE UP (ref 7–23)
BUN SERPL-MCNC: 11 MG/DL — SIGNIFICANT CHANGE UP (ref 7–23)
CALCIUM SERPL-MCNC: 9.2 MG/DL — SIGNIFICANT CHANGE UP (ref 8.4–10.5)
CALCIUM SERPL-MCNC: 9.2 MG/DL — SIGNIFICANT CHANGE UP (ref 8.4–10.5)
CHLORIDE SERPL-SCNC: 94 MMOL/L — LOW (ref 98–107)
CHLORIDE SERPL-SCNC: 94 MMOL/L — LOW (ref 98–107)
CO2 BLDV-SCNC: 36.4 MMOL/L — HIGH (ref 22–26)
CO2 BLDV-SCNC: 36.4 MMOL/L — HIGH (ref 22–26)
CO2 SERPL-SCNC: 32 MMOL/L — HIGH (ref 22–31)
CO2 SERPL-SCNC: 32 MMOL/L — HIGH (ref 22–31)
CREAT SERPL-MCNC: 0.55 MG/DL — SIGNIFICANT CHANGE UP (ref 0.5–1.3)
CREAT SERPL-MCNC: 0.55 MG/DL — SIGNIFICANT CHANGE UP (ref 0.5–1.3)
EGFR: 107 ML/MIN/1.73M2 — SIGNIFICANT CHANGE UP
EGFR: 107 ML/MIN/1.73M2 — SIGNIFICANT CHANGE UP
GLUCOSE SERPL-MCNC: 85 MG/DL — SIGNIFICANT CHANGE UP (ref 70–99)
GLUCOSE SERPL-MCNC: 85 MG/DL — SIGNIFICANT CHANGE UP (ref 70–99)
HCO3 BLDV-SCNC: 34 MMOL/L — HIGH (ref 22–29)
HCO3 BLDV-SCNC: 34 MMOL/L — HIGH (ref 22–29)
HCT VFR BLD CALC: 33.3 % — LOW (ref 39–50)
HCT VFR BLD CALC: 33.3 % — LOW (ref 39–50)
HGB BLD-MCNC: 10.6 G/DL — LOW (ref 13–17)
HGB BLD-MCNC: 10.6 G/DL — LOW (ref 13–17)
MAGNESIUM SERPL-MCNC: 1.8 MG/DL — SIGNIFICANT CHANGE UP (ref 1.6–2.6)
MAGNESIUM SERPL-MCNC: 1.8 MG/DL — SIGNIFICANT CHANGE UP (ref 1.6–2.6)
MCHC RBC-ENTMCNC: 28.9 PG — SIGNIFICANT CHANGE UP (ref 27–34)
MCHC RBC-ENTMCNC: 28.9 PG — SIGNIFICANT CHANGE UP (ref 27–34)
MCHC RBC-ENTMCNC: 31.8 GM/DL — LOW (ref 32–36)
MCHC RBC-ENTMCNC: 31.8 GM/DL — LOW (ref 32–36)
MCV RBC AUTO: 90.7 FL — SIGNIFICANT CHANGE UP (ref 80–100)
MCV RBC AUTO: 90.7 FL — SIGNIFICANT CHANGE UP (ref 80–100)
NRBC # BLD: 0 /100 WBCS — SIGNIFICANT CHANGE UP (ref 0–0)
NRBC # BLD: 0 /100 WBCS — SIGNIFICANT CHANGE UP (ref 0–0)
NRBC # FLD: 0 K/UL — SIGNIFICANT CHANGE UP (ref 0–0)
NRBC # FLD: 0 K/UL — SIGNIFICANT CHANGE UP (ref 0–0)
PCO2 BLDV: 61 MMHG — HIGH (ref 42–55)
PCO2 BLDV: 61 MMHG — HIGH (ref 42–55)
PH BLDV: 7.36 — SIGNIFICANT CHANGE UP (ref 7.32–7.43)
PH BLDV: 7.36 — SIGNIFICANT CHANGE UP (ref 7.32–7.43)
PLATELET # BLD AUTO: 295 K/UL — SIGNIFICANT CHANGE UP (ref 150–400)
PLATELET # BLD AUTO: 295 K/UL — SIGNIFICANT CHANGE UP (ref 150–400)
PO2 BLDV: 54 MMHG — HIGH (ref 25–45)
PO2 BLDV: 54 MMHG — HIGH (ref 25–45)
POTASSIUM SERPL-MCNC: 4 MMOL/L — SIGNIFICANT CHANGE UP (ref 3.5–5.3)
POTASSIUM SERPL-MCNC: 4 MMOL/L — SIGNIFICANT CHANGE UP (ref 3.5–5.3)
POTASSIUM SERPL-SCNC: 4 MMOL/L — SIGNIFICANT CHANGE UP (ref 3.5–5.3)
POTASSIUM SERPL-SCNC: 4 MMOL/L — SIGNIFICANT CHANGE UP (ref 3.5–5.3)
PROT SERPL-MCNC: 6.4 G/DL — SIGNIFICANT CHANGE UP (ref 6–8.3)
PROT SERPL-MCNC: 6.4 G/DL — SIGNIFICANT CHANGE UP (ref 6–8.3)
RBC # BLD: 3.67 M/UL — LOW (ref 4.2–5.8)
RBC # BLD: 3.67 M/UL — LOW (ref 4.2–5.8)
RBC # FLD: 15.2 % — HIGH (ref 10.3–14.5)
RBC # FLD: 15.2 % — HIGH (ref 10.3–14.5)
SAO2 % BLDV: 85.8 % — SIGNIFICANT CHANGE UP (ref 67–88)
SAO2 % BLDV: 85.8 % — SIGNIFICANT CHANGE UP (ref 67–88)
SODIUM SERPL-SCNC: 134 MMOL/L — LOW (ref 135–145)
SODIUM SERPL-SCNC: 134 MMOL/L — LOW (ref 135–145)
WBC # BLD: 9.38 K/UL — SIGNIFICANT CHANGE UP (ref 3.8–10.5)
WBC # BLD: 9.38 K/UL — SIGNIFICANT CHANGE UP (ref 3.8–10.5)
WBC # FLD AUTO: 9.38 K/UL — SIGNIFICANT CHANGE UP (ref 3.8–10.5)
WBC # FLD AUTO: 9.38 K/UL — SIGNIFICANT CHANGE UP (ref 3.8–10.5)

## 2023-11-10 RX ADMIN — Medication 1 DROP(S): at 17:26

## 2023-11-10 RX ADMIN — Medication 650 MILLIGRAM(S): at 07:58

## 2023-11-10 RX ADMIN — LISINOPRIL 20 MILLIGRAM(S): 2.5 TABLET ORAL at 06:13

## 2023-11-10 RX ADMIN — ATORVASTATIN CALCIUM 40 MILLIGRAM(S): 80 TABLET, FILM COATED ORAL at 22:34

## 2023-11-10 RX ADMIN — TIOTROPIUM BROMIDE 2 PUFF(S): 18 CAPSULE ORAL; RESPIRATORY (INHALATION) at 12:28

## 2023-11-10 RX ADMIN — Medication 37.5 MICROGRAM(S): at 06:13

## 2023-11-10 RX ADMIN — PRASUGREL 10 MILLIGRAM(S): 5 TABLET, FILM COATED ORAL at 12:59

## 2023-11-10 RX ADMIN — Medication 81 MILLIGRAM(S): at 12:28

## 2023-11-10 RX ADMIN — MAGNESIUM OXIDE 400 MG ORAL TABLET 400 MILLIGRAM(S): 241.3 TABLET ORAL at 22:33

## 2023-11-10 RX ADMIN — BUDESONIDE AND FORMOTEROL FUMARATE DIHYDRATE 2 PUFF(S): 160; 4.5 AEROSOL RESPIRATORY (INHALATION) at 09:30

## 2023-11-10 RX ADMIN — ENOXAPARIN SODIUM 40 MILLIGRAM(S): 100 INJECTION SUBCUTANEOUS at 18:45

## 2023-11-10 RX ADMIN — Medication 50 MILLIGRAM(S): at 09:31

## 2023-11-10 RX ADMIN — BUDESONIDE AND FORMOTEROL FUMARATE DIHYDRATE 2 PUFF(S): 160; 4.5 AEROSOL RESPIRATORY (INHALATION) at 22:33

## 2023-11-10 RX ADMIN — DIPHENHYDRAMINE HYDROCHLORIDE AND LIDOCAINE HYDROCHLORIDE AND ALUMINUM HYDROXIDE AND MAGNESIUM HYDRO 5 MILLILITER(S): KIT at 06:12

## 2023-11-10 RX ADMIN — Medication 1 DROP(S): at 06:13

## 2023-11-10 RX ADMIN — DIPHENHYDRAMINE HYDROCHLORIDE AND LIDOCAINE HYDROCHLORIDE AND ALUMINUM HYDROXIDE AND MAGNESIUM HYDRO 5 MILLILITER(S): KIT at 17:25

## 2023-11-10 RX ADMIN — Medication 50 MILLIGRAM(S): at 22:34

## 2023-11-10 RX ADMIN — Medication 650 MILLIGRAM(S): at 08:28

## 2023-11-10 NOTE — PROGRESS NOTE ADULT - SUBJECTIVE AND OBJECTIVE BOX
Cardiovascular Disease Progress Note    Overnight events: No acute events overnight.  no new cardiac sx  Otherwise review of systems negative    Objective Findings:  T(C): 36.7 (11-10-23 @ 06:00), Max: 36.8 (11-09-23 @ 09:29)  HR: 60 (11-10-23 @ 06:00) (48 - 63)  BP: 156/78 (11-10-23 @ 06:00) (152/71 - 165/94)  RR: 18 (11-10-23 @ 06:00) (17 - 19)  SpO2: 100% (11-10-23 @ 06:00) (97% - 100%)  Wt(kg): --  Daily     Daily       Physical Exam:  Gen: NAD  HEENT: EOMI  CV: RRR, normal S1 + S2, no m/r/g  Lungs: CTAB  Abd: soft, non-tender  Ext: No edema    Telemetry:    Laboratory Data:                        10.6   9.38  )-----------( 295      ( 10 Nov 2023 06:05 )             33.3     11-08    135  |  95<L>  |  17  ----------------------------<  96  5.4<H>   |  29  |  0.67    Ca    9.4      08 Nov 2023 17:38  Phos  3.2     11-08  Mg     1.80     11-08                Inpatient Medications:  MEDICATIONS  (STANDING):  artificial tears (preservative free) Ophthalmic Solution 1 Drop(s) Both EYES two times a day  aspirin enteric coated 81 milliGRAM(s) Oral daily  atorvastatin 40 milliGRAM(s) Oral at bedtime  budesonide 160 MICROgram(s)/formoterol 4.5 MICROgram(s) Inhaler 2 Puff(s) Inhalation two times a day  enoxaparin Injectable 40 milliGRAM(s) SubCutaneous every 24 hours  FIRST- Mouthwash  BLM 5 milliLiter(s) Swish and Spit four times a day  levothyroxine 37.5 MICROGram(s) Oral daily  lidocaine 4% Cream 1 Application(s) Topical once  lisinopril 20 milliGRAM(s) Oral daily  magnesium oxide 400 milliGRAM(s) Oral at bedtime  metoprolol tartrate 50 milliGRAM(s) Oral two times a day  prasugrel 10 milliGRAM(s) Oral daily  tiotropium 2.5 MICROgram(s) Inhaler 2 Puff(s) Inhalation daily      Assessment:  71M PMHx COPD on home O2 2 L nasal cannula (~45pack year hx, quit 4 years ago), hypertension, known AAA - being followed by Dr. Joaquin, PAD, alcohol abuse complicated with liver cirrhosis, recently treated for a UTI, as per patient was referred to the emergency department by his primary care physician Dr. Remington Simms for abnormal labs. Patient reports exacerbation of his chronic low back pain. C/o b/l claudication from buttocks down at distances <50 feet. He has history of PAD s/p RLE bypass (?failed). Denies rest pain and poor wound healing. Denies abd pain, pulsatile mass, tearing pain.       Recs:  cardiac stable  dyspnea likely in setting of copd-e. no e/o chf  avaps per pulm  vol status stable. diuretics prn to maintain euvolemic  echo mild lv dysfunction, normal rv fxn, mild-mod as  cw gdmt for lv dysfunction  cw antiplatelets, statin and antianginals  f/u vascular surgery re partially thrombosed AAA and LE PAD  pulmonary f/u  icd interrogation --> no events  dcp pending home AVAPS  will follow        Over 25 minutes spent on total encounter; more than 50% of the visit was spent counseling and/or coordinating care by the attending physician.      Vaughn Simms MD   Cardiovascular Disease  [phone]

## 2023-11-10 NOTE — PROGRESS NOTE ADULT - ASSESSMENT
_________________________________________________________________________________________  ========>>  M E D I C A L   A T T E N D I N G    F O L L O W  U P  N O T E  <<=========  -----------------------------------------------------------------------------------------------------    - Patient seen and examined by me earlier today.   - Patient today overall doing ok, comfortable, eating OK. breathing ok     no new events.. awaiting to go home..   patient had a lot of leg cramping last PM >> asking for magnesium > ordered   > ordered >> patient contacted vascular sx as well, awaiting follow up    ==================>> REVIEW OF SYSTEM <<=================    GEN: no fever, no chills, no pain  RESP: no significant SOB at rest on O2, Occasional sputum  CVS: no chest pain, no palpitations  GI: no abdominal pain, no nausea  : no dysuria, no frequency  Neuro: no headache, no dizziness    ==================>> PHYSICAL EXAM <<=================    GEN: A&O X 3 , NAD , comfortable, pleasant, calm , Sitting on the edge of the bed  HEENT: NCAT, PERRL, MMM, hearing intact., O2 via NC  CVS: S1S2 , regular , No M/R/G appreciated  PULM: decreased BS bilaterally, no wheezing noted   ABD.: soft. non tender, non distended,  bowel sounds present  Extrem: intact pulses , no edema         ( Note written / Date of service 11-10-23 ( This is certified to be the same as "ENTERED" date above ( for billing purposes)))    ==================>> MEDICATIONS <<====================    artificial tears (preservative free) Ophthalmic Solution 1 Drop(s) Both EYES two times a day  aspirin enteric coated 81 milliGRAM(s) Oral daily  atorvastatin 40 milliGRAM(s) Oral at bedtime  budesonide 160 MICROgram(s)/formoterol 4.5 MICROgram(s) Inhaler 2 Puff(s) Inhalation two times a day  enoxaparin Injectable 40 milliGRAM(s) SubCutaneous every 24 hours  FIRST- Mouthwash  BLM 5 milliLiter(s) Swish and Spit four times a day  levothyroxine 37.5 MICROGram(s) Oral daily  lidocaine 4% Cream 1 Application(s) Topical once  lisinopril 20 milliGRAM(s) Oral daily  magnesium oxide 400 milliGRAM(s) Oral at bedtime  metoprolol tartrate 50 milliGRAM(s) Oral two times a day  prasugrel 10 milliGRAM(s) Oral daily  tiotropium 2.5 MICROgram(s) Inhaler 2 Puff(s) Inhalation daily    MEDICATIONS  (PRN):  acetaminophen     Tablet .. 650 milliGRAM(s) Oral every 6 hours PRN Temp greater or equal to 38C (100.4F), Mild Pain (1 - 3)  albuterol    90 MICROgram(s) HFA Inhaler 2 Puff(s) Inhalation every 6 hours PRN Shortness of Breath and/or Wheezing    ___________  Active diet:  Diet, Regular:   1200mL Fluid Restriction ([id number])  ___________________    ==================>> VITAL SIGNS <<==================    Vital Signs Last 24 HrsT(C): 36.3 (11-10-23 @ 13:35)  T(F): 97.3 (11-10-23 @ 13:35), Max: 98.1 (11-09-23 @ 18:24)  HR: 52 (11-10-23 @ 13:35) (48 - 65)  BP: 153/72 (11-10-23 @ 13:35)  RR: 18 (11-10-23 @ 13:35) (18 - 19)  SpO2: 100% (11-10-23 @ 13:35) (100% - 100%)      CAPILLARY BLOOD GLUCOSE         ==================>> LAB AND IMAGING <<==================                        10.6   9.38  )-----------( 295      ( 10 Nov 2023 06:05 )             33.3        11-10    134<L>  |  94<L>  |  11  ----------------------------<  85  4.0   |  32<H>  |  0.55    Ca    9.2      10 Nov 2023 06:05  Phos  3.2     11-08  Mg     1.80     11-10    TPro  6.4  /  Alb  3.9  /  TBili  0.5  /  DBili  x   /  AST  19  /  ALT  16  /  AlkPhos  82  11-10    WBC count:   9.38 <<== ,  10.97 <<==   Hemoglobin:   10.6 <<==,  11.7 <<==  platelets:  295 <==, 350 <==    Creatinine:  0.55  <<==, 0.67  <<==  Sodium:   134  <==, 135  <==       AST:          19 <==      ALT:        16  <==      AP:        82  <=     Bili:        0.5  <=    ____________________________    M I C R O B I O L O G Y :      SARS-CoV-2: NotDetec (10-28-23 @ 12:12)    < from: TTE W or WO Ultrasound Enhancing Agent (10.30.23 @ 15:17) >  CONCLUSIONS:    1. Left ventricular wall thickness is normal. Left ventricular systolic function is mildly decreased with an ejection fraction of 49 % by Vazquez's method of disks. Regional wall motion abnormalities present.   2. There is hypokinesis of the apical and apical septal walls.   3. Normal right ventricular cavity size and systolic function.   4. The aortic valve appears trileaflet with reduced systolic excursion. There is mild to moderate aortic stenosis. The peak transaortic velocity is 2.58 m/s, peak transaortic gradient is 26.6 mmHg and mean transaortic gradient is 12.0 mmHg with an LVOT/aortic valve VTI ratio of 0.45. The aortic valve area is estimated at 1.41 cm² by the continuity equation. There is trace aortic regurgitation.   5. Normal atria.   6. The inferior vena cava is normal in size measuring 1.62 cm in diameter, (normal <2.1cm) with normal inspiratory collapse (normal >50%) consistent with normal right atrial pressure (~3, range 0-5mmHg).   7. No pericardial effusion seen.  < end of copied text >    ___________________________________________________________________________________  ===============>>  A S S E S S M E N T   A N D   P L A N <<===============  ------------------------------------------------------------------------------------------    · Assessment	  71 year old male with PMHx of COPD on home O2 (2-3 L/NC), HTN, known AAA, PAD, hyperlipidemia and alcohol use disorder complicated with liver cirrhosis (quit 1yr ago) who was told to come to ER for abnormal labs (elevated CO2 to 42 on recent labs), felt to be secondary to acute on chronic hypercapnic respiratory failure due to likely COPD exacerbation.    Problem/Plan - 1:  ·  Problem: Acute on chronic respiratory failure with hypercapnia.   - Being treated for likely COPD exacerbation  - Pulmonary follow-up, management and optimization appreciated  - AVAPS::  will need to go home with per pulm >> awaiting insurance company approval undiluted by equipment company prior to going home:: Discussed with case management  - Has a left upper lobe 4 mm nodule, to be f/up as outpatient    Problem/Plan - 2:  ·  Problem: Hypertension.   - C/w home meds  - Echocardiogram as above     Further management and optimization as per cardiology given above Abnormalities    Problem/Plan - 3:  ·  Problem: hyponatremia >> improved / stable   diet changed  patient eating well  free fluid restriction   monitor BMP given hyperkalemia   magnesium supplements as needed for cramping     Problem/Plan - 4:  ·  Problem: AAA (abdominal aortic aneurysm).   - CTA Ao shows-Increased size of partially thrombosed infrarenal abdominal aortic aneurysm measuring up to 5.8 cm in greatest axial dimension. Crescentic area of mild relative hyperattenuation within the thrombus along the anterior aspect of the aneurysm at the level of the kidneys suspicious for hemorrhage/leakage into the thrombus. No active extravasation identified  - Seen by Vascular, no intervention at this time planned   - BP control   - C/w ASA/prasugrel/statin  - Follow up with Dr. Joaquin as outpatient on 10/30/23 per team   - Cardio on board    - Patient with symptoms, likely from claudication >> Need close vascular follow-up as discussed      Encouraged increased activity as able    Problem/Plan - 5:  ·  Problem: Hyperlipidemia.   ·  Plan: - C/w statin.    Problem/Plan - 6:  ·  Problem: Prophylactic measure.   ·  Plan: LMWH  PT  // OOB to chair daily / increase activity as able     DC planing home pending above     --------------------------------------------  Case discussed with patient,   Education given on findings and plan of care  ___________________________  H. JOJO Brown.  Pager: [phone]       _________________________________________________________________________________________  ========>>  M E D I C A L   A T T E N D I N G    F O L L O W  U P  N O T E  <<=========  -----------------------------------------------------------------------------------------------------    - Patient seen and examined by me earlier today.   - Patient today overall doing ok, comfortable, eating OK. breathing ok     no new events.. awaiting to go home..  Patient's respiratory machine not approved by insurance company yet  leg cramping A little better with magnesium     ==================>> REVIEW OF SYSTEM <<=================    GEN: no fever, no chills, no pain  RESP: no significant SOB at rest on O2, Occasional sputum  CVS: no chest pain, no palpitations  GI: no abdominal pain, no nausea  : no dysuria, no frequency  Neuro: no headache, no dizziness    ==================>> PHYSICAL EXAM <<=================    GEN: A&O X 3 , NAD , comfortable, pleasant, calm , Sitting on the edge of the bed  HEENT: NCAT, PERRL, MMM, hearing intact., O2 via NC  CVS: S1S2 , regular , No M/R/G appreciated  PULM: decreased BS bilaterally, no wheezing noted   ABD.: soft. non tender, non distended,  bowel sounds present  Extrem: intact pulses , no edema         ( Note written / Date of service 11-10-23 ( This is certified to be the same as "ENTERED" date above ( for billing purposes)))    ==================>> MEDICATIONS <<====================    artificial tears (preservative free) Ophthalmic Solution 1 Drop(s) Both EYES two times a day  aspirin enteric coated 81 milliGRAM(s) Oral daily  atorvastatin 40 milliGRAM(s) Oral at bedtime  budesonide 160 MICROgram(s)/formoterol 4.5 MICROgram(s) Inhaler 2 Puff(s) Inhalation two times a day  enoxaparin Injectable 40 milliGRAM(s) SubCutaneous every 24 hours  FIRST- Mouthwash  BLM 5 milliLiter(s) Swish and Spit four times a day  levothyroxine 37.5 MICROGram(s) Oral daily  lidocaine 4% Cream 1 Application(s) Topical once  lisinopril 20 milliGRAM(s) Oral daily  magnesium oxide 400 milliGRAM(s) Oral at bedtime  metoprolol tartrate 50 milliGRAM(s) Oral two times a day  prasugrel 10 milliGRAM(s) Oral daily  tiotropium 2.5 MICROgram(s) Inhaler 2 Puff(s) Inhalation daily    MEDICATIONS  (PRN):  acetaminophen     Tablet .. 650 milliGRAM(s) Oral every 6 hours PRN Temp greater or equal to 38C (100.4F), Mild Pain (1 - 3)  albuterol    90 MICROgram(s) HFA Inhaler 2 Puff(s) Inhalation every 6 hours PRN Shortness of Breath and/or Wheezing    ___________  Active diet:  Diet, Regular:   1200mL Fluid Restriction ([id number])  ___________________    ==================>> VITAL SIGNS <<==================    Vital Signs Last 24 HrsT(C): 36.3 (11-10-23 @ 13:35)  T(F): 97.3 (11-10-23 @ 13:35), Max: 98.1 (11-09-23 @ 18:24)  HR: 52 (11-10-23 @ 13:35) (48 - 65)  BP: 153/72 (11-10-23 @ 13:35)  RR: 18 (11-10-23 @ 13:35) (18 - 19)  SpO2: 100% (11-10-23 @ 13:35) (100% - 100%)       ==================>> LAB AND IMAGING <<==================                        10.6   9.38  )-----------( 295      ( 10 Nov 2023 06:05 )             33.3        11-10    134<L>  |  94<L>  |  11  ----------------------------<  85  4.0   |  32<H>  |  0.55    Ca    9.2      10 Nov 2023 06:05  Phos  3.2     11-08  Mg     1.80     11-10    TPro  6.4  /  Alb  3.9  /  TBili  0.5  /  DBili  x   /  AST  19  /  ALT  16  /  AlkPhos  82  11-10    WBC count:   9.38 <<== ,  10.97 <<==   Hemoglobin:   10.6 <<==,  11.7 <<==  platelets:  295 <==, 350 <==    Creatinine:  0.55  <<==, 0.67  <<==  Sodium:   134  <==, 135  <==    < from: TTE W or WO Ultrasound Enhancing Agent (10.30.23 @ 15:17) >  CONCLUSIONS:    1. Left ventricular wall thickness is normal. Left ventricular systolic function is mildly decreased with an ejection fraction of 49 % by Vazquez's method of disks. Regional wall motion abnormalities present.   2. There is hypokinesis of the apical and apical septal walls.   3. Normal right ventricular cavity size and systolic function.   4. The aortic valve appears trileaflet with reduced systolic excursion. There is mild to moderate aortic stenosis. The peak transaortic velocity is 2.58 m/s, peak transaortic gradient is 26.6 mmHg and mean transaortic gradient is 12.0 mmHg with an LVOT/aortic valve VTI ratio of 0.45. The aortic valve area is estimated at 1.41 cm² by the continuity equation. There is trace aortic regurgitation.   5. Normal atria.   6. The inferior vena cava is normal in size measuring 1.62 cm in diameter, (normal <2.1cm) with normal inspiratory collapse (normal >50%) consistent with normal right atrial pressure (~3, range 0-5mmHg).   7. No pericardial effusion seen.  < end of copied text >    ___________________________________________________________________________________  ===============>>  A S S E S S M E N T   A N D   P L A N <<===============  ------------------------------------------------------------------------------------------    · Assessment	  71 year old male with PMHx of COPD on home O2 (2-3 L/NC), HTN, known AAA, PAD, hyperlipidemia and alcohol use disorder complicated with liver cirrhosis (quit 1yr ago) who was told to come to ER for abnormal labs (elevated CO2 to 42 on recent labs), felt to be secondary to acute on chronic hypercapnic respiratory failure due to likely COPD exacerbation.    Problem/Plan - 1:  ·  Problem: Acute on chronic respiratory failure with hypercapnia.   - Being treated for likely COPD exacerbation  - Pulmonary follow-up, management and optimization appreciated  - AVAPS::  will need to go home with per pulm >> still awaiting insurance company approval   - Has a left upper lobe 4 mm nodule, to be f/up as outpatient    Problem/Plan - 2:  ·  Problem: Hypertension.   - C/w home meds  - Echocardiogram as above     Further management and optimization as per cardiology given above Abnormalities    Problem/Plan - 3:  ·  Problem: hyponatremia >> improved / stable   diet changed  patient eating well  free fluid restriction   monitor BMP given hyperkalemia   magnesium supplements as needed for cramping     Problem/Plan - 4:  ·  Problem: AAA (abdominal aortic aneurysm).   - CTA Ao shows-Increased size of partially thrombosed infrarenal abdominal aortic aneurysm measuring up to 5.8 cm in greatest axial dimension. Crescentic area of mild relative hyperattenuation within the thrombus along the anterior aspect of the aneurysm at the level of the kidneys suspicious for hemorrhage/leakage into the thrombus. No active extravasation identified  - Seen by Vascular, no intervention at this time planned   - BP control   - C/w ASA/prasugrel/statin  - Follow up with Dr. Joaquin as outpatient on 10/30/23 per team   - Cardio on board    - Patient with symptoms, likely from claudication >> Need close vascular follow-up as discussed      Encouraged increased activity as able    Problem/Plan - 5:  ·  Problem: Hyperlipidemia.   ·  Plan: - C/w statin.    Problem/Plan - 6:  ·  Problem: Prophylactic measure.   ·  Plan: LMWH  PT  // OOB to chair daily / increase activity as able     DC planing home pending above     --------------------------------------------  Case discussed with patient,   Education given on findings and plan of care  ___________________________  JACQUELINE Brown D.O.  Pager: [phone]

## 2023-11-11 LAB
ALBUMIN SERPL ELPH-MCNC: 3.6 G/DL — SIGNIFICANT CHANGE UP (ref 3.3–5)
ALBUMIN SERPL ELPH-MCNC: 3.6 G/DL — SIGNIFICANT CHANGE UP (ref 3.3–5)
ALP SERPL-CCNC: 83 U/L — SIGNIFICANT CHANGE UP (ref 40–120)
ALP SERPL-CCNC: 83 U/L — SIGNIFICANT CHANGE UP (ref 40–120)
ALT FLD-CCNC: 17 U/L — SIGNIFICANT CHANGE UP (ref 4–41)
ALT FLD-CCNC: 17 U/L — SIGNIFICANT CHANGE UP (ref 4–41)
ANION GAP SERPL CALC-SCNC: 7 MMOL/L — SIGNIFICANT CHANGE UP (ref 7–14)
ANION GAP SERPL CALC-SCNC: 7 MMOL/L — SIGNIFICANT CHANGE UP (ref 7–14)
AST SERPL-CCNC: 18 U/L — SIGNIFICANT CHANGE UP (ref 4–40)
AST SERPL-CCNC: 18 U/L — SIGNIFICANT CHANGE UP (ref 4–40)
BASE EXCESS BLDV CALC-SCNC: 6.1 MMOL/L — HIGH (ref -2–3)
BASE EXCESS BLDV CALC-SCNC: 6.1 MMOL/L — HIGH (ref -2–3)
BILIRUB SERPL-MCNC: 0.5 MG/DL — SIGNIFICANT CHANGE UP (ref 0.2–1.2)
BILIRUB SERPL-MCNC: 0.5 MG/DL — SIGNIFICANT CHANGE UP (ref 0.2–1.2)
BUN SERPL-MCNC: 11 MG/DL — SIGNIFICANT CHANGE UP (ref 7–23)
BUN SERPL-MCNC: 11 MG/DL — SIGNIFICANT CHANGE UP (ref 7–23)
CALCIUM SERPL-MCNC: 9.3 MG/DL — SIGNIFICANT CHANGE UP (ref 8.4–10.5)
CALCIUM SERPL-MCNC: 9.3 MG/DL — SIGNIFICANT CHANGE UP (ref 8.4–10.5)
CHLORIDE SERPL-SCNC: 94 MMOL/L — LOW (ref 98–107)
CHLORIDE SERPL-SCNC: 94 MMOL/L — LOW (ref 98–107)
CO2 BLDV-SCNC: 35.9 MMOL/L — HIGH (ref 22–26)
CO2 BLDV-SCNC: 35.9 MMOL/L — HIGH (ref 22–26)
CO2 SERPL-SCNC: 31 MMOL/L — SIGNIFICANT CHANGE UP (ref 22–31)
CO2 SERPL-SCNC: 31 MMOL/L — SIGNIFICANT CHANGE UP (ref 22–31)
CREAT SERPL-MCNC: 0.51 MG/DL — SIGNIFICANT CHANGE UP (ref 0.5–1.3)
CREAT SERPL-MCNC: 0.51 MG/DL — SIGNIFICANT CHANGE UP (ref 0.5–1.3)
EGFR: 109 ML/MIN/1.73M2 — SIGNIFICANT CHANGE UP
EGFR: 109 ML/MIN/1.73M2 — SIGNIFICANT CHANGE UP
GLUCOSE SERPL-MCNC: 84 MG/DL — SIGNIFICANT CHANGE UP (ref 70–99)
GLUCOSE SERPL-MCNC: 84 MG/DL — SIGNIFICANT CHANGE UP (ref 70–99)
HCO3 BLDV-SCNC: 34 MMOL/L — HIGH (ref 22–29)
HCO3 BLDV-SCNC: 34 MMOL/L — HIGH (ref 22–29)
HCT VFR BLD CALC: 32.7 % — LOW (ref 39–50)
HCT VFR BLD CALC: 32.7 % — LOW (ref 39–50)
HGB BLD-MCNC: 10.5 G/DL — LOW (ref 13–17)
HGB BLD-MCNC: 10.5 G/DL — LOW (ref 13–17)
MAGNESIUM SERPL-MCNC: 1.8 MG/DL — SIGNIFICANT CHANGE UP (ref 1.6–2.6)
MAGNESIUM SERPL-MCNC: 1.8 MG/DL — SIGNIFICANT CHANGE UP (ref 1.6–2.6)
MCHC RBC-ENTMCNC: 28.8 PG — SIGNIFICANT CHANGE UP (ref 27–34)
MCHC RBC-ENTMCNC: 28.8 PG — SIGNIFICANT CHANGE UP (ref 27–34)
MCHC RBC-ENTMCNC: 32.1 GM/DL — SIGNIFICANT CHANGE UP (ref 32–36)
MCHC RBC-ENTMCNC: 32.1 GM/DL — SIGNIFICANT CHANGE UP (ref 32–36)
MCV RBC AUTO: 89.8 FL — SIGNIFICANT CHANGE UP (ref 80–100)
MCV RBC AUTO: 89.8 FL — SIGNIFICANT CHANGE UP (ref 80–100)
NRBC # BLD: 0 /100 WBCS — SIGNIFICANT CHANGE UP (ref 0–0)
NRBC # BLD: 0 /100 WBCS — SIGNIFICANT CHANGE UP (ref 0–0)
NRBC # FLD: 0 K/UL — SIGNIFICANT CHANGE UP (ref 0–0)
NRBC # FLD: 0 K/UL — SIGNIFICANT CHANGE UP (ref 0–0)
PCO2 BLDV: 63 MMHG — HIGH (ref 42–55)
PCO2 BLDV: 63 MMHG — HIGH (ref 42–55)
PH BLDV: 7.34 — SIGNIFICANT CHANGE UP (ref 7.32–7.43)
PH BLDV: 7.34 — SIGNIFICANT CHANGE UP (ref 7.32–7.43)
PLATELET # BLD AUTO: 285 K/UL — SIGNIFICANT CHANGE UP (ref 150–400)
PLATELET # BLD AUTO: 285 K/UL — SIGNIFICANT CHANGE UP (ref 150–400)
PO2 BLDV: 58 MMHG — HIGH (ref 25–45)
PO2 BLDV: 58 MMHG — HIGH (ref 25–45)
POTASSIUM SERPL-MCNC: 3.8 MMOL/L — SIGNIFICANT CHANGE UP (ref 3.5–5.3)
POTASSIUM SERPL-MCNC: 3.8 MMOL/L — SIGNIFICANT CHANGE UP (ref 3.5–5.3)
POTASSIUM SERPL-SCNC: 3.8 MMOL/L — SIGNIFICANT CHANGE UP (ref 3.5–5.3)
POTASSIUM SERPL-SCNC: 3.8 MMOL/L — SIGNIFICANT CHANGE UP (ref 3.5–5.3)
PROT SERPL-MCNC: 6.4 G/DL — SIGNIFICANT CHANGE UP (ref 6–8.3)
PROT SERPL-MCNC: 6.4 G/DL — SIGNIFICANT CHANGE UP (ref 6–8.3)
RBC # BLD: 3.64 M/UL — LOW (ref 4.2–5.8)
RBC # BLD: 3.64 M/UL — LOW (ref 4.2–5.8)
RBC # FLD: 15.1 % — HIGH (ref 10.3–14.5)
RBC # FLD: 15.1 % — HIGH (ref 10.3–14.5)
SAO2 % BLDV: 86.8 % — SIGNIFICANT CHANGE UP (ref 67–88)
SAO2 % BLDV: 86.8 % — SIGNIFICANT CHANGE UP (ref 67–88)
SODIUM SERPL-SCNC: 132 MMOL/L — LOW (ref 135–145)
SODIUM SERPL-SCNC: 132 MMOL/L — LOW (ref 135–145)
WBC # BLD: 8.9 K/UL — SIGNIFICANT CHANGE UP (ref 3.8–10.5)
WBC # BLD: 8.9 K/UL — SIGNIFICANT CHANGE UP (ref 3.8–10.5)
WBC # FLD AUTO: 8.9 K/UL — SIGNIFICANT CHANGE UP (ref 3.8–10.5)
WBC # FLD AUTO: 8.9 K/UL — SIGNIFICANT CHANGE UP (ref 3.8–10.5)

## 2023-11-11 RX ADMIN — MAGNESIUM OXIDE 400 MG ORAL TABLET 400 MILLIGRAM(S): 241.3 TABLET ORAL at 21:15

## 2023-11-11 RX ADMIN — BUDESONIDE AND FORMOTEROL FUMARATE DIHYDRATE 2 PUFF(S): 160; 4.5 AEROSOL RESPIRATORY (INHALATION) at 21:15

## 2023-11-11 RX ADMIN — Medication 1 DROP(S): at 06:28

## 2023-11-11 RX ADMIN — PRASUGREL 10 MILLIGRAM(S): 5 TABLET, FILM COATED ORAL at 11:57

## 2023-11-11 RX ADMIN — Medication 50 MILLIGRAM(S): at 21:15

## 2023-11-11 RX ADMIN — LIDOCAINE 1 APPLICATION(S): 4 CREAM TOPICAL at 16:05

## 2023-11-11 RX ADMIN — Medication 1 DROP(S): at 18:54

## 2023-11-11 RX ADMIN — Medication 50 MILLIGRAM(S): at 09:17

## 2023-11-11 RX ADMIN — ATORVASTATIN CALCIUM 40 MILLIGRAM(S): 80 TABLET, FILM COATED ORAL at 21:15

## 2023-11-11 RX ADMIN — ENOXAPARIN SODIUM 40 MILLIGRAM(S): 100 INJECTION SUBCUTANEOUS at 18:52

## 2023-11-11 RX ADMIN — LISINOPRIL 20 MILLIGRAM(S): 2.5 TABLET ORAL at 06:28

## 2023-11-11 RX ADMIN — TIOTROPIUM BROMIDE 2 PUFF(S): 18 CAPSULE ORAL; RESPIRATORY (INHALATION) at 09:17

## 2023-11-11 RX ADMIN — BUDESONIDE AND FORMOTEROL FUMARATE DIHYDRATE 2 PUFF(S): 160; 4.5 AEROSOL RESPIRATORY (INHALATION) at 09:16

## 2023-11-11 RX ADMIN — Medication 81 MILLIGRAM(S): at 11:58

## 2023-11-11 RX ADMIN — Medication 37.5 MICROGRAM(S): at 06:28

## 2023-11-11 NOTE — PROGRESS NOTE ADULT - ASSESSMENT
-__________________________________________________________________________________  ===============>>  A S S E S S M E N T   A N D   P L A N <<===============  ------------------------------------------------------------------------------------------    · Assessment	  71 year old male with PMHx of COPD on home O2 (2-3 L/NC), HTN, known AAA, PAD, hyperlipidemia and alcohol use disorder complicated with liver cirrhosis (quit 1yr ago) who was told to come to ER for abnormal labs (elevated CO2 to 42 on recent labs), felt to be secondary to acute on chronic hypercapnic respiratory failure due to likely COPD exacerbation.    Problem/Plan - 1:  ·  Problem: Acute on chronic respiratory failure with hypercapnia.   - Being treated for likely COPD exacerbation  - Pulmonary follow-up, management and optimization appreciated  - AVAPS::  will need to go home with per pulm >> still awaiting insurance company approval   - Has a left upper lobe 4 mm nodule, to be f/up as outpatient    Problem/Plan - 2:  ·  Problem: Hypertension.   - C/w home meds  - Echocardiogram as above     Further management and optimization as per cardiology given above Abnormalities    Problem/Plan - 3:  ·  Problem: hyponatremia >> improved / stable   diet changed  patient eating well  free fluid restriction   monitor BMP given hyperkalemia   magnesium supplements as needed for cramping     Problem/Plan - 4:  ·  Problem: AAA (abdominal aortic aneurysm).   - CTA Ao shows-Increased size of partially thrombosed infrarenal abdominal aortic aneurysm measuring up to 5.8 cm in greatest axial dimension. Crescentic area of mild relative hyperattenuation within the thrombus along the anterior aspect of the aneurysm at the level of the kidneys suspicious for hemorrhage/leakage into the thrombus. No active extravasation identified  - Seen by Vascular, no intervention at this time planned   - BP control   - C/w ASA/prasugrel/statin  - Follow up with Dr. Joaquin as outpatient on 10/30/23 per team   - Cardio on board    - Patient with symptoms, likely from claudication >> Need close vascular follow-up as discussed      Encouraged increased activity as able    Problem/Plan - 5:  ·  Problem: Hyperlipidemia.   ·  Plan: - C/w statin.    Problem/Plan - 6:  ·  Problem: Prophylactic measure.   ·  Plan: LMWH  PT  // OOB to chair daily / increase activity as able     DC planing home pending above

## 2023-11-11 NOTE — PROGRESS NOTE ADULT - SUBJECTIVE AND OBJECTIVE BOX
Patient is a 68y old  Male who presents with a chief complaint of Told to come for abnormal labs (10 Nov 2023 14:46)      SUBJECTIVE / OVERNIGHT EVENTS: no events       T(C): 37.2 (11-11-23 @ 18:00), Max: 37.2 (11-11-23 @ 18:00)  HR: 62 (11-11-23 @ 18:00) (57 - 72)  BP: 130/73 (11-11-23 @ 18:00) (130/73 - 143/78)  RR: 18 (11-11-23 @ 18:00) (18 - 18)  SpO2: 94% (11-11-23 @ 18:00) (94% - 100%)      MEDICATIONS  (STANDING):  artificial tears (preservative free) Ophthalmic Solution 1 Drop(s) Both EYES two times a day  aspirin enteric coated 81 milliGRAM(s) Oral daily  atorvastatin 40 milliGRAM(s) Oral at bedtime  budesonide 160 MICROgram(s)/formoterol 4.5 MICROgram(s) Inhaler 2 Puff(s) Inhalation two times a day  enoxaparin Injectable 40 milliGRAM(s) SubCutaneous every 24 hours  levothyroxine 37.5 MICROGram(s) Oral daily  lisinopril 20 milliGRAM(s) Oral daily  magnesium oxide 400 milliGRAM(s) Oral at bedtime  metoprolol tartrate 50 milliGRAM(s) Oral two times a day  prasugrel 10 milliGRAM(s) Oral daily  tiotropium 2.5 MICROgram(s) Inhaler 2 Puff(s) Inhalation daily    MEDICATIONS  (PRN):  acetaminophen     Tablet .. 650 milliGRAM(s) Oral every 6 hours PRN Temp greater or equal to 38C (100.4F), Mild Pain (1 - 3)  albuterol    90 MICROgram(s) HFA Inhaler 2 Puff(s) Inhalation every 6 hours PRN Shortness of Breath and/or Wheezing  GENERAL: NAD, well-developed  HEAD:  Atraumatic, Normocephalic  EYES: EOMI, PERRLA, conjunctiva and sclera clear  NECK: Supple, No JVD  CHEST/LUNG: Clear to auscultation bilaterally; No wheeze  HEART: Regular rate and rhythm; No murmurs, rubs, or gallops  ABDOMEN: Soft, Nontender, Nondistended; Bowel sounds present  EXTREMITIES:  2+ Peripheral Pulses, No clubbing, cyanosis, or edema  PSYCH: AAOx3  NEUROLOGY: non-focal  SKIN: No rashes or lesions                              10.5   8.90  )-----------( 285      ( 11 Nov 2023 06:42 )             32.7           LIVER FUNCTIONS - ( 11 Nov 2023 06:42 )  Alb: 3.6 g/dL / Pro: 6.4 g/dL / ALK PHOS: 83 U/L / ALT: 17 U/L / AST: 18 U/L / GGT: x             132|94|11<84  3.8|31|0.51  9.3,1.80,--  11-11 @ 06:42  RADIOLOGY & ADDITIONAL TESTS:    Imaging Personally Reviewed:    Consultant(s) Notes Reviewed:      Care Discussed with Consultants/Other Providers:

## 2023-11-12 LAB
ALBUMIN SERPL ELPH-MCNC: 3.6 G/DL — SIGNIFICANT CHANGE UP (ref 3.3–5)
ALBUMIN SERPL ELPH-MCNC: 3.6 G/DL — SIGNIFICANT CHANGE UP (ref 3.3–5)
ALP SERPL-CCNC: 82 U/L — SIGNIFICANT CHANGE UP (ref 40–120)
ALP SERPL-CCNC: 82 U/L — SIGNIFICANT CHANGE UP (ref 40–120)
ALT FLD-CCNC: 18 U/L — SIGNIFICANT CHANGE UP (ref 4–41)
ALT FLD-CCNC: 18 U/L — SIGNIFICANT CHANGE UP (ref 4–41)
ANION GAP SERPL CALC-SCNC: 5 MMOL/L — LOW (ref 7–14)
ANION GAP SERPL CALC-SCNC: 5 MMOL/L — LOW (ref 7–14)
AST SERPL-CCNC: 18 U/L — SIGNIFICANT CHANGE UP (ref 4–40)
AST SERPL-CCNC: 18 U/L — SIGNIFICANT CHANGE UP (ref 4–40)
BASE EXCESS BLDV CALC-SCNC: 6.1 MMOL/L — HIGH (ref -2–3)
BASE EXCESS BLDV CALC-SCNC: 6.1 MMOL/L — HIGH (ref -2–3)
BILIRUB SERPL-MCNC: 0.3 MG/DL — SIGNIFICANT CHANGE UP (ref 0.2–1.2)
BILIRUB SERPL-MCNC: 0.3 MG/DL — SIGNIFICANT CHANGE UP (ref 0.2–1.2)
BUN SERPL-MCNC: 12 MG/DL — SIGNIFICANT CHANGE UP (ref 7–23)
BUN SERPL-MCNC: 12 MG/DL — SIGNIFICANT CHANGE UP (ref 7–23)
CALCIUM SERPL-MCNC: 9.1 MG/DL — SIGNIFICANT CHANGE UP (ref 8.4–10.5)
CALCIUM SERPL-MCNC: 9.1 MG/DL — SIGNIFICANT CHANGE UP (ref 8.4–10.5)
CHLORIDE SERPL-SCNC: 96 MMOL/L — LOW (ref 98–107)
CHLORIDE SERPL-SCNC: 96 MMOL/L — LOW (ref 98–107)
CO2 BLDV-SCNC: 36.6 MMOL/L — HIGH (ref 22–26)
CO2 BLDV-SCNC: 36.6 MMOL/L — HIGH (ref 22–26)
CO2 SERPL-SCNC: 33 MMOL/L — HIGH (ref 22–31)
CO2 SERPL-SCNC: 33 MMOL/L — HIGH (ref 22–31)
CREAT SERPL-MCNC: 0.54 MG/DL — SIGNIFICANT CHANGE UP (ref 0.5–1.3)
CREAT SERPL-MCNC: 0.54 MG/DL — SIGNIFICANT CHANGE UP (ref 0.5–1.3)
EGFR: 107 ML/MIN/1.73M2 — SIGNIFICANT CHANGE UP
EGFR: 107 ML/MIN/1.73M2 — SIGNIFICANT CHANGE UP
GLUCOSE SERPL-MCNC: 93 MG/DL — SIGNIFICANT CHANGE UP (ref 70–99)
GLUCOSE SERPL-MCNC: 93 MG/DL — SIGNIFICANT CHANGE UP (ref 70–99)
HCO3 BLDV-SCNC: 34 MMOL/L — HIGH (ref 22–29)
HCO3 BLDV-SCNC: 34 MMOL/L — HIGH (ref 22–29)
HCT VFR BLD CALC: 32.5 % — LOW (ref 39–50)
HCT VFR BLD CALC: 32.5 % — LOW (ref 39–50)
HGB BLD-MCNC: 10.6 G/DL — LOW (ref 13–17)
HGB BLD-MCNC: 10.6 G/DL — LOW (ref 13–17)
MAGNESIUM SERPL-MCNC: 1.9 MG/DL — SIGNIFICANT CHANGE UP (ref 1.6–2.6)
MAGNESIUM SERPL-MCNC: 1.9 MG/DL — SIGNIFICANT CHANGE UP (ref 1.6–2.6)
MCHC RBC-ENTMCNC: 29.1 PG — SIGNIFICANT CHANGE UP (ref 27–34)
MCHC RBC-ENTMCNC: 29.1 PG — SIGNIFICANT CHANGE UP (ref 27–34)
MCHC RBC-ENTMCNC: 32.6 GM/DL — SIGNIFICANT CHANGE UP (ref 32–36)
MCHC RBC-ENTMCNC: 32.6 GM/DL — SIGNIFICANT CHANGE UP (ref 32–36)
MCV RBC AUTO: 89.3 FL — SIGNIFICANT CHANGE UP (ref 80–100)
MCV RBC AUTO: 89.3 FL — SIGNIFICANT CHANGE UP (ref 80–100)
NRBC # BLD: 0 /100 WBCS — SIGNIFICANT CHANGE UP (ref 0–0)
NRBC # BLD: 0 /100 WBCS — SIGNIFICANT CHANGE UP (ref 0–0)
NRBC # FLD: 0 K/UL — SIGNIFICANT CHANGE UP (ref 0–0)
NRBC # FLD: 0 K/UL — SIGNIFICANT CHANGE UP (ref 0–0)
PCO2 BLDV: 67 MMHG — HIGH (ref 42–55)
PCO2 BLDV: 67 MMHG — HIGH (ref 42–55)
PH BLDV: 7.32 — SIGNIFICANT CHANGE UP (ref 7.32–7.43)
PH BLDV: 7.32 — SIGNIFICANT CHANGE UP (ref 7.32–7.43)
PLATELET # BLD AUTO: 295 K/UL — SIGNIFICANT CHANGE UP (ref 150–400)
PLATELET # BLD AUTO: 295 K/UL — SIGNIFICANT CHANGE UP (ref 150–400)
PO2 BLDV: 63 MMHG — HIGH (ref 25–45)
PO2 BLDV: 63 MMHG — HIGH (ref 25–45)
POTASSIUM SERPL-MCNC: 4.1 MMOL/L — SIGNIFICANT CHANGE UP (ref 3.5–5.3)
POTASSIUM SERPL-MCNC: 4.1 MMOL/L — SIGNIFICANT CHANGE UP (ref 3.5–5.3)
POTASSIUM SERPL-SCNC: 4.1 MMOL/L — SIGNIFICANT CHANGE UP (ref 3.5–5.3)
POTASSIUM SERPL-SCNC: 4.1 MMOL/L — SIGNIFICANT CHANGE UP (ref 3.5–5.3)
PROT SERPL-MCNC: 6.2 G/DL — SIGNIFICANT CHANGE UP (ref 6–8.3)
PROT SERPL-MCNC: 6.2 G/DL — SIGNIFICANT CHANGE UP (ref 6–8.3)
RBC # BLD: 3.64 M/UL — LOW (ref 4.2–5.8)
RBC # BLD: 3.64 M/UL — LOW (ref 4.2–5.8)
RBC # FLD: 14.9 % — HIGH (ref 10.3–14.5)
RBC # FLD: 14.9 % — HIGH (ref 10.3–14.5)
SAO2 % BLDV: 87.8 % — SIGNIFICANT CHANGE UP (ref 67–88)
SAO2 % BLDV: 87.8 % — SIGNIFICANT CHANGE UP (ref 67–88)
SODIUM SERPL-SCNC: 134 MMOL/L — LOW (ref 135–145)
SODIUM SERPL-SCNC: 134 MMOL/L — LOW (ref 135–145)
WBC # BLD: 8.95 K/UL — SIGNIFICANT CHANGE UP (ref 3.8–10.5)
WBC # BLD: 8.95 K/UL — SIGNIFICANT CHANGE UP (ref 3.8–10.5)
WBC # FLD AUTO: 8.95 K/UL — SIGNIFICANT CHANGE UP (ref 3.8–10.5)
WBC # FLD AUTO: 8.95 K/UL — SIGNIFICANT CHANGE UP (ref 3.8–10.5)

## 2023-11-12 RX ADMIN — Medication 81 MILLIGRAM(S): at 12:17

## 2023-11-12 RX ADMIN — Medication 50 MILLIGRAM(S): at 09:52

## 2023-11-12 RX ADMIN — Medication 37.5 MICROGRAM(S): at 06:37

## 2023-11-12 RX ADMIN — LISINOPRIL 20 MILLIGRAM(S): 2.5 TABLET ORAL at 06:38

## 2023-11-12 RX ADMIN — MAGNESIUM OXIDE 400 MG ORAL TABLET 400 MILLIGRAM(S): 241.3 TABLET ORAL at 21:33

## 2023-11-12 RX ADMIN — TIOTROPIUM BROMIDE 2 PUFF(S): 18 CAPSULE ORAL; RESPIRATORY (INHALATION) at 09:52

## 2023-11-12 RX ADMIN — ATORVASTATIN CALCIUM 40 MILLIGRAM(S): 80 TABLET, FILM COATED ORAL at 21:33

## 2023-11-12 RX ADMIN — Medication 50 MILLIGRAM(S): at 21:33

## 2023-11-12 RX ADMIN — BUDESONIDE AND FORMOTEROL FUMARATE DIHYDRATE 2 PUFF(S): 160; 4.5 AEROSOL RESPIRATORY (INHALATION) at 21:34

## 2023-11-12 RX ADMIN — Medication 1 DROP(S): at 18:29

## 2023-11-12 RX ADMIN — Medication 650 MILLIGRAM(S): at 00:32

## 2023-11-12 RX ADMIN — ENOXAPARIN SODIUM 40 MILLIGRAM(S): 100 INJECTION SUBCUTANEOUS at 18:29

## 2023-11-12 RX ADMIN — PRASUGREL 10 MILLIGRAM(S): 5 TABLET, FILM COATED ORAL at 12:17

## 2023-11-12 RX ADMIN — BUDESONIDE AND FORMOTEROL FUMARATE DIHYDRATE 2 PUFF(S): 160; 4.5 AEROSOL RESPIRATORY (INHALATION) at 09:51

## 2023-11-12 RX ADMIN — Medication 650 MILLIGRAM(S): at 01:02

## 2023-11-12 RX ADMIN — Medication 1 DROP(S): at 06:36

## 2023-11-12 NOTE — PROGRESS NOTE ADULT - ASSESSMENT
_________________________________________________________________________________________  ========>>  M E D I C A L   A T T E N D I N G    F O L L O W  U P  N O T E  <<=========  -----------------------------------------------------------------------------------------------------    - Patient seen and examined by me earlier today.   - Patient today overall doing ok, comfortable, eating OK. breathing ok     no new events.. awaiting to go home..  Patient's respiratory machine not approved by insurance company yet  leg cramping A little better with magnesium     ==================>> REVIEW OF SYSTEM <<=================    GEN: no fever, no chills, no pain  RESP: no significant SOB at rest on O2, Occasional sputum  CVS: no chest pain, no palpitations  GI: no abdominal pain, no nausea  : no dysuria, no frequency  Neuro: no headache, no dizziness    ==================>> PHYSICAL EXAM <<=================    GEN: A&O X 3 , NAD , comfortable, pleasant, calm , Sitting on the edge of the bed  HEENT: NCAT, PERRL, MMM, hearing intact., O2 via NC  CVS: S1S2 , regular , No M/R/G appreciated  PULM: decreased BS bilaterally, no wheezing noted   ABD.: soft. non tender, non distended,  bowel sounds present  Extrem: intact pulses , no edema         ( Note written / Date of service 11-12-23 ( This is certified to be the same as "ENTERED" date above ( for billing purposes)))    ==================>> MEDICATIONS <<====================    artificial tears (preservative free) Ophthalmic Solution 1 Drop(s) Both EYES two times a day  aspirin enteric coated 81 milliGRAM(s) Oral daily  atorvastatin 40 milliGRAM(s) Oral at bedtime  budesonide 160 MICROgram(s)/formoterol 4.5 MICROgram(s) Inhaler 2 Puff(s) Inhalation two times a day  enoxaparin Injectable 40 milliGRAM(s) SubCutaneous every 24 hours  levothyroxine 37.5 MICROGram(s) Oral daily  lisinopril 20 milliGRAM(s) Oral daily  magnesium oxide 400 milliGRAM(s) Oral at bedtime  metoprolol tartrate 50 milliGRAM(s) Oral two times a day  prasugrel 10 milliGRAM(s) Oral daily  tiotropium 2.5 MICROgram(s) Inhaler 2 Puff(s) Inhalation daily    MEDICATIONS  (PRN):  acetaminophen     Tablet .. 650 milliGRAM(s) Oral every 6 hours PRN Temp greater or equal to 38C (100.4F), Mild Pain (1 - 3)  albuterol    90 MICROgram(s) HFA Inhaler 2 Puff(s) Inhalation every 6 hours PRN Shortness of Breath and/or Wheezing    ___________  Active diet:  Diet, Regular:   1200mL Fluid Restriction ([id number])  ___________________    ==================>> VITAL SIGNS <<==================    Vital Signs Last 24 HrsT(C): 36.7 (11-12-23 @ 09:48)  T(F): 98.1 (11-12-23 @ 09:48), Max: 98.9 (11-11-23 @ 18:00)  HR: 64 (11-12-23 @ 09:48) (56 - 70)  BP: 141/86 (11-12-23 @ 09:48)  RR: 18 (11-12-23 @ 09:48) (18 - 18)  SpO2: 100% (11-12-23 @ 09:48) (94% - 100%)      CAPILLARY BLOOD GLUCOSE         ==================>> LAB AND IMAGING <<==================                        10.6   8.95  )-----------( 295      ( 12 Nov 2023 06:04 )             32.5        11-12    134<L>  |  96<L>  |  12  ----------------------------<  93  4.1   |  33<H>  |  0.54    Ca    9.1      12 Nov 2023 06:04  Mg     1.90     11-12    TPro  6.2  /  Alb  3.6  /  TBili  0.3  /  DBili  x   /  AST  18  /  ALT  18  /  AlkPhos  82  11-12    WBC count:   8.95 <<== ,  8.90 <<== ,  9.38 <<== ,  10.97 <<==   Hemoglobin:   10.6 <<==,  10.5 <<==,  10.6 <<==,  11.7 <<==  platelets:  295 <==, 285 <==, 295 <==, 350 <==    Creatinine:  0.54  <<==, 0.51  <<==, 0.55  <<==, 0.67  <<==  Sodium:   134  <==, 132  <==, 134  <==, 135  <==       AST:          18 <== , 18 <== , 19 <==      ALT:        18  <== , 17  <== , 16  <==      AP:        82  <=, 83  <=, 82  <=     Bili:        0.3  <=, 0.5  <=, 0.5  <=    ____________________________    M I C R O B I O L O G Y :      SARS-CoV-2: NotDetec (10-28-23 @ 12:12)      < from: TTE W or WO Ultrasound Enhancing Agent (10.30.23 @ 15:17) >  CONCLUSIONS:    1. Left ventricular wall thickness is normal. Left ventricular systolic function is mildly decreased with an ejection fraction of 49 % by Vazquez's method of disks. Regional wall motion abnormalities present.   2. There is hypokinesis of the apical and apical septal walls.   3. Normal right ventricular cavity size and systolic function.   4. The aortic valve appears trileaflet with reduced systolic excursion. There is mild to moderate aortic stenosis. The peak transaortic velocity is 2.58 m/s, peak transaortic gradient is 26.6 mmHg and mean transaortic gradient is 12.0 mmHg with an LVOT/aortic valve VTI ratio of 0.45. The aortic valve area is estimated at 1.41 cm² by the continuity equation. There is trace aortic regurgitation.   5. Normal atria.   6. The inferior vena cava is normal in size measuring 1.62 cm in diameter, (normal <2.1cm) with normal inspiratory collapse (normal >50%) consistent with normal right atrial pressure (~3, range 0-5mmHg).   7. No pericardial effusion seen.  < end of copied text >    ___________________________________________________________________________________  ===============>>  A S S E S S M E N T   A N D   P L A N <<===============  ------------------------------------------------------------------------------------------    · Assessment	  71 year old male with PMHx of COPD on home O2 (2-3 L/NC), HTN, known AAA, PAD, hyperlipidemia and alcohol use disorder complicated with liver cirrhosis (quit 1yr ago) who was told to come to ER for abnormal labs (elevated CO2 to 42 on recent labs), felt to be secondary to acute on chronic hypercapnic respiratory failure due to likely COPD exacerbation.    Problem/Plan - 1:  ·  Problem: Acute on chronic respiratory failure with hypercapnia.   - Being treated for likely COPD exacerbation  - Pulmonary follow-up, management and optimization appreciated  - AVAPS::  will need to go home with per pulm >> still awaiting insurance company approval   - Has a left upper lobe 4 mm nodule, to be f/up as outpatient    Problem/Plan - 2:  ·  Problem: Hypertension.   - C/w home meds  - Echocardiogram as above     Further management and optimization as per cardiology given above Abnormalities    Problem/Plan - 3:  ·  Problem: hyponatremia >> improved / stable   diet changed  patient eating well  free fluid restriction   monitor BMP given hyperkalemia   magnesium supplements as needed for cramping     Problem/Plan - 4:  ·  Problem: AAA (abdominal aortic aneurysm).   - CTA Ao shows-Increased size of partially thrombosed infrarenal abdominal aortic aneurysm measuring up to 5.8 cm in greatest axial dimension. Crescentic area of mild relative hyperattenuation within the thrombus along the anterior aspect of the aneurysm at the level of the kidneys suspicious for hemorrhage/leakage into the thrombus. No active extravasation identified  - Seen by Vascular, no intervention at this time planned   - BP control   - C/w ASA/prasugrel/statin  - Follow up with Dr. Joaquin as outpatient on 10/30/23 per team   - Cardio on board    - Patient with symptoms, likely from claudication >> Need close vascular follow-up as discussed      Encouraged increased activity as able    Problem/Plan - 5:  ·  Problem: Hyperlipidemia.   ·  Plan: - C/w statin.    Problem/Plan - 6:  ·  Problem: Prophylactic measure.   ·  Plan: LMWH  PT  // OOB to chair daily / increase activity as able     DC planing home pending above     --------------------------------------------  Case discussed with patient,   Education given on findings and plan of care  ___________________________  H. JOJO Brown.  Pager: [phone]       _________________________________________________________________________________________  ========>>  M E D I C A L   A T T E N D I N G    F O L L O W  U P  N O T E  <<=========  -----------------------------------------------------------------------------------------------------    - Patient seen and examined by me earlier today.   - Patient today overall doing ok, comfortable, eating OK. breathing ok     no new events.. awaiting to go home..  Patient's respiratory machine not approved by insurance company yet    ==================>> REVIEW OF SYSTEM <<=================    GEN: no fever, no chills, no pain  RESP: no significant SOB at rest on O2, Occasional sputum  CVS: no chest pain, no palpitations  GI: no abdominal pain, no nausea  : no dysuria, no frequency  Neuro: no headache, no dizziness    ==================>> PHYSICAL EXAM <<=================    GEN: A&O X 3 , NAD , comfortable, pleasant, calm , Sitting on the edge of the bed  HEENT: NCAT, PERRL, MMM, hearing intact., O2 via NC  CVS: S1S2 , regular , No M/R/G appreciated  PULM: decreased BS bilaterally, no wheezing noted   ABD.: soft. non tender, non distended,  bowel sounds present  Extrem: intact pulses , no edema         ( Note written / Date of service 11-12-23 ( This is certified to be the same as "ENTERED" date above ( for billing purposes)))    ==================>> MEDICATIONS <<====================    artificial tears (preservative free) Ophthalmic Solution 1 Drop(s) Both EYES two times a day  aspirin enteric coated 81 milliGRAM(s) Oral daily  atorvastatin 40 milliGRAM(s) Oral at bedtime  budesonide 160 MICROgram(s)/formoterol 4.5 MICROgram(s) Inhaler 2 Puff(s) Inhalation two times a day  enoxaparin Injectable 40 milliGRAM(s) SubCutaneous every 24 hours  levothyroxine 37.5 MICROGram(s) Oral daily  lisinopril 20 milliGRAM(s) Oral daily  magnesium oxide 400 milliGRAM(s) Oral at bedtime  metoprolol tartrate 50 milliGRAM(s) Oral two times a day  prasugrel 10 milliGRAM(s) Oral daily  tiotropium 2.5 MICROgram(s) Inhaler 2 Puff(s) Inhalation daily    MEDICATIONS  (PRN):  acetaminophen     Tablet .. 650 milliGRAM(s) Oral every 6 hours PRN Temp greater or equal to 38C (100.4F), Mild Pain (1 - 3)  albuterol    90 MICROgram(s) HFA Inhaler 2 Puff(s) Inhalation every 6 hours PRN Shortness of Breath and/or Wheezing    ___________  Active diet:  Diet, Regular:   1200mL Fluid Restriction ([id number])  ___________________    ==================>> VITAL SIGNS <<==================    Vital Signs Last 24 HrsT(C): 36.7 (11-12-23 @ 09:48)  T(F): 98.1 (11-12-23 @ 09:48), Max: 98.9 (11-11-23 @ 18:00)  HR: 64 (11-12-23 @ 09:48) (56 - 70)  BP: 141/86 (11-12-23 @ 09:48)  RR: 18 (11-12-23 @ 09:48) (18 - 18)  SpO2: 100% (11-12-23 @ 09:48) (94% - 100%)         ==================>> LAB AND IMAGING <<==================                        10.6   8.95  )-----------( 295      ( 12 Nov 2023 06:04 )             32.5        11-12    134<L>  |  96<L>  |  12  ----------------------------<  93  4.1   |  33<H>  |  0.54    Ca    9.1      12 Nov 2023 06:04  Mg     1.90     11-12    TPro  6.2  /  Alb  3.6  /  TBili  0.3  /  DBili  x   /  AST  18  /  ALT  18  /  AlkPhos  82  11-12    WBC count:   8.95 <<== ,  8.90 <<== ,  9.38 <<== ,  10.97 <<==   Hemoglobin:   10.6 <<==,  10.5 <<==,  10.6 <<==,  11.7 <<==  platelets:  295 <==, 285 <==, 295 <==, 350 <==    Creatinine:  0.54  <<==, 0.51  <<==, 0.55  <<==, 0.67  <<==  Sodium:   134  <==, 132  <==, 134  <==, 135  <==       AST:          18 <== , 18 <== , 19 <==      ALT:        18  <== , 17  <== , 16  <==      AP:        82  <=, 83  <=, 82  <=     Bili:        0.3  <=, 0.5  <=, 0.5  <=    ____________________________    M I C R O B I O L O G Y :      SARS-CoV-2: NotYoel (10-28-23 @ 12:12)      < from: TTE W or WO Ultrasound Enhancing Agent (10.30.23 @ 15:17) >  CONCLUSIONS:    1. Left ventricular wall thickness is normal. Left ventricular systolic function is mildly decreased with an ejection fraction of 49 % by Vazquez's method of disks. Regional wall motion abnormalities present.   2. There is hypokinesis of the apical and apical septal walls.   3. Normal right ventricular cavity size and systolic function.   4. The aortic valve appears trileaflet with reduced systolic excursion. There is mild to moderate aortic stenosis. The peak transaortic velocity is 2.58 m/s, peak transaortic gradient is 26.6 mmHg and mean transaortic gradient is 12.0 mmHg with an LVOT/aortic valve VTI ratio of 0.45. The aortic valve area is estimated at 1.41 cm² by the continuity equation. There is trace aortic regurgitation.   5. Normal atria.   6. The inferior vena cava is normal in size measuring 1.62 cm in diameter, (normal <2.1cm) with normal inspiratory collapse (normal >50%) consistent with normal right atrial pressure (~3, range 0-5mmHg).   7. No pericardial effusion seen.  < end of copied text >    ___________________________________________________________________________________  ===============>>  A S S E S S M E N T   A N D   P L A N <<===============  ------------------------------------------------------------------------------------------    · Assessment	  71 year old male with PMHx of COPD on home O2 (2-3 L/NC), HTN, known AAA, PAD, hyperlipidemia and alcohol use disorder complicated with liver cirrhosis (quit 1yr ago) who was told to come to ER for abnormal labs (elevated CO2 to 42 on recent labs), felt to be secondary to acute on chronic hypercapnic respiratory failure due to likely COPD exacerbation.    Problem/Plan - 1:  ·  Problem: Acute on chronic respiratory failure with hypercapnia.   - Being treated for likely COPD exacerbation  - Pulmonary follow-up, management and optimization appreciated  - AVAPS::  will need to go home with per pulm >> still awaiting insurance company approval   - Has a left upper lobe 4 mm nodule, to be f/up as outpatient    Problem/Plan - 2:  ·  Problem: Hypertension.   - C/w home meds  - Echocardiogram as above     Further management and optimization as per cardiology given above Abnormalities    Problem/Plan - 3:  ·  Problem: hyponatremia >> improved / stable   diet changed  patient eating well  free fluid restriction   monitor BMP given hyperkalemia   magnesium supplements as needed for cramping     Problem/Plan - 4:  ·  Problem: AAA (abdominal aortic aneurysm).   - CTA Ao shows-Increased size of partially thrombosed infrarenal abdominal aortic aneurysm measuring up to 5.8 cm in greatest axial dimension. Crescentic area of mild relative hyperattenuation within the thrombus along the anterior aspect of the aneurysm at the level of the kidneys suspicious for hemorrhage/leakage into the thrombus. No active extravasation identified  - Seen by Vascular, no intervention at this time planned   - BP control   - C/w ASA/prasugrel/statin  - Follow up with Dr. Joaquin as outpatient on 10/30/23 per team   - Cardio on board    - Patient with symptoms, likely from claudication >> Need close vascular follow-up as discussed      Encouraged increased activity as able    Problem/Plan - 5:  ·  Problem: Hyperlipidemia.   ·  Plan: - C/w statin.    Problem/Plan - 6:  ·  Problem: Prophylactic measure.   ·  Plan: LMWH  PT  // OOB to chair daily / increase activity as able     DC planing home pending above     --------------------------------------------  Case discussed with patient,   Education given on findings and plan of care  ___________________________  H. JOJO Brown.  Pager: [phone]

## 2023-11-13 PROCEDURE — 76882 US LMTD JT/FCL EVL NVASC XTR: CPT | Mod: 26,RT

## 2023-11-13 RX ADMIN — Medication 81 MILLIGRAM(S): at 12:09

## 2023-11-13 RX ADMIN — Medication 1 DROP(S): at 19:02

## 2023-11-13 RX ADMIN — Medication 50 MILLIGRAM(S): at 21:43

## 2023-11-13 RX ADMIN — BUDESONIDE AND FORMOTEROL FUMARATE DIHYDRATE 2 PUFF(S): 160; 4.5 AEROSOL RESPIRATORY (INHALATION) at 21:44

## 2023-11-13 RX ADMIN — Medication 37.5 MICROGRAM(S): at 06:19

## 2023-11-13 RX ADMIN — Medication 50 MILLIGRAM(S): at 09:35

## 2023-11-13 RX ADMIN — Medication 1 DROP(S): at 06:19

## 2023-11-13 RX ADMIN — ATORVASTATIN CALCIUM 40 MILLIGRAM(S): 80 TABLET, FILM COATED ORAL at 21:44

## 2023-11-13 RX ADMIN — ENOXAPARIN SODIUM 40 MILLIGRAM(S): 100 INJECTION SUBCUTANEOUS at 18:59

## 2023-11-13 RX ADMIN — TIOTROPIUM BROMIDE 2 PUFF(S): 18 CAPSULE ORAL; RESPIRATORY (INHALATION) at 09:34

## 2023-11-13 RX ADMIN — LISINOPRIL 20 MILLIGRAM(S): 2.5 TABLET ORAL at 06:19

## 2023-11-13 RX ADMIN — MAGNESIUM OXIDE 400 MG ORAL TABLET 400 MILLIGRAM(S): 241.3 TABLET ORAL at 21:43

## 2023-11-13 RX ADMIN — BUDESONIDE AND FORMOTEROL FUMARATE DIHYDRATE 2 PUFF(S): 160; 4.5 AEROSOL RESPIRATORY (INHALATION) at 09:34

## 2023-11-13 RX ADMIN — PRASUGREL 10 MILLIGRAM(S): 5 TABLET, FILM COATED ORAL at 12:08

## 2023-11-13 NOTE — PROGRESS NOTE ADULT - SUBJECTIVE AND OBJECTIVE BOX
Cardiovascular Disease Progress Note    Overnight events: No acute events overnight.  no new cardiac sx  Otherwise review of systems negative    Objective Findings:  T(C): 36.6 (11-13-23 @ 06:00), Max: 36.7 (11-12-23 @ 09:48)  HR: 56 (11-13-23 @ 06:00) (56 - 64)  BP: 141/71 (11-13-23 @ 06:00) (141/71 - 163/79)  RR: 18 (11-13-23 @ 06:00) (16 - 18)  SpO2: 100% (11-13-23 @ 06:00) (100% - 100%)  Wt(kg): --  Daily     Daily       Physical Exam:  Gen: NAD  HEENT: EOMI  CV: RRR, normal S1 + S2, no m/r/g  Lungs: CTAB  Abd: soft, non-tender  Ext: No edema    Telemetry:    Laboratory Data:                        10.6   8.95  )-----------( 295      ( 12 Nov 2023 06:04 )             32.5     11-12    134<L>  |  96<L>  |  12  ----------------------------<  93  4.1   |  33<H>  |  0.54    Ca    9.1      12 Nov 2023 06:04  Mg     1.90     11-12    TPro  6.2  /  Alb  3.6  /  TBili  0.3  /  DBili  x   /  AST  18  /  ALT  18  /  AlkPhos  82  11-12              Inpatient Medications:  MEDICATIONS  (STANDING):  artificial tears (preservative free) Ophthalmic Solution 1 Drop(s) Both EYES two times a day  aspirin enteric coated 81 milliGRAM(s) Oral daily  atorvastatin 40 milliGRAM(s) Oral at bedtime  budesonide 160 MICROgram(s)/formoterol 4.5 MICROgram(s) Inhaler 2 Puff(s) Inhalation two times a day  enoxaparin Injectable 40 milliGRAM(s) SubCutaneous every 24 hours  levothyroxine 37.5 MICROGram(s) Oral daily  lisinopril 20 milliGRAM(s) Oral daily  magnesium oxide 400 milliGRAM(s) Oral at bedtime  metoprolol tartrate 50 milliGRAM(s) Oral two times a day  prasugrel 10 milliGRAM(s) Oral daily  tiotropium 2.5 MICROgram(s) Inhaler 2 Puff(s) Inhalation daily      Assessment:  71M PMHx COPD on home O2 2 L nasal cannula (~45pack year hx, quit 4 years ago), hypertension, known AAA - being followed by Dr. Joaquin, PAD, alcohol abuse complicated with liver cirrhosis, recently treated for a UTI, as per patient was referred to the emergency department by his primary care physician Dr. Remington Simms for abnormal labs. Patient reports exacerbation of his chronic low back pain. C/o b/l claudication from buttocks down at distances <50 feet. He has history of PAD s/p RLE bypass (?failed). Denies rest pain and poor wound healing. Denies abd pain, pulsatile mass, tearing pain.       Recs:  cardiac stable  dyspnea likely in setting of copd-e. no e/o chf  avaps per pulm  vol status stable. diuretics prn to maintain euvolemic  echo mild lv dysfunction, normal rv fxn, mild-mod as  cw gdmt for lv dysfunction  cw antiplatelets, statin and antianginals  f/u vascular surgery re partially thrombosed AAA and LE PAD  pulmonary f/u  icd interrogation --> no events  dcp pending home AVAPS  will follow      Over 25 minutes spent on total encounter; more than 50% of the visit was spent counseling and/or coordinating care by the attending physician.      Vaughn Simms MD   Cardiovascular Disease  [phone]

## 2023-11-13 NOTE — PROGRESS NOTE ADULT - ASSESSMENT
_________________________________________________________________________________________  ========>>  M E D I C A L   A T T E N D I N G    F O L L O W  U P  N O T E  <<=========  -----------------------------------------------------------------------------------------------------    - Patient seen and examined by me earlier today.   - Patient today overall doing ok, comfortable, eating OK. breathing ok     no new events.. awaiting to go home..  Patient's respiratory machine not approved by insurance company yet   + small nodularityu in right upper anterior thigh is noted by patient >> possible LN ? , pt reassured ..  ACP ordered sono as bellow      ==================>> REVIEW OF SYSTEM <<=================    GEN: no fever, no chills, no pain  RESP: no significant SOB at rest on O2, Occasional sputum  CVS: no chest pain, no palpitations  GI: no abdominal pain, no nausea  : no dysuria, no frequency  Neuro: no headache, no dizziness    ==================>> PHYSICAL EXAM <<=================    GEN: A&O X 3 , NAD , comfortable, pleasant, calm , Sitting on the edge of the bed  HEENT: NCAT, PERRL, MMM, hearing intact., O2 via NC  CVS: S1S2 , regular , No M/R/G appreciated  PULM: decreased BS bilaterally, no wheezing noted   ABD.: soft. non tender, non distended,  bowel sounds present  Extrem: intact pulses , no edema        ( Note written / Date of service 11-13-23 ( This is certified to be the same as "ENTERED" date above ( for billing purposes)))    ==================>> MEDICATIONS <<====================    artificial tears (preservative free) Ophthalmic Solution 1 Drop(s) Both EYES two times a day  aspirin enteric coated 81 milliGRAM(s) Oral daily  atorvastatin 40 milliGRAM(s) Oral at bedtime  budesonide 160 MICROgram(s)/formoterol 4.5 MICROgram(s) Inhaler 2 Puff(s) Inhalation two times a day  enoxaparin Injectable 40 milliGRAM(s) SubCutaneous every 24 hours  levothyroxine 37.5 MICROGram(s) Oral daily  lisinopril 20 milliGRAM(s) Oral daily  magnesium oxide 400 milliGRAM(s) Oral at bedtime  metoprolol tartrate 50 milliGRAM(s) Oral two times a day  prasugrel 10 milliGRAM(s) Oral daily  tiotropium 2.5 MICROgram(s) Inhaler 2 Puff(s) Inhalation daily    MEDICATIONS  (PRN):  acetaminophen     Tablet .. 650 milliGRAM(s) Oral every 6 hours PRN Temp greater or equal to 38C (100.4F), Mild Pain (1 - 3)  albuterol    90 MICROgram(s) HFA Inhaler 2 Puff(s) Inhalation every 6 hours PRN Shortness of Breath and/or Wheezing    ___________  Active diet:  Diet, Regular:   1200mL Fluid Restriction ([id number])  ___________________    ==================>> VITAL SIGNS <<==================    Vital Signs Last 24 HrsT(C): 36.7 (11-13-23 @ 17:15)  T(F): 98.1 (11-13-23 @ 17:15), Max: 98.8 (11-13-23 @ 09:35)  HR: 57 (11-13-23 @ 17:15) (56 - 77)  BP: 169/72 (11-13-23 @ 17:15)  RR: 18 (11-13-23 @ 17:15) (18 - 18)  SpO2: 98% (11-13-23 @ 17:15) (98% - 100%)      CAPILLARY BLOOD GLUCOSE         ==================>> LAB AND IMAGING <<==================                        10.6   8.95  )-----------( 295      ( 12 Nov 2023 06:04 )             32.5        11-12    134<L>  |  96<L>  |  12  ----------------------------<  93  4.1   |  33<H>  |  0.54    Ca    9.1      12 Nov 2023 06:04  Mg     1.90     11-12    TPro  6.2  /  Alb  3.6  /  TBili  0.3  /  DBili  x   /  AST  18  /  ALT  18  /  AlkPhos  82  11-12    WBC count:   8.95 <<== ,  8.90 <<== ,  9.38 <<==   Hemoglobin:   10.6 <<==,  10.5 <<==,  10.6 <<==  platelets:  295 <==, 285 <==, 295 <==, 350 <==    Creatinine:  0.54  <<==, 0.51  <<==, 0.55  <<==, 0.67  <<==  Sodium:   134  <==, 132  <==, 134  <==, 135  <==       AST:          18 <== , 18 <== , 19 <==      ALT:        18  <== , 17  <== , 16  <==      AP:        82  <=, 83  <=, 82  <=     Bili:        0.3  <=, 0.5  <=, 0.5  <=    < from: US Extremity Nonvasc Limited, Right (11.13.23 @ 14:51) >  IMPRESSION: Small cystic and solid nodule without vascularity in the   medial right thigh. The differential includes epidermoid inclusion cyst.  < end of copied text >      < from: TTE W or WO Ultrasound Enhancing Agent (10.30.23 @ 15:17) >  CONCLUSIONS:    1. Left ventricular wall thickness is normal. Left ventricular systolic function is mildly decreased with an ejection fraction of 49 % by Vazquez's method of disks. Regional wall motion abnormalities present.   2. There is hypokinesis of the apical and apical septal walls.   3. Normal right ventricular cavity size and systolic function.   4. The aortic valve appears trileaflet with reduced systolic excursion. There is mild to moderate aortic stenosis. The peak transaortic velocity is 2.58 m/s, peak transaortic gradient is 26.6 mmHg and mean transaortic gradient is 12.0 mmHg with an LVOT/aortic valve VTI ratio of 0.45. The aortic valve area is estimated at 1.41 cm² by the continuity equation. There is trace aortic regurgitation.   5. Normal atria.   6. The inferior vena cava is normal in size measuring 1.62 cm in diameter, (normal <2.1cm) with normal inspiratory collapse (normal >50%) consistent with normal right atrial pressure (~3, range 0-5mmHg).   7. No pericardial effusion seen.  < end of copied text >    ___________________________________________________________________________________  ===============>>  A S S E S S M E N T   A N D   P L A N <<===============  ------------------------------------------------------------------------------------------    · Assessment	  71 year old male with PMHx of COPD on home O2 (2-3 L/NC), HTN, known AAA, PAD, hyperlipidemia and alcohol use disorder complicated with liver cirrhosis (quit 1yr ago) who was told to come to ER for abnormal labs (elevated CO2 to 42 on recent labs), felt to be secondary to acute on chronic hypercapnic respiratory failure due to likely COPD exacerbation.    Problem/Plan - 1:  ·  Problem: Acute on chronic respiratory failure with hypercapnia.   - Being treated for likely COPD exacerbation  - Pulmonary follow-up, management and optimization appreciated  - AVAPS::  will need to go home with per pulm >> still awaiting insurance company approval   - Has a left upper lobe 4 mm nodule, to be f/up as outpatient    Problem/Plan - 2:  ·  Problem: Hypertension.   - C/w home meds  - Echocardiogram as above     Further management and optimization as per cardiology given above Abnormalities    Problem/Plan - 3:  ·  Problem: hyponatremia >> improved / stable   diet changed  patient eating well  free fluid restriction   monitor BMP given hyperkalemia   magnesium supplements as needed for cramping     Problem/Plan - 4:  ·  Problem: AAA (abdominal aortic aneurysm).   - CTA Ao shows-Increased size of partially thrombosed infrarenal abdominal aortic aneurysm measuring up to 5.8 cm in greatest axial dimension. Crescentic area of mild relative hyperattenuation within the thrombus along the anterior aspect of the aneurysm at the level of the kidneys suspicious for hemorrhage/leakage into the thrombus. No active extravasation identified  - Seen by Vascular, no intervention at this time planned   - BP control   - C/w ASA/prasugrel/statin  - Follow up with Dr. Joaquin as outpatient on 10/30/23 per team   - Cardio on board    - Patient with symptoms, likely from claudication >> Need close vascular follow-up as discussed      Encouraged increased activity as able    Problem/Plan - 5:  ·  Problem: Hyperlipidemia.   ·  Plan: - C/w statin.    Problem/Plan - 6:  ·  Problem: Prophylactic measure.   ·  Plan: LMWH  PT  // OOB to chair daily / increase activity as able     DC planing home pending above     --------------------------------------------  Case discussed with patient, ACP  Education given on findings and plan of care  ___________________________  H. JOJO Brown.  Pager: [phone]

## 2023-11-14 RX ADMIN — ENOXAPARIN SODIUM 40 MILLIGRAM(S): 100 INJECTION SUBCUTANEOUS at 18:45

## 2023-11-14 RX ADMIN — LISINOPRIL 20 MILLIGRAM(S): 2.5 TABLET ORAL at 05:40

## 2023-11-14 RX ADMIN — TIOTROPIUM BROMIDE 2 PUFF(S): 18 CAPSULE ORAL; RESPIRATORY (INHALATION) at 09:28

## 2023-11-14 RX ADMIN — PRASUGREL 10 MILLIGRAM(S): 5 TABLET, FILM COATED ORAL at 12:40

## 2023-11-14 RX ADMIN — Medication 1 DROP(S): at 18:44

## 2023-11-14 RX ADMIN — Medication 1 DROP(S): at 05:42

## 2023-11-14 RX ADMIN — BUDESONIDE AND FORMOTEROL FUMARATE DIHYDRATE 2 PUFF(S): 160; 4.5 AEROSOL RESPIRATORY (INHALATION) at 22:00

## 2023-11-14 RX ADMIN — Medication 81 MILLIGRAM(S): at 12:40

## 2023-11-14 RX ADMIN — Medication 50 MILLIGRAM(S): at 09:27

## 2023-11-14 RX ADMIN — Medication 37.5 MICROGRAM(S): at 05:40

## 2023-11-14 RX ADMIN — BUDESONIDE AND FORMOTEROL FUMARATE DIHYDRATE 2 PUFF(S): 160; 4.5 AEROSOL RESPIRATORY (INHALATION) at 09:29

## 2023-11-14 NOTE — PROGRESS NOTE ADULT - SUBJECTIVE AND OBJECTIVE BOX
Cardiovascular Disease Progress Note    Overnight events: No acute events overnight.  awaiting on avaps. no new cardiac sx  Otherwise review of systems negative    Objective Findings:  T(C): 36.6 (11-14-23 @ 05:40), Max: 37.1 (11-13-23 @ 09:35)  HR: 54 (11-14-23 @ 05:40) (54 - 77)  BP: 145/75 (11-14-23 @ 05:40) (136/77 - 169/72)  RR: 18 (11-14-23 @ 05:40) (18 - 18)  SpO2: 100% (11-14-23 @ 05:40) (98% - 100%)  Wt(kg): --  Daily     Daily       Physical Exam:  Gen: NAD  HEENT: EOMI  CV: RRR, normal S1 + S2, no m/r/g  Lungs: CTAB  Abd: soft, non-tender  Ext: No edema    Telemetry:    Laboratory Data:                    Inpatient Medications:  MEDICATIONS  (STANDING):  artificial tears (preservative free) Ophthalmic Solution 1 Drop(s) Both EYES two times a day  aspirin enteric coated 81 milliGRAM(s) Oral daily  atorvastatin 40 milliGRAM(s) Oral at bedtime  budesonide 160 MICROgram(s)/formoterol 4.5 MICROgram(s) Inhaler 2 Puff(s) Inhalation two times a day  enoxaparin Injectable 40 milliGRAM(s) SubCutaneous every 24 hours  levothyroxine 37.5 MICROGram(s) Oral daily  lisinopril 20 milliGRAM(s) Oral daily  magnesium oxide 400 milliGRAM(s) Oral at bedtime  metoprolol tartrate 50 milliGRAM(s) Oral two times a day  prasugrel 10 milliGRAM(s) Oral daily  tiotropium 2.5 MICROgram(s) Inhaler 2 Puff(s) Inhalation daily      Assessment:  71M PMHx COPD on home O2 2 L nasal cannula (~45pack year hx, quit 4 years ago), hypertension, known AAA - being followed by Dr. Joaquin, PAD, alcohol abuse complicated with liver cirrhosis, recently treated for a UTI, as per patient was referred to the emergency department by his primary care physician Dr. Remington Simms for abnormal labs. Patient reports exacerbation of his chronic low back pain. C/o b/l claudication from buttocks down at distances <50 feet. He has history of PAD s/p RLE bypass (?failed). Denies rest pain and poor wound healing. Denies abd pain, pulsatile mass, tearing pain.       Recs:  cardiac stable  dyspnea likely in setting of copd-e. no e/o chf  avaps per pulm  vol status stable. diuretics prn to maintain euvolemic  echo mild lv dysfunction, normal rv fxn, mild-mod as  cw gdmt for lv dysfunction  cw antiplatelets, statin and antianginals  f/u vascular surgery re partially thrombosed AAA and LE PAD  pulmonary f/u  icd interrogation --> no events  dcp pending home AVAPS    Over 25 minutes spent on total encounter; more than 50% of the visit was spent counseling and/or coordinating care by the attending physician.      Vaughn Simms MD   Cardiovascular Disease  [phone]

## 2023-11-14 NOTE — PROGRESS NOTE ADULT - ASSESSMENT
_________________________________________________________________________________________  ========>>  M E D I C A L   A T T E N D I N G    F O L L O W  U P  N O T E  <<=========  -----------------------------------------------------------------------------------------------------    - Patient seen and examined by me earlier today.   - Patient today overall doing ok, comfortable, eating OK. breathing ok     no new events.. awaiting to go home..  Patient's respiratory machine not approved by insurance company yet   + small nodularity in right upper anterior thigh is noted by patient >> stable,, sono as bellow      ==================>> REVIEW OF SYSTEM <<=================    GEN: no fever, no chills, no pain  RESP: no significant SOB at rest on O2, Occasional sputum  CVS: no chest pain, no palpitations  GI: no abdominal pain, no nausea  : no dysuria, no frequency  Neuro: no headache, no dizziness    ==================>> PHYSICAL EXAM <<=================    GEN: A&O X 3 , NAD , comfortable, pleasant, calm , Sitting on the edge of the bed  HEENT: NCAT, PERRL, MMM, hearing intact., O2 via NC  CVS: S1S2 , regular , No M/R/G appreciated  PULM: decreased BS bilaterally, no wheezing noted   ABD.: soft. non tender, non distended,  bowel sounds present  Extrem: intact pulses , no edema           ( Note written / Date of service 11-14-23 ( This is certified to be the same as "ENTERED" date above ( for billing purposes)))    ==================>> MEDICATIONS <<====================    artificial tears (preservative free) Ophthalmic Solution 1 Drop(s) Both EYES two times a day  aspirin enteric coated 81 milliGRAM(s) Oral daily  atorvastatin 40 milliGRAM(s) Oral at bedtime  budesonide 160 MICROgram(s)/formoterol 4.5 MICROgram(s) Inhaler 2 Puff(s) Inhalation two times a day  enoxaparin Injectable 40 milliGRAM(s) SubCutaneous every 24 hours  levothyroxine 37.5 MICROGram(s) Oral daily  lisinopril 20 milliGRAM(s) Oral daily  magnesium oxide 400 milliGRAM(s) Oral at bedtime  metoprolol tartrate 50 milliGRAM(s) Oral two times a day  prasugrel 10 milliGRAM(s) Oral daily  tiotropium 2.5 MICROgram(s) Inhaler 2 Puff(s) Inhalation daily    MEDICATIONS  (PRN):  acetaminophen     Tablet .. 650 milliGRAM(s) Oral every 6 hours PRN Temp greater or equal to 38C (100.4F), Mild Pain (1 - 3)  albuterol    90 MICROgram(s) HFA Inhaler 2 Puff(s) Inhalation every 6 hours PRN Shortness of Breath and/or Wheezing    ___________  Active diet:  Diet, Regular:   1200mL Fluid Restriction ([id number])  ___________________    ==================>> VITAL SIGNS <<==================     Vital Signs Last 24 HrsT(C): 37 (11-14-23 @ 09:27)  T(F): 98.6 (11-14-23 @ 09:27), Max: 98.6 (11-14-23 @ 09:27)  HR: 65 (11-14-23 @ 09:27) (54 - 65)  BP: 158/85 (11-14-23 @ 09:27)  RR: 18 (11-14-23 @ 09:27) (18 - 18)  SpO2: 99% (11-14-23 @ 09:27) (98% - 100%)       ==================>> LAB AND IMAGING <<==================       no labd today    no need for daily labs     < from: US Extremity Nonvasc Limited, Right (11.13.23 @ 14:51) >  IMPRESSION: Small cystic and solid nodule without vascularity in the   medial right thigh. The differential includes epidermoid inclusion cyst.  < end of copied text >      < from: TTE W or WO Ultrasound Enhancing Agent (10.30.23 @ 15:17) >  CONCLUSIONS:    1. Left ventricular wall thickness is normal. Left ventricular systolic function is mildly decreased with an ejection fraction of 49 % by Vazquez's method of disks. Regional wall motion abnormalities present.   2. There is hypokinesis of the apical and apical septal walls.   3. Normal right ventricular cavity size and systolic function.   4. The aortic valve appears trileaflet with reduced systolic excursion. There is mild to moderate aortic stenosis. The peak transaortic velocity is 2.58 m/s, peak transaortic gradient is 26.6 mmHg and mean transaortic gradient is 12.0 mmHg with an LVOT/aortic valve VTI ratio of 0.45. The aortic valve area is estimated at 1.41 cm² by the continuity equation. There is trace aortic regurgitation.   5. Normal atria.   6. The inferior vena cava is normal in size measuring 1.62 cm in diameter, (normal <2.1cm) with normal inspiratory collapse (normal >50%) consistent with normal right atrial pressure (~3, range 0-5mmHg).   7. No pericardial effusion seen.  < end of copied text >    ___________________________________________________________________________________  ===============>>  A S S E S S M E N T   A N D   P L A N <<===============  ------------------------------------------------------------------------------------------    · Assessment	  71 year old male with PMHx of COPD on home O2 (2-3 L/NC), HTN, known AAA, PAD, hyperlipidemia and alcohol use disorder complicated with liver cirrhosis (quit 1yr ago) who was told to come to ER for abnormal labs (elevated CO2 to 42 on recent labs), felt to be secondary to acute on chronic hypercapnic respiratory failure due to likely COPD exacerbation.    Problem/Plan - 1:  ·  Problem: Acute on chronic respiratory failure with hypercapnia.   - Being treated for likely COPD exacerbation  - Pulmonary follow-up, management and optimization appreciated  - AVAPS::  will need to go home with per pulm >> still awaiting insurance company approval   - Has a left upper lobe 4 mm nodule, to be f/up as outpatient    Problem/Plan - 2:  ·  Problem: Hypertension.   - C/w home meds  - Echocardiogram as above     Further management and optimization as per cardiology given above Abnormalities    Problem/Plan - 3:  ·  Problem: hyponatremia >> improved / stable   diet changed  patient eating well  free fluid restriction   monitor BMP given hyperkalemia   magnesium supplements as needed for cramping     Problem/Plan - 4:  ·  Problem: AAA (abdominal aortic aneurysm).   - CTA Ao shows-Increased size of partially thrombosed infrarenal abdominal aortic aneurysm measuring up to 5.8 cm in greatest axial dimension. Crescentic area of mild relative hyperattenuation within the thrombus along the anterior aspect of the aneurysm at the level of the kidneys suspicious for hemorrhage/leakage into the thrombus. No active extravasation identified  - Seen by Vascular, no intervention at this time planned   - BP control   - C/w ASA/prasugrel/statin  - Follow up with Dr. Joaquin as outpatient on 10/30/23 per team   - Cardio on board    - Patient with symptoms, likely from claudication >> Need close vascular follow-up as discussed      Encouraged increased activity as able    Problem/Plan - 5:  ·  Problem: Hyperlipidemia.   ·  Plan: - C/w statin.    Problem/Plan - 6:  ·  Problem: Prophylactic measure.   ·  Plan: LMWH  PT  // OOB to chair daily / increase activity as able     DC planing home pending above     --------------------------------------------  Case discussed with patient, ACP  Education given on findings and plan of care  ___________________________  H. JOJO Brown.  Pager: [phone]

## 2023-11-15 PROCEDURE — 99232 SBSQ HOSP IP/OBS MODERATE 35: CPT

## 2023-11-15 RX ADMIN — ATORVASTATIN CALCIUM 40 MILLIGRAM(S): 80 TABLET, FILM COATED ORAL at 22:37

## 2023-11-15 RX ADMIN — LISINOPRIL 20 MILLIGRAM(S): 2.5 TABLET ORAL at 05:16

## 2023-11-15 RX ADMIN — Medication 37.5 MICROGRAM(S): at 05:17

## 2023-11-15 RX ADMIN — PRASUGREL 10 MILLIGRAM(S): 5 TABLET, FILM COATED ORAL at 11:56

## 2023-11-15 RX ADMIN — BUDESONIDE AND FORMOTEROL FUMARATE DIHYDRATE 2 PUFF(S): 160; 4.5 AEROSOL RESPIRATORY (INHALATION) at 09:44

## 2023-11-15 RX ADMIN — Medication 50 MILLIGRAM(S): at 22:53

## 2023-11-15 RX ADMIN — ENOXAPARIN SODIUM 40 MILLIGRAM(S): 100 INJECTION SUBCUTANEOUS at 17:56

## 2023-11-15 RX ADMIN — MAGNESIUM OXIDE 400 MG ORAL TABLET 400 MILLIGRAM(S): 241.3 TABLET ORAL at 22:37

## 2023-11-15 RX ADMIN — Medication 81 MILLIGRAM(S): at 11:56

## 2023-11-15 RX ADMIN — Medication 650 MILLIGRAM(S): at 04:09

## 2023-11-15 RX ADMIN — Medication 1 DROP(S): at 17:56

## 2023-11-15 RX ADMIN — Medication 50 MILLIGRAM(S): at 05:17

## 2023-11-15 RX ADMIN — BUDESONIDE AND FORMOTEROL FUMARATE DIHYDRATE 2 PUFF(S): 160; 4.5 AEROSOL RESPIRATORY (INHALATION) at 22:32

## 2023-11-15 RX ADMIN — Medication 50 MILLIGRAM(S): at 17:56

## 2023-11-15 RX ADMIN — Medication 1 DROP(S): at 05:17

## 2023-11-15 RX ADMIN — Medication 650 MILLIGRAM(S): at 04:39

## 2023-11-15 RX ADMIN — TIOTROPIUM BROMIDE 2 PUFF(S): 18 CAPSULE ORAL; RESPIRATORY (INHALATION) at 09:44

## 2023-11-15 NOTE — PROVIDER CONTACT NOTE (OTHER) - RECOMMENDATIONS
provider notify
MD aware. patient is known to have Hr in 40's while sleeping
Provider made aware
Provider will be made aware.
notify provider
provider notify
Continue to monitor pt o2 sat w/ VS
notify provider
notify provider.
provider notify
provider notify

## 2023-11-15 NOTE — PROGRESS NOTE ADULT - SUBJECTIVE AND OBJECTIVE BOX
PULMONARY PROGRESS NOTE    FRANCIS CHAUDHARY  MRN-[id number]    Patient is a 68y old  Male who presents with a chief complaint of Told to come for abnormal labs (14 Nov 2023 15:11)      HPI:  washing up this morning  doing ok  pending nivv      ROS:   -    ACTIVE MEDICATION LIST:  MEDICATIONS  (STANDING):  artificial tears (preservative free) Ophthalmic Solution 1 Drop(s) Both EYES two times a day  aspirin enteric coated 81 milliGRAM(s) Oral daily  atorvastatin 40 milliGRAM(s) Oral at bedtime  budesonide 160 MICROgram(s)/formoterol 4.5 MICROgram(s) Inhaler 2 Puff(s) Inhalation two times a day  enoxaparin Injectable 40 milliGRAM(s) SubCutaneous every 24 hours  levothyroxine 37.5 MICROGram(s) Oral daily  lisinopril 20 milliGRAM(s) Oral daily  magnesium oxide 400 milliGRAM(s) Oral at bedtime  metoprolol tartrate 50 milliGRAM(s) Oral two times a day  prasugrel 10 milliGRAM(s) Oral daily  tiotropium 2.5 MICROgram(s) Inhaler 2 Puff(s) Inhalation daily    MEDICATIONS  (PRN):  acetaminophen     Tablet .. 650 milliGRAM(s) Oral every 6 hours PRN Temp greater or equal to 38C (100.4F), Mild Pain (1 - 3)  albuterol    90 MICROgram(s) HFA Inhaler 2 Puff(s) Inhalation every 6 hours PRN Shortness of Breath and/or Wheezing      EXAM:  Vital Signs Last 24 Hrs  T(C): 36.3 (15 Nov 2023 05:15), Max: 37 (14 Nov 2023 09:27)  T(F): 97.3 (15 Nov 2023 05:15), Max: 98.6 (14 Nov 2023 09:27)  HR: 68 (15 Nov 2023 05:15) (61 - 74)  BP: 155/92 (15 Nov 2023 05:15) (139/81 - 158/85)  BP(mean): --  RR: 18 (15 Nov 2023 05:15) (18 - 18)  SpO2: 95% (15 Nov 2023 07:25) (95% - 100%)    Parameters below as of 15 Nov 2023 05:15  Patient On (Oxygen Delivery Method): nasal cannula  O2 Flow (L/min): 2      GENERAL: The patient is awake and alert in no apparent distress.     LUNGS: Clear to auscultation without wheezing, rales or rhonchi; respirations unlabored   < from: CT Angio Chest Aorta w/wo IV Cont (10.27.23 @ 23:11) >    ACC: [id number] EXAM:  CT ANGIO ABD PELV (W)AW IC   ORDERED BY: JOSÉ MANUEL FRANK     ACC: [id number] EXAM:  CT ANGIO CHEST AORTA WAWIC   ORDERED BY: HOANG SMITH     PROCEDURE DATE:  10/27/2023          INTERPRETATION:  INDICATION: Abdominal aortic aneurysm.    COMPARISON: CTA chest abdomen pelvis 12/15/2022.    CONTRAST/COMPLICATIONS:  IV Contrast: Omnipaque 350. 95 cc administered. 5 cc discarded.  Oral Contrast: None.  Complications: None reported at time of study completion.    PROCEDURE:  CT Angiography of the Chest, Abdomen and Pelvis.  Precontrast imaging was performed through the chest followed by arterial   phase imaging of the chest, abdomen and pelvis.  Sagittal and coronal reformats were performed as well as 3D (MIP)   reconstructions.    FINDINGS:  CHEST:  LUNGS AND LARGE AIRWAYS: Small low density layering material in the   trachea, bilateral mainstem bronchi and bronchus intermedius. Emphysema.   Bilateral subcentimeter pulmonary nodules. A left upper lobe 4 mm nodule   (301:54) appears new when compared to prior exam.  PLEURA: No pleural effusion.  VESSELS: No thoracic aortic dissection, aneurysm or intramural hematoma.   Aortic and coronary artery calcifications.  HEART: Heart size is normal. No pericardial effusion. AICD lead   terminates in the right ventricle.  MEDIASTINUM AND ALANNA: No lymphadenopathy.  CHEST WALL AND LOWER NECK: Left anterior chest wall AICD.    ABDOMEN AND PELVIS:  LIVER: Within normal limits.  BILE DUCTS: Normal caliber.  GALLBLADDER: Cholelithiasis.  SPLEEN: Within normal limits.  PANCREAS: Within normal limits.  ADRENALS: Similar indeterminate attenuation right adrenal nodule. Left   adrenal gland is normal.  KIDNEYS/URETERS: Symmetric enhancement. No hydronephrosis. 4 mm   nonobstructing right renal calculus. Left renal cortical   scarring/postsurgical change. Left renal cyst.    BLADDER: Within normal limits.  REPRODUCTIVE ORGANS: Prostate is enlarged.    BOWEL: No bowel obstruction. Appendix is normal.  PERITONEUM: No ascites.    VESSELS: Partially thrombosed infrarenal abdominal aortic aneurysm   measures 5.0 x 5.8 x 12.3 cm (TRV x AP x CC), previously 5.3 x 4.8 x 11.5   cm. Crescentic area of relative hyperattenuation within the thrombus   along the anterior aspect of the aneurysm at the level of the kidneys   (example 301, 153), suspicious for hemorrhage/leakage into thrombus. No   active extravasation identified. Similar ectasia of the proximal common   iliac arteries, up to 1.5 cm on the right. Intact right femoral bypass   graft. Atherosclerotic changes. No abdominal aortic dissection.  RETROPERITONEUM/LYMPH NODES: No lymphadenopathy.  ABDOMINAL WALL: Within normal limits.  BONES: Degenerative changes.    IMPRESSION:  No aortic dissection.    Increased size of partially thrombosed infrarenal abdominal aortic   aneurysm measuring up to 5.8 cm in greatest axial dimension. Crescentic   area of mild relative hyperattenuation within the thrombus along the   anterior aspect of the aneurysm at the level of the kidneys suspicious   for hemorrhage/leakage into the thrombus. No active extravasation   identified. Vascular surgery consult is recommended.    --- End of Report ---          TAYLOR GARNER MD; Resident Radiology  This document has been electronicallysigned.  MU OLSON MD; Attending Radiologist  This document has been electronically signed. Oct 28 2023  1:05AM    < end of copied text >      PROBLEM LIST:  68y Male with HEALTH ISSUES - PROBLEM Dx:  Acute on chronic respiratory failure with hypercapnia    COPD exacerbation    Hypertension    AAA (abdominal aortic aneurysm)    Prophylactic measure    Hyperlipidemia    Chronic respiratory failure              RECS:  please taper off 02 at rest  100% is too high for him given his  CHRONIC HYPERCARBIC RESP FAILURE due to copd/ emphysema/ former  smoker  hypercarbia  did not  improve on BPAP , on NIVV, improved hypercarbia noted on blood work   continue symbicort- told him to rinse mouth  continue spiriva  continue NIVV qhs/ whenever sleeping  s/p prednisone 5 days  BEDSIDE SPIROMETRY shows severe COPD    outpatient f/u on the pulm nodule    the patient requires home medical ventilator device capable of delivering pressure targeted and/or volume present ventilation cannot be done on any home care BPAP device due to their chronic respiratory failure consequent to the COPD.  The patient will need home medical ventilator with battery back up continuous enhanced alarm monitoring mouth piece ventilation, home resp. therapist set up andcontinued visits for continued use to 24hrs/day an access to RT 24/7 for urgent trouble shooting machine repairs & ongoing titration or adjustments.  We have attempted avaps mode on this patient. he has been admitted for chronic resp failure with hypercapnia secondary to copd. the patient's condition will likely deteriorate without a home medical ventilator and this may cause him to be rehospitalized or cause serious clinical harm      Please call with any questions.    Paris Warren DO  [facility] Pulmonary/Sleep Medicine  [phone]

## 2023-11-15 NOTE — PROGRESS NOTE ADULT - ASSESSMENT
_________________________________________________________________________________________  ========>>  M E D I C A L   A T T E N D I N G    F O L L O W  U P  N O T E  <<=========  -----------------------------------------------------------------------------------------------------    - Patient seen and examined by me earlier today.   - Patient today overall doing ok, comfortable, eating OK. breathing ok     no new events.. awaiting to go home..  Patient's respiratory machine not approved by insurance company yet   + small nodularity in right upper anterior thigh is noted by patient >> stable,, sono as bellow      ==================>> REVIEW OF SYSTEM <<=================    GEN: no fever, no chills, no pain  RESP: no significant SOB at rest on O2, Occasional sputum  CVS: no chest pain, no palpitations  GI: no abdominal pain, no nausea  : no dysuria, no frequency  Neuro: no headache, no dizziness    ==================>> PHYSICAL EXAM <<=================    GEN: A&O X 3 , NAD , comfortable, pleasant, calm , Sitting on the edge of the bed  HEENT: NCAT, PERRL, MMM, hearing intact., O2 via NC  CVS: S1S2 , regular , No M/R/G appreciated  PULM: decreased BS bilaterally, no wheezing noted   ABD.: soft. non tender, non distended,  bowel sounds present  Extrem: intact pulses , no edema              ( Note written / Date of service 11-15-23 ( This is certified to be the same as "ENTERED" date above ( for billing purposes)))    ==================>> MEDICATIONS <<====================    artificial tears (preservative free) Ophthalmic Solution 1 Drop(s) Both EYES two times a day  aspirin enteric coated 81 milliGRAM(s) Oral daily  atorvastatin 40 milliGRAM(s) Oral at bedtime  budesonide 160 MICROgram(s)/formoterol 4.5 MICROgram(s) Inhaler 2 Puff(s) Inhalation two times a day  enoxaparin Injectable 40 milliGRAM(s) SubCutaneous every 24 hours  levothyroxine 37.5 MICROGram(s) Oral daily  lisinopril 20 milliGRAM(s) Oral daily  magnesium oxide 400 milliGRAM(s) Oral at bedtime  metoprolol tartrate 50 milliGRAM(s) Oral two times a day  prasugrel 10 milliGRAM(s) Oral daily  tiotropium 2.5 MICROgram(s) Inhaler 2 Puff(s) Inhalation daily    MEDICATIONS  (PRN):  acetaminophen     Tablet .. 650 milliGRAM(s) Oral every 6 hours PRN Temp greater or equal to 38C (100.4F), Mild Pain (1 - 3)  albuterol    90 MICROgram(s) HFA Inhaler 2 Puff(s) Inhalation every 6 hours PRN Shortness of Breath and/or Wheezing    ___________  Active diet:  Diet, Regular:   1200mL Fluid Restriction ([id number])  ___________________    ==================>> VITAL SIGNS <<==================     Vital Signs Last 24 HrsT(C): 36.6 (11-15-23 @ 14:00)  T(F): 97.8 (11-15-23 @ 14:00), Max: 98.6 (11-14-23 @ 21:09)  HR: 74 (11-15-23 @ 14:00) (61 - 74)  BP: 145/80 (11-15-23 @ 14:00)  RR: 18 (11-15-23 @ 14:00) (18 - 18)  SpO2: 94% (11-15-23 @ 14:00) (94% - 100%)      CAPILLARY BLOOD GLUCOSE         ==================>> LAB AND IMAGING <<==================            no labd today    no need for daily labs     < from: US Extremity Nonvasc Limited, Right (11.13.23 @ 14:51) >  IMPRESSION: Small cystic and solid nodule without vascularity in the   medial right thigh. The differential includes epidermoid inclusion cyst.  < end of copied text >      < from: TTE W or WO Ultrasound Enhancing Agent (10.30.23 @ 15:17) >  CONCLUSIONS:    1. Left ventricular wall thickness is normal. Left ventricular systolic function is mildly decreased with an ejection fraction of 49 % by Vazquez's method of disks. Regional wall motion abnormalities present.   2. There is hypokinesis of the apical and apical septal walls.   3. Normal right ventricular cavity size and systolic function.   4. The aortic valve appears trileaflet with reduced systolic excursion. There is mild to moderate aortic stenosis. The peak transaortic velocity is 2.58 m/s, peak transaortic gradient is 26.6 mmHg and mean transaortic gradient is 12.0 mmHg with an LVOT/aortic valve VTI ratio of 0.45. The aortic valve area is estimated at 1.41 cm² by the continuity equation. There is trace aortic regurgitation.   5. Normal atria.   6. The inferior vena cava is normal in size measuring 1.62 cm in diameter, (normal <2.1cm) with normal inspiratory collapse (normal >50%) consistent with normal right atrial pressure (~3, range 0-5mmHg).   7. No pericardial effusion seen.  < end of copied text >    ___________________________________________________________________________________  ===============>>  A S S E S S M E N T   A N D   P L A N <<===============  ------------------------------------------------------------------------------------------    · Assessment	  71 year old male with PMHx of COPD on home O2 (2-3 L/NC), HTN, known AAA, PAD, hyperlipidemia and alcohol use disorder complicated with liver cirrhosis (quit 1yr ago) who was told to come to ER for abnormal labs (elevated CO2 to 42 on recent labs), felt to be secondary to acute on chronic hypercapnic respiratory failure due to likely COPD exacerbation.    Problem/Plan - 1:  ·  Problem: Acute on chronic respiratory failure with hypercapnia.   - Being treated for likely COPD exacerbation  - Pulmonary follow-up, management and optimization appreciated  - AVAPS::  will need to go home with per pulm >> still awaiting insurance company approval   - Has a left upper lobe 4 mm nodule, to be f/up as outpatient    Problem/Plan - 2:  ·  Problem: Hypertension.   - C/w home meds  - Echocardiogram as above     Further management and optimization as per cardiology given above Abnormalities    Problem/Plan - 3:  ·  Problem: hyponatremia >> improved / stable   diet changed  patient eating well  free fluid restriction   monitor BMP given hyperkalemia   magnesium supplements as needed for cramping     Problem/Plan - 4:  ·  Problem: AAA (abdominal aortic aneurysm).   - CTA Ao shows-Increased size of partially thrombosed infrarenal abdominal aortic aneurysm measuring up to 5.8 cm in greatest axial dimension. Crescentic area of mild relative hyperattenuation within the thrombus along the anterior aspect of the aneurysm at the level of the kidneys suspicious for hemorrhage/leakage into the thrombus. No active extravasation identified  - Seen by Vascular, no intervention at this time planned   - BP control   - C/w ASA/prasugrel/statin  - Follow up with Dr. Joaquin as outpatient on 10/30/23 per team   - Cardio on board    - Patient with symptoms, likely from claudication >> Need close vascular follow-up as discussed      Encouraged increased activity as able    Problem/Plan - 5:  ·  Problem: Hyperlipidemia.   ·  Plan: - C/w statin.    Problem/Plan - 6:  ·  Problem: Prophylactic measure.   ·  Plan: LMWH  PT  // OOB to chair daily / increase activity as able     DC planing home pending above     --------------------------------------------  Case discussed with patient, ACP  Education given on findings and plan of care  ___________________________  HKrupa Brown D.O.  Pager: [phone]       _________________________________________________________________________________________  ========>>  M E D I C A L   A T T E N D I N G    F O L L O W  U P  N O T E  <<=========  -----------------------------------------------------------------------------------------------------    - Patient seen and examined by me earlier today.   - Patient today overall doing ok, comfortable, eating OK. breathing ok     no new events.. awaiting to go home..  Patient's respiratory machine not approved by insurance company     ==================>> REVIEW OF SYSTEM <<=================    GEN: no fever, no chills, no pain  RESP: no significant SOB at rest on O2, Occasional sputum  CVS: no chest pain, no palpitations  GI: no abdominal pain, no nausea  : no dysuria, no frequency  Neuro: no headache, no dizziness    ==================>> PHYSICAL EXAM <<=================    GEN: A&O X 3 , NAD , comfortable, pleasant, calm , Sitting on the edge of the bed  HEENT: NCAT, PERRL, MMM, hearing intact., O2 via NC  CVS: S1S2 , regular , No M/R/G appreciated  PULM: decreased BS bilaterally, no wheezing noted   ABD.: soft. non tender, non distended,  bowel sounds present  Extrem: intact pulses , no edema              ( Note written / Date of service 11-15-23 ( This is certified to be the same as "ENTERED" date above ( for billing purposes)))    ==================>> MEDICATIONS <<====================    artificial tears (preservative free) Ophthalmic Solution 1 Drop(s) Both EYES two times a day  aspirin enteric coated 81 milliGRAM(s) Oral daily  atorvastatin 40 milliGRAM(s) Oral at bedtime  budesonide 160 MICROgram(s)/formoterol 4.5 MICROgram(s) Inhaler 2 Puff(s) Inhalation two times a day  enoxaparin Injectable 40 milliGRAM(s) SubCutaneous every 24 hours  levothyroxine 37.5 MICROGram(s) Oral daily  lisinopril 20 milliGRAM(s) Oral daily  magnesium oxide 400 milliGRAM(s) Oral at bedtime  metoprolol tartrate 50 milliGRAM(s) Oral two times a day  prasugrel 10 milliGRAM(s) Oral daily  tiotropium 2.5 MICROgram(s) Inhaler 2 Puff(s) Inhalation daily    MEDICATIONS  (PRN):  acetaminophen     Tablet .. 650 milliGRAM(s) Oral every 6 hours PRN Temp greater or equal to 38C (100.4F), Mild Pain (1 - 3)  albuterol    90 MICROgram(s) HFA Inhaler 2 Puff(s) Inhalation every 6 hours PRN Shortness of Breath and/or Wheezing    ___________  Active diet:  Diet, Regular:   1200mL Fluid Restriction ([id number])  ___________________    ==================>> VITAL SIGNS <<==================     Vital Signs Last 24 HrsT(C): 36.6 (11-15-23 @ 14:00)  T(F): 97.8 (11-15-23 @ 14:00), Max: 98.6 (11-14-23 @ 21:09)  HR: 74 (11-15-23 @ 14:00) (61 - 74)  BP: 145/80 (11-15-23 @ 14:00)  RR: 18 (11-15-23 @ 14:00) (18 - 18)  SpO2: 94% (11-15-23 @ 14:00) (94% - 100%)  > Appreciated pulmonary note: oxygen saturation goal should be Between 90 to 96%     ==================>> LAB AND IMAGING <<==================            no labd today    no need for daily labs     < from: US Extremity Nonvasc Limited, Right (11.13.23 @ 14:51) >  IMPRESSION: Small cystic and solid nodule without vascularity in the   medial right thigh. The differential includes epidermoid inclusion cyst.  < end of copied text >      < from: TTE W or WO Ultrasound Enhancing Agent (10.30.23 @ 15:17) >  CONCLUSIONS:    1. Left ventricular wall thickness is normal. Left ventricular systolic function is mildly decreased with an ejection fraction of 49 % by Vazquez's method of disks. Regional wall motion abnormalities present.   2. There is hypokinesis of the apical and apical septal walls.   3. Normal right ventricular cavity size and systolic function.   4. The aortic valve appears trileaflet with reduced systolic excursion. There is mild to moderate aortic stenosis. The peak transaortic velocity is 2.58 m/s, peak transaortic gradient is 26.6 mmHg and mean transaortic gradient is 12.0 mmHg with an LVOT/aortic valve VTI ratio of 0.45. The aortic valve area is estimated at 1.41 cm² by the continuity equation. There is trace aortic regurgitation.   5. Normal atria.   6. The inferior vena cava is normal in size measuring 1.62 cm in diameter, (normal <2.1cm) with normal inspiratory collapse (normal >50%) consistent with normal right atrial pressure (~3, range 0-5mmHg).   7. No pericardial effusion seen.  < end of copied text >    ___________________________________________________________________________________  ===============>>  A S S E S S M E N T   A N D   P L A N <<===============  ------------------------------------------------------------------------------------------    · Assessment	  71 year old male with PMHx of COPD on home O2 (2-3 L/NC), HTN, known AAA, PAD, hyperlipidemia and alcohol use disorder complicated with liver cirrhosis (quit 1yr ago) who was told to come to ER for abnormal labs (elevated CO2 to 42 on recent labs), felt to be secondary to acute on chronic hypercapnic respiratory failure due to likely COPD exacerbation.    Problem/Plan - 1:  ·  Problem: Acute on chronic respiratory failure with hypercapnia.   - Being treated for likely COPD exacerbation  - Pulmonary follow-up, management and optimization appreciated  - AVAPS::  will need to go home with per pulm >> still awaiting insurance company approval >> Rejected, pending appeal  - Has a left upper lobe 4 mm nodule, to be f/up as outpatient    Problem/Plan - 2:  ·  Problem: Hypertension.   - C/w home meds  - Echocardiogram as above     Further management and optimization as per cardiology given above Abnormalities    Problem/Plan - 3:  ·  Problem: hyponatremia >> improved / stable   diet changed  patient eating well  free fluid restriction   monitor BMP given hyperkalemia   magnesium supplements as needed for cramping     Problem/Plan - 4:  ·  Problem: AAA (abdominal aortic aneurysm).   - CTA Ao shows-Increased size of partially thrombosed infrarenal abdominal aortic aneurysm measuring up to 5.8 cm in greatest axial dimension. Crescentic area of mild relative hyperattenuation within the thrombus along the anterior aspect of the aneurysm at the level of the kidneys suspicious for hemorrhage/leakage into the thrombus. No active extravasation identified  - Seen by Vascular, no intervention at this time planned   - BP control   - C/w ASA/prasugrel/statin  - Follow up with Dr. Joaquin as outpatient on 10/30/23 per team   - Cardio on board    - Patient with symptoms, likely from claudication >> Need close vascular follow-up as discussed      Encouraged increased activity as able    Problem/Plan - 5:  ·  Problem: Leg pain and back pain  ·  Plan: Patient with chronic pain in his legs with cramping of the thighs.  Patient requesting neurology evaluation.  Offer to have a MRI of the lumbar spine, however patient states unable to get MRI because of his defibrillator.  Get a CT scan of the lumbosacral spine for now.  Neurology evaluation pending above    Problem/Plan - 6:  ·  Problem: Prophylactic measure.   ·  Plan: LMWH  PT  // OOB to chair daily / increase activity as able     DC planing home pending above     --------------------------------------------  Case discussed with patient, son at bedside   Education given on findings and plan of care  ___________________________  H. JOJO Brown.  Pager: [phone]

## 2023-11-16 PROCEDURE — 72131 CT LUMBAR SPINE W/O DYE: CPT | Mod: 26

## 2023-11-16 PROCEDURE — 99232 SBSQ HOSP IP/OBS MODERATE 35: CPT

## 2023-11-16 RX ADMIN — ENOXAPARIN SODIUM 40 MILLIGRAM(S): 100 INJECTION SUBCUTANEOUS at 18:07

## 2023-11-16 RX ADMIN — PRASUGREL 10 MILLIGRAM(S): 5 TABLET, FILM COATED ORAL at 13:17

## 2023-11-16 RX ADMIN — BUDESONIDE AND FORMOTEROL FUMARATE DIHYDRATE 2 PUFF(S): 160; 4.5 AEROSOL RESPIRATORY (INHALATION) at 13:15

## 2023-11-16 RX ADMIN — Medication 50 MILLIGRAM(S): at 21:55

## 2023-11-16 RX ADMIN — BUDESONIDE AND FORMOTEROL FUMARATE DIHYDRATE 2 PUFF(S): 160; 4.5 AEROSOL RESPIRATORY (INHALATION) at 21:53

## 2023-11-16 RX ADMIN — Medication 50 MILLIGRAM(S): at 13:17

## 2023-11-16 RX ADMIN — Medication 81 MILLIGRAM(S): at 13:17

## 2023-11-16 RX ADMIN — Medication 37.5 MICROGRAM(S): at 06:40

## 2023-11-16 RX ADMIN — MAGNESIUM OXIDE 400 MG ORAL TABLET 400 MILLIGRAM(S): 241.3 TABLET ORAL at 21:53

## 2023-11-16 RX ADMIN — ATORVASTATIN CALCIUM 40 MILLIGRAM(S): 80 TABLET, FILM COATED ORAL at 21:53

## 2023-11-16 RX ADMIN — LISINOPRIL 20 MILLIGRAM(S): 2.5 TABLET ORAL at 06:41

## 2023-11-16 RX ADMIN — TIOTROPIUM BROMIDE 2 PUFF(S): 18 CAPSULE ORAL; RESPIRATORY (INHALATION) at 13:15

## 2023-11-16 NOTE — PROGRESS NOTE ADULT - ASSESSMENT
_________________________________________________________________________________________  ========>>  M E D I C A L   A T T E N D I N G    F O L L O W  U P  N O T E  <<=========  -----------------------------------------------------------------------------------------------------    - Patient seen and examined by me earlier today.   - Patient today overall doing ok, comfortable, eating OK. breathing ok      no new events.. awaiting to go home..  Patient's respiratory machine not approved by insurance company > awaiting appeal !     ==================>> REVIEW OF SYSTEM <<=================    GEN: no fever, no chills, no pain  RESP: no significant SOB at rest on O2, Occasional sputum  CVS: no chest pain, no palpitations  GI: no abdominal pain, no nausea  : no dysuria, no frequency  Neuro: no headache, no dizziness    ==================>> PHYSICAL EXAM <<=================    GEN: A&O X 3 , NAD , comfortable, pleasant, calm , Sitting on the edge of the bed  HEENT: NCAT, PERRL, MMM, hearing intact., O2 via NC (on and off / as needed )  CVS: S1S2 , regular , No M/R/G appreciated  PULM: decreased BS bilaterally, no wheezing noted   ABD.: soft. non tender, non distended,  bowel sounds present  Extrem: intact pulses , no edema           ( Note written / Date of service 11-16-23 ( This is certified to be the same as "ENTERED" date above ( for billing purposes)))    ==================>> MEDICATIONS <<====================    artificial tears (preservative free) Ophthalmic Solution 1 Drop(s) Both EYES two times a day  aspirin enteric coated 81 milliGRAM(s) Oral daily  atorvastatin 40 milliGRAM(s) Oral at bedtime  budesonide 160 MICROgram(s)/formoterol 4.5 MICROgram(s) Inhaler 2 Puff(s) Inhalation two times a day  enoxaparin Injectable 40 milliGRAM(s) SubCutaneous every 24 hours  levothyroxine 37.5 MICROGram(s) Oral daily  lisinopril 20 milliGRAM(s) Oral daily  magnesium oxide 400 milliGRAM(s) Oral at bedtime  metoprolol tartrate 50 milliGRAM(s) Oral two times a day  prasugrel 10 milliGRAM(s) Oral daily  tiotropium 2.5 MICROgram(s) Inhaler 2 Puff(s) Inhalation daily    MEDICATIONS  (PRN):  acetaminophen     Tablet .. 650 milliGRAM(s) Oral every 6 hours PRN Temp greater or equal to 38C (100.4F), Mild Pain (1 - 3)  albuterol    90 MICROgram(s) HFA Inhaler 2 Puff(s) Inhalation every 6 hours PRN Shortness of Breath and/or Wheezing    ___________  Active diet:  Diet, Regular:   1200mL Fluid Restriction ([id number])  ___________________    ==================>> VITAL SIGNS <<==================     Vital Signs Last 24 HrsT(C): 36.7 (11-16-23 @ 05:28)  T(F): 98 (11-16-23 @ 05:28), Max: 98.3 (11-15-23 @ 21:57)  HR: 62 (11-16-23 @ 07:53) (52 - 74)  BP: 146/82 (11-16-23 @ 05:28)  RR: 18 (11-16-23 @ 05:28) (18 - 18)  SpO2: 98% (11-16-23 @ 07:53) (94% - 100%)       ==================>> LAB AND IMAGING <<==================         no labs today    no need for daily labs     < from: US Extremity Nonvasc Limited, Right (11.13.23 @ 14:51) >  IMPRESSION: Small cystic and solid nodule without vascularity in the   medial right thigh. The differential includes epidermoid inclusion cyst.  < end of copied text >      < from: TTE W or WO Ultrasound Enhancing Agent (10.30.23 @ 15:17) >  CONCLUSIONS:    1. Left ventricular wall thickness is normal. Left ventricular systolic function is mildly decreased with an ejection fraction of 49 % by Vazquez's method of disks. Regional wall motion abnormalities present.   2. There is hypokinesis of the apical and apical septal walls.   3. Normal right ventricular cavity size and systolic function.   4. The aortic valve appears trileaflet with reduced systolic excursion. There is mild to moderate aortic stenosis. The peak transaortic velocity is 2.58 m/s, peak transaortic gradient is 26.6 mmHg and mean transaortic gradient is 12.0 mmHg with an LVOT/aortic valve VTI ratio of 0.45. The aortic valve area is estimated at 1.41 cm² by the continuity equation. There is trace aortic regurgitation.   5. Normal atria.   6. The inferior vena cava is normal in size measuring 1.62 cm in diameter, (normal <2.1cm) with normal inspiratory collapse (normal >50%) consistent with normal right atrial pressure (~3, range 0-5mmHg).   7. No pericardial effusion seen.  < end of copied text >    ___________________________________________________________________________________  ===============>>  A S S E S S M E N T   A N D   P L A N <<===============  ------------------------------------------------------------------------------------------    · Assessment	  71 year old male with PMHx of COPD on home O2 (2-3 L/NC), HTN, known AAA, PAD, hyperlipidemia and alcohol use disorder complicated with liver cirrhosis (quit 1yr ago) who was told to come to ER for abnormal labs (elevated CO2 to 42 on recent labs), felt to be secondary to acute on chronic hypercapnic respiratory failure due to likely COPD exacerbation.    Problem/Plan - 1:  ·  Problem: Acute on chronic respiratory failure with hypercapnia.   - Being treated for likely COPD exacerbation  - Pulmonary follow-up, management and optimization appreciated  - AVAPS::  will need to go home with per pulm >> still awaiting insurance company approval >> Rejected, pending appeal  - Has a left upper lobe 4 mm nodule, to be f/up as outpatient    Problem/Plan - 2:  ·  Problem: Hypertension.   - C/w home meds  - Echocardiogram as above     Further management and optimization as per cardiology given above Abnormalities    Problem/Plan - 3:  ·  Problem: hyponatremia >> improved / stable   diet changed  patient eating well  free fluid restriction   monitor BMP given hyperkalemia   magnesium supplements as needed for cramping     Problem/Plan - 4:  ·  Problem: AAA (abdominal aortic aneurysm).   - CTA Ao shows-Increased size of partially thrombosed infrarenal abdominal aortic aneurysm measuring up to 5.8 cm in greatest axial dimension. Crescentic area of mild relative hyperattenuation within the thrombus along the anterior aspect of the aneurysm at the level of the kidneys suspicious for hemorrhage/leakage into the thrombus. No active extravasation identified  - Seen by Vascular, no intervention at this time planned   - BP control   - C/w ASA/prasugrel/statin  - Follow up with Dr. Joaquin as outpatient on 10/30/23 per team   - Cardio on board    - Patient with symptoms, likely from claudication >> Need close vascular follow-up as discussed      Encouraged increased activity as able    Problem/Plan - 5:  ·  Problem: Leg pain and back pain  ·  Plan: Patient with chronic pain in his legs with cramping of the thighs.  Patient requesting neurology evaluation.  Offer to have a MRI of the lumbar spine, however patient states unable to get MRI because of his defibrillator.  Get a CT scan of the lumbosacral spine for now.  Neurology evaluation pending above    Problem/Plan - 6:  ·  Problem: Prophylactic measure.   ·  Plan: LMWH  PT  // OOB to chair daily / increase activity as able     DC planing home pending above     --------------------------------------------  Case discussed with patient, pulm   Education given on findings and plan of care  ___________________________  H. JOJO Brown.  Pager: [phone]

## 2023-11-16 NOTE — PROGRESS NOTE ADULT - SUBJECTIVE AND OBJECTIVE BOX
PULMONARY PROGRESS NOTE    FRANCIS CHAUDHARY  MRN-[id number]    Patient is a 68y old  Male who presents with a chief complaint of Told to come for abnormal labs (16 Nov 2023 07:58)      HPI:  -  -    ROS:   -    ACTIVE MEDICATION LIST:  MEDICATIONS  (STANDING):  artificial tears (preservative free) Ophthalmic Solution 1 Drop(s) Both EYES two times a day  aspirin enteric coated 81 milliGRAM(s) Oral daily  atorvastatin 40 milliGRAM(s) Oral at bedtime  budesonide 160 MICROgram(s)/formoterol 4.5 MICROgram(s) Inhaler 2 Puff(s) Inhalation two times a day  enoxaparin Injectable 40 milliGRAM(s) SubCutaneous every 24 hours  levothyroxine 37.5 MICROGram(s) Oral daily  lisinopril 20 milliGRAM(s) Oral daily  magnesium oxide 400 milliGRAM(s) Oral at bedtime  metoprolol tartrate 50 milliGRAM(s) Oral two times a day  prasugrel 10 milliGRAM(s) Oral daily  tiotropium 2.5 MICROgram(s) Inhaler 2 Puff(s) Inhalation daily    MEDICATIONS  (PRN):  acetaminophen     Tablet .. 650 milliGRAM(s) Oral every 6 hours PRN Temp greater or equal to 38C (100.4F), Mild Pain (1 - 3)  albuterol    90 MICROgram(s) HFA Inhaler 2 Puff(s) Inhalation every 6 hours PRN Shortness of Breath and/or Wheezing      EXAM:  Vital Signs Last 24 Hrs  T(C): 36.7 (16 Nov 2023 05:28), Max: 36.8 (15 Nov 2023 21:57)  T(F): 98 (16 Nov 2023 05:28), Max: 98.3 (15 Nov 2023 21:57)  HR: 62 (16 Nov 2023 07:53) (52 - 74)  BP: 146/82 (16 Nov 2023 05:28) (127/76 - 153/77)  BP(mean): --  RR: 18 (16 Nov 2023 05:28) (18 - 18)  SpO2: 98% (16 Nov 2023 07:53) (94% - 100%)    Parameters below as of 16 Nov 2023 05:28  Patient On (Oxygen Delivery Method): nasal cannula  O2 Flow (L/min): 2      GENERAL: The patient is awake and alert in no apparent distress.     LUNGS: Clear to auscultation without wheezing, rales or rhonchi; respirations unlabored    HEART: Regular rate and rhythm without murmur.                >>> <<<    PROBLEM LIST:  68y Male with HEALTH ISSUES - PROBLEM Dx:  Acute on chronic respiratory failure with hypercapnia    COPD exacerbation    Hypertension    AAA (abdominal aortic aneurysm)    Prophylactic measure    Hyperlipidemia    Chronic respiratory failure              RECS:        Please call with any questions.    Paris Warren DO  [facility] Pulmonary/Sleep Medicine  [phone]   PULMONARY PROGRESS NOTE    FRANCIS CHAUDHARY  MRN-[id number]    Patient is a 68y old  Male who presents with a chief complaint of Told to come for abnormal labs (16 Nov 2023 07:58)      HPI:  NIVV denied      ROS:   -    ACTIVE MEDICATION LIST:  MEDICATIONS  (STANDING):  artificial tears (preservative free) Ophthalmic Solution 1 Drop(s) Both EYES two times a day  aspirin enteric coated 81 milliGRAM(s) Oral daily  atorvastatin 40 milliGRAM(s) Oral at bedtime  budesonide 160 MICROgram(s)/formoterol 4.5 MICROgram(s) Inhaler 2 Puff(s) Inhalation two times a day  enoxaparin Injectable 40 milliGRAM(s) SubCutaneous every 24 hours  levothyroxine 37.5 MICROGram(s) Oral daily  lisinopril 20 milliGRAM(s) Oral daily  magnesium oxide 400 milliGRAM(s) Oral at bedtime  metoprolol tartrate 50 milliGRAM(s) Oral two times a day  prasugrel 10 milliGRAM(s) Oral daily  tiotropium 2.5 MICROgram(s) Inhaler 2 Puff(s) Inhalation daily    MEDICATIONS  (PRN):  acetaminophen     Tablet .. 650 milliGRAM(s) Oral every 6 hours PRN Temp greater or equal to 38C (100.4F), Mild Pain (1 - 3)  albuterol    90 MICROgram(s) HFA Inhaler 2 Puff(s) Inhalation every 6 hours PRN Shortness of Breath and/or Wheezing      EXAM:  Vital Signs Last 24 Hrs  T(C): 36.7 (16 Nov 2023 05:28), Max: 36.8 (15 Nov 2023 21:57)  T(F): 98 (16 Nov 2023 05:28), Max: 98.3 (15 Nov 2023 21:57)  HR: 62 (16 Nov 2023 07:53) (52 - 74)  BP: 146/82 (16 Nov 2023 05:28) (127/76 - 153/77)  BP(mean): --  RR: 18 (16 Nov 2023 05:28) (18 - 18)  SpO2: 98% (16 Nov 2023 07:53) (94% - 100%)    Parameters below as of 16 Nov 2023 05:28  Patient On (Oxygen Delivery Method): nasal cannula  O2 Flow (L/min): 2      GENERAL: The patient is awake and alert in no apparent distress.     LUNGS:  espirations unlabored         < from: CT Angio Chest Aorta w/wo IV Cont (10.27.23 @ 23:11) >    ACC: [id number] EXAM:  CT ANGIO ABD PELV (W)AW IC   ORDERED BY: JOSÉ MANUEL   MONIKA     ACC: [id number] EXAM:  CT ANGIO CHEST AORTA WAWIC   ORDERED BY: HOANG SMITH     PROCEDURE DATE:  10/27/2023          INTERPRETATION:  INDICATION: Abdominal aortic aneurysm.    COMPARISON: CTA chest abdomen pelvis 12/15/2022.    CONTRAST/COMPLICATIONS:  IV Contrast: Omnipaque 350. 95 cc administered. 5 cc discarded.  Oral Contrast: None.  Complications: None reported at time of study completion.    PROCEDURE:  CT Angiography of the Chest, Abdomen and Pelvis.  Precontrast imaging was performed through the chest followed by arterial   phase imaging of the chest, abdomen and pelvis.  Sagittal and coronal reformats were performed as well as 3D (MIP)   reconstructions.    FINDINGS:  CHEST:  LUNGS AND LARGE AIRWAYS: Small low density layering material in the   trachea, bilateral mainstem bronchi and bronchus intermedius. Emphysema.   Bilateral subcentimeter pulmonary nodules. A left upper lobe 4 mm nodule   (301:54) appears new when compared to prior exam.  PLEURA: No pleural effusion.  VESSELS: No thoracic aortic dissection, aneurysm or intramural hematoma.   Aortic and coronary artery calcifications.  HEART: Heart size is normal. No pericardial effusion. AICD lead   terminates in the right ventricle.  MEDIASTINUM AND ALANNA: No lymphadenopathy.  CHEST WALL AND LOWER NECK: Left anterior chest wall AICD.    ABDOMEN AND PELVIS:  LIVER: Within normal limits.  BILE DUCTS: Normal caliber.  GALLBLADDER: Cholelithiasis.  SPLEEN: Within normal limits.  PANCREAS: Within normal limits.  ADRENALS: Similar indeterminate attenuation right adrenal nodule. Left   adrenal gland is normal.  KIDNEYS/URETERS: Symmetric enhancement. No hydronephrosis. 4 mm   nonobstructing right renal calculus. Left renal cortical   scarring/postsurgical change. Left renal cyst.    BLADDER: Within normal limits.  REPRODUCTIVE ORGANS: Prostate is enlarged.    BOWEL: No bowel obstruction. Appendix is normal.  PERITONEUM: No ascites.    VESSELS: Partially thrombosed infrarenal abdominal aortic aneurysm   measures 5.0 x 5.8 x 12.3 cm (TRV x AP x CC), previously 5.3 x 4.8 x 11.5   cm. Crescentic area of relative hyperattenuation within the thrombus   along the anterior aspect of the aneurysm at the level of the kidneys   (example 301, 153), suspicious for hemorrhage/leakage into thrombus. No   active extravasation identified. Similar ectasia of the proximal common   iliac arteries, up to 1.5 cm on the right. Intact right femoral bypass   graft. Atherosclerotic changes. No abdominal aortic dissection.  RETROPERITONEUM/LYMPH NODES: No lymphadenopathy.  ABDOMINAL WALL: Within normal limits.  BONES: Degenerative changes.    IMPRESSION:  No aortic dissection.    Increased size of partially thrombosed infrarenal abdominal aortic   aneurysm measuring up to 5.8 cm in greatest axial dimension. Crescentic   area of mild relative hyperattenuation within the thrombus along the   anterior aspect of the aneurysm at the level of the kidneys suspicious   for hemorrhage/leakage into the thrombus. No active extravasation   identified. Vascular surgery consult is recommended.    --- End of Report ---          TAYLOR GARNER MD; Resident Radiology  This document has been electronicallysigned.  MU OLSON MD; Attending Radiologist  This document has been electronically signed. Oct 28 2023  1:05AM    < end of copied text >  < from: CT Angio Abdomen and Pelvis w/ IV Cont (10.27.23 @ 23:11) >    ACC: [id number] EXAM:  CT ANGIO ABD PELV (W)AW IC   ORDERED BY: JOSÉ MANUEL FRANK     ACC: [id number] EXAM:  CT ANGIO CHEST AORTA WAWIC   ORDERED BY: HOANG SMITH     PROCEDURE DATE:  10/27/2023          INTERPRETATION:  INDICATION: Abdominal aortic aneurysm.    COMPARISON: CTA chest abdomen pelvis 12/15/2022.    CONTRAST/COMPLICATIONS:  IV Contrast: Omnipaque 350. 95 cc administered. 5 cc discarded.  Oral Contrast: None.  Complications: None reported at time of study completion.    PROCEDURE:  CT Angiography of the Chest, Abdomen and Pelvis.  Precontrast imaging was performed through the chest followed by arterial   phase imaging of the chest, abdomen and pelvis.  Sagittal and coronal reformats were performed as well as 3D (MIP)   reconstructions.    FINDINGS:  CHEST:  LUNGS AND LARGE AIRWAYS: Small low density layering material in the   trachea, bilateral mainstem bronchi and bronchus intermedius. Emphysema.   Bilateral subcentimeter pulmonary nodules. A left upper lobe 4 mm nodule   (301:54) appears new when compared to prior exam.  PLEURA: No pleural effusion.  VESSELS: No thoracic aortic dissection, aneurysm or intramural hematoma.   Aortic and coronary artery calcifications.  HEART: Heart size is normal. No pericardial effusion. AICD lead   terminates in the right ventricle.  MEDIASTINUM AND ALANNA: No lymphadenopathy.  CHEST WALL AND LOWER NECK: Left anterior chest wall AICD.    ABDOMEN AND PELVIS:  LIVER: Within normal limits.  BILE DUCTS: Normal caliber.  GALLBLADDER: Cholelithiasis.  SPLEEN: Within normal limits.  PANCREAS: Within normal limits.  ADRENALS: Similar indeterminate attenuation right adrenal nodule. Left   adrenal gland is normal.  KIDNEYS/URETERS: Symmetric enhancement. No hydronephrosis. 4 mm   nonobstructing right renal calculus. Left renal cortical   scarring/postsurgical change. Left renal cyst.    BLADDER: Within normal limits.  REPRODUCTIVE ORGANS: Prostate is enlarged.    BOWEL: No bowel obstruction. Appendix is normal.  PERITONEUM: No ascites.    VESSELS: Partially thrombosed infrarenal abdominal aortic aneurysm   measures 5.0 x 5.8 x 12.3 cm (TRV x AP x CC), previously 5.3 x 4.8 x 11.5   cm. Crescentic area of relative hyperattenuation within the thrombus   along the anterior aspect of the aneurysm at the level of the kidneys   (example 301, 153), suspicious for hemorrhage/leakage into thrombus. No   active extravasation identified. Similar ectasia of the proximal common   iliac arteries, up to 1.5 cm on the right. Intact right femoral bypass   graft. Atherosclerotic changes. No abdominal aortic dissection.  RETROPERITONEUM/LYMPH NODES: No lymphadenopathy.  ABDOMINAL WALL: Within normal limits.  BONES: Degenerative changes.    IMPRESSION:  No aortic dissection.    Increased size of partially thrombosed infrarenal abdominal aortic   aneurysm measuring up to 5.8 cm in greatest axial dimension. Crescentic   area of mild relative hyperattenuation within the thrombus along the   anterior aspect of the aneurysm at the level of the kidneys suspicious   for hemorrhage/leakage into the thrombus. No active extravasation   identified. Vascular surgery consult is recommended.    --- End of Report ---          TAYLOR GARNER MD; Resident Radiology  This document has been electronicallysigned.  MU OLSON MD; Attending Radiologist  This document has been electronically signed. Oct 28 2023  1:05AM    < end of copied text >      PROBLEM LIST:  68y Male with HEALTH ISSUES - PROBLEM Dx:  Acute on chronic respiratory failure with hypercapnia    COPD exacerbation    Hypertension    AAA (abdominal aortic aneurysm)    Prophylactic measure    Hyperlipidemia    Chronic respiratory failure              RECS:  please taper off 02 at rest  100% is too high for him given his  CHRONIC HYPERCARBIC RESP FAILURE due to copd/ emphysema/ former  smoker   continue symbicort- told him to rinse mouth  continue spiriva  continue NIVV qhs/ whenever sleeping  s/p prednisone 5 days  BEDSIDE SPIROMETRY shows severe COPD    outpatient f/u on the pulm nodule    told my primary team NIVV denied, left contact info for appeals  if appeal fails then he needs to f/u outpatient for sleep study/ restarting process to get him a vent    Please call with any questions.    Paris Warren,   [facility] Pulmonary/Sleep Medicine  [phone]   PULMONARY PROGRESS NOTE    FRANCIS CHAUDHARY  MRN-[id number]    Patient is a 68y old  Male who presents with a chief complaint of Told to come for abnormal labs (16 Nov 2023 07:58)      HPI:  NIVV denied      ROS:   -    ACTIVE MEDICATION LIST:  MEDICATIONS  (STANDING):  artificial tears (preservative free) Ophthalmic Solution 1 Drop(s) Both EYES two times a day  aspirin enteric coated 81 milliGRAM(s) Oral daily  atorvastatin 40 milliGRAM(s) Oral at bedtime  budesonide 160 MICROgram(s)/formoterol 4.5 MICROgram(s) Inhaler 2 Puff(s) Inhalation two times a day  enoxaparin Injectable 40 milliGRAM(s) SubCutaneous every 24 hours  levothyroxine 37.5 MICROGram(s) Oral daily  lisinopril 20 milliGRAM(s) Oral daily  magnesium oxide 400 milliGRAM(s) Oral at bedtime  metoprolol tartrate 50 milliGRAM(s) Oral two times a day  prasugrel 10 milliGRAM(s) Oral daily  tiotropium 2.5 MICROgram(s) Inhaler 2 Puff(s) Inhalation daily    MEDICATIONS  (PRN):  acetaminophen     Tablet .. 650 milliGRAM(s) Oral every 6 hours PRN Temp greater or equal to 38C (100.4F), Mild Pain (1 - 3)  albuterol    90 MICROgram(s) HFA Inhaler 2 Puff(s) Inhalation every 6 hours PRN Shortness of Breath and/or Wheezing      EXAM:  Vital Signs Last 24 Hrs  T(C): 36.7 (16 Nov 2023 05:28), Max: 36.8 (15 Nov 2023 21:57)  T(F): 98 (16 Nov 2023 05:28), Max: 98.3 (15 Nov 2023 21:57)  HR: 62 (16 Nov 2023 07:53) (52 - 74)  BP: 146/82 (16 Nov 2023 05:28) (127/76 - 153/77)  BP(mean): --  RR: 18 (16 Nov 2023 05:28) (18 - 18)  SpO2: 98% (16 Nov 2023 07:53) (94% - 100%)    Parameters below as of 16 Nov 2023 05:28  Patient On (Oxygen Delivery Method): nasal cannula  O2 Flow (L/min): 2      GENERAL: The patient is awake and alert in no apparent distress.     LUNGS:  espirations unlabored         < from: CT Angio Chest Aorta w/wo IV Cont (10.27.23 @ 23:11) >    ACC: [id number] EXAM:  CT ANGIO ABD PELV (W)AW IC   ORDERED BY: JOSÉ MANUEL   MONIKA     ACC: [id number] EXAM:  CT ANGIO CHEST AORTA WAWIC   ORDERED BY: HOANG SMITH     PROCEDURE DATE:  10/27/2023          INTERPRETATION:  INDICATION: Abdominal aortic aneurysm.    COMPARISON: CTA chest abdomen pelvis 12/15/2022.    CONTRAST/COMPLICATIONS:  IV Contrast: Omnipaque 350. 95 cc administered. 5 cc discarded.  Oral Contrast: None.  Complications: None reported at time of study completion.    PROCEDURE:  CT Angiography of the Chest, Abdomen and Pelvis.  Precontrast imaging was performed through the chest followed by arterial   phase imaging of the chest, abdomen and pelvis.  Sagittal and coronal reformats were performed as well as 3D (MIP)   reconstructions.    FINDINGS:  CHEST:  LUNGS AND LARGE AIRWAYS: Small low density layering material in the   trachea, bilateral mainstem bronchi and bronchus intermedius. Emphysema.   Bilateral subcentimeter pulmonary nodules. A left upper lobe 4 mm nodule   (301:54) appears new when compared to prior exam.  PLEURA: No pleural effusion.  VESSELS: No thoracic aortic dissection, aneurysm or intramural hematoma.   Aortic and coronary artery calcifications.  HEART: Heart size is normal. No pericardial effusion. AICD lead   terminates in the right ventricle.  MEDIASTINUM AND ALANNA: No lymphadenopathy.  CHEST WALL AND LOWER NECK: Left anterior chest wall AICD.    ABDOMEN AND PELVIS:  LIVER: Within normal limits.  BILE DUCTS: Normal caliber.  GALLBLADDER: Cholelithiasis.  SPLEEN: Within normal limits.  PANCREAS: Within normal limits.  ADRENALS: Similar indeterminate attenuation right adrenal nodule. Left   adrenal gland is normal.  KIDNEYS/URETERS: Symmetric enhancement. No hydronephrosis. 4 mm   nonobstructing right renal calculus. Left renal cortical   scarring/postsurgical change. Left renal cyst.    BLADDER: Within normal limits.  REPRODUCTIVE ORGANS: Prostate is enlarged.    BOWEL: No bowel obstruction. Appendix is normal.  PERITONEUM: No ascites.    VESSELS: Partially thrombosed infrarenal abdominal aortic aneurysm   measures 5.0 x 5.8 x 12.3 cm (TRV x AP x CC), previously 5.3 x 4.8 x 11.5   cm. Crescentic area of relative hyperattenuation within the thrombus   along the anterior aspect of the aneurysm at the level of the kidneys   (example 301, 153), suspicious for hemorrhage/leakage into thrombus. No   active extravasation identified. Similar ectasia of the proximal common   iliac arteries, up to 1.5 cm on the right. Intact right femoral bypass   graft. Atherosclerotic changes. No abdominal aortic dissection.  RETROPERITONEUM/LYMPH NODES: No lymphadenopathy.  ABDOMINAL WALL: Within normal limits.  BONES: Degenerative changes.    IMPRESSION:  No aortic dissection.    Increased size of partially thrombosed infrarenal abdominal aortic   aneurysm measuring up to 5.8 cm in greatest axial dimension. Crescentic   area of mild relative hyperattenuation within the thrombus along the   anterior aspect of the aneurysm at the level of the kidneys suspicious   for hemorrhage/leakage into the thrombus. No active extravasation   identified. Vascular surgery consult is recommended.    --- End of Report ---          TAYLOR GARNER MD; Resident Radiology  This document has been electronicallysigned.  MU OLSON MD; Attending Radiologist  This document has been electronically signed. Oct 28 2023  1:05AM    < end of copied text >  < from: CT Angio Abdomen and Pelvis w/ IV Cont (10.27.23 @ 23:11) >    ACC: [id number] EXAM:  CT ANGIO ABD PELV (W)AW IC   ORDERED BY: JOSÉ MANUEL FRANK     ACC: [id number] EXAM:  CT ANGIO CHEST AORTA WAWIC   ORDERED BY: HOANG SMITH     PROCEDURE DATE:  10/27/2023          INTERPRETATION:  INDICATION: Abdominal aortic aneurysm.    COMPARISON: CTA chest abdomen pelvis 12/15/2022.    CONTRAST/COMPLICATIONS:  IV Contrast: Omnipaque 350. 95 cc administered. 5 cc discarded.  Oral Contrast: None.  Complications: None reported at time of study completion.    PROCEDURE:  CT Angiography of the Chest, Abdomen and Pelvis.  Precontrast imaging was performed through the chest followed by arterial   phase imaging of the chest, abdomen and pelvis.  Sagittal and coronal reformats were performed as well as 3D (MIP)   reconstructions.    FINDINGS:  CHEST:  LUNGS AND LARGE AIRWAYS: Small low density layering material in the   trachea, bilateral mainstem bronchi and bronchus intermedius. Emphysema.   Bilateral subcentimeter pulmonary nodules. A left upper lobe 4 mm nodule   (301:54) appears new when compared to prior exam.  PLEURA: No pleural effusion.  VESSELS: No thoracic aortic dissection, aneurysm or intramural hematoma.   Aortic and coronary artery calcifications.  HEART: Heart size is normal. No pericardial effusion. AICD lead   terminates in the right ventricle.  MEDIASTINUM AND ALANNA: No lymphadenopathy.  CHEST WALL AND LOWER NECK: Left anterior chest wall AICD.    ABDOMEN AND PELVIS:  LIVER: Within normal limits.  BILE DUCTS: Normal caliber.  GALLBLADDER: Cholelithiasis.  SPLEEN: Within normal limits.  PANCREAS: Within normal limits.  ADRENALS: Similar indeterminate attenuation right adrenal nodule. Left   adrenal gland is normal.  KIDNEYS/URETERS: Symmetric enhancement. No hydronephrosis. 4 mm   nonobstructing right renal calculus. Left renal cortical   scarring/postsurgical change. Left renal cyst.    BLADDER: Within normal limits.  REPRODUCTIVE ORGANS: Prostate is enlarged.    BOWEL: No bowel obstruction. Appendix is normal.  PERITONEUM: No ascites.    VESSELS: Partially thrombosed infrarenal abdominal aortic aneurysm   measures 5.0 x 5.8 x 12.3 cm (TRV x AP x CC), previously 5.3 x 4.8 x 11.5   cm. Crescentic area of relative hyperattenuation within the thrombus   along the anterior aspect of the aneurysm at the level of the kidneys   (example 301, 153), suspicious for hemorrhage/leakage into thrombus. No   active extravasation identified. Similar ectasia of the proximal common   iliac arteries, up to 1.5 cm on the right. Intact right femoral bypass   graft. Atherosclerotic changes. No abdominal aortic dissection.  RETROPERITONEUM/LYMPH NODES: No lymphadenopathy.  ABDOMINAL WALL: Within normal limits.  BONES: Degenerative changes.    IMPRESSION:  No aortic dissection.    Increased size of partially thrombosed infrarenal abdominal aortic   aneurysm measuring up to 5.8 cm in greatest axial dimension. Crescentic   area of mild relative hyperattenuation within the thrombus along the   anterior aspect of the aneurysm at the level of the kidneys suspicious   for hemorrhage/leakage into the thrombus. No active extravasation   identified. Vascular surgery consult is recommended.    --- End of Report ---          TAYLOR GARNER MD; Resident Radiology  This document has been electronicallysigned.  MU OLSON MD; Attending Radiologist  This document has been electronically signed. Oct 28 2023  1:05AM    < end of copied text >      PROBLEM LIST:  68y Male with HEALTH ISSUES - PROBLEM Dx:  Acute on chronic respiratory failure with hypercapnia    COPD exacerbation    Hypertension    AAA (abdominal aortic aneurysm)    Prophylactic measure    Hyperlipidemia    Chronic respiratory failure              RECS:  please taper off 02 at rest  100% is too high for him given his  CHRONIC HYPERCARBIC RESP FAILURE due to copd/ emphysema/ former  smoker   continue symbicort- told him to rinse mouth  continue spiriva  continue NIVV qhs/ whenever sleeping  s/p prednisone 5 days  BEDSIDE SPIROMETRY shows severe COPD    outpatient f/u on the pulm nodule    told my primary team NIVV denied, left contact info for appeals  if appeal fails then he needs to f/u outpatient for sleep study/ restarting process to get him a vent    Please call with any questions.    Paris Warren DO  [facility] Pulmonary/Sleep Medicine  [phone]      addendum:  spoke to insurance- company  peer to peer  doc on the line suggest: changing the submission/ request form-  (bpap with back up)

## 2023-11-16 NOTE — PROGRESS NOTE ADULT - SUBJECTIVE AND OBJECTIVE BOX
Cardiovascular Disease Progress Note    Overnight events: No acute events overnight.  no new cardiac sx  Otherwise review of systems negative    Objective Findings:  T(C): 36.7 (11-16-23 @ 05:28), Max: 36.8 (11-15-23 @ 21:57)  HR: 62 (11-16-23 @ 07:53) (52 - 74)  BP: 146/82 (11-16-23 @ 05:28) (127/76 - 153/77)  RR: 18 (11-16-23 @ 05:28) (18 - 18)  SpO2: 98% (11-16-23 @ 07:53) (94% - 100%)  Wt(kg): --  Daily     Daily       Physical Exam:  Gen: NAD  HEENT: EOMI  CV: RRR, normal S1 + S2, no m/r/g  Lungs: CTAB  Abd: soft, non-tender  Ext: No edema    Telemetry:    Laboratory Data:                    Inpatient Medications:  MEDICATIONS  (STANDING):  artificial tears (preservative free) Ophthalmic Solution 1 Drop(s) Both EYES two times a day  aspirin enteric coated 81 milliGRAM(s) Oral daily  atorvastatin 40 milliGRAM(s) Oral at bedtime  budesonide 160 MICROgram(s)/formoterol 4.5 MICROgram(s) Inhaler 2 Puff(s) Inhalation two times a day  enoxaparin Injectable 40 milliGRAM(s) SubCutaneous every 24 hours  levothyroxine 37.5 MICROGram(s) Oral daily  lisinopril 20 milliGRAM(s) Oral daily  magnesium oxide 400 milliGRAM(s) Oral at bedtime  metoprolol tartrate 50 milliGRAM(s) Oral two times a day  prasugrel 10 milliGRAM(s) Oral daily  tiotropium 2.5 MICROgram(s) Inhaler 2 Puff(s) Inhalation daily      Assessment:  71M PMHx COPD on home O2 2 L nasal cannula (~45pack year hx, quit 4 years ago), hypertension, known AAA - being followed by Dr. Joaquin, PAD, alcohol abuse complicated with liver cirrhosis, recently treated for a UTI, as per patient was referred to the emergency department by his primary care physician Dr. Remington Simms for abnormal labs. Patient reports exacerbation of his chronic low back pain. C/o b/l claudication from buttocks down at distances <50 feet. He has history of PAD s/p RLE bypass (?failed). Denies rest pain and poor wound healing. Denies abd pain, pulsatile mass, tearing pain.       Recs:  cardiac stable  dyspnea likely in setting of copd-e. no e/o chf  avaps per pulm  vol status stable. diuretics prn to maintain euvolemic  echo mild lv dysfunction, normal rv fxn, mild-mod as  cw gdmt for lv dysfunction  cw antiplatelets, statin and antianginals  f/u vascular surgery re partially thrombosed AAA and LE PAD  pulmonary f/u  icd interrogation --> no events  dcp pending home medical ventillator          Over 25 minutes spent on total encounter; more than 50% of the visit was spent counseling and/or coordinating care by the attending physician.      Vaughn Simms MD   Cardiovascular Disease  [phone]

## 2023-11-17 PROCEDURE — 99232 SBSQ HOSP IP/OBS MODERATE 35: CPT

## 2023-11-17 RX ORDER — POLYETHYLENE GLYCOL 3350 17 G/17G
17 POWDER, FOR SOLUTION ORAL DAILY
Refills: 0 | Status: DISCONTINUED | OUTPATIENT
Start: 2023-11-17 | End: 2023-11-20

## 2023-11-17 RX ADMIN — LISINOPRIL 20 MILLIGRAM(S): 2.5 TABLET ORAL at 05:59

## 2023-11-17 RX ADMIN — ATORVASTATIN CALCIUM 40 MILLIGRAM(S): 80 TABLET, FILM COATED ORAL at 22:17

## 2023-11-17 RX ADMIN — BUDESONIDE AND FORMOTEROL FUMARATE DIHYDRATE 2 PUFF(S): 160; 4.5 AEROSOL RESPIRATORY (INHALATION) at 22:17

## 2023-11-17 RX ADMIN — Medication 81 MILLIGRAM(S): at 13:31

## 2023-11-17 RX ADMIN — ENOXAPARIN SODIUM 40 MILLIGRAM(S): 100 INJECTION SUBCUTANEOUS at 18:32

## 2023-11-17 RX ADMIN — MAGNESIUM OXIDE 400 MG ORAL TABLET 400 MILLIGRAM(S): 241.3 TABLET ORAL at 22:18

## 2023-11-17 RX ADMIN — Medication 1 DROP(S): at 18:32

## 2023-11-17 RX ADMIN — Medication 1 DROP(S): at 06:05

## 2023-11-17 RX ADMIN — Medication 50 MILLIGRAM(S): at 22:18

## 2023-11-17 RX ADMIN — Medication 37.5 MICROGRAM(S): at 05:59

## 2023-11-17 RX ADMIN — Medication 50 MILLIGRAM(S): at 13:31

## 2023-11-17 RX ADMIN — TIOTROPIUM BROMIDE 2 PUFF(S): 18 CAPSULE ORAL; RESPIRATORY (INHALATION) at 13:32

## 2023-11-17 RX ADMIN — PRASUGREL 10 MILLIGRAM(S): 5 TABLET, FILM COATED ORAL at 13:31

## 2023-11-17 RX ADMIN — BUDESONIDE AND FORMOTEROL FUMARATE DIHYDRATE 2 PUFF(S): 160; 4.5 AEROSOL RESPIRATORY (INHALATION) at 13:32

## 2023-11-17 NOTE — PROGRESS NOTE ADULT - ASSESSMENT
_________________________________________________________________________________________  ========>>  M E D I C A L   A T T E N D I N G    F O L L O W  U P  N O T E  <<=========  -----------------------------------------------------------------------------------------------------    - Patient seen and examined by me earlier today.   - Patient today overall doing ok, comfortable, eating OK. breathing ok      no new events.. awaiting to go home..  Patient's respiratory machine not approved by insurance company > awaiting appeal !     ==================>> REVIEW OF SYSTEM <<=================    GEN: no fever, no chills, no pain  RESP: no significant SOB at rest on O2, Occasional sputum  CVS: no chest pain, no palpitations  GI: no abdominal pain, no nausea  : no dysuria, no frequency  Neuro: no headache, no dizziness    ==================>> PHYSICAL EXAM <<=================    GEN: A&O X 3 , NAD , comfortable, pleasant, calm , Sitting on the edge of the bed  HEENT: NCAT, PERRL, MMM, hearing intact., O2 via NC (on and off / as needed )  CVS: S1S2 , regular , No M/R/G appreciated  PULM: decreased BS bilaterally, no wheezing noted   ABD.: soft. non tender, non distended,  bowel sounds present  Extrem: intact pulses , no edema               ( Note written / Date of service 11-17-23 ( This is certified to be the same as "ENTERED" date above ( for billing purposes)))    ==================>> MEDICATIONS <<====================    artificial tears (preservative free) Ophthalmic Solution 1 Drop(s) Both EYES two times a day  aspirin enteric coated 81 milliGRAM(s) Oral daily  atorvastatin 40 milliGRAM(s) Oral at bedtime  budesonide 160 MICROgram(s)/formoterol 4.5 MICROgram(s) Inhaler 2 Puff(s) Inhalation two times a day  enoxaparin Injectable 40 milliGRAM(s) SubCutaneous every 24 hours  levothyroxine 37.5 MICROGram(s) Oral daily  lisinopril 20 milliGRAM(s) Oral daily  magnesium oxide 400 milliGRAM(s) Oral at bedtime  metoprolol tartrate 50 milliGRAM(s) Oral two times a day  polyethylene glycol 3350 17 Gram(s) Oral daily  prasugrel 10 milliGRAM(s) Oral daily  tiotropium 2.5 MICROgram(s) Inhaler 2 Puff(s) Inhalation daily    MEDICATIONS  (PRN):  acetaminophen     Tablet .. 650 milliGRAM(s) Oral every 6 hours PRN Temp greater or equal to 38C (100.4F), Mild Pain (1 - 3)  albuterol    90 MICROgram(s) HFA Inhaler 2 Puff(s) Inhalation every 6 hours PRN Shortness of Breath and/or Wheezing    ___________  Active diet:  Diet, Regular:   1200mL Fluid Restriction ([id number])  ___________________    ==================>> VITAL SIGNS <<==================     Vital Signs Last 24 HrsT(C): 36.5 (11-17-23 @ 05:45)  T(F): 97.7 (11-17-23 @ 05:45), Max: 97.8 (11-16-23 @ 20:41)  HR: 80 (11-17-23 @ 13:24) (69 - 80)  BP: 146/80 (11-17-23 @ 13:24)  RR: 20 (11-17-23 @ 05:45) (18 - 20)  SpO2: 96% (11-17-23 @ 05:45) (96% - 98%)      CAPILLARY BLOOD GLUCOSE         ==================>> LAB AND IMAGING <<==================               no labs today    no need for daily labs     < from: US Extremity Nonvasc Limited, Right (11.13.23 @ 14:51) >  IMPRESSION: Small cystic and solid nodule without vascularity in the   medial right thigh. The differential includes epidermoid inclusion cyst.  < end of copied text >      < from: TTE W or WO Ultrasound Enhancing Agent (10.30.23 @ 15:17) >  CONCLUSIONS:    1. Left ventricular wall thickness is normal. Left ventricular systolic function is mildly decreased with an ejection fraction of 49 % by Vazquez's method of disks. Regional wall motion abnormalities present.   2. There is hypokinesis of the apical and apical septal walls.   3. Normal right ventricular cavity size and systolic function.   4. The aortic valve appears trileaflet with reduced systolic excursion. There is mild to moderate aortic stenosis. The peak transaortic velocity is 2.58 m/s, peak transaortic gradient is 26.6 mmHg and mean transaortic gradient is 12.0 mmHg with an LVOT/aortic valve VTI ratio of 0.45. The aortic valve area is estimated at 1.41 cm² by the continuity equation. There is trace aortic regurgitation.   5. Normal atria.   6. The inferior vena cava is normal in size measuring 1.62 cm in diameter, (normal <2.1cm) with normal inspiratory collapse (normal >50%) consistent with normal right atrial pressure (~3, range 0-5mmHg).   7. No pericardial effusion seen.  < end of copied text >    ___________________________________________________________________________________  ===============>>  A S S E S S M E N T   A N D   P L A N <<===============  ------------------------------------------------------------------------------------------    · Assessment	  71 year old male with PMHx of COPD on home O2 (2-3 L/NC), HTN, known AAA, PAD, hyperlipidemia and alcohol use disorder complicated with liver cirrhosis (quit 1yr ago) who was told to come to ER for abnormal labs (elevated CO2 to 42 on recent labs), felt to be secondary to acute on chronic hypercapnic respiratory failure due to likely COPD exacerbation.    Problem/Plan - 1:  ·  Problem: Acute on chronic respiratory failure with hypercapnia.   - Being treated for likely COPD exacerbation  - Pulmonary follow-up, management and optimization appreciated  - AVAPS::  will need to go home with per pulm >> still awaiting insurance company approval >> Rejected, pending appeal  - Has a left upper lobe 4 mm nodule, to be f/up as outpatient    Problem/Plan - 2:  ·  Problem: Hypertension.   - C/w home meds  - Echocardiogram as above     Further management and optimization as per cardiology given above Abnormalities    Problem/Plan - 3:  ·  Problem: hyponatremia >> improved / stable   diet changed  patient eating well  free fluid restriction   monitor BMP given hyperkalemia   magnesium supplements as needed for cramping     Problem/Plan - 4:  ·  Problem: AAA (abdominal aortic aneurysm).   - CTA Ao shows-Increased size of partially thrombosed infrarenal abdominal aortic aneurysm measuring up to 5.8 cm in greatest axial dimension. Crescentic area of mild relative hyperattenuation within the thrombus along the anterior aspect of the aneurysm at the level of the kidneys suspicious for hemorrhage/leakage into the thrombus. No active extravasation identified  - Seen by Vascular, no intervention at this time planned   - BP control   - C/w ASA/prasugrel/statin  - Follow up with Dr. Joaquin as outpatient on 10/30/23 per team   - Cardio on board    - Patient with symptoms, likely from claudication >> Need close vascular follow-up as discussed      Encouraged increased activity as able    Problem/Plan - 5:  ·  Problem: Leg pain and back pain  ·  Plan: Patient with chronic pain in his legs with cramping of the thighs.  Patient requesting neurology evaluation.  Offer to have a MRI of the lumbar spine, however patient states unable to get MRI because of his defibrillator.  Get a CT scan of the lumbosacral spine for now.  Neurology evaluation pending above    Problem/Plan - 6:  ·  Problem: Prophylactic measure.   ·  Plan: LMWH  PT  // OOB to chair daily / increase activity as able     DC planing home pending above     --------------------------------------------  Case discussed with patient, pulm   Education given on findings and plan of care  ___________________________  JACQUELINE Brown D.O.  Pager: [phone]       _________________________________________________________________________________________  ========>>  M E D I C A L   A T T E N D I N G    F O L L O W  U P  N O T E  <<=========  -----------------------------------------------------------------------------------------------------    - Patient seen and examined by me earlier today.   - Patient today overall doing ok, comfortable, eating OK. breathing ok      no new events.. awaiting to go home..  Patient's respiratory machine not approved by insurance company > awaiting appeal / Alternate    ==================>> REVIEW OF SYSTEM <<=================    GEN: no fever, no chills, no pain  RESP: no significant SOB at rest on O2, Occasional sputum  CVS: no chest pain, no palpitations  GI: no abdominal pain, no nausea  : no dysuria, no frequency  Neuro: no headache, no dizziness    ==================>> PHYSICAL EXAM <<=================    GEN: A&O X 3 , NAD , comfortable, pleasant, calm , Sitting on the edge of the bed  HEENT: NCAT, PERRL, MMM, hearing intact., O2 via NC (on and off / as needed )  CVS: S1S2 , regular , No M/R/G appreciated  PULM: decreased BS bilaterally, no wheezing noted   ABD.: soft. non tender, non distended,  bowel sounds present  Extrem: intact pulses , no edema               ( Note written / Date of service 11-17-23 ( This is certified to be the same as "ENTERED" date above ( for billing purposes)))    ==================>> MEDICATIONS <<====================    artificial tears (preservative free) Ophthalmic Solution 1 Drop(s) Both EYES two times a day  aspirin enteric coated 81 milliGRAM(s) Oral daily  atorvastatin 40 milliGRAM(s) Oral at bedtime  budesonide 160 MICROgram(s)/formoterol 4.5 MICROgram(s) Inhaler 2 Puff(s) Inhalation two times a day  enoxaparin Injectable 40 milliGRAM(s) SubCutaneous every 24 hours  levothyroxine 37.5 MICROGram(s) Oral daily  lisinopril 20 milliGRAM(s) Oral daily  magnesium oxide 400 milliGRAM(s) Oral at bedtime  metoprolol tartrate 50 milliGRAM(s) Oral two times a day  polyethylene glycol 3350 17 Gram(s) Oral daily  prasugrel 10 milliGRAM(s) Oral daily  tiotropium 2.5 MICROgram(s) Inhaler 2 Puff(s) Inhalation daily    MEDICATIONS  (PRN):  acetaminophen     Tablet .. 650 milliGRAM(s) Oral every 6 hours PRN Temp greater or equal to 38C (100.4F), Mild Pain (1 - 3)  albuterol    90 MICROgram(s) HFA Inhaler 2 Puff(s) Inhalation every 6 hours PRN Shortness of Breath and/or Wheezing    ___________  Active diet:  Diet, Regular:   1200mL Fluid Restriction ([id number])  ___________________    ==================>> VITAL SIGNS <<==================     Vital Signs Last 24 HrsT(C): 36.5 (11-17-23 @ 05:45)  T(F): 97.7 (11-17-23 @ 05:45), Max: 97.8 (11-16-23 @ 20:41)  HR: 80 (11-17-23 @ 13:24) (69 - 80)  BP: 146/80 (11-17-23 @ 13:24)  RR: 20 (11-17-23 @ 05:45) (18 - 20)  SpO2: 96% (11-17-23 @ 05:45) (96% - 98%)       ==================>> LAB AND IMAGING <<==================    no labs today    no need for daily labs     < from: US Extremity Nonvasc Limited, Right (11.13.23 @ 14:51) >  IMPRESSION: Small cystic and solid nodule without vascularity in the   medial right thigh. The differential includes epidermoid inclusion cyst.  < end of copied text >      < from: TTE W or WO Ultrasound Enhancing Agent (10.30.23 @ 15:17) >  CONCLUSIONS:    1. Left ventricular wall thickness is normal. Left ventricular systolic function is mildly decreased with an ejection fraction of 49 % by Vazquez's method of disks. Regional wall motion abnormalities present.   2. There is hypokinesis of the apical and apical septal walls.   3. Normal right ventricular cavity size and systolic function.   4. The aortic valve appears trileaflet with reduced systolic excursion. There is mild to moderate aortic stenosis. The peak transaortic velocity is 2.58 m/s, peak transaortic gradient is 26.6 mmHg and mean transaortic gradient is 12.0 mmHg with an LVOT/aortic valve VTI ratio of 0.45. The aortic valve area is estimated at 1.41 cm² by the continuity equation. There is trace aortic regurgitation.   5. Normal atria.   6. The inferior vena cava is normal in size measuring 1.62 cm in diameter, (normal <2.1cm) with normal inspiratory collapse (normal >50%) consistent with normal right atrial pressure (~3, range 0-5mmHg).   7. No pericardial effusion seen.  < end of copied text >    ___________________________________________________________________________________  ===============>>  A S S E S S M E N T   A N D   P L A N <<===============  ------------------------------------------------------------------------------------------    · Assessment	  71 year old male with PMHx of COPD on home O2 (2-3 L/NC), HTN, known AAA, PAD, hyperlipidemia and alcohol use disorder complicated with liver cirrhosis (quit 1yr ago) who was told to come to ER for abnormal labs (elevated CO2 to 42 on recent labs), felt to be secondary to acute on chronic hypercapnic respiratory failure due to likely COPD exacerbation.    Problem/Plan - 1:  ·  Problem: Acute on chronic respiratory failure with hypercapnia.   - Being treated for likely COPD exacerbation  - Pulmonary follow-up, management and optimization appreciated  - AVAPS::  will need to go home with per pulm >> still awaiting insurance company approval >> Rejected, pending appeal / Alternate approval  - Has a left upper lobe 4 mm nodule, to be f/up as outpatient    Problem/Plan - 2:  ·  Problem: Hypertension.   - C/w home meds  - Echocardiogram as above     Further management and optimization as per cardiology given above Abnormalities    Problem/Plan - 3:  ·  Problem: hyponatremia >> improved / stable   diet changed  patient eating well  free fluid restriction   monitor BMP given hyperkalemia   magnesium supplements as needed for cramping     Problem/Plan - 4:  ·  Problem: AAA (abdominal aortic aneurysm).   - CTA Ao shows-Increased size of partially thrombosed infrarenal abdominal aortic aneurysm measuring up to 5.8 cm in greatest axial dimension. Crescentic area of mild relative hyperattenuation within the thrombus along the anterior aspect of the aneurysm at the level of the kidneys suspicious for hemorrhage/leakage into the thrombus. No active extravasation identified  - Seen by Vascular, no intervention at this time planned   - BP control   - C/w ASA/prasugrel/statin  - Follow up with Dr. Joaquin as outpatient on 10/30/23 per team   - Cardio on board    - Patient with symptoms, likely from claudication >> Need close vascular follow-up as discussed      Encouraged increased activity as able    Problem/Plan - 5:  ·  Problem: Leg pain and back pain  ·  Plan: Patient with chronic pain in his legs with cramping of the thighs.  Patient requesting neurology evaluation.  Offer to have a MRI of the lumbar spine, however patient states unable to get MRI because of his defibrillator.  Get a CT scan of the lumbosacral spine for now.     < from: CT Lumbar Spine No Cont (11.16.23 @ 17:08) >        IMPRESSION:  Mild degenerative changes. No evidence of significant stenosis.       < end of copied text >  Neurology evaluation Likely as outpatient    Problem/Plan - 6:  ·  Problem: Prophylactic measure.   ·  Plan: LMWH  PT  // OOB to chair daily / increase activity as able     DC planing home pending above     --------------------------------------------  Case discussed with patient, pulm , ACP   Education given on findings and plan of care  ___________________________  H. JOJO Brown.  Pager: [phone]

## 2023-11-17 NOTE — CHART NOTE - NSCHARTNOTEFT_GEN_A_CORE
Spoke to Dr. Warren    Her recommendations for BIPAP w/backup rate are below  IPAP: 15, EPAP: 10, Backup Rate 10, FI02 40%    Updated prescription given to case management

## 2023-11-17 NOTE — PROGRESS NOTE ADULT - SUBJECTIVE AND OBJECTIVE BOX
PULMONARY PROGRESS NOTE    FRANCIS CHAUDHARY  MRN-[id number]    Patient is a 68y old  Male who presents with a chief complaint of Told to come for abnormal labs (17 Nov 2023 14:31)      HPI:  -  -on room air  sitting in chair      ROS:   -    ACTIVE MEDICATION LIST:  MEDICATIONS  (STANDING):  artificial tears (preservative free) Ophthalmic Solution 1 Drop(s) Both EYES two times a day  aspirin enteric coated 81 milliGRAM(s) Oral daily  atorvastatin 40 milliGRAM(s) Oral at bedtime  budesonide 160 MICROgram(s)/formoterol 4.5 MICROgram(s) Inhaler 2 Puff(s) Inhalation two times a day  enoxaparin Injectable 40 milliGRAM(s) SubCutaneous every 24 hours  levothyroxine 37.5 MICROGram(s) Oral daily  lisinopril 20 milliGRAM(s) Oral daily  magnesium oxide 400 milliGRAM(s) Oral at bedtime  metoprolol tartrate 50 milliGRAM(s) Oral two times a day  polyethylene glycol 3350 17 Gram(s) Oral daily  prasugrel 10 milliGRAM(s) Oral daily  tiotropium 2.5 MICROgram(s) Inhaler 2 Puff(s) Inhalation daily    MEDICATIONS  (PRN):  acetaminophen     Tablet .. 650 milliGRAM(s) Oral every 6 hours PRN Temp greater or equal to 38C (100.4F), Mild Pain (1 - 3)  albuterol    90 MICROgram(s) HFA Inhaler 2 Puff(s) Inhalation every 6 hours PRN Shortness of Breath and/or Wheezing      EXAM:  Vital Signs Last 24 Hrs  T(C): 36.5 (17 Nov 2023 05:45), Max: 36.6 (16 Nov 2023 20:41)  T(F): 97.7 (17 Nov 2023 05:45), Max: 97.8 (16 Nov 2023 20:41)  HR: 80 (17 Nov 2023 13:24) (69 - 80)  BP: 146/80 (17 Nov 2023 13:24) (146/80 - 162/73)  BP(mean): --  RR: 20 (17 Nov 2023 05:45) (18 - 20)  SpO2: 96% (17 Nov 2023 05:45) (96% - 98%)    Parameters below as of 17 Nov 2023 05:45  Patient On (Oxygen Delivery Method): room air        GENERAL: The patient is awake and alert in no apparent distress.     LUNGS:  respirations unlabored                    < from: CT Angio Chest Aorta w/wo IV Cont (10.27.23 @ 23:11) >    ACC: [id number] EXAM:  CT ANGIO ABD PELV (W)AW IC   ORDERED BY: JOSÉ MANUEL FRANK     ACC: [id number] EXAM:  CT ANGIO CHEST AORTA WAWIC   ORDERED BY: HOANG SMITH     PROCEDURE DATE:  10/27/2023          INTERPRETATION:  INDICATION: Abdominal aortic aneurysm.    COMPARISON: CTA chest abdomen pelvis 12/15/2022.    CONTRAST/COMPLICATIONS:  IV Contrast: Omnipaque 350. 95 cc administered. 5 cc discarded.  Oral Contrast: None.  Complications: None reported at time of study completion.    PROCEDURE:  CT Angiography of the Chest, Abdomen and Pelvis.  Precontrast imaging was performed through the chest followed by arterial   phase imaging of the chest, abdomen and pelvis.  Sagittal and coronal reformats were performed as well as 3D (MIP)   reconstructions.    FINDINGS:  CHEST:  LUNGS AND LARGE AIRWAYS: Small low density layering material in the   trachea, bilateral mainstem bronchi and bronchus intermedius. Emphysema.   Bilateral subcentimeter pulmonary nodules. A left upper lobe 4 mm nodule   (301:54) appears new when compared to prior exam.  PLEURA: No pleural effusion.  VESSELS: No thoracic aortic dissection, aneurysm or intramural hematoma.   Aortic and coronary artery calcifications.  HEART: Heart size is normal. No pericardial effusion. AICD lead   terminates in the right ventricle.  MEDIASTINUM AND ALANNA: No lymphadenopathy.  CHEST WALL AND LOWER NECK: Left anterior chest wall AICD.    ABDOMEN AND PELVIS:  LIVER: Within normal limits.  BILE DUCTS: Normal caliber.  GALLBLADDER: Cholelithiasis.  SPLEEN: Within normal limits.  PANCREAS: Within normal limits.  ADRENALS: Similar indeterminate attenuation right adrenal nodule. Left   adrenal gland is normal.  KIDNEYS/URETERS: Symmetric enhancement. No hydronephrosis. 4 mm   nonobstructing right renal calculus. Left renal cortical   scarring/postsurgical change. Left renal cyst.    BLADDER: Within normal limits.  REPRODUCTIVE ORGANS: Prostate is enlarged.    BOWEL: No bowel obstruction. Appendix is normal.  PERITONEUM: No ascites.    VESSELS: Partially thrombosed infrarenal abdominal aortic aneurysm   measures 5.0 x 5.8 x 12.3 cm (TRV x AP x CC), previously 5.3 x 4.8 x 11.5   cm. Crescentic area of relative hyperattenuation within the thrombus   along the anterior aspect of the aneurysm at the level of the kidneys   (example 301, 153), suspicious for hemorrhage/leakage into thrombus. No   active extravasation identified. Similar ectasia of the proximal common   iliac arteries, up to 1.5 cm on the right. Intact right femoral bypass   graft. Atherosclerotic changes. No abdominal aortic dissection.  RETROPERITONEUM/LYMPH NODES: No lymphadenopathy.  ABDOMINAL WALL: Within normal limits.  BONES: Degenerative changes.    IMPRESSION:  No aortic dissection.    Increased size of partially thrombosed infrarenal abdominal aortic   aneurysm measuring up to 5.8 cm in greatest axial dimension. Crescentic   area of mild relative hyperattenuation within the thrombus along the   anterior aspect of the aneurysm at the level of the kidneys suspicious   for hemorrhage/leakage into the thrombus. No active extravasation   identified. Vascular surgery consult is recommended.    --- End of Report ---          TAYLOR GARNER MD; Resident Radiology  This document has been electronicallysigned.  MU OLSON MD; Attending Radiologist  This document has been electronically signed. Oct 28 2023  1:05AM    < end of copied text >      PROBLEM LIST:  68y Male with HEALTH ISSUES - PROBLEM Dx:  Acute on chronic respiratory failure with hypercapnia    COPD exacerbation    Hypertension    AAA (abdominal aortic aneurysm)    Prophylactic measure    Hyperlipidemia    Chronic respiratory failure              RECS:  p2 p appeal done  spoke to primary team re: bpap with back up rate settings  inhalers  02   patient informed about apeal      call office over weekend for any additional questions         Paris Warren DO  [facility] Pulmonary/Sleep Medicine  [phone]

## 2023-11-18 RX ADMIN — LISINOPRIL 20 MILLIGRAM(S): 2.5 TABLET ORAL at 05:18

## 2023-11-18 RX ADMIN — Medication 37.5 MICROGRAM(S): at 05:18

## 2023-11-18 RX ADMIN — PRASUGREL 10 MILLIGRAM(S): 5 TABLET, FILM COATED ORAL at 12:00

## 2023-11-18 RX ADMIN — ATORVASTATIN CALCIUM 40 MILLIGRAM(S): 80 TABLET, FILM COATED ORAL at 21:15

## 2023-11-18 RX ADMIN — TIOTROPIUM BROMIDE 2 PUFF(S): 18 CAPSULE ORAL; RESPIRATORY (INHALATION) at 12:01

## 2023-11-18 RX ADMIN — Medication 50 MILLIGRAM(S): at 12:02

## 2023-11-18 RX ADMIN — ENOXAPARIN SODIUM 40 MILLIGRAM(S): 100 INJECTION SUBCUTANEOUS at 17:43

## 2023-11-18 RX ADMIN — Medication 81 MILLIGRAM(S): at 12:01

## 2023-11-18 RX ADMIN — MAGNESIUM OXIDE 400 MG ORAL TABLET 400 MILLIGRAM(S): 241.3 TABLET ORAL at 21:16

## 2023-11-18 RX ADMIN — BUDESONIDE AND FORMOTEROL FUMARATE DIHYDRATE 2 PUFF(S): 160; 4.5 AEROSOL RESPIRATORY (INHALATION) at 21:15

## 2023-11-18 RX ADMIN — Medication 50 MILLIGRAM(S): at 21:16

## 2023-11-18 RX ADMIN — POLYETHYLENE GLYCOL 3350 17 GRAM(S): 17 POWDER, FOR SOLUTION ORAL at 12:00

## 2023-11-18 RX ADMIN — BUDESONIDE AND FORMOTEROL FUMARATE DIHYDRATE 2 PUFF(S): 160; 4.5 AEROSOL RESPIRATORY (INHALATION) at 12:03

## 2023-11-18 NOTE — CHART NOTE - NSCHARTNOTEFT_GEN_A_CORE
Overnight Medicine ACP    Informed by day RN that patient would like to speak to a provider as previous roomate tested positive for MRSA in culture. Patient seen bedside. Patient expressed concern for contacting MRSA due to his history of COPD. Explained that MRSA would only be contracted in the case of contact, not airborne. Patient verbalized understanding and but voiced his concerns still and would like to change floors. Advised RN to escalate to AND to accommodate patients needs.     Maurice Wilson  Department of Medicine  [facility]  [id number] Overnight Medicine ACP    Informed by day RN that patient would like to speak to a provider as previous roomate tested positive for MRSA in culture. Patient seen bedside. Patient expressed concern for contacting MRSA due to his history of COPD. Explained that MRSA would only be contracted in the case of contact, not airborne. Patient verbalized understanding and but voiced his concerns still and would like to change floors. Escalated to nursing management to accommodate patients concerns and requests.     Maurice Wilson  Department of Medicine  [facility]  [id number]

## 2023-11-19 DIAGNOSIS — M48.061 SPINAL STENOSIS, LUMBAR REGION WITHOUT NEUROGENIC CLAUDICATION: ICD-10-CM

## 2023-11-19 PROCEDURE — 99221 1ST HOSP IP/OBS SF/LOW 40: CPT

## 2023-11-19 RX ADMIN — BUDESONIDE AND FORMOTEROL FUMARATE DIHYDRATE 2 PUFF(S): 160; 4.5 AEROSOL RESPIRATORY (INHALATION) at 21:02

## 2023-11-19 RX ADMIN — Medication 650 MILLIGRAM(S): at 02:46

## 2023-11-19 RX ADMIN — ENOXAPARIN SODIUM 40 MILLIGRAM(S): 100 INJECTION SUBCUTANEOUS at 17:41

## 2023-11-19 RX ADMIN — Medication 1 DROP(S): at 17:41

## 2023-11-19 RX ADMIN — LISINOPRIL 20 MILLIGRAM(S): 2.5 TABLET ORAL at 07:08

## 2023-11-19 RX ADMIN — PRASUGREL 10 MILLIGRAM(S): 5 TABLET, FILM COATED ORAL at 11:08

## 2023-11-19 RX ADMIN — Medication 81 MILLIGRAM(S): at 11:07

## 2023-11-19 RX ADMIN — Medication 1 DROP(S): at 07:09

## 2023-11-19 RX ADMIN — Medication 50 MILLIGRAM(S): at 09:25

## 2023-11-19 RX ADMIN — Medication 37.5 MICROGRAM(S): at 07:09

## 2023-11-19 RX ADMIN — ATORVASTATIN CALCIUM 40 MILLIGRAM(S): 80 TABLET, FILM COATED ORAL at 21:03

## 2023-11-19 RX ADMIN — MAGNESIUM OXIDE 400 MG ORAL TABLET 400 MILLIGRAM(S): 241.3 TABLET ORAL at 21:03

## 2023-11-19 RX ADMIN — BUDESONIDE AND FORMOTEROL FUMARATE DIHYDRATE 2 PUFF(S): 160; 4.5 AEROSOL RESPIRATORY (INHALATION) at 09:26

## 2023-11-19 RX ADMIN — Medication 650 MILLIGRAM(S): at 02:12

## 2023-11-19 RX ADMIN — Medication 50 MILLIGRAM(S): at 21:04

## 2023-11-19 RX ADMIN — TIOTROPIUM BROMIDE 2 PUFF(S): 18 CAPSULE ORAL; RESPIRATORY (INHALATION) at 09:25

## 2023-11-19 NOTE — CONSULT NOTE ADULT - ASSESSMENT
68y male here for COPD exacerbation with complaints of b/l leg pains, CT showing lumbar degenerative disc disease  68y male here for COPD exacerbation with complaints of b/l leg pains, CT showing lumbar degenerative disc disease. Intact neuro exam. Hx PVD and fibromyalgia

## 2023-11-19 NOTE — CONSULT NOTE ADULT - PROBLEM SELECTOR RECOMMENDATION 9
- can do a trial of medrol dose que   - gabapentin for nerve pain   - see if pacemaker is MRI compatible - defibrillator not MRI compatible according to patient   - no nsg intervention  - rec PT and outpatient PT   - sounds like symptoms could be related to PVD or fibromyalgia     [id number]    Case discussed with attending neurosurgeon Dr. Sheehan

## 2023-11-19 NOTE — PROGRESS NOTE ADULT - ASSESSMENT
_________________________________________________________________________________________  ========>>  M E D I C A L   A T T E N D I N G    F O L L O W  U P  N O T E  <<=========  -----------------------------------------------------------------------------------------------------    - Patient seen and examined by me earlier today.   - Patient today overall doing ok, comfortable, eating OK. breathing ok      no new events.. awaiting to go home..  Patient's respiratory machine not approved by insurance company > awaiting appeal / Alternate    ==================>> REVIEW OF SYSTEM <<=================    GEN: no fever, no chills, no pain  RESP: no significant SOB at rest on O2 as needed , Occasional cough / sputum  CVS: no chest pain, no palpitations  GI: no abdominal pain, no nausea  : no dysuria, no frequency  Neuro: no headache, no dizziness    ==================>> PHYSICAL EXAM <<=================    GEN: A&O X 3 , NAD , comfortable, pleasant, calm , Sitting in bed  HEENT: NCAT, PERRL, MMM, hearing intact., O2 via NC (on and off / as needed )  CVS: S1S2 , regular , No M/R/G appreciated  PULM: decreased BS bilaterally, no wheezing noted   ABD.: soft. non tender, non distended,  bowel sounds present  Extrem: intact pulses , no edema           ( Note written / Date of service 11-19-23 ( This is certified to be the same as "ENTERED" date above ( for billing purposes)))    ==================>> MEDICATIONS <<====================    artificial tears (preservative free) Ophthalmic Solution 1 Drop(s) Both EYES two times a day  aspirin enteric coated 81 milliGRAM(s) Oral daily  atorvastatin 40 milliGRAM(s) Oral at bedtime  budesonide 160 MICROgram(s)/formoterol 4.5 MICROgram(s) Inhaler 2 Puff(s) Inhalation two times a day  enoxaparin Injectable 40 milliGRAM(s) SubCutaneous every 24 hours  levothyroxine 37.5 MICROGram(s) Oral daily  lisinopril 20 milliGRAM(s) Oral daily  magnesium oxide 400 milliGRAM(s) Oral at bedtime  metoprolol tartrate 50 milliGRAM(s) Oral two times a day  polyethylene glycol 3350 17 Gram(s) Oral daily  prasugrel 10 milliGRAM(s) Oral daily  tiotropium 2.5 MICROgram(s) Inhaler 2 Puff(s) Inhalation daily    MEDICATIONS  (PRN):  acetaminophen     Tablet .. 650 milliGRAM(s) Oral every 6 hours PRN Temp greater or equal to 38C (100.4F), Mild Pain (1 - 3)  albuterol    90 MICROgram(s) HFA Inhaler 2 Puff(s) Inhalation every 6 hours PRN Shortness of Breath and/or Wheezing      ==================>> VITAL SIGNS <<==================     Vital Signs Last 24 HrsT(C): 36.6 (11-19-23 @ 09:06)  T(F): 97.8 (11-19-23 @ 09:06), Max: 98.8 (11-19-23 @ 07:00)  HR: 74 (11-19-23 @ 09:06) (70 - 93)  BP: 135/76 (11-19-23 @ 09:06)  RR: 17 (11-19-23 @ 09:06) (16 - 18)  SpO2: 97% (11-19-23 @ 09:06) (97% - 99%)       ==================>> LAB AND IMAGING <<==================       no labs today    < from: CT Lumbar Spine No Cont (11.16.23 @ 17:08) >  IMPRESSION:  Mild degenerative changes. No evidence of significant stenosis.  < end of copied text >    < from: TTE W or WO Ultrasound Enhancing Agent (10.30.23 @ 15:17) >  CONCLUSIONS:    1. Left ventricular wall thickness is normal. Left ventricular systolic function is mildly decreased with an ejection fraction of 49 % by Vazquez's method of disks. Regional wall motion abnormalities present.   2. There is hypokinesis of the apical and apical septal walls.   3. Normal right ventricular cavity size and systolic function.   4. The aortic valve appears trileaflet with reduced systolic excursion. There is mild to moderate aortic stenosis. The peak transaortic velocity is 2.58 m/s, peak transaortic gradient is 26.6 mmHg and mean transaortic gradient is 12.0 mmHg with an LVOT/aortic valve VTI ratio of 0.45. The aortic valve area is estimated at 1.41 cm² by the continuity equation. There is trace aortic regurgitation.   5. Normal atria.   6. The inferior vena cava is normal in size measuring 1.62 cm in diameter, (normal <2.1cm) with normal inspiratory collapse (normal >50%) consistent with normal right atrial pressure (~3, range 0-5mmHg).   7. No pericardial effusion seen.  < end of copied text >    ___________________________________________________________________________________  ===============>>  A S S E S S M E N T   A N D   P L A N <<===============  ------------------------------------------------------------------------------------------    · Assessment	  71 year old male with PMHx of COPD on home O2 (2-3 L/NC), HTN, known AAA, PAD, hyperlipidemia and alcohol use disorder complicated with liver cirrhosis (quit 1yr ago) who was told to come to ER for abnormal labs (elevated CO2 to 42 on recent labs), felt to be secondary to acute on chronic hypercapnic respiratory failure due to likely COPD exacerbation.    Problem/Plan - 1:  ·  Problem: Acute on chronic respiratory failure with hypercapnia.   - Being treated for likely COPD exacerbation  - Pulmonary follow-up, management and optimization appreciated  - AVAPS::  will need to go home with per pulm >> still awaiting insurance company approval >> Rejected, pending appeal / Alternate approval  - Has a left upper lobe 4 mm nodule, to be f/up as outpatient    Problem/Plan - 2:  ·  Problem: Hypertension.   - C/w home meds  - Echocardiogram as above     Further management and optimization as per cardiology given above Abnormalities    Problem/Plan - 3:  ·  Problem: hyponatremia >> improved / stable   diet changed  patient eating well  free fluid restriction   monitor BMP >> reordered for AM   magnesium supplements as needed for cramping     Problem/Plan - 4:  ·  Problem: AAA (abdominal aortic aneurysm).   - CTA Ao shows-Increased size of partially thrombosed infrarenal abdominal aortic aneurysm measuring up to 5.8 cm in greatest axial dimension. Crescentic area of mild relative hyperattenuation within the thrombus along the anterior aspect of the aneurysm at the level of the kidneys suspicious for hemorrhage/leakage into the thrombus. No active extravasation identified  - Seen by Vascular, no intervention at this time planned   - BP control   - C/w ASA/prasugrel/statin  - Follow up with Dr. Joaquin as outpatient on 10/30/23 per team   - Cardio on board    - Patient with symptoms, likely from claudication >> Need close vascular follow-up as discussed      Encouraged increased activity as able    Problem/Plan - 5:  ·  Problem: Leg pain and back pain  ·  Plan: Patient with chronic pain in his legs with cramping of the thighs.  >> patient states vascular did not thing this was circularoty symmptoms..       abnormal CT L spine as noted..         patient requesting further evaluation >> >> please call Ortho/spine for bilateral leg pain and difficulty ambulating , abnormal CT L spine  >> specific quesiton : CT myelogram ( can't have MRI)     Problem/Plan - 6:  ·  Problem: Prophylactic measure.   ·  Plan: LMWH  PT  // OOB to chair daily / increase activity as able     --------------------------------------------  Case discussed with patient, family at bedside..   Education given on findings and plan of care  ___________________________  H. JOJO Brown.  Pager: [phone]

## 2023-11-19 NOTE — CONSULT NOTE ADULT - SUBJECTIVE AND OBJECTIVE BOX
NEUROSURGERY CONSULT    HPI: 71 year old male with PMHx of COPD on home O2 (2-3 L/NC), HTN, known AAA, PAD, hyperlipidemia and alcohol use disorder complicated with liver cirrhosis (quit 1yr ago) who was told to come to ER for abnormal labs (elevated CO2 to 42 on recent labs), felt to be secondary to acute on chronic hypercapnic respiratory failure due to likely COPD exacerbation.    Neurosurgery consulted today for CT Lumbar spine showing degenerative disc disease. Patient with complaints of b/l leg pain/radicular pains    RADIOLOGY:   < from: CT Lumbar Spine No Cont (11.16.23 @ 17:08) >    FINDINGS:  VERTEBRAL BODIES AND DISCS:  Normal.  ALIGNMENT:  No subluxations.  L1-L2 LEVEL:  Normal.  L2-L3 LEVEL:  Normal.  L3-L4 LEVEL:  Disc bulge with ligamentous and facet hypertrophic change   and a mild degree of central stenosis.  L4-L5 LEVEL:  Disc bulge with ligamentous and facet joint hypertrophic   change and a mild degree of central stenosis.  L5-S1 LEVEL:  Right neural foraminal narrowing greater than left at   L5/S1. Disc bulge. Vacuum disc phenomenon L5-S1  SPINAL CANAL:  No other intradural or extradural defects are seen.  MISCELLANEOUS: There is evidence of an abdominal aortic aneurysm that is   incompletely visualized on this study. This is described on the patient's   prior abdominal CT as well.    IMPRESSION:  Mild degenerative changes. No evidence of significant   stenosis.    MEDS:  acetaminophen     Tablet .. 650 milliGRAM(s) Oral every 6 hours PRN  albuterol    90 MICROgram(s) HFA Inhaler 2 Puff(s) Inhalation every 6 hours PRN  artificial tears (preservative free) Ophthalmic Solution 1 Drop(s) Both EYES two times a day  aspirin enteric coated 81 milliGRAM(s) Oral daily  atorvastatin 40 milliGRAM(s) Oral at bedtime  budesonide 160 MICROgram(s)/formoterol 4.5 MICROgram(s) Inhaler 2 Puff(s) Inhalation two times a day  enoxaparin Injectable 40 milliGRAM(s) SubCutaneous every 24 hours  levothyroxine 37.5 MICROGram(s) Oral daily  lisinopril 20 milliGRAM(s) Oral daily  magnesium oxide 400 milliGRAM(s) Oral at bedtime  metoprolol tartrate 50 milliGRAM(s) Oral two times a day  polyethylene glycol 3350 17 Gram(s) Oral daily  prasugrel 10 milliGRAM(s) Oral daily  tiotropium 2.5 MICROgram(s) Inhaler 2 Puff(s) Inhalation daily      Vital Signs Last 24 Hrs  T(C): 36.6 (19 Nov 2023 09:06), Max: 37.1 (19 Nov 2023 07:00)  T(F): 97.8 (19 Nov 2023 09:06), Max: 98.8 (19 Nov 2023 07:00)  HR: 74 (19 Nov 2023 09:06) (70 - 93)  BP: 135/76 (19 Nov 2023 09:06) (134/79 - 151/97)  BP(mean): --  RR: 17 (19 Nov 2023 09:06) (16 - 18)  SpO2: 97% (19 Nov 2023 09:06) (97% - 99%)    Parameters below as of 19 Nov 2023 09:06  Patient On (Oxygen Delivery Method): room air        LABS:                PHYSICAL EXAM:     NEUROSURGERY CONSULT    HPI: 71 year old male with PMHx of COPD on home O2 (2-3 L/NC), HTN, known AAA, PAD, hyperlipidemia and alcohol use disorder complicated with liver cirrhosis (quit 1yr ago) who was told to come to ER for abnormal labs (elevated CO2 to 42 on recent labs), felt to be secondary to acute on chronic hypercapnic respiratory failure due to likely COPD exacerbation.    Neurosurgery consulted today for CT Lumbar spine showing degenerative disc disease. Patient with complaints of b/l leg pain/radicular pains    RADIOLOGY:   < from: CT Lumbar Spine No Cont (11.16.23 @ 17:08) >    FINDINGS:  VERTEBRAL BODIES AND DISCS:  Normal.  ALIGNMENT:  No subluxations.  L1-L2 LEVEL:  Normal.  L2-L3 LEVEL:  Normal.  L3-L4 LEVEL:  Disc bulge with ligamentous and facet hypertrophic change   and a mild degree of central stenosis.  L4-L5 LEVEL:  Disc bulge with ligamentous and facet joint hypertrophic   change and a mild degree of central stenosis.  L5-S1 LEVEL:  Right neural foraminal narrowing greater than left at   L5/S1. Disc bulge. Vacuum disc phenomenon L5-S1  SPINAL CANAL:  No other intradural or extradural defects are seen.  MISCELLANEOUS: There is evidence of an abdominal aortic aneurysm that is   incompletely visualized on this study. This is described on the patient's   prior abdominal CT as well.    IMPRESSION:  Mild degenerative changes. No evidence of significant   stenosis.    MEDS:  acetaminophen     Tablet .. 650 milliGRAM(s) Oral every 6 hours PRN  albuterol    90 MICROgram(s) HFA Inhaler 2 Puff(s) Inhalation every 6 hours PRN  artificial tears (preservative free) Ophthalmic Solution 1 Drop(s) Both EYES two times a day  aspirin enteric coated 81 milliGRAM(s) Oral daily  atorvastatin 40 milliGRAM(s) Oral at bedtime  budesonide 160 MICROgram(s)/formoterol 4.5 MICROgram(s) Inhaler 2 Puff(s) Inhalation two times a day  enoxaparin Injectable 40 milliGRAM(s) SubCutaneous every 24 hours  levothyroxine 37.5 MICROGram(s) Oral daily  lisinopril 20 milliGRAM(s) Oral daily  magnesium oxide 400 milliGRAM(s) Oral at bedtime  metoprolol tartrate 50 milliGRAM(s) Oral two times a day  polyethylene glycol 3350 17 Gram(s) Oral daily  prasugrel 10 milliGRAM(s) Oral daily  tiotropium 2.5 MICROgram(s) Inhaler 2 Puff(s) Inhalation daily      Vital Signs Last 24 Hrs  T(C): 36.6 (19 Nov 2023 09:06), Max: 37.1 (19 Nov 2023 07:00)  T(F): 97.8 (19 Nov 2023 09:06), Max: 98.8 (19 Nov 2023 07:00)  HR: 74 (19 Nov 2023 09:06) (70 - 93)  BP: 135/76 (19 Nov 2023 09:06) (134/79 - 151/97)  BP(mean): --  RR: 17 (19 Nov 2023 09:06) (16 - 18)  SpO2: 97% (19 Nov 2023 09:06) (97% - 99%)    Parameters below as of 19 Nov 2023 09:06  Patient On (Oxygen Delivery Method): room air      PE:   AOx3, Following Commands, Face symmetrical  EOMI, PERRL, CN 2-12 intact   Motor:          Upper extremity                           Delt      Bicep     Tricep     HG                                                 L                5/5        5/5          5/5        5/5                                               R                 5/5       5/5          5/5        5/5            Lower extremity                           HF          KF         KE         DF        PF                                                  L                 5/5         5/5        5/5       5/5        5/5                                               R                5/5         5/5        5/5       5/5        5/5  Sensation intact to light touch    No clonus  No Hoffmans

## 2023-11-20 ENCOUNTER — TRANSCRIPTION ENCOUNTER (OUTPATIENT)
Age: 68
End: 2023-11-20

## 2023-11-20 VITALS — OXYGEN SATURATION: 98 % | HEART RATE: 73 BPM

## 2023-11-20 RX ORDER — BUDESONIDE AND FORMOTEROL FUMARATE DIHYDRATE 160; 4.5 UG/1; UG/1
2 AEROSOL RESPIRATORY (INHALATION)
Qty: 1 | Refills: 0
Start: 2023-11-20 | End: 2023-12-19

## 2023-11-20 RX ORDER — BENZOCAINE AND MENTHOL 5; 1 G/100ML; G/100ML
1 LIQUID ORAL EVERY 4 HOURS
Refills: 0 | Status: DISCONTINUED | OUTPATIENT
Start: 2023-11-20 | End: 2023-11-20

## 2023-11-20 RX ORDER — TIOTROPIUM BROMIDE 18 UG/1
2 CAPSULE ORAL; RESPIRATORY (INHALATION)
Qty: 1 | Refills: 0
Start: 2023-11-20 | End: 2023-12-19

## 2023-11-20 RX ORDER — ALBUTEROL 90 UG/1
2 AEROSOL, METERED ORAL
Qty: 1 | Refills: 0
Start: 2023-11-20 | End: 2023-12-19

## 2023-11-20 RX ORDER — CYCLOBENZAPRINE HYDROCHLORIDE 10 MG/1
1 TABLET, FILM COATED ORAL
Qty: 90 | Refills: 0
Start: 2023-11-20 | End: 2023-12-19

## 2023-11-20 RX ORDER — CYCLOBENZAPRINE HYDROCHLORIDE 10 MG/1
5 TABLET, FILM COATED ORAL THREE TIMES A DAY
Refills: 0 | Status: DISCONTINUED | OUTPATIENT
Start: 2023-11-20 | End: 2023-11-20

## 2023-11-20 RX ADMIN — Medication 37.5 MICROGRAM(S): at 06:10

## 2023-11-20 RX ADMIN — BENZOCAINE AND MENTHOL 1 LOZENGE: 5; 1 LIQUID ORAL at 11:27

## 2023-11-20 RX ADMIN — LISINOPRIL 20 MILLIGRAM(S): 2.5 TABLET ORAL at 06:13

## 2023-11-20 RX ADMIN — BUDESONIDE AND FORMOTEROL FUMARATE DIHYDRATE 2 PUFF(S): 160; 4.5 AEROSOL RESPIRATORY (INHALATION) at 09:46

## 2023-11-20 RX ADMIN — PRASUGREL 10 MILLIGRAM(S): 5 TABLET, FILM COATED ORAL at 11:27

## 2023-11-20 RX ADMIN — Medication 50 MILLIGRAM(S): at 09:38

## 2023-11-20 RX ADMIN — TIOTROPIUM BROMIDE 2 PUFF(S): 18 CAPSULE ORAL; RESPIRATORY (INHALATION) at 09:45

## 2023-11-20 RX ADMIN — Medication 81 MILLIGRAM(S): at 11:27

## 2023-11-20 NOTE — DISCHARGE NOTE PROVIDER - NSDCCAREPROVSEEN_GEN_ALL_CORE_FT
Bhanu Brown Hoorbod Delshadfar  Hoorbod Delshadfar  Hoorbod Delshadfar  Heidi Burgess  Ordering Physician  Vaughn Warren  Team [facility] Medicine ACP      [ Greater than 35 min spent for discharge services.   JACQUELINE Brown ]       ( Note written / Date of service is the same as last day of patient stay  in the hospital ( for billing purposes)))

## 2023-11-20 NOTE — DISCHARGE NOTE PROVIDER - HOSPITAL COURSE
Mr. Browning is a 71 year old male with PMHx of COPD on home O2 (2-3 L/NC), HTN, known AAA, PAD, hyperlipidemia and alcohol use disorder complicated with liver cirrhosis (quit 1yr ago) who was told to come to ER for abnormal labs (elevated CO2 to 42 on recent labs). Denies chest pain. No recent fever or chills. No nausea or vomiting or abdominal pain. Normal BM's, last 2 days PTA. No urinary symptoms at present time but completed course of Abx for UTI recently (few days ago). Chronic LE pain. MICU consulted in ER   for hypercapnia, patient deemed no a MICU candidate and was started on BIPAP, weaned to AVAPs  qhs. Cardiology consulted - Dr. Simms  cardiac stable, SOB i/s/o COPD exac. echo mild lv dysfunction, normal rv fxn, mild-mod ac. cw gdmt for lv dysfunction, cw antiplatelets, statin and antianginals. ICD interrogated and showed no events. CT angio abd. showed partially thrombosed infrarenal abdominal aortic aneurysm measures 5.0 x 5.8 x 12.3 cm (TRV x AP x CC), previously 5.3 x 4.8 x 11.5 cm (12/22). Vascular was consulted and no acute vascular surgical intervention. Pt also complaining of chronic, lower back pain.  Neurosurgery consulted for CT Lumbar spine showing degenerative disc disease. Unable to obtain MRI due to AICD not MRI compatible,  pt started on Flexeril 5 mg TID PRN for muscle spasm. cardiac stable. AVAPS not approved-insurance company recommending BIPAP w/backup rate, approved.          Discussed with Dr. Brown, pt medically optimized for discharge home on 11/20/2023.

## 2023-11-20 NOTE — DISCHARGE NOTE PROVIDER - NSDCFUSCHEDAPPT_GEN_ALL_CORE_FT
[facility]  HEPATOLOGY [address] [place] [address]   Scheduled Appointment: 02/01/2024     Mercy Warren  [facility] Physician [facility]  PULMMED [phone] [facility]  Scheduled Appointment: 12/21/2023    [facility]  [facility]  HEPATOLOGY [address] [place] [address]   Scheduled Appointment: 02/01/2024

## 2023-11-20 NOTE — PROVIDER CONTACT NOTE (OTHER) - NAME OF MD/NP/PA/DO NOTIFIED:
Gerhard Rosales
Mary Jo Gallego
Maurice Wilson
Erin GARCIA
Michela Dangelo
Romelia Valdes, PA
Emmie Soliman
RADHA zamorano
Deidre Brewer [facility] [id number]
Fer Stene
Peyman Khan
acp alysia barnhart
Gerhard Rosales

## 2023-11-20 NOTE — DISCHARGE NOTE NURSING/CASE MANAGEMENT/SOCIAL WORK - PATIENT PORTAL LINK FT
You can access the FollowMyHealth Patient Portal offered by [facility] by registering at the following website: [email or web address]. By joining [facility]’s FollowMyHealth portal, you will also be able to view your health information using other applications (apps) compatible with our system.

## 2023-11-20 NOTE — PROGRESS NOTE ADULT - PROVIDER SPECIALTY LIST ADULT
Cardiology
Internal Medicine
Pulmonology
Pulmonology
Vascular Surgery
Cardiology
Internal Medicine
Pulmonology
Cardiology
Internal Medicine
Pulmonology
Pulmonology
Internal Medicine
Vascular Surgery

## 2023-11-20 NOTE — PROGRESS NOTE ADULT - SUBJECTIVE AND OBJECTIVE BOX
Cardiovascular Disease Progress Note    Overnight events: No acute events overnight.  no new cardiac sx  Otherwise review of systems negative    Objective Findings:  T(C): 36.3 (11-20-23 @ 06:05), Max: 37 (11-19-23 @ 21:01)  HR: 60 (11-20-23 @ 06:05) (60 - 74)  BP: 160/93 (11-20-23 @ 06:05) (128/77 - 160/93)  RR: 18 (11-20-23 @ 06:05) (17 - 18)  SpO2: 100% (11-20-23 @ 06:05) (96% - 100%)  Wt(kg): --  Daily     Daily       Physical Exam:  Gen: NAD  HEENT: EOMI  CV: RRR, normal S1 + S2, no m/r/g  Lungs: CTAB  Abd: soft, non-tender  Ext: No edema    Telemetry:    Laboratory Data:                    Inpatient Medications:  MEDICATIONS  (STANDING):  artificial tears (preservative free) Ophthalmic Solution 1 Drop(s) Both EYES two times a day  aspirin enteric coated 81 milliGRAM(s) Oral daily  atorvastatin 40 milliGRAM(s) Oral at bedtime  budesonide 160 MICROgram(s)/formoterol 4.5 MICROgram(s) Inhaler 2 Puff(s) Inhalation two times a day  enoxaparin Injectable 40 milliGRAM(s) SubCutaneous every 24 hours  levothyroxine 37.5 MICROGram(s) Oral daily  lisinopril 20 milliGRAM(s) Oral daily  magnesium oxide 400 milliGRAM(s) Oral at bedtime  metoprolol tartrate 50 milliGRAM(s) Oral two times a day  polyethylene glycol 3350 17 Gram(s) Oral daily  prasugrel 10 milliGRAM(s) Oral daily  tiotropium 2.5 MICROgram(s) Inhaler 2 Puff(s) Inhalation daily      Assessment:  71M PMHx COPD on home O2 2 L nasal cannula (~45pack year hx, quit 4 years ago), hypertension, known AAA - being followed by Dr. Joaquin, PAD, alcohol abuse complicated with liver cirrhosis, recently treated for a UTI, as per patient was referred to the emergency department by his primary care physician Dr. Remington Simms for abnormal labs. Patient reports exacerbation of his chronic low back pain. C/o b/l claudication from buttocks down at distances <50 feet. He has history of PAD s/p RLE bypass (?failed). Denies rest pain and poor wound healing. Denies abd pain, pulsatile mass, tearing pain.       Recs:  cardiac stable  dyspnea likely in setting of copd-e. no e/o chf  avaps per pulm  vol status stable. diuretics prn to maintain euvolemic  echo mild lv dysfunction, normal rv fxn, mild-mod as  cw gdmt for lv dysfunction  cw antiplatelets, statin and antianginals  f/u vascular surgery re partially thrombosed AAA and LE PAD  pulmonary f/u  icd interrogation --> no events  dcp pending home medical ventillator/insurance approval         Over 25 minutes spent on total encounter; more than 50% of the visit was spent counseling and/or coordinating care by the attending physician.      Vaughn Simms MD   Cardiovascular Disease  [phone]

## 2023-11-20 NOTE — PROVIDER CONTACT NOTE (OTHER) - REASON
Patient refused to have IV access
BP outside parameters
Pt refused IV access
Pt refusing new IV access
Pt refusing to wear  during the day
Pt HR 48-49 on  machine
Pt refused AM blood work and still does not have access
heart in the 40's when sleeping
Pt refused 
patient has no IV access, refused to get one inserted.
Pt has no IV access, multiple attempts tried.
patient has spasms b/l legs. patient stated he had it before, but it get worse now.
patient's HR is dropping down to low 40s

## 2023-11-20 NOTE — DISCHARGE NOTE PROVIDER - NSDCCPCAREPLAN_GEN_ALL_CORE_FT
PRINCIPAL DISCHARGE DIAGNOSIS  Diagnosis: COPD exacerbation  Assessment and Plan of Treatment: You presented to the ER with  Shortness of breath, which was due to your COPD. Please continue to take your inhalers ( Symbicort and Spiriva as prescribed. Your insurance approved a BIPAP machine. Please continue to use your BIPAP at night as prescribed.  Your CT scan of the chest revealed a left upper lobe 4 mm nodule. Please follow up with your outpatient pulmonologist.         SECONDARY DISCHARGE DIAGNOSES  Diagnosis: H/O abdominal aortic aneurysm  Assessment and Plan of Treatment: Your CT Abdomen showed a partially thrombosed infrarenal abdominal aortic aneurysm measures 5.0 x 5.8 x 12.3 cm (TRV x AP x CC), previously 5.3 x 4.8 x 11.5 cm (12/22). Aneurysm sac measuring 5.6cm on duplex. Vascular surgery consulted you while admitted and no surgical intervention was recommended at this time. Please continue to follow with Dr. Joaquin for further managament.

## 2023-11-20 NOTE — PROVIDER CONTACT NOTE (OTHER) - BACKGROUND
copd exacerbation
Pt admitted for COPD exacerbation
Pt admitted for COPD with acute exacerbation
68M admitted with COPD with acute exacerbation
copd exacerbation
copd exacerbation
pt admitted for COPD exacerbation
pt admitted for COPD exacerbation
70 y/o male admitted on 10/28/23 for COPD with acute exacerbation
Pt admitted for COPD exacerbation
copd exacerbation
copd exacerbation

## 2023-11-20 NOTE — PROGRESS NOTE ADULT - REASON FOR ADMISSION
Told to come for abnormal labs

## 2023-11-20 NOTE — PROVIDER CONTACT NOTE (OTHER) - DATE AND TIME:
13-Nov-2023 07:12
16-Nov-2023 07:33
08-Nov-2023 16:25
01-Nov-2023 02:35
13-Nov-2023 10:00
13-Nov-2023 21:00
14-Nov-2023 14:59
20-Nov-2023 06:18
31-Oct-2023 02:05
09-Nov-2023 01:50
02-Nov-2023 09:30
12-Nov-2023 21:45
14-Nov-2023 21:20

## 2023-11-20 NOTE — PROVIDER CONTACT NOTE (OTHER) - ACTION/TREATMENT ORDERED:
Provider made aware. No further orders at this time.
Provider made aware.
continue to monitor
provider notified, no further actions
provider made aware, no further orders at this time.
provider made aware, no further orders at this time.
provider made aware, will order lab work
provider made aware. no new orders.
Provider made aware
provider made aware EKG, EKG shows sinus kwaku. provider recommending patient be on tele however 8s is not a tele unit. bedboard aware, pt awaiting tele bed. no active tele orders at this time.
Provider made aware
Provider made aware. No new orders at this time. Provider states will follow up with day team.
provider made aware, no further orders at this time.

## 2023-11-20 NOTE — DISCHARGE NOTE PROVIDER - NSDCCPTREATMENT_GEN_ALL_CORE_FT
PRINCIPAL PROCEDURE  Procedure: CT angio abdomen  Findings and Treatment: CT Angio Abdomen showed Partially thrombosed infrarenal abdominal aortic aneurysm measures 5.0 x 5.8 x 12.3 cm (TRV x AP x CC), previously 5.3 x 4.8 x 11.5 cm (12/22). Aneurysm sac measuring 5.6cm on duplex. Abdomen soft, patient hemodynamically stable. Patient's reported leg pain more consistent with neurogenic pain, no signs concerning for LE ischemia on exam.         SECONDARY PROCEDURE  Procedure: CT chest w con  Findings and Treatment: Small low density layering material in the   trachea, bilateral mainstem bronchi and bronchus intermedius. Emphysema.   Bilateral subcentimeter pulmonary nodules. A left upper lobe 4 mm nodule   (301:54) appears new when compared to prior exam.

## 2023-11-20 NOTE — DISCHARGE NOTE PROVIDER - CARE PROVIDER_API CALL
Ricci Joaquin)  Vascular Surgery  [address] [facility], [address]  [place] [address]  Phone: [phone]  Fax: [phone]  Follow Up Time:

## 2023-11-20 NOTE — PROGRESS NOTE ADULT - ASSESSMENT
_________________________________________________________________________________________  ========>>  M E D I C A L   A T T E N D I N G    F O L L O W  U P  N O T E  <<=========  -----------------------------------------------------------------------------------------------------    - Patient seen and examined by me earlier today.   - Patient today overall doing ok, comfortable, eating OK. breathing ok      no new events.. awaiting to go home.. pending respiratory machine     ==================>> REVIEW OF SYSTEM <<=================    GEN: no fever, no chills, no pain  RESP: no significant SOB at rest on O2 as needed , Occasional cough / sputum  CVS: no chest pain, no palpitations  GI: no abdominal pain, no nausea  : no dysuria, no frequency  Neuro: no headache, no dizziness    ==================>> PHYSICAL EXAM <<=================    GEN: A&O X 3 , NAD , comfortable, pleasant, calm , Sitting in bed  HEENT: NCAT, PERRL, MMM, hearing intact., O2 via NC (on and off / as needed )  CVS: S1S2 , regular , No M/R/G appreciated  PULM: decreased BS bilaterally, no wheezing noted   ABD.: soft. non tender, non distended,  bowel sounds present  Extrem: intact pulses , no edema        ( Note written / Date of service 11-20-23 ( This is certified to be the same as "ENTERED" date above ( for billing purposes)))    ==================>> MEDICATIONS <<====================    artificial tears (preservative free) Ophthalmic Solution 1 Drop(s) Both EYES two times a day  aspirin enteric coated 81 milliGRAM(s) Oral daily  atorvastatin 40 milliGRAM(s) Oral at bedtime  budesonide 160 MICROgram(s)/formoterol 4.5 MICROgram(s) Inhaler 2 Puff(s) Inhalation two times a day  enoxaparin Injectable 40 milliGRAM(s) SubCutaneous every 24 hours  levothyroxine 37.5 MICROGram(s) Oral daily  lisinopril 20 milliGRAM(s) Oral daily  magnesium oxide 400 milliGRAM(s) Oral at bedtime  metoprolol tartrate 50 milliGRAM(s) Oral two times a day  polyethylene glycol 3350 17 Gram(s) Oral daily  prasugrel 10 milliGRAM(s) Oral daily  tiotropium 2.5 MICROgram(s) Inhaler 2 Puff(s) Inhalation daily    MEDICATIONS  (PRN):  acetaminophen     Tablet .. 650 milliGRAM(s) Oral every 6 hours PRN Temp greater or equal to 38C (100.4F), Mild Pain (1 - 3)  albuterol    90 MICROgram(s) HFA Inhaler 2 Puff(s) Inhalation every 6 hours PRN Shortness of Breath and/or Wheezing  benzocaine/menthol Lozenge 1 Lozenge Oral every 4 hours PRN Sore Throat  cyclobenzaprine 5 milliGRAM(s) Oral three times a day PRN Muscle Spasm    ==================>> VITAL SIGNS <<==================    Vital Signs Last 24 HrsT(C): 36.9 (11-20-23 @ 09:30)  T(F): 98.5 (11-20-23 @ 09:30), Max: 98.6 (11-19-23 @ 21:01)  HR: 76 (11-20-23 @ 10:35) (60 - 76)  BP: 125/80 (11-20-23 @ 09:30)  RR: 17 (11-20-23 @ 09:30) (17 - 18)  SpO2: 98% (11-20-23 @ 10:35) (96% - 100%)       ==================>> LAB AND IMAGING <<==================    no labs today       < from: CT Lumbar Spine No Cont (11.16.23 @ 17:08) >  IMPRESSION:  Mild degenerative changes. No evidence of significant stenosis.  < end of copied text >    < from: TTE W or WO Ultrasound Enhancing Agent (10.30.23 @ 15:17) >  CONCLUSIONS:    1. Left ventricular wall thickness is normal. Left ventricular systolic function is mildly decreased with an ejection fraction of 49 % by Vazquez's method of disks. Regional wall motion abnormalities present.   2. There is hypokinesis of the apical and apical septal walls.   3. Normal right ventricular cavity size and systolic function.   4. The aortic valve appears trileaflet with reduced systolic excursion. There is mild to moderate aortic stenosis. The peak transaortic velocity is 2.58 m/s, peak transaortic gradient is 26.6 mmHg and mean transaortic gradient is 12.0 mmHg with an LVOT/aortic valve VTI ratio of 0.45. The aortic valve area is estimated at 1.41 cm² by the continuity equation. There is trace aortic regurgitation.   5. Normal atria.   6. The inferior vena cava is normal in size measuring 1.62 cm in diameter, (normal <2.1cm) with normal inspiratory collapse (normal >50%) consistent with normal right atrial pressure (~3, range 0-5mmHg).   7. No pericardial effusion seen.  < end of copied text >    ___________________________________________________________________________________  ===============>>  A S S E S S M E N T   A N D   P L A N <<===============  ------------------------------------------------------------------------------------------    · Assessment	  71 year old male with PMHx of COPD on home O2 (2-3 L/NC), HTN, known AAA, PAD, hyperlipidemia and alcohol use disorder complicated with liver cirrhosis (quit 1yr ago) who was told to come to ER for abnormal labs (elevated CO2 to 42 on recent labs), felt to be secondary to acute on chronic hypercapnic respiratory failure due to likely COPD exacerbation.    Problem/Plan - 1:  ·  Problem: Acute on chronic respiratory failure with hypercapnia.   - Being treated for likely COPD exacerbation  - Pulmonary follow-up, management and optimization appreciated  - AVAPS::  will need to go home with per pulm >> awaiting insurance   - Has a left upper lobe 4 mm nodule, to be f/up as outpatient    Problem/Plan - 2:  ·  Problem: Hypertension.   - C/w home meds  - Echocardiogram as above     Further management and optimization as per cardiology given above Abnormalities    Problem/Plan - 3:  ·  Problem: hyponatremia >> improved / stable   diet changed  patient eating well  free fluid restriction   monitor BMP >> reordered for AM   magnesium supplements as needed for cramping     Problem/Plan - 4:  ·  Problem: AAA (abdominal aortic aneurysm).   - CTA Ao shows-Increased size of partially thrombosed infrarenal abdominal aortic aneurysm measuring up to 5.8 cm in greatest axial dimension. Crescentic area of mild relative hyperattenuation within the thrombus along the anterior aspect of the aneurysm at the level of the kidneys suspicious for hemorrhage/leakage into the thrombus. No active extravasation identified  - Seen by Vascular, no intervention at this time planned   - BP control   - C/w ASA/prasugrel/statin  - Follow up with Dr. Joaquin as outpatient on 10/30/23 per team   - Cardio on board    - Patient with symptoms, likely from claudication >> Need close vascular follow-up as discussed      Encouraged increased activity as able    Problem/Plan - 5:  ·  Problem: Leg pain and back pain  ·  Plan: Patient with chronic pain in his legs with cramping of the thighs.  >> patient states vascular did not thing this was circulatory symptoms       abnormal CT L spine as noted..         patient requesting further evaluation >>     >> Ortho/spine noted !   patient to increase activity as able and follow up with vascular as outpatient    Problem/Plan - 6:  ·  Problem: Prophylactic measure.   ·  Plan: LMWH  PT  // OOB to chair daily / increase activity as able     --------------------------------------------  Case discussed with patient, Nurse Practitioner   Education given on findings and plan of care  ___________________________  HKrupa Brown D.O.  Pager: [phone]

## 2023-11-20 NOTE — DISCHARGE NOTE PROVIDER - NSDCMRMEDTOKEN_GEN_ALL_CORE_FT
albuterol 90 mcg/inh inhalation aerosol: 2 puff(s) inhaled every 6 hours, As Needed -Shortness of Breath and/or Wheezing - for shortness of breath and/or wheezing   Aspirin Enteric Coated 81 mg oral delayed release tablet: 1 tab(s) orally once a day  atorvastatin 40 mg oral tablet: 1 tab(s) orally once a day  BIPAP Nocturnally: BIPAP IPAP 15 EPAP 10 with backup rate of 10, FIO2 40% MDD: 1  levothyroxine 75 mcg (0.075 mg) oral tablet: 0.5 tab(s) orally once a day  lisinopril 20 mg oral tablet: 1 tab(s) orally once a day  Metoprolol Tartrate 50 mg oral tablet: 1 tab(s) orally 2 times a day  prasugrel 10 mg oral tablet: 1 tab(s) orally once a day   albuterol 90 mcg/inh inhalation aerosol: 2 puff(s) inhaled every 6 hours as needed for Shortness of Breath and/or Wheezing  albuterol 90 mcg/inh inhalation aerosol: 2 puff(s) inhaled every 6 hours, As Needed -Shortness of Breath and/or Wheezing - for shortness of breath and/or wheezing   Aspirin Enteric Coated 81 mg oral delayed release tablet: 1 tab(s) orally once a day  atorvastatin 40 mg oral tablet: 1 tab(s) orally once a day  BIPAP Nocturnally: BIPAP IPAP 15 EPAP 10 with backup rate of 10, FIO2 40% MDD: 1  budesonide-formoterol 160 mcg-4.5 mcg/inh inhalation aerosol: 2 puff(s) inhaled 2 times a day  cyclobenzaprine 5 mg oral tablet: 1 tab(s) orally 3 times a day as needed for Muscle Spasm MDD: 3 tabs  levothyroxine 75 mcg (0.075 mg) oral tablet: 0.5 tab(s) orally once a day  lisinopril 20 mg oral tablet: 1 tab(s) orally once a day  Metoprolol Tartrate 50 mg oral tablet: 1 tab(s) orally 2 times a day  prasugrel 10 mg oral tablet: 1 tab(s) orally once a day  tiotropium 2.5 mcg/inh inhalation aerosol: 2 puff(s) inhaled once a day

## 2023-11-20 NOTE — PROVIDER CONTACT NOTE (OTHER) - ASSESSMENT
Pt refusing new IV access. Pt currently not receiving any medication that would require IV access.
pt is aox4, vital signs stable, not getting any IV medications
Patient asleep and his HR is dropping down to 43-45, when patient wake sup, HR goes up to 48-51
Pt on  machine as ordered. HR resting bt 48 and 49. Pt resting in bed with BiPap machine on. Denies any dizziness, fatigue, weakness.
Pt a&o x4, no s/s of respiratory distress, last o2 sat 100% on 2L o2 via NC.
patient has spasms b/l legs. patient stated he had it before, but it get worse now.
pt assymptomatic
pt is aox4, vital signs stable, not getting any IV medications
Pt A&Ox4. NAD.
VS in flow sheet, pt denies discomfort
patient does not want IV access and refused
pt is aox4, vital signs stable, not getting any IV medications
patient has no IV access, refused to get one inserted.

## 2023-11-20 NOTE — DISCHARGE NOTE PROVIDER - PROVIDER RX CONTACT NUMBER
Chief Complaint:   Chief Complaint   Patient presents with   • Follow-up       HPI:    Masha Forde is a 63 y.o. female here for follow-up of severe sleep apnea.  Patient was last seen 1/10/2019.  Patient states she is doing very well with CPAP therapy and has been using for approximately 20 years.  Patient is sleeping 7 to 8 hours nightly and does feel rested upon awakening.  Patient denies excessive daytime sleepiness.  Patient has an Perley score of 6/24.  Patient has no concerns or complaints today and wishes to continue CPAP.        Current medications are:   Current Outpatient Medications:   •  ACCU-CHEK CINDY PLUS test strip, TEST THREE TIMES DAILY, Disp: 300 each, Rfl: 3  •  allopurinol (ZYLOPRIM) 300 MG tablet, Take 300 mg by mouth Daily., Disp: , Rfl:   •  atorvastatin (LIPITOR) 10 MG tablet, Take 1 tablet by mouth Daily., Disp: 90 tablet, Rfl: 3  •  Blood Glucose Monitoring Suppl (ACCU-CHEK CINDY PLUS) W/DEVICE kit, TEST THREE TIMES DAILY, Disp: 1 kit, Rfl: 0  •  insulin aspart protamine & aspart (NOVOLOG) 70/30 100 unit/mL, 10 units with breakfast, 30 units with lunch, 58 units with supper, Disp: 30 pen, Rfl: 3  •  Insulin Pen Needle (BD PEN NEEDLE BYRON U/F) 32G X 4 MM misc, Inject 1 each under the skin into the appropriate area as directed 3 (Three) Times a Day Before Meals., Disp: 300 each, Rfl: 3  •  Lancets (ACCU-CHEK MULTICLIX) lancets, TEST THREE TIMES DAILY, Disp: 300 each, Rfl: 3  •  losartan (COZAAR) 50 MG tablet, Take 1 tablet by mouth Daily., Disp: 90 tablet, Rfl: 3  •  meloxicam (MOBIC) 15 MG tablet, 1 PO Daily with food., Disp: 60 tablet, Rfl: 0  •  metFORMIN ER (GLUCOPHAGE-XR) 500 MG 24 hr tablet, Take 1 tablet by mouth 2 (Two) Times a Day With Meals., Disp: 180 tablet, Rfl: 3.      The patient's relevant past medical, surgical, family and social history were reviewed and updated in Epic as appropriate.       Review of Systems   Eyes: Positive for visual disturbance.   Respiratory:  Positive for apnea.    Cardiovascular: Positive for leg swelling.   Gastrointestinal:        Heartburn   Musculoskeletal: Positive for arthralgias, back pain, gait problem, joint swelling and myalgias.   Allergic/Immunologic: Positive for food allergies.   Psychiatric/Behavioral: Positive for sleep disturbance.   All other systems reviewed and are negative.        Objective:    Physical Exam   Constitutional: She is oriented to person, place, and time. She appears well-developed and well-nourished.   HENT:   Head: Normocephalic and atraumatic.   Mouth/Throat: Oropharynx is clear and moist.   Class one airway   Eyes: Conjunctivae are normal.   Neck: Neck supple.   Cardiovascular: Normal rate and regular rhythm.   Pulmonary/Chest: Effort normal and breath sounds normal.   Neurological: She is alert and oriented to person, place, and time.   Skin: Skin is warm and dry.   Psychiatric: She has a normal mood and affect. Her behavior is normal. Judgment and thought content normal.   Nursing note and vitals reviewed.    88/90 days of use.  Greater than 4-hour use 94.4%.  90% pressure 9.1.  AHI of 2.5.  Download reviewed with patient.    ASSESSMENT/PLAN    Masha was seen today for follow-up.    Diagnoses and all orders for this visit:    JEANNETTE (obstructive sleep apnea)  -     CPAP Therapy            1. Counseled patient regarding multimodal approach with healthy nutrition, healthy sleep, regular physical activity, social activities, counseling, and medications. Encouraged to practice lateral  sleep position. Avoid alcohol and sedatives close to bedtime.  2.   Refill supplies x1 year.  Return to clinic 1 year or sooner symptoms warrant.  I have reviewed the results of my evaluation and impression and discussed my recommendations in detail with the patient.      Signed by  MIGUE Monet    February 28, 2020      CC: Danae Willingham MD          No ref. provider found       [phone]

## 2023-11-20 NOTE — PROVIDER CONTACT NOTE (OTHER) - SITUATION
BP outside parameters; 160/93
Pt refusing to wear  during the day
patient has no IV access, refused to get one inserted.
Pt refused 
patient refused to have IV access
Pt refused IV access
patient has spasms b/l legs. patient stated he had it before, but it get worse now.
Pt has no IV access, multiple attempts tried.
Pt on  machine as ordered. HR resting bt 48 and 49.
Pt refusing new IV access
Patient asleep and his HR is dropping down to 43-45, when patient wake sup, HR goes up to 48-51
Pt refused AM blood work and still does not have access
heart in the 40's when sleeping

## 2023-12-21 ENCOUNTER — APPOINTMENT (OUTPATIENT)
Dept: PULMONOLOGY | Facility: CLINIC | Age: 68
End: 2023-12-21
Payer: MEDICARE

## 2023-12-21 VITALS
TEMPERATURE: 97.9 F | OXYGEN SATURATION: 85 % | DIASTOLIC BLOOD PRESSURE: 92 MMHG | HEART RATE: 83 BPM | SYSTOLIC BLOOD PRESSURE: 162 MMHG | BODY MASS INDEX: 21.07 KG/M2 | HEIGHT: 68 IN | WEIGHT: 139 LBS

## 2023-12-21 DIAGNOSIS — J44.9 CHRONIC OBSTRUCTIVE PULMONARY DISEASE, UNSPECIFIED: ICD-10-CM

## 2023-12-21 PROCEDURE — 94010 BREATHING CAPACITY TEST: CPT

## 2023-12-21 PROCEDURE — 99214 OFFICE O/P EST MOD 30 MIN: CPT | Mod: 25

## 2023-12-21 PROCEDURE — ZZZZZ: CPT

## 2023-12-22 PROBLEM — J44.9 CHRONIC OBSTRUCTIVE PULMONARY DISEASE, UNSPECIFIED COPD TYPE: Status: ACTIVE | Noted: 2023-01-17

## 2023-12-22 NOTE — PHYSICAL EXAM
[No Acute Distress] : no acute distress [Well Nourished] : well nourished [Well Developed] : well developed [No Resp Distress] : no resp distress [No Acc Muscle Use] : no acc muscle use [Kyphosis] : kyphosis [Oriented x3] : oriented x3 [TextBox_68] : diminished

## 2023-12-22 NOTE — HISTORY OF PRESENT ILLNESS
[Former] : former [TextBox_4] : 71-year-old male history of COPD on home O2 2 L nasal cannula, hypertension, known AAA, PAD, alcohol abuse complicated with liver cirrhosis,  was [facility] 20/2023 [facility] 11/20 for Acute on chronic respiratory failure with hypercapnia, sent home on bpap, and found to have DENISAH nodule 4mm PE negative/ emphysema  here for pulm care  on symbicort and spiriva voice hoarse on 2L with activity on BPAP + 3L  at night

## 2023-12-22 NOTE — PROCEDURE
[FreeTextEntry1] : 12/2023 spirometry severe obstruction/ restriction  compliance data reviewed : 11/2023-12/2023 % days used :  93 % avg use > 4 hours: 68% avg use: 5 hrs &48  min pressure 15/10 cmh20 avg AHI 6 moderate leak. previous data reviewed:  ACC: [id number] EXAM:  CT ANGIO ABD PELV (W)AW IC   ORDERED BY: JOSÉ MANUEL FRANK   ACC: [id number] EXAM:  CT ANGIO CHEST AORTA WAWIC   ORDERED BY: HOANG SMITH   PROCEDURE DATE:  10/27/2023      INTERPRETATION:  INDICATION: Abdominal aortic aneurysm.  COMPARISON: CTA chest abdomen pelvis 12/15/2022.  CONTRAST/COMPLICATIONS: IV Contrast: Omnipaque 350. 95 cc administered. 5 cc discarded. Oral Contrast: None. Complications: None reported at time of study completion.  PROCEDURE: CT Angiography of the Chest, Abdomen and Pelvis. Precontrast imaging was performed through the chest followed by arterial  phase imaging of the chest, abdomen and pelvis. Sagittal and coronal reformats were performed as well as 3D (MIP)  reconstructions.  FINDINGS: CHEST: LUNGS AND LARGE AIRWAYS: Small low density layering material in the  trachea, bilateral mainstem bronchi and bronchus intermedius. Emphysema.  Bilateral subcentimeter pulmonary nodules. A left upper lobe 4 mm nodule  (301:54) appears new when compared to prior exam. PLEURA: No pleural effusion. VESSELS: No thoracic aortic dissection, aneurysm or intramural hematoma.  Aortic and coronary artery calcifications. HEART: Heart size is normal. No pericardial effusion. AICD lead  terminates in the right ventricle. MEDIASTINUM AND ALANNA: No lymphadenopathy. CHEST WALL AND LOWER NECK: Left anterior chest wall AICD.  ABDOMEN AND PELVIS: LIVER: Within normal limits. BILE DUCTS: Normal caliber. GALLBLADDER: Cholelithiasis. SPLEEN: Within normal limits. PANCREAS: Within normal limits. ADRENALS: Similar indeterminate attenuation right adrenal nodule. Left  adrenal gland is normal. KIDNEYS/URETERS: Symmetric enhancement. No hydronephrosis. 4 mm  nonobstructing right renal calculus. Left renal cortical  scarring/postsurgical change. Left renal cyst.  BLADDER: Within normal limits. REPRODUCTIVE ORGANS: Prostate is enlarged.  BOWEL: No bowel obstruction. Appendix is normal. PERITONEUM: No ascites.  VESSELS: Partially thrombosed infrarenal abdominal aortic aneurysm  measures 5.0 x 5.8 x 12.3 cm (TRV x AP x CC), previously 5.3 x 4.8 x 11.5  cm. Crescentic area of relative hyperattenuation within the thrombus  along the anterior aspect of the aneurysm at the level of the kidneys  (example 301, 153), suspicious for hemorrhage/leakage into thrombus. No  active extravasation identified. Similar ectasia of the proximal common  iliac arteries, up to 1.5 cm on the right. Intact right femoral bypass  graft. Atherosclerotic changes. No abdominal aortic dissection. RETROPERITONEUM/LYMPH NODES: No lymphadenopathy. ABDOMINAL WALL: Within normal limits. BONES: Degenerative changes.  IMPRESSION: No aortic dissection.  Increased size of partially thrombosed infrarenal abdominal aortic  aneurysm measuring up to 5.8 cm in greatest axial dimension. Crescentic  area of mild relative hyperattenuation within the thrombus along the  anterior aspect of the aneurysm at the level of the kidneys suspicious  for hemorrhage/leakage into the thrombus. No active extravasation  identified. Vascular surgery consult is recommended.  --- End of Report ---     TAYLOR GARNER MD; Resident Radiology This document has been electronically signed. MU OLSON MD; Attending Radiologist This document has been electronically signed. Oct 28 2023  1:05AM< from: US Extremity Nonvasc Limited, Right (11.13.23 @ 14:51) > IMPRESSION: Small cystic   and solid nodule without vascularity in the  medial right thigh. The differential includes epidermoid inclusion cyst. < end of copied text >   < from: TTE W or WO Ultrasound Enhancing Agent (10.30.23 @ 15:17) > CONCLUSIONS:   1. Left ventricular wall thickness is normal. Left ventricular systolic function is mildly decreased with an ejection fraction of 49 % by Vazquez's method of disks. Regional wall motion abnormalities present.  2. There is hypokinesis of the apical and apical septal walls.  3. Normal right ventricular cavity size and systolic function.  4. The aortic valve appears trileaflet with reduced systolic excursion. There is mild to moderate aortic stenosis. The peak transaortic velocity is 2.58 m/s, peak transaortic gradient is 26.6 mmHg and mean transaortic gradient is 12.0 mmHg with an LVOT/aortic valve VTI ratio of 0.45. The aortic valve area is estimated at 1.41 cm by the continuity equation. There is trace aortic regurgitation.  5. Normal atria.  6. The inferior vena cava is normal in size measuring 1.62 cm in diameter, (normal <2.1cm) with normal inspiratory collapse (normal >50%) consistent with normal right atrial pressure (~3, range 0-5mmHg).  7. No pericardial effusion seen. < end of copied text >

## 2023-12-28 ENCOUNTER — NON-APPOINTMENT (OUTPATIENT)
Age: 68
End: 2023-12-28

## 2024-01-01 ENCOUNTER — NON-APPOINTMENT (OUTPATIENT)
Age: 69
End: 2024-01-01

## 2024-01-05 ENCOUNTER — APPOINTMENT (OUTPATIENT)
Dept: VASCULAR SURGERY | Facility: CLINIC | Age: 69
End: 2024-01-05

## 2024-02-01 ENCOUNTER — APPOINTMENT (OUTPATIENT)
Dept: HEPATOLOGY | Facility: CLINIC | Age: 69
End: 2024-02-01

## 2024-06-20 NOTE — DIETITIAN INITIAL EVALUATION ADULT - FEEDING ASSISTANCE
[de-identified] : Chief Complaint: Low back and left leg pain   History of Present Illness: 74-year-old female presenting today for follow-up for her low back pain and lumbar radiculopathy.  She is accompanied today by her daughter to assist with interpretation patient is French-speaking. Nando has been suffering from significant low back and left leg pain and during her last visit she was diagnosed with an L5 versus S1 radiculopathy.  We have tried a course of home exercises followed by Celebrex as needed she states that her symptoms are most completely resolved she is having no issues down her leg she is very happy with her current level of pain management very happy with her current level of progress so far she always has no pain down her leg anymore.  Doing her home exercises daily   Past medical history, past surgical history, medications, allergies, social history, and family history are as documented in our records today.  Notable items include: None   Review of Systems: I have reviewed the patient's documented Review of Systems data today, I concur with this documentation.  Please Encourage po intake, assist with meals and menu selections, provide alternatives PRN.

## 2024-06-26 ENCOUNTER — APPOINTMENT (OUTPATIENT)
Dept: CT IMAGING | Facility: IMAGING CENTER | Age: 69
End: 2024-06-26
Payer: MEDICARE

## 2024-06-26 ENCOUNTER — OUTPATIENT (OUTPATIENT)
Dept: OUTPATIENT SERVICES | Facility: HOSPITAL | Age: 69
LOS: 1 days | End: 2024-06-26
Payer: MEDICARE

## 2024-06-26 DIAGNOSIS — Z00.8 ENCOUNTER FOR OTHER GENERAL EXAMINATION: ICD-10-CM

## 2024-06-26 PROCEDURE — 74174 CTA ABD&PLVS W/CONTRAST: CPT | Mod: 26

## 2024-06-26 PROCEDURE — 71260 CT THORAX DX C+: CPT | Mod: 26

## 2024-06-26 PROCEDURE — 71260 CT THORAX DX C+: CPT

## 2024-06-26 PROCEDURE — 74174 CTA ABD&PLVS W/CONTRAST: CPT

## 2024-07-10 ENCOUNTER — APPOINTMENT (OUTPATIENT)
Dept: UROLOGY | Facility: CLINIC | Age: 69
End: 2024-07-10
Payer: MEDICARE

## 2024-07-10 VITALS
SYSTOLIC BLOOD PRESSURE: 155 MMHG | OXYGEN SATURATION: 91 % | HEART RATE: 58 BPM | TEMPERATURE: 97.3 F | DIASTOLIC BLOOD PRESSURE: 98 MMHG

## 2024-07-10 DIAGNOSIS — N13.8 BENIGN PROSTATIC HYPERPLASIA WITH LOWER URINARY TRACT SYMPMS: ICD-10-CM

## 2024-07-10 DIAGNOSIS — N39.0 URINARY TRACT INFECTION, SITE NOT SPECIFIED: ICD-10-CM

## 2024-07-10 DIAGNOSIS — Z87.891 PERSONAL HISTORY OF NICOTINE DEPENDENCE: ICD-10-CM

## 2024-07-10 DIAGNOSIS — N40.1 BENIGN PROSTATIC HYPERPLASIA WITH LOWER URINARY TRACT SYMPMS: ICD-10-CM

## 2024-07-10 PROCEDURE — 99204 OFFICE O/P NEW MOD 45 MIN: CPT

## 2024-07-10 PROCEDURE — G2211 COMPLEX E/M VISIT ADD ON: CPT

## 2024-07-10 RX ORDER — TAMSULOSIN HYDROCHLORIDE 0.4 MG/1
0.4 CAPSULE ORAL
Qty: 30 | Refills: 1 | Status: ACTIVE | COMMUNITY
Start: 2024-07-10 | End: 1900-01-01

## 2024-07-12 NOTE — ED ADULT TRIAGE NOTE - CHIEF COMPLAINT QUOTE
7/12/2024      RE: Keerthi Montoya  [address] [place] [id number]       Dear Colleague,    Thank you for the opportunity to participate in the care of your patient, Keerthi Montoya, at the [facility] [place] at [facility]. Please see a copy of my visit note below.      [place] is a 70 year old female with a  medical history is significant for longstanding hypertension since she was in her teens, diabetes, dyslipidemia, and ulcerative colitis.    Patient reports she developed shingles several weeks ago and with this, developed painful, itchy rash and started having SOB and abdominal bloating after this. She was admitted, and diuresed for almost 15 lbs weight gain.     She is feeling well.  Patient denies dyspnea with any of her ADLs or usual chores. She denies SOB, presyncope, syncope, orthopnea, PND, chest pain, palpitations, abdominal pain, nausea, emesis, LE edema or weight gain. Patient reports compliance with her medications, weighing herself daily, and watching her salt and fluid intake. Weight at home 138 lbs. She has more dryness in the mouth.  systolic at home today. Clinic appts can be stressful. TAVR evaluation.     Current meds:    Torsemide 10 mg- every other day   Hydralazine 50 mg TID  Losartan 100 mg daily  Imdur 30 mg daily  Jardiance 10 mg daily  Atenolol 25 mg daily      ROS:   Constitutional: No fever, chills, or sweats.  ENT: No visual disturbance, ear ache, epistaxis, sore throat.   Allergies/Immunologic: Negative  Respiratory: No cough, hemoptysis.   Cardiovascular: As per HPI.   GI: As per HPI.   : No urinary frequency, dysuria, or hematuria.   Integument: Negative.   Psychiatric: Negative.   Neuro: Negative.   Endocrinology: negative   Musculoskeletal: negative    EXAM:   GEN/CONSTITUIONAL: Appears comfortable, in no apparent distress   EYES: No icterus  ENT/MOUTH: Normal  JVP:  not seen at 90  degrees  RESPIRATORY: Clear to auscultation bilaterally   CARDIOVASCULAR: Regular S1 and S2, 2/6 JUANITA.   ABDOMEN: Soft, non-tender, positive bowel sounds   NEUROLOGIC: Grossly non-focal   PSYCHIATRIC: Normal affect  EXT: no LE edema. Normal pedal pulses.  Skin: No petechiae, purpura or rash       ECHO 8/25  Interpretation Summary   Global and regional left ventricular function is normal with an EF of 60-65%.  Paradoxical septal motion consistent with right ventricular pressure and  volume overload is present.  Moderate to severe right ventricular dilation is present.  Global right ventricular function is moderately to severely reduced.  Calculated aortic valve area in severe AS range. Consider additional  evaluation if indicated.  Moderate to severe tricuspid insufficiency is present.  Dilation of the inferior vena cava is present with abnormal respiratory  variation in diameter.  Trivial pericardial effusion is present.    .  Assessment and Plan:  In summary, 70 year old female with a past medical history of chronic HFpEF, HLD, HTN, DM2, ulcerative pancolitis, CKD3, pulmonary HTN, moderate paradoxical low flow low gradient aortic stenosis, persistent atrial fibrillation with (CHADS-VASc 4) with intolerance to anticoagulation due to history of GI bleeding s/p RY closure and Watchman (05/2023) who was admitted in Sept '23 heart failure exacerbation and presents as HFpEF. Patient referred to PH clinic per interventional clinic.    Weight is 138 lbs at home, labs reviewed.   Appears euvolemic today . Labs are stable.   # Acute on Chronic Diastolic Heart Failure  # Moderate Pulmonary HTN  # Moderate Paradoxical Low Flow Low Gradient Aortic Stenosis    # Persistent rate-controlled A-fib s/p Watchman   # HTN  # HLD    - Volume Status: euvolemic  Torsemide 10 mg daily  - DAPT (given recent Watchman) continue ASA 81mg daily, Brilinta 60 mg BID x 6 months through 11/2023)  - Continue Lipitor 40 mg daily   - Continue  Hydralazine 50 mg TID, Imdur 30 mg daily, Losartan 100 mg daily, Atenolol 25 mg daily  - Continue PTA Empagliflozin 10 mg daily  - Rate controlled - on Atenolol  - Will continue to monitor TTE yearly for now     # CKD Stage 3  Baseline appears to be around 1.5-1.9  - Will continue to monitor 2.4- last check      # DM Type II  Hgb A1c 8.7.   - Managed by PCP  - Continue glipizide, empagliflozin and trulicity      Plan:  6 month follow up         Carolyn ADAME NP-C                 abnormal labs  elevated liver enzymes

## 2024-07-15 LAB
APPEARANCE: ABNORMAL
BACTERIA: ABNORMAL /HPF
BILIRUBIN URINE: NEGATIVE
BLOOD URINE: ABNORMAL
CAST: 4 /LPF
COLOR: ABNORMAL
EPITHELIAL CELLS: 2 /HPF
GLUCOSE QUALITATIVE U: NEGATIVE MG/DL
MICROSCOPIC-UA: NORMAL
NITRITE URINE: NEGATIVE
PH URINE: 7
PROTEIN URINE: 100 MG/DL
RED BLOOD CELLS URINE: 31 /HPF
RENAL TUBULAR EPITHELIAL CELLS: PRESENT
REVIEW: NORMAL
SPECIFIC GRAVITY URINE: 1.02
UROBILINOGEN URINE: 1 MG/DL
WHITE BLOOD CELLS URINE: 561 /HPF

## 2024-07-15 RX ORDER — AMOXICILLIN AND CLAVULANATE POTASSIUM 875; 125 MG/1; MG/1
875-125 TABLET, COATED ORAL
Qty: 20 | Refills: 0 | Status: COMPLETED | COMMUNITY
Start: 2024-07-15 | End: 2024-07-25

## 2024-08-12 ENCOUNTER — APPOINTMENT (OUTPATIENT)
Dept: UROLOGY | Facility: CLINIC | Age: 69
End: 2024-08-12
Payer: MEDICARE

## 2024-08-12 VITALS
WEIGHT: 150 LBS | BODY MASS INDEX: 22.73 KG/M2 | SYSTOLIC BLOOD PRESSURE: 162 MMHG | DIASTOLIC BLOOD PRESSURE: 69 MMHG | HEART RATE: 76 BPM | RESPIRATION RATE: 18 BRPM | HEIGHT: 68 IN | OXYGEN SATURATION: 95 %

## 2024-08-12 DIAGNOSIS — N40.1 BENIGN PROSTATIC HYPERPLASIA WITH LOWER URINARY TRACT SYMPMS: ICD-10-CM

## 2024-08-12 DIAGNOSIS — N39.0 URINARY TRACT INFECTION, SITE NOT SPECIFIED: ICD-10-CM

## 2024-08-12 DIAGNOSIS — N13.8 BENIGN PROSTATIC HYPERPLASIA WITH LOWER URINARY TRACT SYMPMS: ICD-10-CM

## 2024-08-12 PROCEDURE — G2211 COMPLEX E/M VISIT ADD ON: CPT

## 2024-08-12 PROCEDURE — 99214 OFFICE O/P EST MOD 30 MIN: CPT

## 2024-08-12 NOTE — HISTORY OF PRESENT ILLNESS
[FreeTextEntry1] : Very pleasant 69 year old gentleman who presents for follow up of BPH and recurrent urinary tract infections.  He reports increased urinary frequency and urgency which started approximately 1 month ago.  He also reports a weak urinary stream.  He reports that he needs to sit to void at times.  Over the last 8 to 9 months he reports 3 urinary tract infections treated with antibiotics.  His most recent urinary tract infection was diagnosed in July 2024 and treated with Augmentin.  At last visit he was started on tamsulosin.  He reports a significant improvement in his urinary symptoms on the medication.   PCP: Dr. Remington Simms

## 2024-08-12 NOTE — ASSESSMENT
[FreeTextEntry1] : Very pleasant 69-year-old gentleman who presents for follow-up of BPH, recurrent urinary tract infections -Urine culture from 7/2024 demonstrated Aerococcus species, for which she completed a course of Augmentin -Urinalysis 561 white blood cells per high-powered field, 31 red blood cells per high-power field -Recheck urinalysis and urine culture -Ureaplasma/mycoplasma -Check GC/chlamydia -Continue tamsulosin-refill sent to the pharmacy -Workup for hematuria if his urinalysis again demonstrates microscopic hematuria -Follow-up in 6 months if urine studies are unremarkable  Patient is being seen today for evaluation and management of a chronic and longitudinal ongoing condition and I am of the primary treating physician

## 2024-08-13 LAB
APPEARANCE: CLEAR
BACTERIA: NEGATIVE /HPF
BILIRUBIN URINE: NEGATIVE
BLOOD URINE: NEGATIVE
C TRACH RRNA SPEC QL NAA+PROBE: NOT DETECTED
CALCIUM PHOSPHATE CRYSTALS: PRESENT
CAST: 0 /LPF
COLOR: YELLOW
EPITHELIAL CELLS: 1 /HPF
GLUCOSE QUALITATIVE U: NEGATIVE MG/DL
KETONES URINE: NEGATIVE MG/DL
LEUKOCYTE ESTERASE URINE: NEGATIVE
MICROSCOPIC-UA: NORMAL
N GONORRHOEA RRNA SPEC QL NAA+PROBE: NOT DETECTED
NITRITE URINE: NEGATIVE
PH URINE: 8
PROTEIN URINE: NEGATIVE MG/DL
RED BLOOD CELLS URINE: NORMAL /HPF
REVIEW: NORMAL
SOURCE AMPLIFICATION: NORMAL
SPECIFIC GRAVITY URINE: 1.01
UROBILINOGEN URINE: 1 MG/DL
WHITE BLOOD CELLS URINE: 0 /HPF

## 2024-08-14 ENCOUNTER — NON-APPOINTMENT (OUTPATIENT)
Age: 69
End: 2024-08-14

## 2024-08-14 LAB — BACTERIA UR CULT: NORMAL

## 2024-08-19 ENCOUNTER — NON-APPOINTMENT (OUTPATIENT)
Age: 69
End: 2024-08-19

## 2024-08-19 LAB
MYCOPLASMA HOMINIS CULTURE: NEGATIVE
UREAPLASMA CULTURE: NEGATIVE

## 2024-11-25 ENCOUNTER — INPATIENT (INPATIENT)
Facility: HOSPITAL | Age: 69
LOS: 3 days | Discharge: HOME | DRG: 189 | End: 2024-11-29
Attending: INTERNAL MEDICINE | Admitting: INTERNAL MEDICINE
Payer: MEDICARE

## 2024-11-25 VITALS
TEMPERATURE: 98 F | OXYGEN SATURATION: 95 % | RESPIRATION RATE: 16 BRPM | SYSTOLIC BLOOD PRESSURE: 159 MMHG | HEART RATE: 86 BPM | HEIGHT: 68 IN | DIASTOLIC BLOOD PRESSURE: 94 MMHG | WEIGHT: 149.91 LBS

## 2024-11-25 DIAGNOSIS — J96.01 ACUTE RESPIRATORY FAILURE WITH HYPOXIA: ICD-10-CM

## 2024-11-25 LAB
ALBUMIN SERPL ELPH-MCNC: 3.8 G/DL — SIGNIFICANT CHANGE UP (ref 3.5–5)
ALP SERPL-CCNC: 96 U/L — SIGNIFICANT CHANGE UP (ref 40–120)
ALT FLD-CCNC: 24 U/L DA — SIGNIFICANT CHANGE UP (ref 10–60)
ANION GAP SERPL CALC-SCNC: 1 MMOL/L — LOW (ref 5–17)
AST SERPL-CCNC: 28 U/L — SIGNIFICANT CHANGE UP (ref 10–40)
BASOPHILS # BLD AUTO: 0.03 K/UL — SIGNIFICANT CHANGE UP (ref 0–0.2)
BASOPHILS NFR BLD AUTO: 0.3 % — SIGNIFICANT CHANGE UP (ref 0–2)
BILIRUB SERPL-MCNC: 0.8 MG/DL — SIGNIFICANT CHANGE UP (ref 0.2–1.2)
BUN SERPL-MCNC: 11 MG/DL — SIGNIFICANT CHANGE UP (ref 7–18)
CALCIUM SERPL-MCNC: 9.6 MG/DL — SIGNIFICANT CHANGE UP (ref 8.4–10.5)
CHLORIDE SERPL-SCNC: 94 MMOL/L — LOW (ref 96–108)
CO2 SERPL-SCNC: 40 MMOL/L — HIGH (ref 22–31)
CREAT SERPL-MCNC: 0.62 MG/DL — SIGNIFICANT CHANGE UP (ref 0.5–1.3)
EGFR: 102 ML/MIN/1.73M2 — SIGNIFICANT CHANGE UP
EOSINOPHIL # BLD AUTO: 0.2 K/UL — SIGNIFICANT CHANGE UP (ref 0–0.5)
EOSINOPHIL NFR BLD AUTO: 1.8 % — SIGNIFICANT CHANGE UP (ref 0–6)
FLUAV AG NPH QL: SIGNIFICANT CHANGE UP
FLUBV AG NPH QL: SIGNIFICANT CHANGE UP
GLUCOSE SERPL-MCNC: 87 MG/DL — SIGNIFICANT CHANGE UP (ref 70–99)
HCT VFR BLD CALC: 41.8 % — SIGNIFICANT CHANGE UP (ref 39–50)
HGB BLD-MCNC: 13.2 G/DL — SIGNIFICANT CHANGE UP (ref 13–17)
IMM GRANULOCYTES NFR BLD AUTO: 0.4 % — SIGNIFICANT CHANGE UP (ref 0–0.9)
LYMPHOCYTES # BLD AUTO: 1.48 K/UL — SIGNIFICANT CHANGE UP (ref 1–3.3)
LYMPHOCYTES # BLD AUTO: 13.3 % — SIGNIFICANT CHANGE UP (ref 13–44)
MCHC RBC-ENTMCNC: 29.5 PG — SIGNIFICANT CHANGE UP (ref 27–34)
MCHC RBC-ENTMCNC: 31.6 G/DL — LOW (ref 32–36)
MCV RBC AUTO: 93.3 FL — SIGNIFICANT CHANGE UP (ref 80–100)
MONOCYTES # BLD AUTO: 0.84 K/UL — SIGNIFICANT CHANGE UP (ref 0–0.9)
MONOCYTES NFR BLD AUTO: 7.5 % — SIGNIFICANT CHANGE UP (ref 2–14)
NEUTROPHILS # BLD AUTO: 8.53 K/UL — HIGH (ref 1.8–7.4)
NEUTROPHILS NFR BLD AUTO: 76.7 % — SIGNIFICANT CHANGE UP (ref 43–77)
NRBC # BLD: 0 /100 WBCS — SIGNIFICANT CHANGE UP (ref 0–0)
NT-PROBNP SERPL-SCNC: 288 PG/ML — HIGH (ref 0–125)
PLATELET # BLD AUTO: 235 K/UL — SIGNIFICANT CHANGE UP (ref 150–400)
POTASSIUM SERPL-MCNC: 4.2 MMOL/L — SIGNIFICANT CHANGE UP (ref 3.5–5.3)
POTASSIUM SERPL-SCNC: 4.2 MMOL/L — SIGNIFICANT CHANGE UP (ref 3.5–5.3)
PROT SERPL-MCNC: 7.6 G/DL — SIGNIFICANT CHANGE UP (ref 6–8.3)
RBC # BLD: 4.48 M/UL — SIGNIFICANT CHANGE UP (ref 4.2–5.8)
RBC # FLD: 15.3 % — HIGH (ref 10.3–14.5)
RSV RNA NPH QL NAA+NON-PROBE: SIGNIFICANT CHANGE UP
SARS-COV-2 RNA SPEC QL NAA+PROBE: SIGNIFICANT CHANGE UP
SODIUM SERPL-SCNC: 135 MMOL/L — SIGNIFICANT CHANGE UP (ref 135–145)
WBC # BLD: 11.13 K/UL — HIGH (ref 3.8–10.5)
WBC # FLD AUTO: 11.13 K/UL — HIGH (ref 3.8–10.5)

## 2024-11-25 PROCEDURE — 99223 1ST HOSP IP/OBS HIGH 75: CPT | Mod: GC

## 2024-11-25 PROCEDURE — 93010 ELECTROCARDIOGRAM REPORT: CPT

## 2024-11-25 PROCEDURE — 71045 X-RAY EXAM CHEST 1 VIEW: CPT | Mod: 26

## 2024-11-25 PROCEDURE — 99285 EMERGENCY DEPT VISIT HI MDM: CPT

## 2024-11-25 RX ORDER — AZITHROMYCIN 250 MG/1
500 TABLET, FILM COATED ORAL ONCE
Refills: 0 | Status: COMPLETED | OUTPATIENT
Start: 2024-11-25 | End: 2024-11-25

## 2024-11-25 RX ORDER — IPRATROPIUM BROMIDE AND ALBUTEROL SULFATE 2.5; .5 MG/3ML; MG/3ML
3 SOLUTION RESPIRATORY (INHALATION) ONCE
Refills: 0 | Status: COMPLETED | OUTPATIENT
Start: 2024-11-25 | End: 2024-11-25

## 2024-11-25 RX ORDER — METHYLPREDNISOLONE SOD SUCC 125 MG
125 VIAL (EA) INJECTION ONCE
Refills: 0 | Status: COMPLETED | OUTPATIENT
Start: 2024-11-25 | End: 2024-11-25

## 2024-11-25 RX ORDER — CEFTRIAXONE SODIUM 1 G
1000 VIAL (EA) INJECTION ONCE
Refills: 0 | Status: COMPLETED | OUTPATIENT
Start: 2024-11-25 | End: 2024-11-25

## 2024-11-25 RX ADMIN — IPRATROPIUM BROMIDE AND ALBUTEROL SULFATE 3 MILLILITER(S): 2.5; .5 SOLUTION RESPIRATORY (INHALATION) at 20:46

## 2024-11-25 RX ADMIN — Medication 125 MILLIGRAM(S): at 20:46

## 2024-11-25 RX ADMIN — Medication 100 MILLIGRAM(S): at 23:42

## 2024-11-25 RX ADMIN — IPRATROPIUM BROMIDE AND ALBUTEROL SULFATE 3 MILLILITER(S): 2.5; .5 SOLUTION RESPIRATORY (INHALATION) at 23:49

## 2024-11-25 NOTE — H&P ADULT - PROBLEM SELECTOR PLAN 1
Presenting with worsening SOB x2d.   States that has been requiring to bump up his home O2 to 4-5L and increased use of home inhalers in the past 2 days. Endorsing cough productive of beige sputum, sore throat, and runny nose.   home meds: albuterol inhaler   Follows with Pulm Dr. Mercy Warren    SARS, COVD, RSV - neg.   CXR - wet rad without infiltrates or consolidation.   s/p duonebs, solumedrol 125, CTX, azithro in the ED.     - RVP  - suspect COPD exacerbation 2/2 viral etiology vs bronchitis.  - will start prednisone 50 daily.  - duonebs q6.  - advair, albuterol    - patient on CPAP at night for suspect ?pulmonary HTN. Will get TTE.   - Primary team to confirm settings for CPAP; can restart as nocturnal BIPAP. Presenting with worsening SOB x2d.   States that has been requiring to bump up his home O2 to 4-5L and increased use of home inhalers in the past 2 days. Endorsing cough productive of beige sputum, sore throat, and runny nose.   home meds: albuterol inhaler   Follows with Pulm Dr. Mercy Warren    SARS, COVD, RSV - neg.   CXR - wet rad without infiltrates or consolidation.   s/p duonebs, solumedrol 125, CTX, azithro in the ED.     - RVP  - suspect COPD exacerbation 2/2 viral etiology vs bronchitis.  - strep pneumo, legionella, mycoplasma  - will start prednisone 50 daily.  - duonebs q6.  - advair, albuterol    - patient on CPAP at night for suspect ?pulmonary HTN. Will get TTE.   - Primary team to confirm settings for CPAP; can restart as nocturnal BIPAP. Presenting with worsening SOB x2d.   States that has been requiring to bump up his home O2 to 4-5L and increased use of home inhalers in the past 2 days. Endorsing cough productive of beige sputum, sore throat, and runny nose.   home meds: albuterol inhaler   Follows with Pulm Dr. Mercy Warren    SARS, COVD, RSV - neg.   CXR - wet rad without infiltrates or consolidation.   s/p duonebs, solumedrol 125, CTX, azithro in the ED.     - RVP  - suspect COPD exacerbation 2/2 viral etiology vs bronchitis.  - CTX, azithro  - strep pneumo, legionella, mycoplasma  - will start prednisone 50 daily.  - duonebs q6.  - advair, albuterol    - patient on CPAP at night for suspect ?pulmonary HTN  - Primary team to confirm settings for CPAP; can restart as nocturnal BIPAP.

## 2024-11-25 NOTE — H&P ADULT - ATTENDING COMMENTS
IMAGING  Chest X-Ray  Pending official read; on my read no infiltrate, consolidation, or effusion.    EKG  Normal Sinus Rhythm, 78 bpm, QTc 440ms, DC 150ms, on my read no ST segment elevation or depression, TWI in lead III    HPI  69 year old male patient with pmhx COPD (on 2L NC home O2), HTN, HLD, hypothyroidism, CAD s/p CABG, PAD, known AAA,  hx of ETOH abuse c/b Cirrhosis, ex-smoker who presented to the ER due to shortness of breath for 2 days.  The patient since the beginning of the year has only been taking his albuterol for his COPD as he couldn't afford his Symbicort or other inhalers. For the past few days he has been using his Albuterol over 5 times a day. Patient follows Dr. Wilson for Pulmonology. He feels his shortness of breath is mostly on ambulation; at rest he does not feel as short of breath. He has been coughing but it is mostly without sputum production; although, he occasionally brought up some brownish sputum. No sick contacts.  Review Of Systems included: + dyspnea on exertion, + cough mostly without sputum but occasionally brownish sputum, no fever, no chills, no chest pain, no diarrhea, no constipation, no dysuria, no nausea    Physical Exam  General: Awake, Alert, Oriented  Cardiac: RRR  Pulmonary: End-Expiratory Wheezing  Abdominal: Soft, ND, NT  Extremities: No edema b/l    A/P  # Acute on Chronic Hypoxic Respiratory Failure  # COPD Exacerbation  > On 2-3 lpm nasal cannula at baseline, now requiring 4-5 lpm nasal cannula  - Azithromycin 500mg daily for anti-inflammatory effects  - DuoNebs  - Albuterol prn  - Advair  - Prednisone 40mg daily  - Supplemental O2 as needed  - Follow up chest x-ray official read  - Consult  as patient has difficulty paying for his home medications    # Hx of HTN, HLD, CAD s/p CABG, CHF, AAA, Hypothyroidism, Cirrhosis, PAD  - Continue home medications Aspirin, Synthroid, Lisinopril, Flexeril, Statin, Flomax, Prasugrel  - Continue night time CPAP    # DVT PPx  - Lovenox    # FEN  - DASH Diet  - Monitor and replete electrolytes as needed    Previous Admissions Included  8/12/2024: Urology Clinic Visit: Recurrent UTI, BPH, Hematuria. Urine culture from 7/2024 demonstrated Aerococcus species, for which she completed a course of Augmentin  1/2/2024: Pulmonology Note: overnight oximetry reviewed with patient diagnosis : COPD. of note- oximetry was done on BPAP + 02 bled into unit. plan: continue BPAP + 02 whenever sleeping  12/22/2023: Pulmonology Clinic Visit: Acute on chronic respiratory failure with hypercapnia, sent home on bpap, and found to have DENISHA nodule 4mm. on 2L with activity. 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 At that time, the aneurysm measured 5.2 cm and its anatomy was not suitable for standard EVAR. CTA c/a/p demonstrated a stable partially thrombosed intra-abdominal aorta aneurysm measures 5.3x4.8x11.5 cm. Images were reviewed with Dr. Joaquin and aneurysm  anatomy was not suitable for standard EVAR (likely would need fEVAR). Given patient's comorbidities, routine surveillance was advised for the time being. Today he complains of worsening RLE claudication at distances <50 feet. He has history of PAD s/p RLE bypass (?failed)  8/3/2023: Hepatology Clinic Visit: 30-40 years of ETOH use. 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 Inpatient workup recently included EGD and EUS with demonstration of sludge in GB but no CBD blockage.  4/5/2023: GI Clinic Visit: F3 on Fibroscan and Elastography. Schedule labs and US in 3 months. HCC Screening needed F3    Patient case and management was discussed with ER Attending  I did examine all labs (including CBC, Lactate, CMP, pro-BNP, COVID/Flu/RSV Test), imaging, prior notes    Time-based billing (NON-critical care).  76 minutes spent on total encounter excluding any time spent teaching residents. The necessity of the time spent during the encounter on this date of service was due to: Review of records, vital signs, labs, microbiology, and imaging, Ordering labs and/or tests, General physical examination performed, Generation of treatment plan, Counseling of the patient and/or family members IMAGING  Chest X-Ray  Pending official read; on my read no infiltrate, consolidation, or effusion.    EKG  Normal Sinus Rhythm, 78 bpm, QTc 440ms, VT 150ms, on my read no ST segment elevation or depression, TWI in lead III    HPI  69 year old male patient with pmhx COPD (on 2L NC home O2), HTN, HLD, hypothyroidism, CAD s/p CABG, PAD, known AAA,  hx of ETOH abuse c/b Cirrhosis, ex-smoker who presented to the ER due to shortness of breath for 2 days.  The patient since the beginning of the year has only been taking his albuterol for his COPD as he couldn't afford his Symbicort or other inhalers. For the past few days he has been using his Albuterol over 5 times a day. Patient follows Dr. Wilson for Pulmonology. He feels his shortness of breath is mostly on ambulation; at rest he does not feel as short of breath. He has been coughing but it is mostly without sputum production; although, he occasionally brought up some brownish sputum. No sick contacts.  Review Of Systems included: + dyspnea on exertion, + cough mostly without sputum but occasionally brownish sputum, no fever, no chills, no chest pain, no diarrhea, no constipation, no dysuria, no nausea    Physical Exam  General: Awake, Alert, Oriented  Cardiac: RRR  Pulmonary: End-Expiratory Wheezing  Abdominal: Soft, ND, NT  Extremities: No edema b/l    A/P  # Acute on Chronic Hypoxic Respiratory Failure  # COPD Exacerbation  > On 2-3 lpm nasal cannula at baseline, now requiring 4-5 lpm nasal cannula  - Azithromycin 500mg daily for anti-inflammatory effects  - DuoNebs  - Albuterol prn  - Advair  - Prednisone 40mg daily  - Supplemental O2 as needed  - Follow up chest x-ray official read  - Consult  as patient has difficulty paying for his home medications    # Hx of HTN, HLD, CAD s/p CABG, CHF, AAA, Hypothyroidism, Cirrhosis, PAD  > Patient doesn't know his CPAP setting  - Continue home medications Aspirin, Synthroid, Lisinopril, Flexeril, Statin, Flomax, Prasugrel  - Continue night time CPAP    # DVT PPx  - Lovenox    # FEN  - DASH Diet  - Monitor and replete electrolytes as needed    Previous Admissions Included  8/12/2024: Urology Clinic Visit: Recurrent UTI, BPH, Hematuria. Urine culture from 7/2024 demonstrated Aerococcus species, for which she completed a course of Augmentin  1/2/2024: Pulmonology Note: overnight oximetry reviewed with patient diagnosis : COPD. of note- oximetry was done on BPAP + 02 bled into unit. plan: continue BPAP + 02 whenever sleeping  12/22/2023: Pulmonology Clinic Visit: Acute on chronic respiratory failure with hypercapnia, sent home on bpap, and found to have DENISHA nodule 4mm. on 2L with activity. 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 At that time, the aneurysm measured 5.2 cm and its anatomy was not suitable for standard EVAR. CTA c/a/p demonstrated a stable partially thrombosed intra-abdominal aorta aneurysm measures 5.3x4.8x11.5 cm. Images were reviewed with Dr. Joaquin and aneurysm  anatomy was not suitable for standard EVAR (likely would need fEVAR). Given patient's comorbidities, routine surveillance was advised for the time being. Today he complains of worsening RLE claudication at distances <50 feet. He has history of PAD s/p RLE bypass (?failed)  8/3/2023: Hepatology Clinic Visit: 30-40 years of ETOH use. 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 Inpatient workup recently included EGD and EUS with demonstration of sludge in GB but no CBD blockage.  4/5/2023: GI Clinic Visit: F3 on Fibroscan and Elastography. Schedule labs and US in 3 months. HCC Screening needed F3    Patient case and management was discussed with ER Attending  I did examine all labs (including CBC, Lactate, CMP, pro-BNP, COVID/Flu/RSV Test), imaging, prior notes    Time-based billing (NON-critical care).  76 minutes spent on total encounter excluding any time spent teaching residents. The necessity of the time spent during the encounter on this date of service was due to: Review of records, vital signs, labs, microbiology, and imaging, Ordering labs and/or tests, General physical examination performed, Generation of treatment plan, Counseling of the patient and/or family members

## 2024-11-25 NOTE — ED PROVIDER NOTE - NS ED MD DISPO ISOLATION TYPES
[facility] [place] Hospitalist Progress Note     John Vieyra [facility] Patient Status:  Observation    1952 MRN [id number]   [facility] 7NE-A Attending Dereck Barboza MD   Hosp Day # 0 PCP Bill Acuña DO     Chief Complaint: Patient pre Pro-Calcitonin  Recent Labs   Lab 01/12/22  0802   PCT 0.25*       Cardiac  No results for input(s): TROP, PBNP in the last 168 hours. Creatinine Kinase  No results for input(s): CK in the last 168 hours.     Inflammatory Markers  No results for in home     # Anxiety  - home meds    Supplementary Documentation:     Quality:  · DVT Prophylaxis: sqh  · CODE status: FULL  · Aguirre: n/a  · Central line: n/a  · If COVID testing is negative, may discontinue isolation: yes     Estimated date of discharge: 1- None

## 2024-11-25 NOTE — ED PROVIDER NOTE - PHYSICAL EXAMINATION
General: Patient well appearing, vital signs within normal limits  HEENT: MMM, trachea midline  Respiratory:  mild respiratory distress, expiratory wheezing in the left lower lung  Neuro: Moves all extremities  MSK: no lower extremity edema  Skin: No rashes or lesions

## 2024-11-25 NOTE — ED PROVIDER NOTE - CLINICAL SUMMARY MEDICAL DECISION MAKING FREE TEXT BOX
69-year-old male with COPD, CHF presents from home with shortness of breath and increased oxygen demands.  Chest x-ray shows no focal consolidation and no significant   Edema.  BNP relatively low.  Received DuoNebs, Solu-Medrol and ceftriaxone and azithromycin.  Hypoxia, shortness of breath likely secondary to COPD exacerbation.  Will admit for further management and treatment.

## 2024-11-25 NOTE — ED PROVIDER NOTE - OBJECTIVE STATEMENT
69-year-old male with past medical history significant for COPD, CHF who presents with increased shortness of breath over the last several days.  Cough has been consistent but has changed to brownish sputum.  Denies fevers or chest pain.  Admits to increased oxygen demands as he is required 3-4 L on nasal cannula while at rest.

## 2024-11-25 NOTE — H&P ADULT - NSHPPHYSICALEXAM_GEN_ALL_CORE
Vital Signs Last 24 Hrs  T(C): 36.9 (26 Nov 2024 01:11), Max: 36.9 (26 Nov 2024 01:11)  T(F): 98.5 (26 Nov 2024 01:11), Max: 98.5 (26 Nov 2024 01:11)  HR: 93 (26 Nov 2024 01:11) (86 - 93)  BP: 128/75 (26 Nov 2024 01:11) (128/75 - 159/94)  BP(mean): --  RR: 16 (26 Nov 2024 01:11) (16 - 16)  SpO2: 98% (26 Nov 2024 01:11) (95% - 98%)    Parameters below as of 26 Nov 2024 01:11  Patient On (Oxygen Delivery Method): nasal cannula  O2 Flow (L/min): 2    GENERAL: NAD  HEAD:  Atraumatic, Normocephalic  EYES: EOMI, PERRLA, conjunctiva and sclera clear  ENMT: No tonsillar erythema, exudates, or enlargement; Moist mucous membranes  NECK: Supple, normal appearance, No JVD; Normal thyroid; Trachea midline  NERVOUS SYSTEM:  Alert & Oriented X3,  Motor Strength 5/5 B/L upper and lower extremities, sensation intact  CHEST/LUNG: +mild R rhonchi, +diffuse expiratory wheezing b/l  HEART: Regular rate and rhythm; No murmurs, rubs, or gallops  ABDOMEN: Soft, Nontender, Nondistended; Bowel sounds present  EXTREMITIES:  2+ Peripheral Pulses, No clubbing, cyanosis, or edema  LYMPH: No lymphadenopathy noted  SKIN: No rashes or lesions

## 2024-11-25 NOTE — ED PROVIDER NOTE - CARE PLAN
1 Principal Discharge DX:	Acute hypoxic respiratory failure  Secondary Diagnosis:	COPD exacerbation

## 2024-11-25 NOTE — H&P ADULT - HISTORY OF PRESENT ILLNESS
69M, from home, ambulates with cane, PMH COPD (on 2L NC home O2), HTN, HLD, hypothyroidism, CAD, PAD, known AAA,  hx of ETOH abuse c/b cirrhosis, ex-smoker. Presenting with worsening SOB x2d. States that has been requiring to bump up his home O2 to 4-5L in the past 2 days. Endorsing cough productive of beige sputum, sore throat, and runny nose. Only use Albuterol inhaler at home, and has been using it more often past couple of days (up to 5 times a day) with minimal relief of his sx. Endorses difficult to ambulate due to SOB ; had difficulty walking from bathroom to his door. However, denies chest pain, palpitations, dizziness. Denies fever, chills, n/v/d, dysuria, diarrhea, numbness or tingling or swelling in lower ext.     Follows with Pulm Dr. Mercy Warren, who put him on CPAP at night. States that has not been able to afford the other inhalers he was prescribed with (states ?Symbicort vs ?Spirvia), so has only taken Albuterol.

## 2024-11-25 NOTE — H&P ADULT - ASSESSMENT
69M, from home, ambulates with cane, PMH COPD (on 2L NC home O2), HTN, HLD, hypothyroidism, CAD, PAD, known AAA,  hx of ETOH abuse c/b cirrhosis, ex-smoker. Presenting with worsening SOB x2d. States that has been requiring to bump up his home O2 to 4-5L and increased use of home inhalers in the past 2 days. Endorsing cough productive of beige sputum, sore throat, and runny nose. SARS, COVD, RSV neg. CXR wet rad without infiltrates or consolidation. Admitted for COPD exacerbation.

## 2024-11-25 NOTE — H&P ADULT - NSICDXPASTMEDICALHX_GEN_ALL_CORE_FT
PAST MEDICAL HISTORY:  BPH (benign prostatic hyperplasia)     CAD (coronary artery disease)     COPD (chronic obstructive pulmonary disease)     HLD (hyperlipidemia)     HTN (hypertension)     Hypothyroidism     Prophylactic measure

## 2024-11-25 NOTE — ED ADULT NURSE NOTE - NSFALLUNIVINTERV_ED_ALL_ED
Bed/Stretcher in lowest position, wheels locked, appropriate side rails in place/Call bell, personal items and telephone in reach/Instruct patient to call for assistance before getting out of bed/chair/stretcher/Non-slip footwear applied when patient is off stretcher/Gabbs to call system/Physically safe environment - no spills, clutter or unnecessary equipment/Purposeful proactive rounding/Room/bathroom lighting operational, light cord in reach

## 2024-11-25 NOTE — H&P ADULT - NSHPREVIEWOFSYSTEMS_GEN_ALL_CORE
CONSTITUTIONAL: +fatigue. No fever, weight loss  RESPIRATORY: +cough, +wheezing. +SOB. No chills, or hemoptysis  CARDIOVASCULAR: No chest pain, palpitations, dizziness, or leg swelling  GASTROINTESTINAL: No abdominal pain. No nausea, vomiting, or hematemesis. No diarrhea or constipation. No melena or hematochezia.  GENITOURINARY: No dysuria or hematuria, urinary frequency  NEUROLOGICAL: No headaches, memory loss, loss of strength, numbness, or tremors  ENDOCRINE: No polyuria, polydipsia, or heat/cold intolerance  MUSKULOSKELETAL: No muscle aches, joint pains  HEME: no easy bruisability, no tender or enlarged lymph nodes  SKIN: No itching, burning, rashes, or lesions.

## 2024-11-26 DIAGNOSIS — I10 ESSENTIAL (PRIMARY) HYPERTENSION: ICD-10-CM

## 2024-11-26 DIAGNOSIS — E78.5 HYPERLIPIDEMIA, UNSPECIFIED: ICD-10-CM

## 2024-11-26 DIAGNOSIS — Z29.9 ENCOUNTER FOR PROPHYLACTIC MEASURES, UNSPECIFIED: ICD-10-CM

## 2024-11-26 DIAGNOSIS — Z75.8 OTHER PROBLEMS RELATED TO MEDICAL FACILITIES AND OTHER HEALTH CARE: ICD-10-CM

## 2024-11-26 DIAGNOSIS — E03.9 HYPOTHYROIDISM, UNSPECIFIED: ICD-10-CM

## 2024-11-26 DIAGNOSIS — Z86.79 PERSONAL HISTORY OF OTHER DISEASES OF THE CIRCULATORY SYSTEM: ICD-10-CM

## 2024-11-26 DIAGNOSIS — J44.1 CHRONIC OBSTRUCTIVE PULMONARY DISEASE WITH (ACUTE) EXACERBATION: ICD-10-CM

## 2024-11-26 DIAGNOSIS — I25.10 ATHEROSCLEROTIC HEART DISEASE OF NATIVE CORONARY ARTERY WITHOUT ANGINA PECTORIS: ICD-10-CM

## 2024-11-26 LAB
ALBUMIN SERPL ELPH-MCNC: 4 G/DL — SIGNIFICANT CHANGE UP (ref 3.5–5)
ALP SERPL-CCNC: 96 U/L — SIGNIFICANT CHANGE UP (ref 40–120)
ALT FLD-CCNC: 23 U/L DA — SIGNIFICANT CHANGE UP (ref 10–60)
ANION GAP SERPL CALC-SCNC: 9 MMOL/L — SIGNIFICANT CHANGE UP (ref 5–17)
AST SERPL-CCNC: 20 U/L — SIGNIFICANT CHANGE UP (ref 10–40)
BASOPHILS # BLD AUTO: 0.01 K/UL — SIGNIFICANT CHANGE UP (ref 0–0.2)
BASOPHILS NFR BLD AUTO: 0.1 % — SIGNIFICANT CHANGE UP (ref 0–2)
BILIRUB SERPL-MCNC: 0.6 MG/DL — SIGNIFICANT CHANGE UP (ref 0.2–1.2)
BUN SERPL-MCNC: 16 MG/DL — SIGNIFICANT CHANGE UP (ref 7–18)
CALCIUM SERPL-MCNC: 9.6 MG/DL — SIGNIFICANT CHANGE UP (ref 8.4–10.5)
CHLORIDE SERPL-SCNC: 91 MMOL/L — LOW (ref 96–108)
CO2 SERPL-SCNC: 33 MMOL/L — HIGH (ref 22–31)
CREAT SERPL-MCNC: 0.78 MG/DL — SIGNIFICANT CHANGE UP (ref 0.5–1.3)
EGFR: 95 ML/MIN/1.73M2 — SIGNIFICANT CHANGE UP
EOSINOPHIL # BLD AUTO: 0 K/UL — SIGNIFICANT CHANGE UP (ref 0–0.5)
EOSINOPHIL NFR BLD AUTO: 0 % — SIGNIFICANT CHANGE UP (ref 0–6)
GLUCOSE SERPL-MCNC: 248 MG/DL — HIGH (ref 70–99)
GRAM STN FLD: ABNORMAL
HCT VFR BLD CALC: 40.3 % — SIGNIFICANT CHANGE UP (ref 39–50)
HGB BLD-MCNC: 13 G/DL — SIGNIFICANT CHANGE UP (ref 13–17)
IMM GRANULOCYTES NFR BLD AUTO: 0.6 % — SIGNIFICANT CHANGE UP (ref 0–0.9)
LACTATE SERPL-SCNC: 1.3 MMOL/L — SIGNIFICANT CHANGE UP (ref 0.7–2)
LEGIONELLA AG UR QL: NEGATIVE — SIGNIFICANT CHANGE UP
LYMPHOCYTES # BLD AUTO: 0.4 K/UL — LOW (ref 1–3.3)
LYMPHOCYTES # BLD AUTO: 4.7 % — LOW (ref 13–44)
MAGNESIUM SERPL-MCNC: 1.8 MG/DL — SIGNIFICANT CHANGE UP (ref 1.6–2.6)
MCHC RBC-ENTMCNC: 29.5 PG — SIGNIFICANT CHANGE UP (ref 27–34)
MCHC RBC-ENTMCNC: 32.3 G/DL — SIGNIFICANT CHANGE UP (ref 32–36)
MCV RBC AUTO: 91.4 FL — SIGNIFICANT CHANGE UP (ref 80–100)
MONOCYTES # BLD AUTO: 0.08 K/UL — SIGNIFICANT CHANGE UP (ref 0–0.9)
MONOCYTES NFR BLD AUTO: 0.9 % — LOW (ref 2–14)
NEUTROPHILS # BLD AUTO: 7.95 K/UL — HIGH (ref 1.8–7.4)
NEUTROPHILS NFR BLD AUTO: 93.7 % — HIGH (ref 43–77)
NRBC # BLD: 0 /100 WBCS — SIGNIFICANT CHANGE UP (ref 0–0)
PHOSPHATE SERPL-MCNC: 3.4 MG/DL — SIGNIFICANT CHANGE UP (ref 2.5–4.5)
PLATELET # BLD AUTO: 249 K/UL — SIGNIFICANT CHANGE UP (ref 150–400)
POTASSIUM SERPL-MCNC: 4 MMOL/L — SIGNIFICANT CHANGE UP (ref 3.5–5.3)
POTASSIUM SERPL-SCNC: 4 MMOL/L — SIGNIFICANT CHANGE UP (ref 3.5–5.3)
PROT SERPL-MCNC: 7.7 G/DL — SIGNIFICANT CHANGE UP (ref 6–8.3)
RAPID RVP RESULT: SIGNIFICANT CHANGE UP
RBC # BLD: 4.41 M/UL — SIGNIFICANT CHANGE UP (ref 4.2–5.8)
RBC # FLD: 15.3 % — HIGH (ref 10.3–14.5)
S PNEUM AG UR QL: NEGATIVE — SIGNIFICANT CHANGE UP
SARS-COV-2 RNA SPEC QL NAA+PROBE: SIGNIFICANT CHANGE UP
SODIUM SERPL-SCNC: 133 MMOL/L — LOW (ref 135–145)
SPECIMEN SOURCE: SIGNIFICANT CHANGE UP
WBC # BLD: 8.49 K/UL — SIGNIFICANT CHANGE UP (ref 3.8–10.5)
WBC # FLD AUTO: 8.49 K/UL — SIGNIFICANT CHANGE UP (ref 3.8–10.5)

## 2024-11-26 PROCEDURE — 99233 SBSQ HOSP IP/OBS HIGH 50: CPT

## 2024-11-26 RX ORDER — PRASUGREL HYDROCHLORIDE 10 MG/1
10 TABLET, COATED ORAL DAILY
Refills: 0 | Status: DISCONTINUED | OUTPATIENT
Start: 2024-11-26 | End: 2024-11-29

## 2024-11-26 RX ORDER — ALBUTEROL 90 MCG
1 AEROSOL (GRAM) INHALATION EVERY 4 HOURS
Refills: 0 | Status: DISCONTINUED | OUTPATIENT
Start: 2024-11-26 | End: 2024-11-29

## 2024-11-26 RX ORDER — METOPROLOL TARTRATE 100 MG/1
1 TABLET, FILM COATED ORAL
Refills: 0 | DISCHARGE

## 2024-11-26 RX ORDER — CYCLOBENZAPRINE HCL 10 MG
1 TABLET ORAL
Refills: 0 | DISCHARGE

## 2024-11-26 RX ORDER — LEVOTHYROXINE SODIUM 150 MCG
1 TABLET ORAL
Refills: 0 | DISCHARGE

## 2024-11-26 RX ORDER — CEFTRIAXONE SODIUM 1 G
1000 VIAL (EA) INJECTION EVERY 24 HOURS
Refills: 0 | Status: DISCONTINUED | OUTPATIENT
Start: 2024-11-26 | End: 2024-11-29

## 2024-11-26 RX ORDER — FLUTICASONE PROPIONATE AND SALMETEROL XINAFOATE 45; 21 UG/1; UG/1
1 AEROSOL, METERED RESPIRATORY (INHALATION)
Refills: 0 | Status: DISCONTINUED | OUTPATIENT
Start: 2024-11-26 | End: 2024-11-29

## 2024-11-26 RX ORDER — LISINOPRIL 20 MG/1
20 TABLET ORAL DAILY
Refills: 0 | Status: DISCONTINUED | OUTPATIENT
Start: 2024-11-26 | End: 2024-11-29

## 2024-11-26 RX ORDER — PREDNISONE 20 MG/1
50 TABLET ORAL DAILY
Refills: 0 | Status: DISCONTINUED | OUTPATIENT
Start: 2024-11-26 | End: 2024-11-28

## 2024-11-26 RX ORDER — TAMSULOSIN HYDROCHLORIDE 0.4 MG/1
1 CAPSULE ORAL
Refills: 0 | DISCHARGE

## 2024-11-26 RX ORDER — ENOXAPARIN SODIUM 30 MG/.3ML
40 INJECTION SUBCUTANEOUS EVERY 24 HOURS
Refills: 0 | Status: DISCONTINUED | OUTPATIENT
Start: 2024-11-26 | End: 2024-11-26

## 2024-11-26 RX ORDER — LISINOPRIL 20 MG/1
1 TABLET ORAL
Refills: 0 | DISCHARGE

## 2024-11-26 RX ORDER — METOPROLOL TARTRATE 100 MG/1
50 TABLET, FILM COATED ORAL
Refills: 0 | Status: DISCONTINUED | OUTPATIENT
Start: 2024-11-26 | End: 2024-11-29

## 2024-11-26 RX ORDER — AZITHROMYCIN 250 MG/1
500 TABLET, FILM COATED ORAL EVERY 24 HOURS
Refills: 0 | Status: DISCONTINUED | OUTPATIENT
Start: 2024-11-26 | End: 2024-11-29

## 2024-11-26 RX ORDER — LEVOTHYROXINE SODIUM 150 MCG
75 TABLET ORAL DAILY
Refills: 0 | Status: DISCONTINUED | OUTPATIENT
Start: 2024-11-26 | End: 2024-11-29

## 2024-11-26 RX ORDER — PRASUGREL HYDROCHLORIDE 10 MG/1
1 TABLET, COATED ORAL
Refills: 0 | DISCHARGE

## 2024-11-26 RX ORDER — IPRATROPIUM BROMIDE AND ALBUTEROL SULFATE 2.5; .5 MG/3ML; MG/3ML
3 SOLUTION RESPIRATORY (INHALATION) EVERY 6 HOURS
Refills: 0 | Status: DISCONTINUED | OUTPATIENT
Start: 2024-11-26 | End: 2024-11-29

## 2024-11-26 RX ORDER — TAMSULOSIN HYDROCHLORIDE 0.4 MG/1
0.4 CAPSULE ORAL AT BEDTIME
Refills: 0 | Status: DISCONTINUED | OUTPATIENT
Start: 2024-11-26 | End: 2024-11-29

## 2024-11-26 RX ORDER — ACETAMINOPHEN 500MG 500 MG/1
650 TABLET, COATED ORAL EVERY 6 HOURS
Refills: 0 | Status: DISCONTINUED | OUTPATIENT
Start: 2024-11-26 | End: 2024-11-29

## 2024-11-26 RX ADMIN — IPRATROPIUM BROMIDE AND ALBUTEROL SULFATE 3 MILLILITER(S): 2.5; .5 SOLUTION RESPIRATORY (INHALATION) at 15:31

## 2024-11-26 RX ADMIN — IPRATROPIUM BROMIDE AND ALBUTEROL SULFATE 3 MILLILITER(S): 2.5; .5 SOLUTION RESPIRATORY (INHALATION) at 10:51

## 2024-11-26 RX ADMIN — IPRATROPIUM BROMIDE AND ALBUTEROL SULFATE 3 MILLILITER(S): 2.5; .5 SOLUTION RESPIRATORY (INHALATION) at 20:08

## 2024-11-26 RX ADMIN — PREDNISONE 50 MILLIGRAM(S): 20 TABLET ORAL at 06:38

## 2024-11-26 RX ADMIN — PRASUGREL HYDROCHLORIDE 10 MILLIGRAM(S): 10 TABLET, COATED ORAL at 18:20

## 2024-11-26 RX ADMIN — FLUTICASONE PROPIONATE AND SALMETEROL XINAFOATE 1 DOSE(S): 45; 21 AEROSOL, METERED RESPIRATORY (INHALATION) at 10:49

## 2024-11-26 RX ADMIN — TAMSULOSIN HYDROCHLORIDE 0.4 MILLIGRAM(S): 0.4 CAPSULE ORAL at 21:17

## 2024-11-26 RX ADMIN — Medication 40 MILLIGRAM(S): at 21:17

## 2024-11-26 RX ADMIN — ENOXAPARIN SODIUM 40 MILLIGRAM(S): 30 INJECTION SUBCUTANEOUS at 06:38

## 2024-11-26 RX ADMIN — METOPROLOL TARTRATE 50 MILLIGRAM(S): 100 TABLET, FILM COATED ORAL at 18:21

## 2024-11-26 RX ADMIN — FLUTICASONE PROPIONATE AND SALMETEROL XINAFOATE 1 DOSE(S): 45; 21 AEROSOL, METERED RESPIRATORY (INHALATION) at 21:17

## 2024-11-26 RX ADMIN — AZITHROMYCIN 255 MILLIGRAM(S): 250 TABLET, FILM COATED ORAL at 02:04

## 2024-11-26 RX ADMIN — IPRATROPIUM BROMIDE AND ALBUTEROL SULFATE 3 MILLILITER(S): 2.5; .5 SOLUTION RESPIRATORY (INHALATION) at 02:44

## 2024-11-26 NOTE — PROGRESS NOTE ADULT - SUBJECTIVE AND OBJECTIVE BOX
[facility]  Patient is a 69y old  Male who presents with a chief complaint of SOB (25 Nov 2024 23:48)      SUBJECTIVE / OVERNIGHT EVENTS:  Patient seen at bedside, states his breathing is better since started on COPD treatment.    MEDICATIONS  (STANDING):  albuterol/ipratropium for Nebulization 3 milliLiter(s) Nebulizer every 6 hours  azithromycin  IVPB 500 milliGRAM(s) IV Intermittent every 24 hours  cefTRIAXone   IVPB 1000 milliGRAM(s) IV Intermittent every 24 hours  enoxaparin Injectable 40 milliGRAM(s) SubCutaneous every 24 hours  fluticasone propionate/ salmeterol 100-50 MICROgram(s) Diskus 1 Dose(s) Inhalation two times a day  predniSONE   Tablet 50 milliGRAM(s) Oral daily    MEDICATIONS  (PRN):  acetaminophen     Tablet .. 650 milliGRAM(s) Oral every 6 hours PRN Temp greater or equal to 38C (100.4F), Mild Pain (1 - 3)  albuterol    90 MICROgram(s) HFA Inhaler 1 Puff(s) Inhalation every 4 hours PRN Shortness of Breath and/or Wheezing          OBJECTIVE:  Vital Signs Last 24 Hrs  T(C): 36.9 (26 Nov 2024 12:55), Max: 36.9 (26 Nov 2024 01:11)  T(F): 98.5 (26 Nov 2024 12:55), Max: 98.5 (26 Nov 2024 01:11)  HR: 94 (26 Nov 2024 12:55) (86 - 98)  BP: 155/86 (26 Nov 2024 12:55) (128/75 - 159/94)  BP(mean): 109 (26 Nov 2024 12:55) (104 - 109)  RR: 20 (26 Nov 2024 12:55) (16 - 20)  SpO2: 95% (26 Nov 2024 12:55) (95% - 98%)    Parameters below as of 26 Nov 2024 12:55  Patient On (Oxygen Delivery Method): nasal cannula  O2 Flow (L/min): 2      PHYSICAL EXAM:  GENERAL: NAD  HEAD:  Atraumatic, Normocephalic  EYES: conjunctiva and sclera clear  NECK: Supple, No JVD  CHEST/LUNG: Clear to auscultation bilaterally; No wheeze  HEART: Regular rate and rhythm; No murmurs, rubs, or gallops  ABDOMEN: Soft, Nontender, Nondistended; Bowel sounds present  EXTREMITIES:  No clubbing, cyanosis, or edema  PSYCH: AAOx3  NEUROLOGY: non-focal  SKIN: No rashes or lesions      CAPILLARY BLOOD GLUCOSE      POCT Blood Glucose.: 96 mg/dL (25 Nov 2024 19:05)    I&O's Summary            LABS:                        13.0   8.49  )-----------( 249      ( 26 Nov 2024 05:20 )             40.3     11-26    133[L]  |  91[L]  |  16  ----------------------------<  248[H]  4.0   |  33[H]  |  0.78    Ca    9.6      26 Nov 2024 05:20  Phos  3.4     11-26  Mg     1.8     11-26    TPro  7.7  /  Alb  4.0  /  TBili  0.6  /  DBili  x   /  AST  20  /  ALT  23  /  AlkPhos  96  11-26          Urinalysis Basic - ( 26 Nov 2024 05:20 )    Color: x / Appearance: x / SG: x / pH: x  Gluc: 248 mg/dL / Ketone: x  / Bili: x / Urobili: x   Blood: x / Protein: x / Nitrite: x   Leuk Esterase: x / RBC: x / WBC x   Sq Epi: x / Non Sq Epi: x / Bacteria: x            RADIOLOGY & ADDITIONAL TESTS:

## 2024-11-26 NOTE — PROGRESS NOTE ADULT - SUBJECTIVE AND OBJECTIVE BOX
NP Note discussed with  primary attending    Patient is a 69y old  Male who presents with a chief complaint of SOB (25 Nov 2024 23:48)      INTERVAL HPI/OVERNIGHT EVENTS: no new complaints    MEDICATIONS  (STANDING):  albuterol/ipratropium for Nebulization 3 milliLiter(s) Nebulizer every 6 hours  azithromycin  IVPB 500 milliGRAM(s) IV Intermittent every 24 hours  cefTRIAXone   IVPB 1000 milliGRAM(s) IV Intermittent every 24 hours  enoxaparin Injectable 40 milliGRAM(s) SubCutaneous every 24 hours  fluticasone propionate/ salmeterol 100-50 MICROgram(s) Diskus 1 Dose(s) Inhalation two times a day  predniSONE   Tablet 50 milliGRAM(s) Oral daily    MEDICATIONS  (PRN):  acetaminophen     Tablet .. 650 milliGRAM(s) Oral every 6 hours PRN Temp greater or equal to 38C (100.4F), Mild Pain (1 - 3)  albuterol    90 MICROgram(s) HFA Inhaler 1 Puff(s) Inhalation every 4 hours PRN Shortness of Breath and/or Wheezing      __________________________________________________  REVIEW OF SYSTEMS:    CONSTITUTIONAL: No fever,   EYES: no acute visual disturbances  NECK: No pain or stiffness  RESPIRATORY: No cough; No shortness of breath  CARDIOVASCULAR: No chest pain, no palpitations  GASTROINTESTINAL: No pain. No nausea or vomiting; No diarrhea   NEUROLOGICAL: No headache or numbness, no tremors  MUSCULOSKELETAL: No joint pain, no muscle pain  GENITOURINARY: no dysuria, no frequency, no hesitancy  PSYCHIATRY: no depression , no anxiety  ALL OTHER  ROS negative        Vital Signs Last 24 Hrs  T(C): 36.9 (26 Nov 2024 12:55), Max: 36.9 (26 Nov 2024 01:11)  T(F): 98.5 (26 Nov 2024 12:55), Max: 98.5 (26 Nov 2024 01:11)  HR: 94 (26 Nov 2024 12:55) (86 - 98)  BP: 155/86 (26 Nov 2024 12:55) (128/75 - 159/94)  BP(mean): 109 (26 Nov 2024 12:55) (104 - 109)  RR: 20 (26 Nov 2024 12:55) (16 - 20)  SpO2: 95% (26 Nov 2024 12:55) (95% - 98%)    Parameters below as of 26 Nov 2024 12:55  Patient On (Oxygen Delivery Method): nasal cannula  O2 Flow (L/min): 2      ________________________________________________  PHYSICAL EXAM:  GENERAL: NAD  HEENT: Normocephalic;  conjunctivae and sclerae clear; moist mucous membranes;   NECK : supple  CHEST/LUNG: Clear to ausculitation bilaterally with good air entry   HEART: S1 S2  regular; no murmurs, gallops or rubs  ABDOMEN: Soft, Nontender, Nondistended; Bowel sounds present  EXTREMITIES: no cyanosis; no edema; no calf tenderness  SKIN: warm and dry; no rash  NERVOUS SYSTEM:  Awake and alert; Oriented  to place, person and time ; no new deficits    _________________________________________________  LABS:                        13.0   8.49  )-----------( 249      ( 26 Nov 2024 05:20 )             40.3     11-26    133[L]  |  91[L]  |  16  ----------------------------<  248[H]  4.0   |  33[H]  |  0.78    Ca    9.6      26 Nov 2024 05:20  Phos  3.4     11-26  Mg     1.8     11-26    TPro  7.7  /  Alb  4.0  /  TBili  0.6  /  DBili  x   /  AST  20  /  ALT  23  /  AlkPhos  96  11-26      Urinalysis Basic - ( 26 Nov 2024 05:20 )    Color: x / Appearance: x / SG: x / pH: x  Gluc: 248 mg/dL / Ketone: x  / Bili: x / Urobili: x   Blood: x / Protein: x / Nitrite: x   Leuk Esterase: x / RBC: x / WBC x   Sq Epi: x / Non Sq Epi: x / Bacteria: x      CAPILLARY BLOOD GLUCOSE      POCT Blood Glucose.: 96 mg/dL (25 Nov 2024 19:05)        RADIOLOGY & ADDITIONAL TESTS:  < from: Xray Chest 1 View-PORTABLE IMMEDIATE (11.25.24 @ 20:13) >    IMPRESSION: Left-sided defibrillator and coronary stents. Probable   emphysema.      < end of copied text >    Imaging Personally Reviewed:  YES    Consultant(s) Notes Reviewed:   YES    Care Discussed with Consultants :     Plan of care was discussed with patient and /or primary care giver; all questions and concerns were addressed and care was aligned with patient's wishes.

## 2024-11-26 NOTE — PATIENT PROFILE ADULT - FALL HARM RISK - HARM RISK INTERVENTIONS

## 2024-11-26 NOTE — PROGRESS NOTE ADULT - PROBLEM SELECTOR PLAN 1
Presenting with worsening SOB x2d.   States that has been requiring to bump up his home O2 to 4-5L and increased use of home inhalers in the past 2 days. Endorsing cough productive of beige sputum, sore throat, and runny nose.   home meds: albuterol inhaler   Follows with Pulm Dr. Mercy Warren    SARS, COVD, RSV - neg.   CXR - without infiltrates or consolidation.   s/p duonebs, solumedrol 125, CTX, azithro in the ED.     - RVP neg  - suspect COPD exacerbation 2/2 viral etiology vs bronchitis.  - C/W CTX, azithro  - F/U strep pneumo, legionella, mycoplasma  - will start prednisone 50 daily.  - duonebs q6.  - advair, albuterol    - patient on CPAP at night for suspect ?pulmonary HTN  - Primary team to confirm settings for CPAP; can restart as nocturnal BIPAP.

## 2024-11-26 NOTE — CONSULT NOTE ADULT - ASSESSMENT
69 year old male patient with pmhx COPD (on 2L NC home O2), HTN, HLD, hypothyroidism, CAD s/p CABG, PAD, known AAA, hx of ETOH abuse c/b Cirrhosis, ex-smoker who presented to the ER due to shortness of breath for 2 days. Pt was not started on CPAP/BIPAP for the past night that he was in the hospital as pt did not know his settings. ICU was consulted for AVAPS at night.     # AHRF due to COPD exacerbation  # CAD s/p CABG  # PAD  # AAA  # ETOH use disorder  # Cirrhosis  # Hypothyrodisim  # HTN  # HLD    # AHRF due to COPD exacerbation  - Transfer to  for AVAPS   - continue duoneb, prednisone, CTX and azithoromycin as per primary team   - CXR noted  - continue on 2-3L home oxygen level    #HTN  - c/w metoprolol tartrate 50 BID, lisinopril 20     #CAD   - c/w aspirin 81, Prasugrel 10    #HLD   - c/w atorvastatin 40     # hyperthyroidism   - c/w  levothyroxine 75     #Disposition   - Transfer to  for AVAPS  69 year old male patient with pmhx COPD (on 2L NC home O2), HTN, HLD, hypothyroidism, CAD s/p CABG, PAD, known AAA, hx of ETOH abuse c/b Cirrhosis, ex-smoker who presented to the ER due to shortness of breath for 2 days. Pt was not started on CPAP/BIPAP for the past night that he was in the hospital as pt did not know his settings. ICU was consulted for AVAPS at night.     # AHRF due to COPD exacerbation  # CAD s/p CABG  # PAD  # AAA  # ETOH use disorder  # Cirrhosis  # Hypothyrodisim  # HTN  # HLD    # AHRF due to COPD exacerbation  - Transfer to  for AVAPS   - continue duoneb, prednisone,  - c/w CTX and azithromycin   - CXR noted  - continue on 2-3L home oxygen level    #HTN  - c/w metoprolol tartrate 50 BID, lisinopril 20     #CAD   - c/w aspirin 81, Prasugrel 10    #HLD   - c/w atorvastatin 40     # hyperthyroidism   - c/w  levothyroxine 75     #Disposition   - Transfer to  for AVAPS

## 2024-11-26 NOTE — PROGRESS NOTE ADULT - TIME BILLING
- Review of hospital course, labs, vitals, medical records  - Bedside exam and interview  - Discussed plan of care with primary team ACP and housestaff, son at bedside, pulmonology office  - Documenting the encounter

## 2024-11-26 NOTE — PROGRESS NOTE ADULT - ASSESSMENT
69 year old male patient with pmhx COPD (on 2L NC home O2), HTN, HLD, hypothyroidism, CAD s/p CABG, PAD, known AAA, hx of ETOH abuse c/b Cirrhosis, ex-smoker who presented to the ER due to shortness of breath for 2 days.    # AHRF due to COPD exacerbation  # CAD s/p CABG  # PAD  # AAA  # ETOH use disorder  # Cirrhosis  # Hypothyrodisim  # HTN  # HLD    - continue duoneb, prednisone, CTX and azithoromycin (anticipating 5-day course)  - CXR without marked consolidations, f/u urine legionella, strep, sputum cx, BCx  - monitor SpO2 on O2 2L (hoem dose)  - resume BIPAP qhs  - I attempted to call his pulmonologist Dr. Warren to obtain collateral regarding home inhaler regiumen, however she is off today  - patient was not able to continue Symbicort prescribed by Dr. Warren due to high copay, will try to send advair this time on discharge with close follow up  - DVT ppx: lovenox

## 2024-11-26 NOTE — PATIENT PROFILE ADULT - NSPROPTRIGHTBILLOFRIGHTS_GEN_A_NUR
Chief Complaint   Patient presents with    Labs Only     Labs drawn from port by RN in lab     Port accessed with 20 gauge, 3/4 inch, flat needle by RN, labs collected, line flushed with saline and heparin. Port de-accessed.    Kira Ashraf RN    
patient

## 2024-11-26 NOTE — CONSULT NOTE ADULT - SUBJECTIVE AND OBJECTIVE BOX
Patient is a 69y old  Male who presents with a chief complaint of SOB (26 Nov 2024 16:40)      HPI:  69M, from home, ambulates with cane, PMH COPD (on 2L NC home O2), HTN, HLD, hypothyroidism, CAD, PAD, known AAA,  hx of ETOH abuse c/b cirrhosis, ex-smoker. Presenting with worsening SOB x2d. States that has been requiring to bump up his home O2 to 4-5L in the past 2 days. Endorsing cough productive of beige sputum, sore throat, and runny nose. Only use Albuterol inhaler at home, and has been using it more often past couple of days (up to 5 times a day) with minimal relief of his sx. Endorses difficult to ambulate due to SOB ; had difficulty walking from bathroom to his door. However, denies chest pain, palpitations, dizziness. Denies fever, chills, n/v/d, dysuria, diarrhea, numbness or tingling or swelling in lower ext.   Follows with Pulm Dr. Mercy Warren, who put him on CPAP at night. States that has not been able to afford the other inhalers he was prescribed with (states ?Symbicort vs ?Spirvia), so has only taken Albuterol.    (25 Nov 2024 23:48)    Today ICU was consulted for AVAPS. Pt seen at bedside, on 3L oxygen, reported being on BiPAP however unsure of the settings and when explained about the differences he mentioned likely it is AVAPS and the respiratory company knew best. Pt agreed to try the AVAPS in the hospital and be transferred to  for it.   As pt was otherwise without any respiratory distress and stable no other ICU indication at this time.     Allergies    No Known Allergies    Intolerances        MEDICATIONS  (STANDING):  albuterol/ipratropium for Nebulization 3 milliLiter(s) Nebulizer every 6 hours  aspirin enteric coated 81 milliGRAM(s) Oral daily  atorvastatin 40 milliGRAM(s) Oral at bedtime  azithromycin  IVPB 500 milliGRAM(s) IV Intermittent every 24 hours  cefTRIAXone   IVPB 1000 milliGRAM(s) IV Intermittent every 24 hours  fluticasone propionate/ salmeterol 100-50 MICROgram(s) Diskus 1 Dose(s) Inhalation two times a day  levothyroxine 75 MICROGram(s) Oral daily  lisinopril 20 milliGRAM(s) Oral daily  metoprolol tartrate 50 milliGRAM(s) Oral two times a day  prasugrel 10 milliGRAM(s) Oral daily  predniSONE   Tablet 50 milliGRAM(s) Oral daily  tamsulosin 0.4 milliGRAM(s) Oral at bedtime    MEDICATIONS  (PRN):  acetaminophen     Tablet .. 650 milliGRAM(s) Oral every 6 hours PRN Temp greater or equal to 38C (100.4F), Mild Pain (1 - 3)  albuterol    90 MICROgram(s) HFA Inhaler 1 Puff(s) Inhalation every 4 hours PRN Shortness of Breath and/or Wheezing      Daily     Daily     Drug Dosing Weight  Height (cm): 172.7 (25 Nov 2024 18:03)  Weight (kg): 68 (25 Nov 2024 18:03)  BMI (kg/m2): 22.8 (25 Nov 2024 18:03)  BSA (m2): 1.81 (25 Nov 2024 18:03)    PAST MEDICAL & SURGICAL HISTORY:  HTN (hypertension)      HLD (hyperlipidemia)      PAD (peripheral artery disease)      History of abdominal aortic aneurysm (AAA)      History of COPD      Gastritis      Hypothyroidism      HTN (hypertension)      HLD (hyperlipidemia)      Hypothyroidism      CAD (coronary artery disease)      COPD (chronic obstructive pulmonary disease)      BPH (benign prostatic hyperplasia)      Prophylactic measure      No significant past surgical history          FAMILY HISTORY:      SOCIAL HISTORY:    ADVANCE DIRECTIVES:    REVIEW OF SYSTEMS:    CONSTITUTIONAL: No weakness, fevers or chills  EYES/ENT: No visual changes;  No vertigo or throat pain   NECK: No pain or stiffness  RESPIRATORY: No cough, wheezing, hemoptysis; No shortness of breath  CARDIOVASCULAR: No chest pain or palpitations  GASTROINTESTINAL: No abdominal or epigastric pain. No nausea, vomiting, or hematemesis; No diarrhea or constipation. No melena or hematochezia.  GENITOURINARY: No dysuria, frequency or hematuria  NEUROLOGICAL: No numbness or weakness  SKIN: No itching, rashes          ICU Vital Signs Last 24 Hrs  T(C): 36.9 (26 Nov 2024 12:55), Max: 36.9 (26 Nov 2024 01:11)  T(F): 98.5 (26 Nov 2024 12:55), Max: 98.5 (26 Nov 2024 01:11)  HR: 90 (26 Nov 2024 18:24) (90 - 98)  BP: 134/63 (26 Nov 2024 18:24) (128/75 - 159/76)  BP(mean): 109 (26 Nov 2024 12:55) (104 - 109)  ABP: --  ABP(mean): --  RR: 20 (26 Nov 2024 12:55) (16 - 20)  SpO2: 95% (26 Nov 2024 12:55) (95% - 98%)    O2 Parameters below as of 26 Nov 2024 12:55  Patient On (Oxygen Delivery Method): nasal cannula  O2 Flow (L/min): 2              I&O's Detail      PHYSICAL EXAM:   GENERAL: NAD  HEAD:  Atraumatic, Normocephalic  EYES: conjunctiva and sclera clear  NECK: Supple, No JVD  CHEST/LUNG: Clear to auscultation bilaterally; No wheeze  HEART: Regular rate and rhythm; No murmurs, rubs, or gallops  ABDOMEN: Soft, Nontender, Nondistended; Bowel sounds present  EXTREMITIES:  No clubbing, cyanosis, or edema  PSYCH: AAOx3  NEUROLOGY: non-focal  SKIN: No rashes or lesions    LABS:  CBC Full  -  ( 26 Nov 2024 05:20 )  WBC Count : 8.49 K/uL  RBC Count : 4.41 M/uL  Hemoglobin : 13.0 g/dL  Hematocrit : 40.3 %  Platelet Count - Automated : 249 K/uL  Mean Cell Volume : 91.4 fl  Mean Cell Hemoglobin : 29.5 pg  Mean Cell Hemoglobin Concentration : 32.3 g/dL  Auto Neutrophil # : 7.95 K/uL  Auto Lymphocyte # : 0.40 K/uL  Auto Monocyte # : 0.08 K/uL  Auto Eosinophil # : 0.00 K/uL  Auto Basophil # : 0.01 K/uL  Auto Neutrophil % : 93.7 %  Auto Lymphocyte % : 4.7 %  Auto Monocyte % : 0.9 %  Auto Eosinophil % : 0.0 %  Auto Basophil % : 0.1 %    11-26    133[L]  |  91[L]  |  16  ----------------------------<  248[H]  4.0   |  33[H]  |  0.78    Ca    9.6      26 Nov 2024 05:20  Phos  3.4     11-26  Mg     1.8     11-26    TPro  7.7  /  Alb  4.0  /  TBili  0.6  /  DBili  x   /  AST  20  /  ALT  23  /  AlkPhos  96  11-26    CAPILLARY BLOOD GLUCOSE      POCT Blood Glucose.: 96 mg/dL (25 Nov 2024 19:05)      Urinalysis Basic - ( 26 Nov 2024 05:20 )    Color: x / Appearance: x / SG: x / pH: x  Gluc: 248 mg/dL / Ketone: x  / Bili: x / Urobili: x   Blood: x / Protein: x / Nitrite: x   Leuk Esterase: x / RBC: x / WBC x   Sq Epi: x / Non Sq Epi: x / Bacteria: x

## 2024-11-26 NOTE — PATIENT PROFILE ADULT - NSTRANSFERBELONGINGSDISPO_GEN_A_NUR
"Chief Complaint   Patient presents with     Physical     Diarrhea       Initial /66 mmHg  Pulse 90  Temp(Src) 98.4  F (36.9  C) (Tympanic)  Resp 16  Ht 5' 4.5\" (1.638 m)  Wt 154 lb (69.854 kg)  BMI 26.04 kg/m2  SpO2 98% Estimated body mass index is 26.04 kg/(m^2) as calculated from the following:    Height as of this encounter: 5' 4.5\" (1.638 m).    Weight as of this encounter: 154 lb (69.854 kg).  Medication Reconciliation: complete   Trish Dupree LPN      " with patient

## 2024-11-27 ENCOUNTER — TRANSCRIPTION ENCOUNTER (OUTPATIENT)
Age: 69
End: 2024-11-27

## 2024-11-27 DIAGNOSIS — D72.829 ELEVATED WHITE BLOOD CELL COUNT, UNSPECIFIED: ICD-10-CM

## 2024-11-27 LAB
ANION GAP SERPL CALC-SCNC: 3 MMOL/L — LOW (ref 5–17)
BUN SERPL-MCNC: 22 MG/DL — HIGH (ref 7–18)
CALCIUM SERPL-MCNC: 9.2 MG/DL — SIGNIFICANT CHANGE UP (ref 8.4–10.5)
CHLORIDE SERPL-SCNC: 98 MMOL/L — SIGNIFICANT CHANGE UP (ref 96–108)
CO2 SERPL-SCNC: 37 MMOL/L — HIGH (ref 22–31)
CREAT SERPL-MCNC: 0.67 MG/DL — SIGNIFICANT CHANGE UP (ref 0.5–1.3)
EGFR: 100 ML/MIN/1.73M2 — SIGNIFICANT CHANGE UP
GLUCOSE SERPL-MCNC: 114 MG/DL — HIGH (ref 70–99)
HCT VFR BLD CALC: 35.1 % — LOW (ref 39–50)
HGB BLD-MCNC: 11.4 G/DL — LOW (ref 13–17)
MCHC RBC-ENTMCNC: 29.4 PG — SIGNIFICANT CHANGE UP (ref 27–34)
MCHC RBC-ENTMCNC: 32.5 G/DL — SIGNIFICANT CHANGE UP (ref 32–36)
MCV RBC AUTO: 90.5 FL — SIGNIFICANT CHANGE UP (ref 80–100)
NRBC # BLD: 0 /100 WBCS — SIGNIFICANT CHANGE UP (ref 0–0)
PLATELET # BLD AUTO: 225 K/UL — SIGNIFICANT CHANGE UP (ref 150–400)
POTASSIUM SERPL-MCNC: 3.5 MMOL/L — SIGNIFICANT CHANGE UP (ref 3.5–5.3)
POTASSIUM SERPL-SCNC: 3.5 MMOL/L — SIGNIFICANT CHANGE UP (ref 3.5–5.3)
RBC # BLD: 3.88 M/UL — LOW (ref 4.2–5.8)
RBC # FLD: 15.6 % — HIGH (ref 10.3–14.5)
SODIUM SERPL-SCNC: 138 MMOL/L — SIGNIFICANT CHANGE UP (ref 135–145)
WBC # BLD: 14.31 K/UL — HIGH (ref 3.8–10.5)
WBC # FLD AUTO: 14.31 K/UL — HIGH (ref 3.8–10.5)

## 2024-11-27 RX ORDER — ALBUTEROL 90 MCG
2 AEROSOL (GRAM) INHALATION
Refills: 0 | DISCHARGE

## 2024-11-27 RX ADMIN — IPRATROPIUM BROMIDE AND ALBUTEROL SULFATE 3 MILLILITER(S): 2.5; .5 SOLUTION RESPIRATORY (INHALATION) at 20:13

## 2024-11-27 RX ADMIN — Medication 40 MILLIGRAM(S): at 22:44

## 2024-11-27 RX ADMIN — IPRATROPIUM BROMIDE AND ALBUTEROL SULFATE 3 MILLILITER(S): 2.5; .5 SOLUTION RESPIRATORY (INHALATION) at 09:14

## 2024-11-27 RX ADMIN — Medication 81 MILLIGRAM(S): at 12:44

## 2024-11-27 RX ADMIN — AZITHROMYCIN 255 MILLIGRAM(S): 250 TABLET, FILM COATED ORAL at 02:12

## 2024-11-27 RX ADMIN — METOPROLOL TARTRATE 50 MILLIGRAM(S): 100 TABLET, FILM COATED ORAL at 19:03

## 2024-11-27 RX ADMIN — TAMSULOSIN HYDROCHLORIDE 0.4 MILLIGRAM(S): 0.4 CAPSULE ORAL at 22:44

## 2024-11-27 RX ADMIN — FLUTICASONE PROPIONATE AND SALMETEROL XINAFOATE 1 DOSE(S): 45; 21 AEROSOL, METERED RESPIRATORY (INHALATION) at 11:32

## 2024-11-27 RX ADMIN — Medication 100 MILLIGRAM(S): at 00:26

## 2024-11-27 RX ADMIN — PRASUGREL HYDROCHLORIDE 10 MILLIGRAM(S): 10 TABLET, COATED ORAL at 12:43

## 2024-11-27 RX ADMIN — METOPROLOL TARTRATE 50 MILLIGRAM(S): 100 TABLET, FILM COATED ORAL at 05:53

## 2024-11-27 RX ADMIN — IPRATROPIUM BROMIDE AND ALBUTEROL SULFATE 3 MILLILITER(S): 2.5; .5 SOLUTION RESPIRATORY (INHALATION) at 14:53

## 2024-11-27 RX ADMIN — LISINOPRIL 20 MILLIGRAM(S): 20 TABLET ORAL at 05:53

## 2024-11-27 RX ADMIN — IPRATROPIUM BROMIDE AND ALBUTEROL SULFATE 3 MILLILITER(S): 2.5; .5 SOLUTION RESPIRATORY (INHALATION) at 02:37

## 2024-11-27 RX ADMIN — PREDNISONE 50 MILLIGRAM(S): 20 TABLET ORAL at 06:28

## 2024-11-27 RX ADMIN — FLUTICASONE PROPIONATE AND SALMETEROL XINAFOATE 1 DOSE(S): 45; 21 AEROSOL, METERED RESPIRATORY (INHALATION) at 22:44

## 2024-11-27 NOTE — DIETITIAN INITIAL EVALUATION ADULT - PROBLEM SELECTOR PLAN 1
Presenting with worsening SOB x2d.   States that has been requiring to bump up his home O2 to 4-5L and increased use of home inhalers in the past 2 days. Endorsing cough productive of beige sputum, sore throat, and runny nose.   home meds: albuterol inhaler   Follows with Pulm Dr. Mercy Warren    SARS, COVD, RSV - neg.   CXR - wet rad without infiltrates or consolidation.   s/p duonebs, solumedrol 125, CTX, azithro in the ED.     - RVP  - suspect COPD exacerbation 2/2 viral etiology vs bronchitis.  - CTX, azithro  - strep pneumo, legionella, mycoplasma  - will start prednisone 50 daily.  - duonebs q6.  - advair, albuterol    - patient on CPAP at night for suspect ?pulmonary HTN  - Primary team to confirm settings for CPAP; can restart as nocturnal BIPAP.

## 2024-11-27 NOTE — PROGRESS NOTE ADULT - PROBLEM SELECTOR PLAN 8
Pt is ambulatory  Pending clinical improvement (Still sob on exertion)  Pending AVAPS setting modifications by vendor (Patient has NIV at home).   CM to follow.  Discussed plan of care with intensivist and patient.

## 2024-11-27 NOTE — DISCHARGE NOTE PROVIDER - NSDCMRMEDTOKEN_GEN_ALL_CORE_FT
albuterol 90 mcg/inh inhalation aerosol: 2 puff(s) inhaled every 6 hours as needed for Shortness of Breath and/or Wheezing  aspirin 81 mg oral tablet: 1 tab(s) orally once a day  atorvastatin 40 mg oral tablet: 1 tab(s) orally once a day  AVAPS nocturnal and as needed.: Non invasive vent settings:   Nocturnal from 10PM to 06:00AM.  FiO2 (%) 40  Expiratory pressure (cmH2O): 8  Back up rate (bpm): 14  Tidal Volume (ml): 400  Maximum pressure (cmH2O): 25  Minimum pressure (cmH2O): 8   Targeted SpO2 Range (%): 88-97    Supplemental O2 PRN while off AVAPS: Nasal cannula   LPM (L/min): 3L  budesonide-formoterol 160 mcg-4.5 mcg/inh inhalation aerosol: 2 puff(s) inhaled 2 times a day  cyclobenzaprine 5 mg oral tablet: 1 tab(s) orally 3 times a day as needed for Muscle Spasm MDD: 3 tabs  levothyroxine 75 mcg (0.075 mg) oral tablet: 0.5 tab(s) orally once a day  lisinopril 20 mg oral tablet: 1 tab(s) orally once a day  metoprolol tartrate 50 mg oral tablet: 1 tab(s) orally 2 times a day  prasugrel 10 mg oral tablet: 1 tab(s) orally once a day  tamsulosin 0.4 mg oral capsule: 1 cap(s) orally once a day  tiotropium 2.5 mcg/inh inhalation aerosol: 2 puff(s) inhaled once a day   albuterol 90 mcg/inh inhalation aerosol: 2 puff(s) inhaled every 6 hours as needed for Shortness of Breath and/or Wheezing  aspirin 81 mg oral tablet: 1 tab(s) orally once a day  atorvastatin 40 mg oral tablet: 1 tab(s) orally once a day  AVAPS nocturnal and as needed.: Non invasive vent settings:   Nocturnal from 10PM to 06:00AM.  FiO2 (%) 40  Expiratory pressure (cmH2O): 8  Back up rate (bpm): 14  Tidal Volume (ml): 400  Maximum pressure (cmH2O): 25  Minimum pressure (cmH2O): 8   Targeted SpO2 Range (%): 88-97    Supplemental O2 PRN while off AVAPS: Nasal cannula   LPM (L/min): 3L  azithromycin 500 mg oral tablet: 1 tab(s) orally once a day  cefuroxime 250 mg oral tablet: 1 tab(s) orally MDD: 500 mg  cyclobenzaprine 5 mg oral tablet: 1 tab(s) orally 3 times a day as needed for Muscle Spasm MDD: 3 tabs  fluticasone-salmeterol 100 mcg-50 mcg inhalation powder: 1 puff(s) inhaled 2 times a day MDD: 2 puffs  levothyroxine 75 mcg (0.075 mg) oral tablet: 1 tab(s) orally once a day On Mondays and Thursdays  levothyroxine 75 mcg (0.075 mg) oral tablet: 0.5 tab(s) orally once a day On Wednesday, Friday, Saturday and Sunday.  lisinopril 20 mg oral tablet: 1 tab(s) orally once a day  metoprolol tartrate 50 mg oral tablet: 1 tab(s) orally 2 times a day  prasugrel 10 mg oral tablet: 1 tab(s) orally once a day  predniSONE 10 mg oral tablet: 1 dose(s) orally once a day Taper instructions:   4 tablets (40mg) once a day x 2 days.  3 tablets (30mg) once a day x 3 days.   2 tablets (20mg)  once a day x 3 days.   1 tablet (10mg) once a day x 3 days. MDD: per taper.  tamsulosin 0.4 mg oral capsule: 1 cap(s) orally once a day   albuterol 90 mcg/inh inhalation aerosol: 2 puff(s) inhaled every 6 hours as needed for Shortness of Breath and/or Wheezing  aspirin 81 mg oral tablet: 1 tab(s) orally once a day  atorvastatin 40 mg oral tablet: 1 tab(s) orally once a day  AVAPS nocturnal and as needed.: Non invasive vent settings:   Nocturnal from 10PM to 06:00AM.  FiO2 (%) 40  Expiratory pressure (cmH2O): 8  Back up rate (bpm): 14  Tidal Volume (ml): 400  Maximum pressure (cmH2O): 25  Minimum pressure (cmH2O): 8   Targeted SpO2 Range (%): 88-97    Supplemental O2 PRN while off AVAPS: Nasal cannula   LPM (L/min): 3L  azithromycin 500 mg oral tablet: 1 tab(s) orally once a day  cefuroxime 250 mg oral tablet: 1 tab(s) orally 2 times a day MDD: 500 mg  cyclobenzaprine 5 mg oral tablet: 1 tab(s) orally 3 times a day as needed for Muscle Spasm MDD: 3 tabs  fluticasone-salmeterol 100 mcg-50 mcg inhalation powder: 1 puff(s) inhaled 2 times a day MDD: 2 puffs  levothyroxine 75 mcg (0.075 mg) oral tablet: 1 tab(s) orally once a day On Mondays and Thursdays  levothyroxine 75 mcg (0.075 mg) oral tablet: 0.5 tab(s) orally once a day On Wednesday, Friday, Saturday and Sunday.  lisinopril 20 mg oral tablet: 1 tab(s) orally once a day  metoprolol tartrate 50 mg oral tablet: 1 tab(s) orally 2 times a day  prasugrel 10 mg oral tablet: 1 tab(s) orally once a day  predniSONE 10 mg oral tablet: 1 dose(s) orally once a day Taper instructions:   4 tablets (40mg) once a day x 2 days.  3 tablets (30mg) once a day x 3 days.   2 tablets (20mg)  once a day x 3 days.   1 tablet (10mg) once a day x 3 days. MDD: per taper.  tamsulosin 0.4 mg oral capsule: 1 cap(s) orally once a day   albuterol 90 mcg/inh inhalation aerosol: 2 puff(s) inhaled every 6 hours as needed for Shortness of Breath and/or Wheezing  aspirin 81 mg oral tablet: 1 tab(s) orally once a day  atorvastatin 40 mg oral tablet: 1 tab(s) orally once a day  azithromycin 500 mg oral tablet: 1 tab(s) orally once a day  BIPAP nocturnal and as needed.: Non invasive vent settings:   Nocturnal from 10PM to 06:00AM.  FiO2 (%) 40  EPAP: 10  IPAP: 15  Back up rate (bpm): 10  Targeted SpO2 Range (%): 88-97    Supplemental O2 PRN while off AVAPS: Nasal cannula   LPM (L/min): 3L  cefuroxime 250 mg oral tablet: 1 tab(s) orally 2 times a day MDD: 500 mg  cyclobenzaprine 5 mg oral tablet: 1 tab(s) orally 3 times a day as needed for Muscle Spasm MDD: 3 tabs  fluticasone-salmeterol 100 mcg-50 mcg inhalation powder: 1 puff(s) inhaled 2 times a day MDD: 2 puffs  levothyroxine 75 mcg (0.075 mg) oral tablet: 1 tab(s) orally once a day On Mondays and Thursdays  levothyroxine 75 mcg (0.075 mg) oral tablet: 0.5 tab(s) orally once a day On Wednesday, Friday, Saturday and Sunday.  lisinopril 20 mg oral tablet: 1 tab(s) orally once a day  metoprolol tartrate 50 mg oral tablet: 1 tab(s) orally 2 times a day  prasugrel 10 mg oral tablet: 1 tab(s) orally once a day  predniSONE 10 mg oral tablet: 1 dose(s) orally once a day Taper instructions:   4 tablets (40mg) once a day x 2 days.  3 tablets (30mg) once a day x 3 days.   2 tablets (20mg)  once a day x 3 days.   1 tablet (10mg) once a day x 3 days. MDD: per taper.  tamsulosin 0.4 mg oral capsule: 1 cap(s) orally once a day

## 2024-11-27 NOTE — DISCHARGE NOTE PROVIDER - PROVIDER TOKENS
PROVIDER:[TOKEN:[[id number]],FOLLOWUP:[1 week]],PROVIDER:[TOKEN:[[id number]],FOLLOWUP:[1-3 days],ESTABLISHEDPATIENT:[T]]

## 2024-11-27 NOTE — PROGRESS NOTE ADULT - SUBJECTIVE AND OBJECTIVE BOX
FRANCIS CHAUDHARY    SCU progress note    INTERVAL HPI/OVERNIGHT EVENTS: ***    DNR [ ]   DNI  [  ]    Covid - 19 PCR:     The 4Ms    What Matters Most: see GOC  Age appropriate Medications/Screen for High Risk Medication: Yes  Mentation: see CAM below  Mobility: defer to physical exam    The Confusion Assessment Method (CAM) Diagnostic Algorithm     1: Acute Onset or Fluctuating Course  - Is there evidence of an acute change in mental status from the patient’s baseline? Did the (abnormal) behavior  fluctuate during the day, that is, tend to come and go, or increase and decrease in severity?  [ ] YES [ ] NO     2: Inattention  - Did the patient have difficulty focusing attention, being easily distractible, or having difficulty keeping track of what was being said?  [ ] YES [ ] NO     3: Disorganized thinking  -Was the patient’s thinking disorganized or incoherent, such as rambling or irrelevant conversation, unclear or illogical flow of ideas, or unpredictable switching from subject to subject?  [ ] YES [ ] NO    4: Altered Level of consciousness?  [ ] YES [ ] NO    The diagnosis of delirium by CAM requires the presence of features 1 and 2 and either 3 or 4.    PRESSORS: [ ] YES [ ] NO  azithromycin  IVPB 500 milliGRAM(s) IV Intermittent every 24 hours  cefTRIAXone   IVPB 1000 milliGRAM(s) IV Intermittent every 24 hours    Cardiovascular:  Heart Failure  Acute   Acute on Chronic  Chronic       lisinopril 20 milliGRAM(s) Oral daily  metoprolol tartrate 50 milliGRAM(s) Oral two times a day    Pulmonary:  albuterol    90 MICROgram(s) HFA Inhaler 1 Puff(s) Inhalation every 4 hours PRN  albuterol/ipratropium for Nebulization 3 milliLiter(s) Nebulizer every 6 hours  fluticasone propionate/ salmeterol 100-50 MICROgram(s) Diskus 1 Dose(s) Inhalation two times a day    Hematalogic:  aspirin enteric coated 81 milliGRAM(s) Oral daily  prasugrel 10 milliGRAM(s) Oral daily    Other:  acetaminophen     Tablet .. 650 milliGRAM(s) Oral every 6 hours PRN  atorvastatin 40 milliGRAM(s) Oral at bedtime  levothyroxine 75 MICROGram(s) Oral daily  predniSONE   Tablet 50 milliGRAM(s) Oral daily  tamsulosin 0.4 milliGRAM(s) Oral at bedtime    acetaminophen     Tablet .. 650 milliGRAM(s) Oral every 6 hours PRN  albuterol    90 MICROgram(s) HFA Inhaler 1 Puff(s) Inhalation every 4 hours PRN  albuterol/ipratropium for Nebulization 3 milliLiter(s) Nebulizer every 6 hours  aspirin enteric coated 81 milliGRAM(s) Oral daily  atorvastatin 40 milliGRAM(s) Oral at bedtime  azithromycin  IVPB 500 milliGRAM(s) IV Intermittent every 24 hours  cefTRIAXone   IVPB 1000 milliGRAM(s) IV Intermittent every 24 hours  fluticasone propionate/ salmeterol 100-50 MICROgram(s) Diskus 1 Dose(s) Inhalation two times a day  levothyroxine 75 MICROGram(s) Oral daily  lisinopril 20 milliGRAM(s) Oral daily  metoprolol tartrate 50 milliGRAM(s) Oral two times a day  prasugrel 10 milliGRAM(s) Oral daily  predniSONE   Tablet 50 milliGRAM(s) Oral daily  tamsulosin 0.4 milliGRAM(s) Oral at bedtime    Drug Dosing Weight  Height (cm): 172.7 (25 Nov 2024 18:03)  Weight (kg): 68 (25 Nov 2024 18:03)  BMI (kg/m2): 22.8 (25 Nov 2024 18:03)  BSA (m2): 1.81 (25 Nov 2024 18:03)    CENTRAL LINE: [ ] YES [ ] NO  LOCATION:   DATE INSERTED:  REMOVE: [ ] YES [ ] NO  EXPLAIN:    FOREMAN: [ ] YES [ ] NO    DATE INSERTED:  REMOVE:  [ ] YES [ ] NO  EXPLAIN:    PAST MEDICAL & SURGICAL HISTORY:  HTN (hypertension)      HLD (hyperlipidemia)      PAD (peripheral artery disease)      History of abdominal aortic aneurysm (AAA)      History of COPD      Gastritis      Hypothyroidism      HTN (hypertension)      HLD (hyperlipidemia)      Hypothyroidism      CAD (coronary artery disease)      COPD (chronic obstructive pulmonary disease)      BPH (benign prostatic hyperplasia)      Prophylactic measure      No significant past surgical history                        PHYSICAL EXAM:    GENERAL: NAD, well-groomed, well-developed  HEAD:  Atraumatic, Normocephalic  EYES: EOMI, PERRLA, conjunctiva and sclera clear  ENMT: No tonsillar erythema, exudates, or enlargement; Moist mucous membranes, Good dentition, No lesions  NECK: Supple, No JVD, Normal thyroid  NERVOUS SYSTEM:  Alert & Oriented X3, Good concentration; Motor Strength 5/5 B/L upper and lower extremities; DTRs 2+ intact and symmetric  CHEST/LUNG: Clear to percussion bilaterally; No rales, rhonchi, wheezing, or rubs  HEART: Regular rate and rhythm; No murmurs, rubs, or gallops  ABDOMEN: Soft, Nontender, Nondistended; Bowel sounds present  EXTREMITIES:  2+ Peripheral Pulses, No clubbing, cyanosis, or edema  LYMPH: No lymphadenopathy noted  SKIN: No rashes or lesions      LABS:  CBC Full  -  ( 27 Nov 2024 06:27 )  WBC Count : 14.31 K/uL  RBC Count : 3.88 M/uL  Hemoglobin : 11.4 g/dL  Hematocrit : 35.1 %  Platelet Count - Automated : 225 K/uL  Mean Cell Volume : 90.5 fl  Mean Cell Hemoglobin : 29.4 pg  Mean Cell Hemoglobin Concentration : 32.5 g/dL  Auto Neutrophil # : x  Auto Lymphocyte # : x  Auto Monocyte # : x  Auto Eosinophil # : x  Auto Basophil # : x  Auto Neutrophil % : x  Auto Lymphocyte % : x  Auto Monocyte % : x  Auto Eosinophil % : x  Auto Basophil % : x    11-27    138  |  98  |  22[H]  ----------------------------<  114[H]  3.5   |  37[H]  |  0.67    Ca    9.2      27 Nov 2024 06:27  Phos  3.4     11-26  Mg     1.8     11-26    TPro  7.7  /  Alb  4.0  /  TBili  0.6  /  DBili  x   /  AST  20  /  ALT  23  /  AlkPhos  96  11-26      Urinalysis Basic - ( 27 Nov 2024 06:27 )    Color: x / Appearance: x / SG: x / pH: x  Gluc: 114 mg/dL / Ketone: x  / Bili: x / Urobili: x   Blood: x / Protein: x / Nitrite: x   Leuk Esterase: x / RBC: x / WBC x   Sq Epi: x / Non Sq Epi: x / Bacteria: x            [  ]  DVT Prophylaxis  [  ]  Nutrition, Brand, Rate         Goal Rate        Abnormal Nutritional Status -  Malnutrition   Cachexia      Morbid Obesity BMI >/=40    RADIOLOGY & ADDITIONAL STUDIES:  ***    Goals of Care Discussion with Family/Proxy/Other   - see note from/family meeting set up for...     FRANCIS CHAUDHARY    SCU progress note    INTERVAL HPI/OVERNIGHT EVENTS: No acute events.     FULL CODE    Covid - 19 PCR: Non-reactive.     The 4Ms    What Matters Most: see GOC  Age appropriate Medications/Screen for High Risk Medication: Yes  Mentation: see CAM below  Mobility: defer to physical exam    The Confusion Assessment Method (CAM) Diagnostic Algorithm     1: Acute Onset or Fluctuating Course  - Is there evidence of an acute change in mental status from the patient’s baseline? Did the (abnormal) behavior  fluctuate during the day, that is, tend to come and go, or increase and decrease in severity?  [ ] YES [ x] NO     2: Inattention  - Did the patient have difficulty focusing attention, being easily distractible, or having difficulty keeping track of what was being said?  [ ] YES [x ] NO     3: Disorganized thinking  -Was the patient’s thinking disorganized or incoherent, such as rambling or irrelevant conversation, unclear or illogical flow of ideas, or unpredictable switching from subject to subject?  [ ] YES [x] NO    4: Altered Level of consciousness?  [ ] YES [x ] NO    The diagnosis of delirium by CAM requires the presence of features 1 and 2 and either 3 or 4.    PRESSORS: [ ] YES [ x] NO  azithromycin  IVPB 500 milliGRAM(s) IV Intermittent every 24 hours  cefTRIAXone   IVPB 1000 milliGRAM(s) IV Intermittent every 24 hours    Cardiovascular:  Heart Failure  Acute   Acute on Chronic  Chronic       lisinopril 20 milliGRAM(s) Oral daily  metoprolol tartrate 50 milliGRAM(s) Oral two times a day    Pulmonary:  albuterol    90 MICROgram(s) HFA Inhaler 1 Puff(s) Inhalation every 4 hours PRN  albuterol/ipratropium for Nebulization 3 milliLiter(s) Nebulizer every 6 hours  fluticasone propionate/ salmeterol 100-50 MICROgram(s) Diskus 1 Dose(s) Inhalation two times a day    Hematalogic:  aspirin enteric coated 81 milliGRAM(s) Oral daily  prasugrel 10 milliGRAM(s) Oral daily    Other:  acetaminophen     Tablet .. 650 milliGRAM(s) Oral every 6 hours PRN  atorvastatin 40 milliGRAM(s) Oral at bedtime  levothyroxine 75 MICROGram(s) Oral daily  predniSONE   Tablet 50 milliGRAM(s) Oral daily  tamsulosin 0.4 milliGRAM(s) Oral at bedtime    acetaminophen     Tablet .. 650 milliGRAM(s) Oral every 6 hours PRN  albuterol    90 MICROgram(s) HFA Inhaler 1 Puff(s) Inhalation every 4 hours PRN  albuterol/ipratropium for Nebulization 3 milliLiter(s) Nebulizer every 6 hours  aspirin enteric coated 81 milliGRAM(s) Oral daily  atorvastatin 40 milliGRAM(s) Oral at bedtime  azithromycin  IVPB 500 milliGRAM(s) IV Intermittent every 24 hours  cefTRIAXone   IVPB 1000 milliGRAM(s) IV Intermittent every 24 hours  fluticasone propionate/ salmeterol 100-50 MICROgram(s) Diskus 1 Dose(s) Inhalation two times a day  levothyroxine 75 MICROGram(s) Oral daily  lisinopril 20 milliGRAM(s) Oral daily  metoprolol tartrate 50 milliGRAM(s) Oral two times a day  prasugrel 10 milliGRAM(s) Oral daily  predniSONE   Tablet 50 milliGRAM(s) Oral daily  tamsulosin 0.4 milliGRAM(s) Oral at bedtime    Drug Dosing Weight  Height (cm): 172.7 (25 Nov 2024 18:03)  Weight (kg): 68 (25 Nov 2024 18:03)  BMI (kg/m2): 22.8 (25 Nov 2024 18:03)  BSA (m2): 1.81 (25 Nov 2024 18:03)    CENTRAL LINE: [ ] YES [x ] NO  LOCATION:   DATE INSERTED:  REMOVE: [ ] YES [ ] NO  EXPLAIN:    FOREMAN: [ ] YES [ ] NO    DATE INSERTED:  REMOVE:  [ ] YES [ ] NO  EXPLAIN:    PAST MEDICAL & SURGICAL HISTORY:  HTN (hypertension)      HLD (hyperlipidemia)      PAD (peripheral artery disease)      History of abdominal aortic aneurysm (AAA)      History of COPD      Gastritis      Hypothyroidism      HTN (hypertension)      HLD (hyperlipidemia)      Hypothyroidism      CAD (coronary artery disease)      COPD (chronic obstructive pulmonary disease)      BPH (benign prostatic hyperplasia)      Prophylactic measure      No significant past surgical history    ROS: C/o SOB with exacerbation.     PHYSICAL EXAM:  GENERAL: NAD, well-groomed, well-developed  HEAD:  Atraumatic, Normocephalic  EYES:  PERRLA, conjunctiva and sclera clear  ENMT: Moist mucous membranes, Good dentition, No lesions  NECK: Supple, No JVD, Normal thyroid  NERVOUS SYSTEM:  Alert & Oriented X3, Good concentration; Motor Strength 5/5 B/L upper and lower extremities.   CHEST/LUNG: Diminished bilaterally; No rales, rhonchi, wheezing, or rubs. On 3L.   HEART: Regular rate and rhythm; No murmurs, rubs, or gallops  ABDOMEN: Soft, Nontender, Nondistended; Bowel sounds present  EXTREMITIES:  2+ Peripheral Pulses, No clubbing, cyanosis, or edema  SKIN: No rashes or lesions      LABS:  CBC Full  -  ( 27 Nov 2024 06:27 )  WBC Count : 14.31 K/uL  RBC Count : 3.88 M/uL  Hemoglobin : 11.4 g/dL  Hematocrit : 35.1 %  Platelet Count - Automated : 225 K/uL  Mean Cell Volume : 90.5 fl  Mean Cell Hemoglobin : 29.4 pg  Mean Cell Hemoglobin Concentration : 32.5 g/dL  Auto Neutrophil # : x  Auto Lymphocyte # : x  Auto Monocyte # : x  Auto Eosinophil # : x  Auto Basophil # : x  Auto Neutrophil % : x  Auto Lymphocyte % : x  Auto Monocyte % : x  Auto Eosinophil % : x  Auto Basophil % : x    11-27    138  |  98  |  22[H]  ----------------------------<  114[H]  3.5   |  37[H]  |  0.67    Ca    9.2      27 Nov 2024 06:27  Phos  3.4     11-26  Mg     1.8     11-26    TPro  7.7  /  Alb  4.0  /  TBili  0.6  /  DBili  x   /  AST  20  /  ALT  23  /  AlkPhos  96  11-26      Urinalysis Basic - ( 27 Nov 2024 06:27 )    Color: x / Appearance: x / SG: x / pH: x  Gluc: 114 mg/dL / Ketone: x  / Bili: x / Urobili: x   Blood: x / Protein: x / Nitrite: x   Leuk Esterase: x / RBC: x / WBC x   Sq Epi: x / Non Sq Epi: x / Bacteria: x    [ x ]  DVT Prophylaxis    RADIOLOGY & ADDITIONAL STUDIES:    < from: CT Angio Abdomen and Pelvis w/ IV Cont (06.26.24 @ 16:01) >    EXAM: [id number] - CT ANGIO ABD PELV (W)AW IC  - ORDERED BY: TAN FONSECA    EXAM: [id number] - CT CHEST IC  - ORDERED BY: TAN FONSECA      PROCEDURE DATE:  06/26/2024           INTERPRETATION:  CLINICAL INFORMATION: 69-year-old man with pulmonary   nodules and abdominal aortic aneurysm    COMPARISON: None.    CONTRAST/COMPLICATIONS:  IV Contrast: Omnipaque 350  90 cc administered   10 cc discarded  Oral Contrast: NONE  Complications: None reported at time of study completion    PROCEDURE:  CT Angiography of the Chest, Abdomen and Pelvis.  Arterial phase imaging of the chest, abdomen and pelvis.  Sagittal and coronal reformats were performed as well as 3D (MIP)   reconstructions.    FINDINGS:  CHEST:  LUNGS AND LARGE AIRWAYS: Patent central airways. Unchanged 7 mm   intrafissural nodule, likely lymph node (3:320) previously described left   upper lobe nodule not visualized. Severe emphysema.  PLEURA: No pleural effusion.  VESSELS: Mid ascending aorta dilated to 4.1 cm, previously 4 cm  HEART: Heart size is normal. No pericardial effusion. Intracardiac   pacemaker leads.  MEDIASTINUM AND ALANNA: No lymphadenopathy.  CHEST WALL AND LOWER NECK: Pacemaker left chest wall    ABDOMEN AND PELVIS:  LIVER: Within normal limits.  BILE DUCTS: Normal caliber.  GALLBLADDER: Sludge versus small calculi.  SPLEEN: Within normal limits.  PANCREAS: Within normal limits.  ADRENALS: Within normal limits.  KIDNEYS/URETERS: Left renal cyst. Scarring left kidney    BLADDER: Within normal limits.  REPRODUCTIVE ORGANS: Enlarged prostate.    BOWEL: No bowel obstruction. Appendix normal.  PERITONEUM/RETROPERITONEUM: Within normal limits.  VESSELS: Infrarenal, partially thrombosed, abdominal aortic aneurysm 5 x   5.9 cm (601:71 and 602:76), previously 5 x5.8 cm. Patent celiac axis and   branches, SMA, CARLOS, bilateral renal arteries, bilateral iliac and   visualized femoral vessels. Patent proximal right femoral bypass graft.   Unchanged 1.9 cm right common iliac artery aneurysm Unchanged crescentic   hyperattenuation in aneurysm thrombus.  LYMPH NODES: No lymphadenopathy.  ABDOMINAL WALL: Within normal limits.  BONES: Within normal limits.    IMPRESSION:  Ascending aortic aneurysm and infrarenal partially thrombosed aortic   aneurysm essentially unchanged from 10/27/2023.  Right common iliac artery aneurysm also unchanged.  Unchanged crescentic hyperattenuation in abdominal aortic aneurysm   thrombus.  Severe emphysema.  Unchanged intrafissural lymph node            --- End of Report ---               CELSO CLEMENS MD; Attending Radiologist   This document has been electronically signed. Jun 27 2024  9:05AM    < end of copied text >  < from: Xray Chest 1 View-PORTABLE IMMEDIATE (11.25.24 @ 20:13) >  ACC: [id number] EXAM:  XR CHEST PORTABLE IMMED 1V   ORDERED BY: KAYLIE ULLOA     PROCEDURE DATE:  11/25/2024          INTERPRETATION:  AP semierect chest on November 25, 2024 at 8:04 PM.   Patient is short of breath.    2 images.    COMPARISON: None available.    Heart size is within normal limits. There is a left-sided defibrillator.   Right coronary stent is noted. Left coronary stent is seen.    Sparse lung markings in the right upper lobe strongly suggests emphysema.   No infiltrate.    IMPRESSION: Left-sided defibrillator and coronary stents. Probable   emphysema.    --- End of Report ---            CULLEN CASPER MD; Attending Radiologist  This document has been electronically signed. Nov 26 2024 10:52AM    < end of copied text >

## 2024-11-27 NOTE — DISCHARGE NOTE PROVIDER - NSDCCPCAREPLAN_GEN_ALL_CORE_FT
PRINCIPAL DISCHARGE DIAGNOSIS  Diagnosis: Acute hypoxic respiratory failure  Assessment and Plan of Treatment: You were admitted to the emergency epartment due to shortness of breath and found to be in COPD exacerbation. You were started on Azithromycin and Ceftriaxone for possible respiratory infection and given nebulizer treatments. You were seen by Pulmonology. You were placed on AVAPS machine at night to prevent CO2 retention and assist with your breathing. You continued to use 3L via nasal cannula during the day. Steroids were initially given IV and swtiched later to pill form. You have improved greatly after treatments. You need to follow up with your pulmonologist mariposa 1 week from discharge date. In the event that you develop shortness of breath unrelieved by inhalers you should returne to the emergency department.      SECONDARY DISCHARGE DIAGNOSES  Diagnosis: COPD exacerbation  Assessment and Plan of Treatment: You were seen by Pulmonology and admitted due to COPD exacerbation. You were placed on AVAPS machine at night to prevent CO2 retention and assist with your breathing. You continued to use 3L via nasal cannula during the day. Steroids were initially given IV and swtiched later to pill form. You have improved greatly after treatments. You need to follow up with your pulmonologist mariposa 1 week from discharge date. In the event that you develop shortness of breath unrelieved by inhalers you should returne to the emergency department.    Diagnosis: Hypothyroidism  Assessment and Plan of Treatment: Continue taking your synthroid as prescribed.    Diagnosis: CAD (coronary artery disease)  Assessment and Plan of Treatment:     Diagnosis: HLD (hyperlipidemia)  Assessment and Plan of Treatment: Continue taking your stain medication and follow up with your primary care provider.    Diagnosis: HTN (hypertension)  Assessment and Plan of Treatment: Continue taking your blood pressure medications as prescribed and follow up with your primary care provider.     PRINCIPAL DISCHARGE DIAGNOSIS  Diagnosis: Acute hypoxic respiratory failure  Assessment and Plan of Treatment: [facility] and found to be in COPD exacerbation. You were given Azithromycin and Ceftriaxone IV for possible respiratory infection and given nebulizer treatments. You were seen by Pulmonology. You were placed on AVAPS machine at night to prevent CO2 retention and assist with your breathing. You continued to use 3L via nasal cannula during the day. Steroids were initially given IV and switched later to pill form. You have improved greatly after treatments. You need to follow up with your pulmonologist mariposa 1 week from discharge date. In the event that you develop shortness of breath unrelieved by inhalers you should returne to the emergency department.  You should continue Ceftin as prescribed for a total of 7 days and conitnue prednisone taper as prescribed. Please follow up with your PCP and Pulmonologist.      SECONDARY DISCHARGE DIAGNOSES  Diagnosis: COPD exacerbation  Assessment and Plan of Treatment: You were seen by Pulmonology and admitted due to COPD exacerbation. You were placed on AVAPS machine at night to prevent CO2 retention and assist with your breathing. You continued to use 3L via nasal cannula during the day. Steroids were initially given IV and swtiched later to pill form. You have improved greatly after treatments. You need to follow up with your pulmonologist mariposa 1 week from discharge date. In the event that you develop shortness of breath unrelieved by inhalers you should returne to the emergency department.    Diagnosis: Hypothyroidism  Assessment and Plan of Treatment: Continue taking your synthroid as prescribed.    Diagnosis: CAD (coronary artery disease)  Assessment and Plan of Treatment: Continue taking Prasugrel and Aspirin as prescribed. Continue Statin and follow up with your PCP.    Diagnosis: HLD (hyperlipidemia)  Assessment and Plan of Treatment: Continue taking your stain medication and follow up with your primary care provider.    Diagnosis: HTN (hypertension)  Assessment and Plan of Treatment: Continue taking your blood pressure medications as prescribed and follow up with your primary care provider.     PRINCIPAL DISCHARGE DIAGNOSIS  Diagnosis: Acute hypoxic respiratory failure  Assessment and Plan of Treatment: [facility] and found to be in COPD exacerbation. You were given Azithromycin and Ceftriaxone IV for possible respiratory infection and given nebulizer treatments. You were seen by Pulmonology. You were placed on AVAPS machine at night to prevent CO2 retention and assist with your breathing. You continued to use 3L via nasal cannula during the day. Steroids were initially given IV and switched later to pill form. You have improved greatly after treatments. You need to follow up with your pulmonologist mariposa 1 week from discharge date. In the event that you develop shortness of breath unrelieved by inhalers you should returne to the emergency department.  You should continue Ceftin as prescribed for a total of 7 days and conitnue prednisone taper as prescribed. Please follow up with your PCP and Pulmonologist.  You were discharged on BiPAP with settings of 15/10 FiO2 40% with back up rate of 10. Technitian to adjust settings upon discharge.  Discussed with tech vendor on the phone.      SECONDARY DISCHARGE DIAGNOSES  Diagnosis: COPD exacerbation  Assessment and Plan of Treatment: You were seen by Pulmonology and admitted due to COPD exacerbation. You were placed on AVAPS machine at night to prevent CO2 retention and assist with your breathing. You continued to use 3L via nasal cannula during the day. Steroids were initially given IV and swtiched later to pill form. You have improved greatly after treatments. You need to follow up with your pulmonologist mariposa 1 week from discharge date. In the event that you develop shortness of breath unrelieved by inhalers you should returne to the emergency department.    Diagnosis: Hypothyroidism  Assessment and Plan of Treatment: Continue taking your synthroid as prescribed.    Diagnosis: CAD (coronary artery disease)  Assessment and Plan of Treatment: Continue taking Prasugrel and Aspirin as prescribed. Continue Statin and follow up with your PCP.    Diagnosis: HLD (hyperlipidemia)  Assessment and Plan of Treatment: Continue taking your stain medication and follow up with your primary care provider.    Diagnosis: HTN (hypertension)  Assessment and Plan of Treatment: Continue taking your blood pressure medications as prescribed and follow up with your primary care provider.

## 2024-11-27 NOTE — PROGRESS NOTE ADULT - PROBLEM SELECTOR PLAN 5
Hx of Coronary stent  home meds: aspirin 81, Prasugrel 10    - c/w home meds home meds: levothyroxine 75 mcg  c/w home meds

## 2024-11-27 NOTE — PROGRESS NOTE ADULT - PROBLEM SELECTOR PLAN 3
home meds: atorvastatin 40    - c/w home meds home meds: metoprolol tartrate 50 BID, lisinopril 20  c/w home meds  Monitor per unit protocol   Malou

## 2024-11-27 NOTE — DISCHARGE NOTE PROVIDER - HOSPITAL COURSE
69M, from home, ambulates with cane, PMH COPD (on 2L NC home O2), HTN, HLD, hypothyroidism, CAD, PAD, known AAA,  hx of ETOH abuse c/b cirrhosis, ex-smoker. Presenting with worsening SOB x2d. States that has been requiring to bump up his home O2 to 4-5L in the past 2 days. Endorsing cough productive of beige sputum, sore throat, and runny nose. Only use Albuterol inhaler at home, and has been using it more often past couple of days (up to 5 times a day) with minimal relief of his sx. Endorses difficult to ambulate due to SOB ; had difficulty walking from bathroom to his door. However, denies chest pain, palpitations, dizziness. Denies fever, chills, n/v/d, dysuria, diarrhea, numbness or tingling or swelling in lower ext.   Follows with Pulm Dr. Mercy Warren, who put him on CPAP at night. States that has not been able to afford the other inhalers he was prescribed with (states ?Symbicort vs ?Spirvia), so has only taken Albuterol.    (25 Nov 2024 23:48)    ICU was consulted for AVAPS. Pt seen at bedside, on 3L oxygen, reported being on BiPAP however unsure of the settings and when explained about the differences he mentioned likely it is AVAPS and the respiratory company knew best. Pt agreed to try the AVAPS in the hospital and be transferred to  for it.   As pt was otherwise without any respiratory distress and stable no other ICU indication at this time.   Patient on Azithro and Ceftriaxone and AVAPS noctural and as needed with 3L NC during the day.   AVAPS setting sent to vendor. Patient endorses that current NIV machine at home is not functioning. Spoke with company and faxed AVAPS setting.     69M, from home, ambulates with cane, PMH COPD (on 2L NC home O2), HTN, HLD, hypothyroidism, CAD, PAD, known AAA,  hx of ETOH abuse c/b cirrhosis, ex-smoker. Presenting with worsening SOB x2d. States that has been requiring to bump up his home O2 to 4-5L in the past 2 days. Endorsing cough productive of beige sputum, sore throat, and runny nose. Only use Albuterol inhaler at home, and has been using it more often past couple of days (up to 5 times a day) with minimal relief of his sx. Endorses difficult to ambulate due to SOB ; had difficulty walking from bathroom to his door. However, denies chest pain, palpitations, dizziness. Denies fever, chills, n/v/d, dysuria, diarrhea, numbness or tingling or swelling in lower ext.   Follows with Pulm Dr. Mercy Warren, who put him on CPAP at night. States that has not been able to afford the other inhalers he was prescribed with (states ?Symbicort vs ?Spirvia), so has only taken Albuterol.    (25 Nov 2024 23:48)    ICU was consulted for AVAPS. Pt seen at bedside, on 3L oxygen, reported being on BiPAP however unsure of the settings and when explained about the differences he mentioned likely it is AVAPS and the respiratory company knew best. Pt agreed to try the AVAPS in the hospital and be transferred to  for it.   As pt was otherwise without any respiratory distress and stable no other ICU indication at this time.   Patient on Azithro and Ceftriaxone and AVAPS noctural and as needed with 3L NC during the day.   AVAPS setting sent to vendor. Patient endorses that current NIV machine at home is not functioning. Spoke with company and faxed AVAPS setting.      Patient started to Prednisone taper and will be discharged on oral Azithromycin to complete a total of 5 day course and Ceftin 250mg BID for 7 days total. Patient to be discharged on Advair and Albuterol PRN.   Patient to follow up with his Pulmonologist OP. Dicussed discharge instructions with patient and patient's son at bedside.     Given patient's improved clinical status and current hemodynamic stability, Discussed discharge planning with intensivist and decision was made to discharge. Please note that this is a brief summary of hospital course. Please refer to daily progress notes and consult notes for full course and events.     69M, from home, ambulates with cane, PMH COPD (on 2L NC home O2), HTN, HLD, hypothyroidism, CAD, PAD, known AAA,  hx of ETOH abuse c/b cirrhosis, ex-smoker. Presenting with worsening SOB x2d. States that has been requiring to bump up his home O2 to 4-5L in the past 2 days. Endorsing cough productive of beige sputum, sore throat, and runny nose. Only use Albuterol inhaler at home, and has been using it more often past couple of days (up to 5 times a day) with minimal relief of his sx. Endorses difficult to ambulate due to SOB ; had difficulty walking from bathroom to his door. However, denies chest pain, palpitations, dizziness. Denies fever, chills, n/v/d, dysuria, diarrhea, numbness or tingling or swelling in lower ext.   Follows with Pulm Dr. Mercy Warren, who put him on CPAP at night. States that has not been able to afford the other inhalers he was prescribed with (states ?Symbicort vs ?Spirvia), so has only taken Albuterol.    (25 Nov 2024 23:48)    ICU was consulted for AVAPS. Pt seen at bedside, on 3L oxygen, reported being on BiPAP however unsure of the settings and when explained about the differences he mentioned likely it is AVAPS and the respiratory company knew best. Pt agreed to try the AVAPS in the hospital and be transferred to  for it.   As pt was otherwise without any respiratory distress and stable no other ICU indication at this time.   Patient on Azithro and Ceftriaxone and AVAPS nocturnal and as needed with 3L NC during the day.   AVAPS setting sent to vendor. Patient endorses that current NIV machine at home is not functioning. Spoke with company and faxed AVAPS setting.      Patient started to Prednisone taper and will be discharged on oral Azithromycin to complete a total of 5 day course and Ceftin 250mg BID for 7 days total. Patient to be discharged on Advair and Albuterol PRN.   Patient to follow up with his Pulmonologist OP. Discussed discharge instructions with patient and patient's son at bedside.     Given patient's improved clinical status and current hemodynamic stability, Discussed discharge planning with intensivist and decision was made to discharge. Please note that this is a brief summary of hospital course. Please refer to daily progress notes and consult notes for full course and events.     69M, from home, ambulates with cane, PMH COPD (on 2L NC home O2), HTN, HLD, hypothyroidism, CAD, PAD, known AAA,  hx of ETOH abuse c/b cirrhosis, ex-smoker. Presenting with worsening SOB x2d. States that has been requiring to bump up his home O2 to 4-5L in the past 2 days. Endorsing cough productive of beige sputum, sore throat, and runny nose. Only use Albuterol inhaler at home, and has been using it more often past couple of days (up to 5 times a day) with minimal relief of his sx. Endorses difficult to ambulate due to SOB ; had difficulty walking from bathroom to his door. However, denies chest pain, palpitations, dizziness. Denies fever, chills, n/v/d, dysuria, diarrhea, numbness or tingling or swelling in lower ext.   Follows with Pulm Dr. Mercy Warren, who put him on CPAP at night. States that has not been able to afford the other inhalers he was prescribed with (states ?Symbicort vs ?Spirvia), so has only taken Albuterol.    (25 Nov 2024 23:48)    ICU was consulted for AVAPS. Pt seen at bedside, on 3L oxygen, reported being on BiPAP however unsure of the settings and when explained about the differences he mentioned likely it is AVAPS and the respiratory company knew best. Pt agreed to try the AVAPS in the hospital and be transferred to  for it.   As pt was otherwise without any respiratory distress and stable no other ICU indication at this time.   Patient on Azithro and Ceftriaxone and AVAPS nocturnal and as needed with 3L NC during the day.   AVAPS setting sent to vendor. Patient endorses that current NIV machine at home is not functioning. Spoke with company and faxed AVAPS setting.      Patient started to Prednisone taper and will be discharged on oral Azithromycin to complete a total of 5 day course and Ceftin 250 mg BID for 7 days total. Patient to be discharged on Advair and Albuterol PRN.   Patient to follow up with his Pulmonologist OP. Discussed discharge instructions with patient and patient's son at bedside.   Spoke with BiPAP vendor and abtained instructions that tech will review setting with patient upon discharge. Patient has BiPAP at home. Settings obtained from patient's previous discharge 15/10 with back up rate of 10 and FiO2 of 40%. Patient to use 3L O2 at home. has concentrator.     Given patient's improved clinical status and current hemodynamic stability, Discussed discharge planning with intensivist and decision was made to discharge. Please note that this is a brief summary of hospital course. Please refer to daily progress notes and consult notes for full course and events.     69M, from home, ambulates with cane, PMH COPD (on 2L NC home O2), HTN, HLD, hypothyroidism, CAD, PAD, known AAA,  hx of ETOH abuse c/b cirrhosis, ex-smoker. Presenting with worsening SOB x2d. States that has been requiring to bump up his home O2 to 4-5L in the past 2 days. Endorsing cough productive of beige sputum, sore throat, and runny nose. Only use Albuterol inhaler at home, and has been using it more often past couple of days (up to 5 times a day) with minimal relief of his sx. Endorses difficult to ambulate due to SOB ; had difficulty walking from bathroom to his door. However, denies chest pain, palpitations, dizziness. Denies fever, chills, n/v/d, dysuria, diarrhea, numbness or tingling or swelling in lower ext.   Follows with Pulm Dr. Mercy Warren, who put him on CPAP at night. States that has not been able to afford the other inhalers he was prescribed with (states ?Symbicort vs ?Spirvia), so has only taken Albuterol.    (25 Nov 2024 23:48)    ICU was consulted for AVAPS. Pt seen at bedside, on 3L oxygen, reported being on BiPAP however unsure of the settings and when explained about the differences he mentioned likely it is AVAPS and the respiratory company knew best. Pt agreed to try the AVAPS in the hospital and be transferred to  for it.   As pt was otherwise without any respiratory distress and stable no other ICU indication at this time.   Patient on Azithro and Ceftriaxone and AVAPS nocturnal and as needed with 3L NC during the day.   AVAPS setting sent to vendor. Patient endorses that current NIV machine at home is not functioning. Spoke with company and faxed AVAPS setting.      Patient started to Prednisone taper and will be discharged on oral Azithromycin to complete a total of 5 day course and Ceftin 250 mg BID for 7 days total. Patient to be discharged on Advair and Albuterol PRN.   Patient to follow up with his Pulmonologist OP. Discussed discharge instructions with patient and patient's son at bedside.   Spoke with BiPAP vendor and abtained instructions that tech will review setting with patient upon discharge. Patient has BiPAP at home. Settings obtained from patient's previous discharge 15/10 with back up rate of 10 and FiO2 of 40%. Patient to use 3L O2 at home. has concentrator.     Given patient's improved clinical status and current hemodynamic stability, Discussed discharge planning with intensivist and decision was made to discharge. Please note that this is a brief summary of hospital course. Please refer to daily progress notes and consult notes for full course and events.  for a full account of hospital course please refer to actual medical records for this is a brief summery

## 2024-11-27 NOTE — DISCHARGE NOTE PROVIDER - NSDCFUSCHEDAPPT_GEN_ALL_CORE_FT
Joe Wise  [facility] Physician [facility]  UROLOGY [address]  Scheduled Appointment: 02/03/2025

## 2024-11-27 NOTE — PROGRESS NOTE ADULT - ASSESSMENT
69M, from home, ambulates with cane, PMH COPD (on 2L NC home O2), HTN, HLD, hypothyroidism, CAD, PAD, known AAA,  hx of ETOH abuse c/b cirrhosis, ex-smoker. Presenting with worsening SOB x2d. States that has been requiring to bump up his home O2 to 4-5L and increased use of home inhalers in the past 2 days. Endorsing cough productive of beige sputum, sore throat, and runny nose. SARS, COVD, RSV neg. CXR wet rad without infiltrates or consolidation. Admitted for COPD exacerbation.   11/27/2024:              69M, from home, ambulates with cane, PMH COPD (on 2L NC home O2), HTN, HLD, hypothyroidism, CAD, PAD, known AAA,  hx of ETOH abuse c/b cirrhosis, ex-smoker. Presenting with worsening SOB x2d. States that has been requiring to bump up his home O2 to 4-5L and increased use of home inhalers in the past 2 days. Endorsing cough productive of beige sputum, sore throat, and runny nose. SARS, COVID, RSV neg. CXR wet rad without infiltrates or consolidation. Admitted for COPD exacerbation.   11/27/2024: Patient on 3L NC and tolerating well. endorses SOB with exertion. C/w Prednisone 50 mg. Will start taper on 11/28. C/w Azithro and Ceftriaxone. AVAPS AHS. AVAPS setting faxed to vendor. Patient has O2 at home. CM following.

## 2024-11-27 NOTE — PROGRESS NOTE ADULT - PROBLEM SELECTOR PLAN 1
Presenting with worsening SOB x2d.   States that has been requiring to bump up his home O2 to 4-5L and increased use of home inhalers in the past 2 days. Endorsing cough productive of beige sputum, sore throat, and runny nose.   home meds: albuterol inhaler   Follows with Pulm Dr. Mercy Warren    SARS, COVD, RSV - neg.   CXR - without infiltrates or consolidation.   s/p duonebs, solumedrol 125, CTX, azithro in the ED.     - RVP neg  - suspect COPD exacerbation 2/2 viral etiology vs bronchitis.  - C/W CTX, azithro  - F/U strep pneumo, legionella, mycoplasma  - will start prednisone 50 daily.  - duonebs q6.  - advair, albuterol    - patient on CPAP at night for suspect ?pulmonary HTN  - Primary team to confirm settings for CPAP; can restart as nocturnal BIPAP. suspect COPD exacerbation 2/2 viral etiology vs bronchitis.  Follows with Pulm Dr. Mercy Warren  SARS, COVD, RSV - neg.   CXR - without infiltrates or consolidation.   s/p Duoneb, solumedrol 125, CTX, azithro in the ED.   RVP neg  C/W CTX, azithro  F/U strep pneumo, legionella, mycoplasma.   will start prednisone 50 daily.  Duoneb q6.  - advair, albuterol    - patient on CPAP at night for suspect ?pulmonary HTN  - Primary team to confirm settings for CPAP; can restart as nocturnal BIPAP.

## 2024-11-27 NOTE — PROGRESS NOTE ADULT - PROBLEM SELECTOR PLAN 2
home meds: metoprolol tartrate 50 BID, lisinopril 20    - c/w home meds Likely steroid induced.   Afebrile.   C/w Steroids with taper in AM

## 2024-11-27 NOTE — DIETITIAN INITIAL EVALUATION ADULT - PERTINENT MEDS FT
MEDICATIONS  (STANDING):  albuterol/ipratropium for Nebulization 3 milliLiter(s) Nebulizer every 6 hours  aspirin enteric coated 81 milliGRAM(s) Oral daily  atorvastatin 40 milliGRAM(s) Oral at bedtime  azithromycin  IVPB 500 milliGRAM(s) IV Intermittent every 24 hours  cefTRIAXone   IVPB 1000 milliGRAM(s) IV Intermittent every 24 hours  fluticasone propionate/ salmeterol 100-50 MICROgram(s) Diskus 1 Dose(s) Inhalation two times a day  levothyroxine 75 MICROGram(s) Oral daily  lisinopril 20 milliGRAM(s) Oral daily  metoprolol tartrate 50 milliGRAM(s) Oral two times a day  prasugrel 10 milliGRAM(s) Oral daily  predniSONE   Tablet 50 milliGRAM(s) Oral daily  tamsulosin 0.4 milliGRAM(s) Oral at bedtime    MEDICATIONS  (PRN):  acetaminophen     Tablet .. 650 milliGRAM(s) Oral every 6 hours PRN Temp greater or equal to 38C (100.4F), Mild Pain (1 - 3)  albuterol    90 MICROgram(s) HFA Inhaler 1 Puff(s) Inhalation every 4 hours PRN Shortness of Breath and/or Wheezing

## 2024-11-27 NOTE — DIETITIAN INITIAL EVALUATION ADULT - OTHER INFO
Pt visited. Pt is On O2. Pt On AVAPS at night. Pt Reports Good appetite. Weight stable.  Per Pt HT 68 inches, USUAL BODY  lb. PT agreed to try Supplement.WIll suggest Ensure Plus HP BID. Pt educated on eating Protein first . Pt with COPD exacerbation.   Labs noted.

## 2024-11-27 NOTE — DISCHARGE NOTE PROVIDER - CARE PROVIDER_API CALL
Remington Simms  Cardiovascular Disease  [address] [address] [place] [id number]  Phone: [phone]  Fax: [phone]  Follow Up Time: 1 week    Mercy Warren  Pulmonary Disease  [address] [facility], [address]  [place] [address]  Phone: [phone]  Fax: [phone]  Established Patient  Follow Up Time: 1-3 days

## 2024-11-27 NOTE — DIETITIAN INITIAL EVALUATION ADULT - PERTINENT LABORATORY DATA
11-27    138  |  98  |  22[H]  ----------------------------<  114[H]  3.5   |  37[H]  |  0.67    Ca    9.2      27 Nov 2024 06:27  Phos  3.4     11-26  Mg     1.8     11-26    TPro  7.7  /  Alb  4.0  /  TBili  0.6  /  DBili  x   /  AST  20  /  ALT  23  /  AlkPhos  96  11-26

## 2024-11-28 LAB
CULTURE RESULTS: ABNORMAL
MRSA PCR RESULT.: SIGNIFICANT CHANGE UP
S AUREUS DNA NOSE QL NAA+PROBE: SIGNIFICANT CHANGE UP
SPECIMEN SOURCE: SIGNIFICANT CHANGE UP

## 2024-11-28 RX ORDER — PREDNISONE 20 MG/1
40 TABLET ORAL DAILY
Refills: 0 | Status: DISCONTINUED | OUTPATIENT
Start: 2024-11-29 | End: 2024-11-29

## 2024-11-28 RX ORDER — PREDNISONE 20 MG/1
20 TABLET ORAL DAILY
Refills: 0 | Status: CANCELLED | OUTPATIENT
Start: 2024-12-05 | End: 2024-11-29

## 2024-11-28 RX ORDER — PREDNISONE 20 MG/1
30 TABLET ORAL DAILY
Refills: 0 | Status: CANCELLED | OUTPATIENT
Start: 2024-12-02 | End: 2024-11-29

## 2024-11-28 RX ORDER — PREDNISONE 20 MG/1
10 TABLET ORAL DAILY
Refills: 0 | Status: CANCELLED | OUTPATIENT
Start: 2024-12-08 | End: 2024-11-29

## 2024-11-28 RX ADMIN — Medication 81 MILLIGRAM(S): at 13:00

## 2024-11-28 RX ADMIN — IPRATROPIUM BROMIDE AND ALBUTEROL SULFATE 3 MILLILITER(S): 2.5; .5 SOLUTION RESPIRATORY (INHALATION) at 09:12

## 2024-11-28 RX ADMIN — Medication 40 MILLIGRAM(S): at 21:57

## 2024-11-28 RX ADMIN — LISINOPRIL 20 MILLIGRAM(S): 20 TABLET ORAL at 05:41

## 2024-11-28 RX ADMIN — METOPROLOL TARTRATE 50 MILLIGRAM(S): 100 TABLET, FILM COATED ORAL at 18:11

## 2024-11-28 RX ADMIN — FLUTICASONE PROPIONATE AND SALMETEROL XINAFOATE 1 DOSE(S): 45; 21 AEROSOL, METERED RESPIRATORY (INHALATION) at 21:58

## 2024-11-28 RX ADMIN — Medication 100 MILLIGRAM(S): at 00:39

## 2024-11-28 RX ADMIN — METOPROLOL TARTRATE 50 MILLIGRAM(S): 100 TABLET, FILM COATED ORAL at 05:41

## 2024-11-28 RX ADMIN — TAMSULOSIN HYDROCHLORIDE 0.4 MILLIGRAM(S): 0.4 CAPSULE ORAL at 21:57

## 2024-11-28 RX ADMIN — PRASUGREL HYDROCHLORIDE 10 MILLIGRAM(S): 10 TABLET, COATED ORAL at 13:00

## 2024-11-28 RX ADMIN — AZITHROMYCIN 255 MILLIGRAM(S): 250 TABLET, FILM COATED ORAL at 01:14

## 2024-11-28 RX ADMIN — IPRATROPIUM BROMIDE AND ALBUTEROL SULFATE 3 MILLILITER(S): 2.5; .5 SOLUTION RESPIRATORY (INHALATION) at 14:34

## 2024-11-28 RX ADMIN — IPRATROPIUM BROMIDE AND ALBUTEROL SULFATE 3 MILLILITER(S): 2.5; .5 SOLUTION RESPIRATORY (INHALATION) at 21:05

## 2024-11-28 RX ADMIN — FLUTICASONE PROPIONATE AND SALMETEROL XINAFOATE 1 DOSE(S): 45; 21 AEROSOL, METERED RESPIRATORY (INHALATION) at 10:29

## 2024-11-28 RX ADMIN — PREDNISONE 50 MILLIGRAM(S): 20 TABLET ORAL at 05:41

## 2024-11-28 RX ADMIN — IPRATROPIUM BROMIDE AND ALBUTEROL SULFATE 3 MILLILITER(S): 2.5; .5 SOLUTION RESPIRATORY (INHALATION) at 02:37

## 2024-11-28 RX ADMIN — Medication 75 MICROGRAM(S): at 05:41

## 2024-11-28 RX ADMIN — Medication 100 MILLIGRAM(S): at 23:12

## 2024-11-28 NOTE — PROGRESS NOTE ADULT - PROBLEM SELECTOR PLAN 1
suspect COPD exacerbation 2/2 viral etiology vs bronchitis.  Follows with Pulm Dr. Mercy Warren  SARS, COVD, RSV - neg.   CXR - without infiltrates or consolidation.   s/p Duoneb, solumedrol 125, CTX, azithro in the ED.   RVP neg  C/W CTX, azithro  F/U strep pneumo, legionella, mycoplasma.   will start prednisone 50 daily.  Duoneb q6.  - advair, albuterol    - patient on CPAP at night for suspect ?pulmonary HTN  - Primary team to confirm settings for CPAP; can restart as nocturnal BIPAP. suspect COPD exacerbation 2/2 viral etiology vs bronchitis.  Follows with Pulm Dr. Mercy Warren  SARS, COVD, RSV - neg.   CXR - without infiltrates or consolidation.   s/p Duoneb, solumedrol 125, CTX, azithro in the ED.   RVP neg  C/W CTX, azithro  F/U strep pneumo, legionella, mycoplasma.   Prednisone Taper  Duoneb q6.  advair, albuterol   patient on CPAP at night for suspect ?pulmonary HTN  Primary team to confirm settings for CPAP; can restart as nocturnal BIPAP.

## 2024-11-28 NOTE — PROGRESS NOTE ADULT - ASSESSMENT
_________________________________________________________________________________________  ========>>  M E D I C A L   A T T E N D I N G    F O L L O W  U P  N O T E  <<=========  -----------------------------------------------------------------------------------------------------    - Patient seen and examined by me earlier today.  patient known to me previously , called to follow up from medical point of view, post ICU admission .   - In summary,  FRANCIS CHAUDHARY is a 69y year old man admitted with SOB  - Patient today overall doing ok, comfortable, eating OK. overall feeling better.. + occasional SOB with ambulation      patient reports he had issues with his respiratory  equipment at home which brought him into the hospital >> working on having those issues resolved / serviced..     ==================>> REVIEW OF SYSTEM <<=================    GEN: no fever, no chills, no pain  RESP: no SOB at rest , no cough, no sputum  CVS: no chest pain, no palpitations  GI: no abdominal pain, no nausea, good appetite   : no dysuria, no frequency  Neuro: no headache, no dizziness    ==================>> PHYSICAL EXAM <<=================    GEN: A&O X 3 , NAD , comfortable, pleasant, calm in bed.. encouraged out of bed to chair with assistance as needed.   HEENT: NCAT, PERRL, MMM, hearing intact  CVS: S1S2 , regular , No M/R/G appreciated  PULM: decreased BS bilaterally   ABD.: soft. non tender, non distended,  bowel sounds present  Extrem: intact pulses , no edema           ( Note written / Date of service 11-28-24 ( This is certified to be the same as "ENTERED" date above ( for billing purposes)))    ==================>> MEDICATIONS <<====================    MEDICATIONS  (STANDING):  albuterol/ipratropium for Nebulization 3 milliLiter(s) Nebulizer every 6 hours  aspirin enteric coated 81 milliGRAM(s) Oral daily  atorvastatin 40 milliGRAM(s) Oral at bedtime  azithromycin  IVPB 500 milliGRAM(s) IV Intermittent every 24 hours  cefTRIAXone   IVPB 1000 milliGRAM(s) IV Intermittent every 24 hours  fluticasone propionate/ salmeterol 100-50 MICROgram(s) Diskus 1 Dose(s) Inhalation two times a day  levothyroxine 75 MICROGram(s) Oral daily  lisinopril 20 milliGRAM(s) Oral daily  metoprolol tartrate 50 milliGRAM(s) Oral two times a day  prasugrel 10 milliGRAM(s) Oral daily  predniSONE   Tablet 50 milliGRAM(s) Oral daily  tamsulosin 0.4 milliGRAM(s) Oral at bedtime    MEDICATIONS  (PRN):  acetaminophen     Tablet .. 650 milliGRAM(s) Oral every 6 hours PRN Temp greater or equal to 38C (100.4F), Mild Pain (1 - 3)  albuterol    90 MICROgram(s) HFA Inhaler 1 Puff(s) Inhalation every 4 hours PRN Shortness of Breath and/or Wheezing    ___________  Active diet:  Diet, Regular:   DASH/TLC Sodium & Cholesterol Restricted  ___________________    ==================>> VITAL SIGNS <<==================    T(C): 36.3 (11-28-24 @ 05:01), Max: 36.8 (11-27-24 @ 21:28)  HR: 63 (11-28-24 @ 05:01) (62 - 69)  BP: 112/63 (11-28-24 @ 05:01) (112/63 - 138/77)  RR: 18 (11-28-24 @ 05:01) (14 - 18)  SpO2: 100% (11-28-24 @ 05:01) (98% - 100%)      ==================>> LAB AND IMAGING <<==================                        11.4   14.31 )-----------( 225      ( 27 Nov 2024 06:27 )             35.1        WBC count:   14.31 <<== ,  8.49 <<== ,  11.13 <<==     11-27    138  |  98  |  22[H]  ----------------------------<  114[H]  3.5   |  37[H]  |  0.67    Ca    9.2      27 Nov 2024 06:27    ____________________________    M I C R O B I O L O G Y :    Culture - Sputum (collected 26 Nov 2024 10:50)  Source: .Sputum Sputum  Gram Stain (26 Nov 2024 21:29):    Few Squamous epithelial cells per low power field    No polymorphonuclear leukocytes per low power field    Numerous Gram Negative Rods per oil power field    Moderate Gram Positive Rods per oil power field    Moderate Gram Positive Cocci in Clusters per oil power field  Final Report (28 Nov 2024 09:56):    Commensal april consistent with body site    Culture - Blood (collected 25 Nov 2024 23:35)  Source: .Blood BLOOD  Preliminary Report (28 Nov 2024 06:01):    No growth at 48 Hours    Culture - Blood (collected 25 Nov 2024 23:30)  Source: .Blood BLOOD  Preliminary Report (28 Nov 2024 06:01):    No growth at 48 Hours    SARS-CoV-2: NotDetec (11-25-24 @ 20:00)    ___________________________________________________________________________________  ===============>>  A S S E S S M E N T   A N D   P L A N <<===============  ------------------------------------------------------------------------------------------    patient is a 68 y/o man, from home, ambulates with cane, PMH COPD (on 2L NC home O2), HTN, HLD, hypothyroidism, CAD, PAD, known AAA,  hx of ETOH abuse c/b cirrhosis, ex-smoker. Presenting with worsening SOB x2d. States that has been requiring to bump up his home O2 to 4-5L and increased use of home inhalers in the past 2 days. Endorsing cough productive of beige sputum, sore throat, and runny nose. SARS, COVID, RSV neg. CXR wet rad without infiltrates or consolidation. Admitted for COPD exacerbation.   11/27/2024: Patient on 3L NC and tolerating well. endorsed SOB with exertion. C/w Prednisone 50 mg. Will start taper on 11/28. C/w Azithro and Ceftriaxone. AVAPS AHS. AVAPS setting faxed to vendor. Patient has O2 at home. CM following.   11/28/2024:      Problem/Plan - 1:  ·  Problem: COPD exacerbation.   appreciated care and management of critical care team  ·  Plan: suspect COPD exacerbation 2/2 viral etiology vs bronchitis.  Follows with Pulm Dr. Mercy Warren ( [facility])  SARS, COVD, RSV - neg.   CXR - without infiltrates or consolidation.   s/p Duoneb, solumedrol 125, CTX, azithro in the ED.   RVP neg  CTX, azithro  on steroids > taper as able   Duoneb q6.  - advair, albuterol    - patient on CPAP at night for suspect ?pulmonary HTN  - Primary team to confirm settings for CPAP; can restart as nocturnal BIPAP.     Problem/Plan - 2:  ·  Problem: Leukocytosis.   ·  Plan: Likely steroid induced.   Afebrile.   C/w Steroids with taper as able     Problem/Plan - 3:  ·  Problem: HTN (hypertension).   ·  Plan: home meds: metoprolol tartrate 50 BID, lisinopril 20  c/w home meds  Monitor per unit protocol   [place].  patient follows with Dr Simms     Problem/Plan - 4:  ·  Problem: HLD (hyperlipidemia).   ·  Plan: home meds: atorvastatin 40  c/w home meds.     Problem/Plan - 5:  ·  Problem: Hypothyroidism.   ·  Plan: home meds: levothyroxine 75 mcg  c/w home meds.     Problem/Plan - 6:  ·  Problem: CAD (coronary artery disease).   ·  Plan: Hx of Coronary stent  home meds: aspirin 81, Prasugrel 10  c/w home meds.    -GI/DVT Prophylaxis per protocol.    --------------------------------------------  Case discussed with patient..   Education given on findings and plan of care  ___________________________  JACQUELINE Brown D.O.  Pager: [phone]

## 2024-11-28 NOTE — PROGRESS NOTE ADULT - PROBLEM SELECTOR PLAN 2
Likely steroid induced.   Afebrile.   C/w Steroids with taper in AM Likely steroid induced.   Afebrile.   Prednisone Taper

## 2024-11-28 NOTE — PROGRESS NOTE ADULT - PROBLEM SELECTOR PLAN 3
home meds: metoprolol tartrate 50 BID, lisinopril 20  c/w home meds  Monitor per unit protocol   [place] home meds: metoprolol tartrate 50 BID, lisinopril 20  c/w home meds  Monitor per unit protocol   Better controlled

## 2024-11-28 NOTE — PROGRESS NOTE ADULT - PROBLEM SELECTOR PLAN 8
Pt is ambulatory  Pending clinical improvement (Still sob on exertion)  Pending AVAPS setting modifications by vendor (Patient has NIV at home).   CM to follow.  Discussed plan of care with intensivist and patient. Pt is ambulatory  Pending clinical improvement (Still sob on exertion)  Pending AVAPS setting modifications by vendor (Patient has NIV at home). Faxed new settings on 11/27 to vendor.   CM to follow.  Discussed plan of care with intensivist and patient.  Prednisone Taper

## 2024-11-28 NOTE — PROGRESS NOTE ADULT - SUBJECTIVE AND OBJECTIVE BOX
FRANCIS CHAUDHARY    SCU progress note    INTERVAL HPI/OVERNIGHT EVENTS: ***    DNR [ ]   DNI  [  ]    Covid - 19 PCR:     The 4Ms    What Matters Most: see GOC  Age appropriate Medications/Screen for High Risk Medication: Yes  Mentation: see CAM below  Mobility: defer to physical exam    The Confusion Assessment Method (CAM) Diagnostic Algorithm     1: Acute Onset or Fluctuating Course  - Is there evidence of an acute change in mental status from the patient’s baseline? Did the (abnormal) behavior  fluctuate during the day, that is, tend to come and go, or increase and decrease in severity?  [ ] YES [ ] NO     2: Inattention  - Did the patient have difficulty focusing attention, being easily distractible, or having difficulty keeping track of what was being said?  [ ] YES [ ] NO     3: Disorganized thinking  -Was the patient’s thinking disorganized or incoherent, such as rambling or irrelevant conversation, unclear or illogical flow of ideas, or unpredictable switching from subject to subject?  [ ] YES [ ] NO    4: Altered Level of consciousness?  [ ] YES [ ] NO    The diagnosis of delirium by CAM requires the presence of features 1 and 2 and either 3 or 4.    PRESSORS: [ ] YES [ ] NO  azithromycin  IVPB 500 milliGRAM(s) IV Intermittent every 24 hours  cefTRIAXone   IVPB 1000 milliGRAM(s) IV Intermittent every 24 hours    Cardiovascular:  Heart Failure  Acute   Acute on Chronic  Chronic       lisinopril 20 milliGRAM(s) Oral daily  metoprolol tartrate 50 milliGRAM(s) Oral two times a day    Pulmonary:  albuterol    90 MICROgram(s) HFA Inhaler 1 Puff(s) Inhalation every 4 hours PRN  albuterol/ipratropium for Nebulization 3 milliLiter(s) Nebulizer every 6 hours  fluticasone propionate/ salmeterol 100-50 MICROgram(s) Diskus 1 Dose(s) Inhalation two times a day    Hematalogic:  aspirin enteric coated 81 milliGRAM(s) Oral daily  prasugrel 10 milliGRAM(s) Oral daily    Other:  acetaminophen     Tablet .. 650 milliGRAM(s) Oral every 6 hours PRN  atorvastatin 40 milliGRAM(s) Oral at bedtime  levothyroxine 75 MICROGram(s) Oral daily  predniSONE   Tablet 50 milliGRAM(s) Oral daily  tamsulosin 0.4 milliGRAM(s) Oral at bedtime    acetaminophen     Tablet .. 650 milliGRAM(s) Oral every 6 hours PRN  albuterol    90 MICROgram(s) HFA Inhaler 1 Puff(s) Inhalation every 4 hours PRN  albuterol/ipratropium for Nebulization 3 milliLiter(s) Nebulizer every 6 hours  aspirin enteric coated 81 milliGRAM(s) Oral daily  atorvastatin 40 milliGRAM(s) Oral at bedtime  azithromycin  IVPB 500 milliGRAM(s) IV Intermittent every 24 hours  cefTRIAXone   IVPB 1000 milliGRAM(s) IV Intermittent every 24 hours  fluticasone propionate/ salmeterol 100-50 MICROgram(s) Diskus 1 Dose(s) Inhalation two times a day  levothyroxine 75 MICROGram(s) Oral daily  lisinopril 20 milliGRAM(s) Oral daily  metoprolol tartrate 50 milliGRAM(s) Oral two times a day  prasugrel 10 milliGRAM(s) Oral daily  predniSONE   Tablet 50 milliGRAM(s) Oral daily  tamsulosin 0.4 milliGRAM(s) Oral at bedtime    Drug Dosing Weight  Height (cm): 172.7 (25 Nov 2024 18:03)  Weight (kg): 68 (25 Nov 2024 18:03)  BMI (kg/m2): 22.8 (25 Nov 2024 18:03)  BSA (m2): 1.81 (25 Nov 2024 18:03)    CENTRAL LINE: [ ] YES [ ] NO  LOCATION:   DATE INSERTED:  REMOVE: [ ] YES [ ] NO  EXPLAIN:    FOREMAN: [ ] YES [ ] NO    DATE INSERTED:  REMOVE:  [ ] YES [ ] NO  EXPLAIN:    PAST MEDICAL & SURGICAL HISTORY:  HTN (hypertension)      HLD (hyperlipidemia)      PAD (peripheral artery disease)      History of abdominal aortic aneurysm (AAA)      History of COPD      Gastritis      Hypothyroidism      HTN (hypertension)      HLD (hyperlipidemia)      Hypothyroidism      CAD (coronary artery disease)      COPD (chronic obstructive pulmonary disease)      BPH (benign prostatic hyperplasia)      Prophylactic measure      No significant past surgical history                        PHYSICAL EXAM:    GENERAL: NAD, well-groomed, well-developed  HEAD:  Atraumatic, Normocephalic  EYES: EOMI, PERRLA, conjunctiva and sclera clear  ENMT: No tonsillar erythema, exudates, or enlargement; Moist mucous membranes, Good dentition, No lesions  NECK: Supple, No JVD, Normal thyroid  NERVOUS SYSTEM:  Alert & Oriented X3, Good concentration; Motor Strength 5/5 B/L upper and lower extremities; DTRs 2+ intact and symmetric  CHEST/LUNG: Clear to percussion bilaterally; No rales, rhonchi, wheezing, or rubs  HEART: Regular rate and rhythm; No murmurs, rubs, or gallops  ABDOMEN: Soft, Nontender, Nondistended; Bowel sounds present  EXTREMITIES:  2+ Peripheral Pulses, No clubbing, cyanosis, or edema  LYMPH: No lymphadenopathy noted  SKIN: No rashes or lesions      LABS:  CBC Full  -  ( 27 Nov 2024 06:27 )  WBC Count : 14.31 K/uL  RBC Count : 3.88 M/uL  Hemoglobin : 11.4 g/dL  Hematocrit : 35.1 %  Platelet Count - Automated : 225 K/uL  Mean Cell Volume : 90.5 fl  Mean Cell Hemoglobin : 29.4 pg  Mean Cell Hemoglobin Concentration : 32.5 g/dL  Auto Neutrophil # : x  Auto Lymphocyte # : x  Auto Monocyte # : x  Auto Eosinophil # : x  Auto Basophil # : x  Auto Neutrophil % : x  Auto Lymphocyte % : x  Auto Monocyte % : x  Auto Eosinophil % : x  Auto Basophil % : x    11-27    138  |  98  |  22[H]  ----------------------------<  114[H]  3.5   |  37[H]  |  0.67    Ca    9.2      27 Nov 2024 06:27        Urinalysis Basic - ( 27 Nov 2024 06:27 )    Color: x / Appearance: x / SG: x / pH: x  Gluc: 114 mg/dL / Ketone: x  / Bili: x / Urobili: x   Blood: x / Protein: x / Nitrite: x   Leuk Esterase: x / RBC: x / WBC x   Sq Epi: x / Non Sq Epi: x / Bacteria: x            [  ]  DVT Prophylaxis  [  ]  Nutrition, Brand, Rate         Goal Rate        Abnormal Nutritional Status -  Malnutrition   Cachexia      Morbid Obesity BMI >/=40    RADIOLOGY & ADDITIONAL STUDIES:  ***    Goals of Care Discussion with Family/Proxy/Other   - see note from/family meeting set up for...     FRANCIS CHAUDHARY    SCU progress note    INTERVAL HPI/OVERNIGHT EVENTS: No events overnight    FULL CODE    Covid - 19 PCR: non-reactive    The 4Ms    What Matters Most: see GOC  Age appropriate Medications/Screen for High Risk Medication: Yes  Mentation: see CAM below  Mobility: defer to physical exam    The Confusion Assessment Method (CAM) Diagnostic Algorithm     1: Acute Onset or Fluctuating Course  - Is there evidence of an acute change in mental status from the patient’s baseline? Did the (abnormal) behavior  fluctuate during the day, that is, tend to come and go, or increase and decrease in severity?  [ ] YES [ x] NO     2: Inattention  - Did the patient have difficulty focusing attention, being easily distractible, or having difficulty keeping track of what was being said?  [ ] YES [x ] NO     3: Disorganized thinking  -Was the patient’s thinking disorganized or incoherent, such as rambling or irrelevant conversation, unclear or illogical flow of ideas, or unpredictable switching from subject to subject?  [ ] YES [ x] NO    4: Altered Level of consciousness?  [ ] YES [x ] NO    The diagnosis of delirium by CAM requires the presence of features 1 and 2 and either 3 or 4.    PRESSORS: [ ] YES [ x] NO  azithromycin  IVPB 500 milliGRAM(s) IV Intermittent every 24 hours  cefTRIAXone   IVPB 1000 milliGRAM(s) IV Intermittent every 24 hours    Cardiovascular:  Heart Failure  Acute   Acute on Chronic  Chronic       lisinopril 20 milliGRAM(s) Oral daily  metoprolol tartrate 50 milliGRAM(s) Oral two times a day    Pulmonary:  albuterol    90 MICROgram(s) HFA Inhaler 1 Puff(s) Inhalation every 4 hours PRN  albuterol/ipratropium for Nebulization 3 milliLiter(s) Nebulizer every 6 hours  fluticasone propionate/ salmeterol 100-50 MICROgram(s) Diskus 1 Dose(s) Inhalation two times a day    Hematalogic:  aspirin enteric coated 81 milliGRAM(s) Oral daily  prasugrel 10 milliGRAM(s) Oral daily    Other:  acetaminophen     Tablet .. 650 milliGRAM(s) Oral every 6 hours PRN  atorvastatin 40 milliGRAM(s) Oral at bedtime  levothyroxine 75 MICROGram(s) Oral daily  predniSONE   Tablet 50 milliGRAM(s) Oral daily  tamsulosin 0.4 milliGRAM(s) Oral at bedtime    acetaminophen     Tablet .. 650 milliGRAM(s) Oral every 6 hours PRN  albuterol    90 MICROgram(s) HFA Inhaler 1 Puff(s) Inhalation every 4 hours PRN  albuterol/ipratropium for Nebulization 3 milliLiter(s) Nebulizer every 6 hours  aspirin enteric coated 81 milliGRAM(s) Oral daily  atorvastatin 40 milliGRAM(s) Oral at bedtime  azithromycin  IVPB 500 milliGRAM(s) IV Intermittent every 24 hours  cefTRIAXone   IVPB 1000 milliGRAM(s) IV Intermittent every 24 hours  fluticasone propionate/ salmeterol 100-50 MICROgram(s) Diskus 1 Dose(s) Inhalation two times a day  levothyroxine 75 MICROGram(s) Oral daily  lisinopril 20 milliGRAM(s) Oral daily  metoprolol tartrate 50 milliGRAM(s) Oral two times a day  prasugrel 10 milliGRAM(s) Oral daily  predniSONE   Tablet 50 milliGRAM(s) Oral daily  tamsulosin 0.4 milliGRAM(s) Oral at bedtime    Drug Dosing Weight  Height (cm): 172.7 (25 Nov 2024 18:03)  Weight (kg): 68 (25 Nov 2024 18:03)  BMI (kg/m2): 22.8 (25 Nov 2024 18:03)  BSA (m2): 1.81 (25 Nov 2024 18:03)    CENTRAL LINE: [ ] YES [x ] NO  LOCATION:   DATE INSERTED:  REMOVE: [ ] YES [ ] NO  EXPLAIN:    FOREMAN: [ ] YES [x ] NO    DATE INSERTED:  REMOVE:  [ ] YES [ ] NO  EXPLAIN:    PAST MEDICAL & SURGICAL HISTORY:  HTN (hypertension)      HLD (hyperlipidemia)      PAD (peripheral artery disease)      History of abdominal aortic aneurysm (AAA)      History of COPD      Gastritis      Hypothyroidism      HTN (hypertension)      HLD (hyperlipidemia)      Hypothyroidism      CAD (coronary artery disease)      COPD (chronic obstructive pulmonary disease)      BPH (benign prostatic hyperplasia)      Prophylactic measure      No significant past surgical history      ROS: No complaints .    PHYSICAL EXAM:  GENERAL: NAD  HEAD:  Atraumatic, Normocephalic  EYES:  PERRLA, conjunctiva and sclera clear  ENMT:Moist mucous membranes, Good dentition, No lesions  NECK: Supple, No JVD, Normal thyroid  NERVOUS SYSTEM:  Alert & Oriented X3, Good concentration; Motor Strength 5/5 B/L upper and lower extremities  CHEST/LUNG: Diminished bilaterally; No rales, rhonchi, wheezing, or rubs. On 3L NC  HEART: Regular rate and rhythm; No murmurs, rubs, or gallops  ABDOMEN: Soft, Nontender, Nondistended; Bowel sounds present  EXTREMITIES:  2+ Peripheral Pulses, No clubbing, cyanosis, or edema  SKIN: No rashes or lesions      LABS:  CBC Full  -  ( 27 Nov 2024 06:27 )  WBC Count : 14.31 K/uL  RBC Count : 3.88 M/uL  Hemoglobin : 11.4 g/dL  Hematocrit : 35.1 %  Platelet Count - Automated : 225 K/uL  Mean Cell Volume : 90.5 fl  Mean Cell Hemoglobin : 29.4 pg  Mean Cell Hemoglobin Concentration : 32.5 g/dL  Auto Neutrophil # : x  Auto Lymphocyte # : x  Auto Monocyte # : x  Auto Eosinophil # : x  Auto Basophil # : x  Auto Neutrophil % : x  Auto Lymphocyte % : x  Auto Monocyte % : x  Auto Eosinophil % : x  Auto Basophil % : x    11-27    138  |  98  |  22[H]  ----------------------------<  114[H]  3.5   |  37[H]  |  0.67    Ca    9.2      27 Nov 2024 06:27        Urinalysis Basic - ( 27 Nov 2024 06:27 )    Color: x / Appearance: x / SG: x / pH: x  Gluc: 114 mg/dL / Ketone: x  / Bili: x / Urobili: x   Blood: x / Protein: x / Nitrite: x   Leuk Esterase: x / RBC: x / WBC x   Sq Epi: x / Non Sq Epi: x / Bacteria: x    [ x ]  DVT Prophylaxis

## 2024-11-28 NOTE — PROGRESS NOTE ADULT - ASSESSMENT
69M, from home, ambulates with cane, PMH COPD (on 2L NC home O2), HTN, HLD, hypothyroidism, CAD, PAD, known AAA,  hx of ETOH abuse c/b cirrhosis, ex-smoker. Presenting with worsening SOB x2d. States that has been requiring to bump up his home O2 to 4-5L and increased use of home inhalers in the past 2 days. Endorsing cough productive of beige sputum, sore throat, and runny nose. SARS, COVID, RSV neg. CXR wet rad without infiltrates or consolidation. Admitted for COPD exacerbation.   11/27/2024: Patient on 3L NC and tolerating well. endorses SOB with exertion. C/w Prednisone 50 mg. Will start taper on 11/28. C/w Azithro and Ceftriaxone. AVAPS AHS. AVAPS setting faxed to vendor. Patient has O2 at home. CM following.   11/28/2024:              69M, from home, ambulates with cane, PMH COPD (on 2L NC home O2), HTN, HLD, hypothyroidism, CAD, PAD, known AAA,  hx of ETOH abuse c/b cirrhosis, ex-smoker. Presenting with worsening SOB x2d. States that has been requiring to bump up his home O2 to 4-5L and increased use of home inhalers in the past 2 days. Endorsing cough productive of beige sputum, sore throat, and runny nose. SARS, COVID, RSV neg. CXR wet rad without infiltrates or consolidation. Admitted for COPD exacerbation.   11/27/2024: Patient on 3L NC and tolerating well. endorses SOB with exertion. C/w Prednisone 50 mg. Will start taper on 11/28. C/w Azithro and Ceftriaxone. AVAPS AHS. AVAPS setting faxed to vendor. Patient has O2 at home. CM following.   11/28/2024: Prednisone taper. Reports improvement in breathing. C/w Azithro and Ceftriaxone. AVAPS setting script sent to vendor (patient has NIV at home). CM to follow.

## 2024-11-29 ENCOUNTER — NON-APPOINTMENT (OUTPATIENT)
Age: 69
End: 2024-11-29

## 2024-11-29 ENCOUNTER — TRANSCRIPTION ENCOUNTER (OUTPATIENT)
Age: 69
End: 2024-11-29

## 2024-11-29 VITALS
RESPIRATION RATE: 18 BRPM | SYSTOLIC BLOOD PRESSURE: 170 MMHG | OXYGEN SATURATION: 100 % | HEART RATE: 79 BPM | DIASTOLIC BLOOD PRESSURE: 88 MMHG

## 2024-11-29 PROCEDURE — 83735 ASSAY OF MAGNESIUM: CPT

## 2024-11-29 PROCEDURE — 84100 ASSAY OF PHOSPHORUS: CPT

## 2024-11-29 PROCEDURE — 80053 COMPREHEN METABOLIC PANEL: CPT

## 2024-11-29 PROCEDURE — 83880 ASSAY OF NATRIURETIC PEPTIDE: CPT

## 2024-11-29 PROCEDURE — 87205 SMEAR GRAM STAIN: CPT

## 2024-11-29 PROCEDURE — 96375 TX/PRO/DX INJ NEW DRUG ADDON: CPT

## 2024-11-29 PROCEDURE — 87637 SARSCOV2&INF A&B&RSV AMP PRB: CPT

## 2024-11-29 PROCEDURE — 87899 AGENT NOS ASSAY W/OPTIC: CPT

## 2024-11-29 PROCEDURE — 94760 N-INVAS EAR/PLS OXIMETRY 1: CPT

## 2024-11-29 PROCEDURE — 0225U NFCT DS DNA&RNA 21 SARSCOV2: CPT

## 2024-11-29 PROCEDURE — 93005 ELECTROCARDIOGRAM TRACING: CPT

## 2024-11-29 PROCEDURE — 85025 COMPLETE CBC W/AUTO DIFF WBC: CPT

## 2024-11-29 PROCEDURE — 87449 NOS EACH ORGANISM AG IA: CPT

## 2024-11-29 PROCEDURE — 87070 CULTURE OTHR SPECIMN AEROBIC: CPT

## 2024-11-29 PROCEDURE — 94660 CPAP INITIATION&MGMT: CPT

## 2024-11-29 PROCEDURE — 82962 GLUCOSE BLOOD TEST: CPT

## 2024-11-29 PROCEDURE — 99285 EMERGENCY DEPT VISIT HI MDM: CPT

## 2024-11-29 PROCEDURE — 87640 STAPH A DNA AMP PROBE: CPT

## 2024-11-29 PROCEDURE — 80048 BASIC METABOLIC PNL TOTAL CA: CPT

## 2024-11-29 PROCEDURE — 87040 BLOOD CULTURE FOR BACTERIA: CPT

## 2024-11-29 PROCEDURE — 94640 AIRWAY INHALATION TREATMENT: CPT

## 2024-11-29 PROCEDURE — 83605 ASSAY OF LACTIC ACID: CPT

## 2024-11-29 PROCEDURE — 85027 COMPLETE CBC AUTOMATED: CPT

## 2024-11-29 PROCEDURE — 36415 COLL VENOUS BLD VENIPUNCTURE: CPT

## 2024-11-29 PROCEDURE — 86738 MYCOPLASMA ANTIBODY: CPT

## 2024-11-29 PROCEDURE — 71045 X-RAY EXAM CHEST 1 VIEW: CPT

## 2024-11-29 PROCEDURE — 96374 THER/PROPH/DIAG INJ IV PUSH: CPT

## 2024-11-29 PROCEDURE — 87641 MR-STAPH DNA AMP PROBE: CPT

## 2024-11-29 RX ORDER — FLUTICASONE PROPIONATE AND SALMETEROL XINAFOATE 45; 21 UG/1; UG/1
1 AEROSOL, METERED RESPIRATORY (INHALATION)
Qty: 1 | Refills: 0
Start: 2024-11-29 | End: 2024-12-28

## 2024-11-29 RX ORDER — LEVOTHYROXINE SODIUM 150 MCG
1 TABLET ORAL
Refills: 0 | DISCHARGE

## 2024-11-29 RX ORDER — PREDNISONE 20 MG/1
1 TABLET ORAL
Qty: 26 | Refills: 0
Start: 2024-11-29 | End: 2024-12-09

## 2024-11-29 RX ADMIN — AZITHROMYCIN 255 MILLIGRAM(S): 250 TABLET, FILM COATED ORAL at 00:53

## 2024-11-29 RX ADMIN — METOPROLOL TARTRATE 50 MILLIGRAM(S): 100 TABLET, FILM COATED ORAL at 06:26

## 2024-11-29 RX ADMIN — LISINOPRIL 20 MILLIGRAM(S): 20 TABLET ORAL at 06:25

## 2024-11-29 RX ADMIN — FLUTICASONE PROPIONATE AND SALMETEROL XINAFOATE 1 DOSE(S): 45; 21 AEROSOL, METERED RESPIRATORY (INHALATION) at 10:50

## 2024-11-29 RX ADMIN — Medication 81 MILLIGRAM(S): at 11:11

## 2024-11-29 RX ADMIN — IPRATROPIUM BROMIDE AND ALBUTEROL SULFATE 3 MILLILITER(S): 2.5; .5 SOLUTION RESPIRATORY (INHALATION) at 03:41

## 2024-11-29 RX ADMIN — PRASUGREL HYDROCHLORIDE 10 MILLIGRAM(S): 10 TABLET, COATED ORAL at 11:12

## 2024-11-29 RX ADMIN — PREDNISONE 40 MILLIGRAM(S): 20 TABLET ORAL at 06:26

## 2024-11-29 RX ADMIN — IPRATROPIUM BROMIDE AND ALBUTEROL SULFATE 3 MILLILITER(S): 2.5; .5 SOLUTION RESPIRATORY (INHALATION) at 09:30

## 2024-11-29 NOTE — DISCHARGE NOTE NURSING/CASE MANAGEMENT/SOCIAL WORK - NSDCFUADDAPPT_GEN_ALL_CORE_FT
APPTS ARE READY TO BE MADE: [X] YES    Best Family or Patient Contact (if needed):    Additional Information about above appointments (if needed):    1: Needs Pulmonology appt with Dr. Mercy Warren at [phone]  2:   3:     Other comments or requests:

## 2024-11-29 NOTE — PROGRESS NOTE ADULT - ASSESSMENT
69M, from home, ambulates with cane, PMH COPD (on 2L NC home O2), HTN, HLD, hypothyroidism, CAD, PAD, known AAA,  hx of ETOH abuse c/b cirrhosis, ex-smoker. Presenting with worsening SOB x2d. States that has been requiring to bump up his home O2 to 4-5L and increased use of home inhalers in the past 2 days. Endorsing cough productive of beige sputum, sore throat, and runny nose. SARS, COVID, RSV neg. CXR wet rad without infiltrates or consolidation. Admitted for COPD exacerbation.   11/27/2024: Patient on 3L NC and tolerating well. endorses SOB with exertion. C/w Prednisone 50 mg. Will start taper on 11/28. C/w Azithro and Ceftriaxone. AVAPS AHS. AVAPS setting faxed to vendor. Patient has O2 at home. CM following.   11/28/2024: Prednisone taper. Reports improvement in breathing. C/w Azithro and Ceftriaxone. AVAPS setting script sent to vendor (patient has NIV at home). CM to follow.   11/29:             69M, from home, ambulates with cane, PMH COPD (on 2L NC home O2), HTN, HLD, hypothyroidism, CAD, PAD, known AAA,  hx of ETOH abuse c/b cirrhosis, ex-smoker. Presenting with worsening SOB x2d. States that has been requiring to bump up his home O2 to 4-5L and increased use of home inhalers in the past 2 days. Endorsing cough productive of beige sputum, sore throat, and runny nose. SARS, COVID, RSV neg. CXR wet rad without infiltrates or consolidation. Admitted for COPD exacerbation.   11/27/2024: Patient on 3L NC and tolerating well. endorses SOB with exertion. C/w Prednisone 50 mg. Will start taper on 11/28. C/w Azithro and Ceftriaxone. AVAPS AHS. AVAPS setting faxed to vendor. Patient has O2 at home. CM following.   11/28/2024: Prednisone taper. Reports improvement in breathing. C/w Azithro and Ceftriaxone. AVAPS setting script sent to vendor (patient has NIV at home). CM to follow.   11/29: Discussed with vendor tech in the presence of patient at bedside. Per company, patient has BiPAP and tech will adjust NIV upon patient discharge. BiPAP setting 15/10 with back up rate of 10 FiO2 40%. Prescription sent to pharmacy. Discharge instructions given. On 3L NC. Patient to be picked up by son. Patient endorsed he will see his pulmonologist

## 2024-11-29 NOTE — PROGRESS NOTE ADULT - PROBLEM SELECTOR PLAN 1
suspect COPD exacerbation 2/2 viral etiology vs bronchitis.  Follows with Pulm Dr. Mercy Warren  SARS, COVD, RSV - neg.   CXR - without infiltrates or consolidation.   s/p Duoneb, solumedrol 125, CTX, azithro in the ED.   RVP neg  C/W CTX, azithro  F/U strep pneumo, legionella, mycoplasma.   Prednisone Taper  Duoneb q6.  advair, albuterol   patient on CPAP at night for suspect ?pulmonary HTN  Primary team to confirm settings for CPAP; can restart as nocturnal BIPAP. suspect COPD exacerbation 2/2 viral etiology vs bronchitis.  Follows with Pulm Dr. Mercy Warren  SARS, COVD, RSV - neg.   CXR - without infiltrates or consolidation.   s/p Duoneb, solumedrol 125, CTX, azithro in the ED.   RVP neg  C/W CTX, azithro>>.discharge on Ceftin and Azithro.   F/U strep pneumo, legionella, mycoplasma.   Prednisone Taper  Duoneb q6.  advair, albuterol   patient on CPAP at night for suspect ?pulmonary HTN  nocturnal BIPAP.

## 2024-11-29 NOTE — PROGRESS NOTE ADULT - PROBLEM SELECTOR PLAN 8
Pt is ambulatory  Pending clinical improvement (Still sob on exertion)  Pending AVAPS setting modifications by vendor (Patient has NIV at home). Faxed new settings on 11/27 to vendor.   CM to follow.  Discussed plan of care with intensivist and patient.  Prednisone Taper Pt is ambulatory  Pending clinical improvement (Still sob on exertion)  BiPAP script with settings given to patient. Discussed with tech vendor. Settings to be adjusted by company upon patient's discharge.   Discussed plan of care with intensivist and patient.  Prednisone Taper sent to pharmacy with ABX. Instructions given to patient.

## 2024-11-29 NOTE — PROGRESS NOTE ADULT - SUBJECTIVE AND OBJECTIVE BOX
FRANCIS CHAUDHARY    SCU progress note    INTERVAL HPI/OVERNIGHT EVENTS: ***    DNR [ ]   DNI  [  ]    Covid - 19 PCR:     The 4Ms    What Matters Most: see GOC  Age appropriate Medications/Screen for High Risk Medication: Yes  Mentation: see CAM below  Mobility: defer to physical exam    The Confusion Assessment Method (CAM) Diagnostic Algorithm     1: Acute Onset or Fluctuating Course  - Is there evidence of an acute change in mental status from the patient’s baseline? Did the (abnormal) behavior  fluctuate during the day, that is, tend to come and go, or increase and decrease in severity?  [ ] YES [ ] NO     2: Inattention  - Did the patient have difficulty focusing attention, being easily distractible, or having difficulty keeping track of what was being said?  [ ] YES [ ] NO     3: Disorganized thinking  -Was the patient’s thinking disorganized or incoherent, such as rambling or irrelevant conversation, unclear or illogical flow of ideas, or unpredictable switching from subject to subject?  [ ] YES [ ] NO    4: Altered Level of consciousness?  [ ] YES [ ] NO    The diagnosis of delirium by CAM requires the presence of features 1 and 2 and either 3 or 4.    PRESSORS: [ ] YES [ ] NO  azithromycin  IVPB 500 milliGRAM(s) IV Intermittent every 24 hours  cefTRIAXone   IVPB 1000 milliGRAM(s) IV Intermittent every 24 hours    Cardiovascular:  Heart Failure  Acute   Acute on Chronic  Chronic       lisinopril 20 milliGRAM(s) Oral daily  metoprolol tartrate 50 milliGRAM(s) Oral two times a day    Pulmonary:  albuterol    90 MICROgram(s) HFA Inhaler 1 Puff(s) Inhalation every 4 hours PRN  albuterol/ipratropium for Nebulization 3 milliLiter(s) Nebulizer every 6 hours  fluticasone propionate/ salmeterol 100-50 MICROgram(s) Diskus 1 Dose(s) Inhalation two times a day    Hematalogic:  aspirin enteric coated 81 milliGRAM(s) Oral daily  prasugrel 10 milliGRAM(s) Oral daily    Other:  acetaminophen     Tablet .. 650 milliGRAM(s) Oral every 6 hours PRN  atorvastatin 40 milliGRAM(s) Oral at bedtime  levothyroxine 75 MICROGram(s) Oral daily  predniSONE   Tablet 40 milliGRAM(s) Oral daily  tamsulosin 0.4 milliGRAM(s) Oral at bedtime    acetaminophen     Tablet .. 650 milliGRAM(s) Oral every 6 hours PRN  albuterol    90 MICROgram(s) HFA Inhaler 1 Puff(s) Inhalation every 4 hours PRN  albuterol/ipratropium for Nebulization 3 milliLiter(s) Nebulizer every 6 hours  aspirin enteric coated 81 milliGRAM(s) Oral daily  atorvastatin 40 milliGRAM(s) Oral at bedtime  azithromycin  IVPB 500 milliGRAM(s) IV Intermittent every 24 hours  cefTRIAXone   IVPB 1000 milliGRAM(s) IV Intermittent every 24 hours  fluticasone propionate/ salmeterol 100-50 MICROgram(s) Diskus 1 Dose(s) Inhalation two times a day  levothyroxine 75 MICROGram(s) Oral daily  lisinopril 20 milliGRAM(s) Oral daily  metoprolol tartrate 50 milliGRAM(s) Oral two times a day  prasugrel 10 milliGRAM(s) Oral daily  predniSONE   Tablet 40 milliGRAM(s) Oral daily  tamsulosin 0.4 milliGRAM(s) Oral at bedtime    Drug Dosing Weight  Height (cm): 172.7 (25 Nov 2024 18:03)  Weight (kg): 68 (25 Nov 2024 18:03)  BMI (kg/m2): 22.8 (25 Nov 2024 18:03)  BSA (m2): 1.81 (25 Nov 2024 18:03)    CENTRAL LINE: [ ] YES [ ] NO  LOCATION:   DATE INSERTED:  REMOVE: [ ] YES [ ] NO  EXPLAIN:    FOREMAN: [ ] YES [ ] NO    DATE INSERTED:  REMOVE:  [ ] YES [ ] NO  EXPLAIN:    PAST MEDICAL & SURGICAL HISTORY:  HTN (hypertension)      HLD (hyperlipidemia)      PAD (peripheral artery disease)      History of abdominal aortic aneurysm (AAA)      History of COPD      Gastritis      Hypothyroidism      HTN (hypertension)      HLD (hyperlipidemia)      Hypothyroidism      CAD (coronary artery disease)      COPD (chronic obstructive pulmonary disease)      BPH (benign prostatic hyperplasia)      Prophylactic measure      No significant past surgical history                        PHYSICAL EXAM:    GENERAL: NAD, well-groomed, well-developed  HEAD:  Atraumatic, Normocephalic  EYES: EOMI, PERRLA, conjunctiva and sclera clear  ENMT: No tonsillar erythema, exudates, or enlargement; Moist mucous membranes, Good dentition, No lesions  NECK: Supple, No JVD, Normal thyroid  NERVOUS SYSTEM:  Alert & Oriented X3, Good concentration; Motor Strength 5/5 B/L upper and lower extremities; DTRs 2+ intact and symmetric  CHEST/LUNG: Clear to percussion bilaterally; No rales, rhonchi, wheezing, or rubs  HEART: Regular rate and rhythm; No murmurs, rubs, or gallops  ABDOMEN: Soft, Nontender, Nondistended; Bowel sounds present  EXTREMITIES:  2+ Peripheral Pulses, No clubbing, cyanosis, or edema  LYMPH: No lymphadenopathy noted  SKIN: No rashes or lesions      LABS:                    [  ]  DVT Prophylaxis  [  ]  Nutrition, Brand, Rate         Goal Rate        Abnormal Nutritional Status -  Malnutrition   Cachexia      Morbid Obesity BMI >/=40    RADIOLOGY & ADDITIONAL STUDIES:  ***    Goals of Care Discussion with Family/Proxy/Other   - see note from/family meeting set up for...     FRANCIS CHAUDHARY    SCU progress note    INTERVAL HPI/OVERNIGHT EVENTS: No events overnight    FULL CODE    Covid - 19 PCR: non-reactive    The 4Ms    What Matters Most: see GOC  Age appropriate Medications/Screen for High Risk Medication: Yes  Mentation: see CAM below  Mobility: defer to physical exam    The Confusion Assessment Method (CAM) Diagnostic Algorithm     1: Acute Onset or Fluctuating Course  - Is there evidence of an acute change in mental status from the patient’s baseline? Did the (abnormal) behavior  fluctuate during the day, that is, tend to come and go, or increase and decrease in severity?  [ ] YES [ x] NO     2: Inattention  - Did the patient have difficulty focusing attention, being easily distractible, or having difficulty keeping track of what was being said?  [ ] YES [x ] NO     3: Disorganized thinking  -Was the patient’s thinking disorganized or incoherent, such as rambling or irrelevant conversation, unclear or illogical flow of ideas, or unpredictable switching from subject to subject?  [ ] YES [ x] NO    4: Altered Level of consciousness?  [ ] YES [x ] NO    The diagnosis of delirium by CAM requires the presence of features 1 and 2 and either 3 or 4.    PRESSORS: [ ] YES x[ ] NO  azithromycin  IVPB 500 milliGRAM(s) IV Intermittent every 24 hours  cefTRIAXone   IVPB 1000 milliGRAM(s) IV Intermittent every 24 hours    Cardiovascular:  Heart Failure  Acute   Acute on Chronic  Chronic       lisinopril 20 milliGRAM(s) Oral daily  metoprolol tartrate 50 milliGRAM(s) Oral two times a day    Pulmonary:  albuterol    90 MICROgram(s) HFA Inhaler 1 Puff(s) Inhalation every 4 hours PRN  albuterol/ipratropium for Nebulization 3 milliLiter(s) Nebulizer every 6 hours  fluticasone propionate/ salmeterol 100-50 MICROgram(s) Diskus 1 Dose(s) Inhalation two times a day    Hematalogic:  aspirin enteric coated 81 milliGRAM(s) Oral daily  prasugrel 10 milliGRAM(s) Oral daily    Other:  acetaminophen     Tablet .. 650 milliGRAM(s) Oral every 6 hours PRN  atorvastatin 40 milliGRAM(s) Oral at bedtime  levothyroxine 75 MICROGram(s) Oral daily  predniSONE   Tablet 40 milliGRAM(s) Oral daily  tamsulosin 0.4 milliGRAM(s) Oral at bedtime    acetaminophen     Tablet .. 650 milliGRAM(s) Oral every 6 hours PRN  albuterol    90 MICROgram(s) HFA Inhaler 1 Puff(s) Inhalation every 4 hours PRN  albuterol/ipratropium for Nebulization 3 milliLiter(s) Nebulizer every 6 hours  aspirin enteric coated 81 milliGRAM(s) Oral daily  atorvastatin 40 milliGRAM(s) Oral at bedtime  azithromycin  IVPB 500 milliGRAM(s) IV Intermittent every 24 hours  cefTRIAXone   IVPB 1000 milliGRAM(s) IV Intermittent every 24 hours  fluticasone propionate/ salmeterol 100-50 MICROgram(s) Diskus 1 Dose(s) Inhalation two times a day  levothyroxine 75 MICROGram(s) Oral daily  lisinopril 20 milliGRAM(s) Oral daily  metoprolol tartrate 50 milliGRAM(s) Oral two times a day  prasugrel 10 milliGRAM(s) Oral daily  predniSONE   Tablet 40 milliGRAM(s) Oral daily  tamsulosin 0.4 milliGRAM(s) Oral at bedtime    Drug Dosing Weight  Height (cm): 172.7 (25 Nov 2024 18:03)  Weight (kg): 68 (25 Nov 2024 18:03)  BMI (kg/m2): 22.8 (25 Nov 2024 18:03)  BSA (m2): 1.81 (25 Nov 2024 18:03)    CENTRAL LINE: [ ] YES [x ] NO  LOCATION:   DATE INSERTED:  REMOVE: [ ] YES [ ] NO  EXPLAIN:    FOREMAN: [ ] YES [ ] NO    DATE INSERTED:  REMOVE:  [ ] YES [ ] NO  EXPLAIN:    PAST MEDICAL & SURGICAL HISTORY:  HTN (hypertension)      HLD (hyperlipidemia)      PAD (peripheral artery disease)      History of abdominal aortic aneurysm (AAA)      History of COPD      Gastritis      Hypothyroidism      HTN (hypertension)      HLD (hyperlipidemia)      Hypothyroidism      CAD (coronary artery disease)      COPD (chronic obstructive pulmonary disease)      BPH (benign prostatic hyperplasia)      Prophylactic measure      No significant past surgical history  ROS: mild SOB with exertion   PHYSICAL EXAM:    GENERAL: NAD  HEAD:  Atraumatic, Normocephalic  EYES: EOMI, PERRLA, conjunctiva and sclera clear  ENMT: Moist mucous membranes  NECK: Supple  NERVOUS SYSTEM:  Alert & Oriented X3, Good concentration; Motor Strength 5/5 B/L upper and lower extremities  CHEST/LUNG: Diminished bilaterally; No rales, rhonchi, wheezing, or rubs  HEART: Regular rate and rhythm; No murmurs, rubs, or gallops  ABDOMEN: Soft, Nontender, Nondistended; Bowel sounds present  EXTREMITIES:  2+ Peripheral Pulses, No clubbing, cyanosis, or edema  SKIN: No rashes or lesions        [ x ]  DVT Prophylaxis

## 2024-11-29 NOTE — DISCHARGE NOTE NURSING/CASE MANAGEMENT/SOCIAL WORK - FINANCIAL ASSISTANCE
[facility] provides services at a reduced cost to those who are determined to be eligible through [facility]’s financial assistance program. Information regarding [facility]’s financial assistance program can be found by going to [email or web address] or by calling [phone].

## 2024-11-29 NOTE — PROGRESS NOTE ADULT - NS ATTEND AMEND GEN_ALL_CORE FT
69M, from home, ambulates with cane, PMH COPD (on 2L NC home O2), HTN, HLD, hypothyroidism, CAD, PAD, known AAA,  hx of ETOH abuse c/b cirrhosis, ex-smoker. Presenting with worsening SOB x2d. States that has been requiring to bump up his home O2 to 4-5L and increased use of home inhalers in the past 2 days. Endorsing cough productive of beige sputum, sore throat, and runny nose. SARS, COVID, RSV neg. CXR wet rad without infiltrates or consolidation. Admitted for COPD exacerbation.   11/27/2024: Patient on 3L NC and tolerating well. endorses SOB with exertion. C/w Prednisone 50 mg. Will start taper on 11/28. C/w Azithro and Ceftriaxone. AVAPS AHS. AVAPS setting faxed to vendor. Patient has O2 at home. CM following.   11/28/2024: Prednisone taper. Reports improvement in breathing. C/w Azithro and Ceftriaxone. AVAPS setting script sent to vendor (patient has NIV at home). CM to follow.   11/29: Discussed with vendor tech in the presence of patient at bedside. Per company, patient has BiPAP and tech will adjust NIV upon patient discharge. BiPAP setting 15/10 with back up rate of 10 FiO2 40%. Prescription sent to pharmacy. Discharge instructions given. On 3L NC. Patient to be picked up by son. Patient endorsed he will see his pulmonologist                     Problem/Plan - 1:  ·  Problem: COPD exacerbation.   ·  Plan: suspect COPD exacerbation 2/2 viral etiology vs bronchitis.  Follows with Pulm Dr. Mercy Warren  SARS, COVD, RSV - neg.   CXR - without infiltrates or consolidation.   s/p Duoneb, solumedrol 125, CTX, azithro in the ED.   RVP neg  C/W CTX, azithro>>.discharge on Ceftin and Azithro.   F/U strep pneumo, legionella, mycoplasma.   Prednisone Taper  Duoneb q6.  advair, albuterol   patient on CPAP at night for suspect ?pulmonary HTN  nocturnal BIPAP.     Problem/Plan - 2:  ·  Problem: Leukocytosis.   ·  Plan: Likely steroid induced.   Afebrile.   Prednisone Taper.     Problem/Plan - 3:  ·  Problem: HTN (hypertension).   ·  Plan: home meds: metoprolol tartrate 50 BID, lisinopril 20  c/w home meds  Monitor per unit protocol   Better controlled.     Problem/Plan - 4:  ·  Problem: HLD (hyperlipidemia).   ·  Plan: home meds: atorvastatin 40  c/w home meds.     Problem/Plan - 5:  ·  Problem: Hypothyroidism.   ·  Plan: home meds: levothyroxine 75 mcg  c/w home meds.     Problem/Plan - 6:  ·  Problem: CAD (coronary artery disease).   ·  Plan: Hx of Coronary stent  home meds: aspirin 81, Prasugrel 10  c/w home meds.     Problem/Plan - 7:  ·  Problem: Prophylactic measure.   ·  Plan: C/W Prasugrel & Asa.     Problem/Plan - 8:  ·  Problem: Discharge planning issues.   ·  Plan: Pt is ambulatory  Pending clinical improvement (Still sob on exertion)  BiPAP script with settings given to patient. Discussed with tech vendor. Settings to be adjusted by company upon patient's discharge.   Discussed plan of care with intensivist and patient.  Prednisone Taper sent to pharmacy with ABX. Instructions given to patient.
69M, from home, ambulates with cane, PMH COPD (on 2L NC home O2), HTN, HLD, hypothyroidism, CAD, PAD, known AAA,  hx of ETOH abuse c/b cirrhosis, ex-smoker. Presenting with worsening SOB x2d. States that has been requiring to bump up his home O2 to 4-5L and increased use of home inhalers in the past 2 days. Endorsing cough productive of beige sputum, sore throat, and runny nose. SARS, COVID, RSV neg. CXR wet rad without infiltrates or consolidation. Admitted for COPD exacerbation.   11/27/2024: Patient on 3L NC and tolerating well. endorses SOB with exertion. C/w Prednisone 50 mg. Will start taper on 11/28. C/w Azithro and Ceftriaxone. AVAPS AHS. AVAPS setting faxed to vendor. Patient has O2 at home. CM following.   11/28/2024: Prednisone taper. Reports improvement in breathing. C/w Azithro and Ceftriaxone. AVAPS setting script sent to vendor (patient has NIV at home). CM to follow.                    Problem/Plan - 1:  ·  Problem: COPD exacerbation.   ·  Plan: suspect COPD exacerbation 2/2 viral etiology vs bronchitis.  Follows with Pulm Dr. Mercy Warren  SARS, COVD, RSV - neg.   CXR - without infiltrates or consolidation.   s/p Duoneb, solumedrol 125, CTX, azithro in the ED.   RVP neg  C/W CTX, azithro  F/U strep pneumo, legionella, mycoplasma.   Prednisone Taper  Duoneb q6.  advair, albuterol   patient on CPAP at night for suspect ?pulmonary HTN  Primary team to confirm settings for CPAP; can restart as nocturnal BIPAP.     Problem/Plan - 2:  ·  Problem: Leukocytosis.   ·  Plan: Likely steroid induced.   Afebrile.   Prednisone Taper.     Problem/Plan - 3:  ·  Problem: HTN (hypertension).   ·  Plan: home meds: metoprolol tartrate 50 BID, lisinopril 20  c/w home meds  Monitor per unit protocol   Better controlled.     Problem/Plan - 4:  ·  Problem: HLD (hyperlipidemia).   ·  Plan: home meds: atorvastatin 40  c/w home meds.     Problem/Plan - 5:  ·  Problem: Hypothyroidism.   ·  Plan: home meds: levothyroxine 75 mcg  c/w home meds.
69M, from home, ambulates with cane, PMH COPD (on 2L NC home O2), HTN, HLD, hypothyroidism, CAD, PAD, known AAA,  hx of ETOH abuse c/b cirrhosis, ex-smoker. Presenting with worsening SOB x2d. States that has been requiring to bump up his home O2 to 4-5L and increased use of home inhalers in the past 2 days. Endorsing cough productive of beige sputum, sore throat, and runny nose. SARS, COVID, RSV neg. CXR wet rad without infiltrates or consolidation. Admitted for COPD exacerbation.   11/27/2024: Patient on 3L NC and tolerating well. endorses SOB with exertion. C/w Prednisone 50 mg. Will start taper on 11/28. C/w Azithro and Ceftriaxone. AVAPS AHS. AVAPS setting faxed to vendor. Patient has O2 at home. CM following.                    Problem/Plan - 1:  ·  Problem: COPD exacerbation.   ·  Plan: suspect COPD exacerbation 2/2 viral etiology vs bronchitis.  Follows with Pulm Dr. Mercy Warern  SARS, COVD, RSV - neg.   CXR - without infiltrates or consolidation.   s/p Duoneb, solumedrol 125, CTX, azithro in the ED.   RVP neg  C/W CTX, azithro  F/U strep pneumo, legionella, mycoplasma.   will start prednisone 50 daily.  Duoneb q6.  - advair, albuterol    - patient on CPAP at night for suspect ?pulmonary HTN  - Primary team to confirm settings for CPAP; can restart as nocturnal BIPAP.     Problem/Plan - 2:  ·  Problem: Leukocytosis.  ·  Plan: Likely steroid induced.   Afebrile.   C/w Steroids with taper in AM.     Problem/Plan - 3:  ·  Problem: HTN (hypertension).  ·  Plan: home meds: metoprolol tartrate 50 BID, lisinopril 20  c/w home meds  Monitor per unit protocol   [place].     Problem/Plan - 4:  ·  Problem: HLD (hyperlipidemia).  ·  Plan: home meds: atorvastatin 40  c/w home meds.     Problem/Plan - 5:  ·  Problem: Hypothyroidism.  ·  Plan: home meds: levothyroxine 75 mcg  c/w home meds.     Problem/Plan - 6:  ·  Problem: CAD (coronary artery disease).  ·  Plan: Hx of Coronary stent  home meds: aspirin 81, Prasugrel 10  c/w home meds.     Problem/Plan - 7:  ·  Problem: Prophylactic measure.  ·  Plan: C/W Prasugrel & Asa.     Problem/Plan - 8:  ·  Problem: Discharge planning issues.   ·  Plan: Pt is ambulatory  Pending clinical improvement (Still sob on exertion)  Pending AVAPS setting modifications by vendor (Patient has NIV at home).   CM to follow.  Discussed plan of care with intensivist and patient.

## 2024-11-29 NOTE — DISCHARGE NOTE NURSING/CASE MANAGEMENT/SOCIAL WORK - PATIENT PORTAL LINK FT
You can access the FollowMyHealth Patient Portal offered by [facility] by registering at the following website: [email or web address]. By joining [employer]’s FollowMyHealth portal, you will also be able to view your health information using other applications (apps) compatible with our system.

## 2024-11-29 NOTE — DISCHARGE NOTE NURSING/CASE MANAGEMENT/SOCIAL WORK - NSDCPEFALRISK_GEN_ALL_CORE
For information on Fall & Injury Prevention, visit: [email or web address] OR  [email or web address] OR  https://www.cdc.gov/steadi/patient.html

## 2024-11-29 NOTE — PROGRESS NOTE ADULT - PROVIDER SPECIALTY LIST ADULT
Internal Medicine
Critical Care

## 2024-11-29 NOTE — PROGRESS NOTE ADULT - PROBLEM SELECTOR PLAN 3
home meds: metoprolol tartrate 50 BID, lisinopril 20  c/w home meds  Monitor per unit protocol   Better controlled

## 2024-11-29 NOTE — PROGRESS NOTE ADULT - ASSESSMENT
_________________________________________________________________________________________  ========>>  M E D I C A L   A T T E N D I N G    F O L L O W  U P  N O T E  <<=========  -----------------------------------------------------------------------------------------------------    - Patient seen and examined by me earlier today.    - In summary,  FRANCIS CHAUDHARY is a 69y year old man admitted with SOB  - Patient today overall doing ok, comfortable, eating OK. overall feeling better.. + occasional SOB with ambulation      while there, patient received a call from the respiratory equipment servicing [employer] >> patient will need a new device delivered  to his home ..     ==================>> REVIEW OF SYSTEM <<=================    GEN: no fever, no chills, no pain  RESP: no SOB at rest , no cough, no sputum  CVS: no chest pain, no palpitations  GI: no abdominal pain, no nausea, good appetite   : no dysuria, no frequency  Neuro: no headache, no dizziness    ==================>> PHYSICAL EXAM <<=================    GEN: A&O X 3 , NAD , comfortable, pleasant, calm in bed.. encouraged out of bed to chair with assistance as needed.   HEENT: NCAT, PERRL, MMM, hearing intact, on O2 via NC   CVS: S1S2 , regular , No M/R/G appreciated  PULM: decreased BS bilaterally   ABD.: soft. non tender, non distended,  bowel sounds present  Extrem: intact pulses , no edema        ( Note written / Date of service 11-29-24 ( This is certified to be the same as "ENTERED" date above ( for billing purposes)))    ==================>> MEDICATIONS <<====================    albuterol/ipratropium for Nebulization 3 milliLiter(s) Nebulizer every 6 hours  aspirin enteric coated 81 milliGRAM(s) Oral daily  atorvastatin 40 milliGRAM(s) Oral at bedtime  azithromycin  IVPB 500 milliGRAM(s) IV Intermittent every 24 hours  cefTRIAXone   IVPB 1000 milliGRAM(s) IV Intermittent every 24 hours  fluticasone propionate/ salmeterol 100-50 MICROgram(s) Diskus 1 Dose(s) Inhalation two times a day  levothyroxine 75 MICROGram(s) Oral daily  lisinopril 20 milliGRAM(s) Oral daily  metoprolol tartrate 50 milliGRAM(s) Oral two times a day  prasugrel 10 milliGRAM(s) Oral daily  predniSONE   Tablet 40 milliGRAM(s) Oral daily  tamsulosin 0.4 milliGRAM(s) Oral at bedtime    MEDICATIONS  (PRN):  acetaminophen     Tablet .. 650 milliGRAM(s) Oral every 6 hours PRN Temp greater or equal to 38C (100.4F), Mild Pain (1 - 3)  albuterol    90 MICROgram(s) HFA Inhaler 1 Puff(s) Inhalation every 4 hours PRN Shortness of Breath and/or Wheezing    ___________  Active diet:  Diet, Regular:   DASH/TLC Sodium & Cholesterol Restricted  ___________________    ==================>> VITAL SIGNS <<==================    Vital Signs Last 24 HrsT(C): 36.5 (11-29-24 @ 12:04)  T(F): 97.7 (11-29-24 @ 12:04), Max: 97.7 (11-29-24 @ 12:04)  HR: 74 (11-29-24 @ 12:04) (60 - 82)  BP: 164/85 (11-29-24 @ 12:04)  RR: 15 (11-29-24 @ 12:04) (15 - 18)  SpO2: 98% (11-29-24 @ 12:04) (98% - 100%)       ==================>> LAB AND IMAGING <<==================       no labs today !     WBC count:   14.31 <<== ,  8.49 <<== ,  11.13 <<==   Hemoglobin:   11.4 <<==,  13.0 <<==,  13.2 <<==  platelets:  225 <==, 249 <==, 235 <==    Creatinine:  0.67  <<==, 0.78  <<==, 0.62  <<==  Sodium:   138  <==, 133  <==, 135  <==       AST:          20(11-26) <== , 28(11-25) <==      ALT:        23(11-26)  <== , 24(11-25)  <==      AP:        96(11-26)  <=, 96(11-25)  <=     Bili:        0.6(11-26)  <=, 0.8(11-25)  <=    ____________________________    M I C R O B I O L O G Y :    Culture - Sputum (collected 26 Nov 2024 10:50)  Source: .Sputum Sputum  Gram Stain (26 Nov 2024 21:29):    Few Squamous epithelial cells per low power field    No polymorphonuclear leukocytes per low power field    Numerous Gram Negative Rods per oil power field    Moderate Gram Positive Rods per oil power field    Moderate Gram Positive Cocci in Clusters per oil power field  Final Report (28 Nov 2024 09:56):    Commensal april consistent with body site    Culture - Blood (collected 25 Nov 2024 23:35)  Source: .Blood BLOOD  Preliminary Report (29 Nov 2024 06:00):    No growth at 72 Hours    Culture - Blood (collected 25 Nov 2024 23:30)  Source: .Blood BLOOD  Preliminary Report (29 Nov 2024 06:00):    No growth at 72 Hours      ___________________________________________________________________________________  ===============>>  A S S E S S M E N T   A N D   P L A N <<===============  ------------------------------------------------------------------------------------------    patient is a 70 y/o man, from home, ambulates with cane, PMH COPD (on 2L NC home O2), HTN, HLD, hypothyroidism, CAD, PAD, known AAA,  hx of ETOH abuse c/b cirrhosis, ex-smoker. Presenting with worsening SOB x2d. States that has been requiring to bump up his home O2 to 4-5L and increased use of home inhalers in the past 2 days. Endorsing cough productive of beige sputum, sore throat, and runny nose. SARS, COVID, RSV neg. CXR wet rad without infiltrates or consolidation. Admitted for COPD exacerbation.   11/27/2024: Patient on 3L NC and tolerating well. endorsed SOB with exertion. C/w Prednisone 50 mg. Will start taper on 11/28. C/w Azithro and Ceftriaxone. AVAPS AHS. AVAPS setting faxed to vendor. Patient has O2 at home. CM following.   11/28/2024:      Problem/Plan - 1:  ·  Problem: COPD exacerbation.   appreciated care and management of critical care team  ·  Plan: suspect COPD exacerbation 2/2 viral etiology vs bronchitis.  Follows with Pulm Dr. Mercy Warren ( [facility])  SARS, COVD, RSV - neg.   CXR - without infiltrates or consolidation.   s/p Duoneb, solumedrol 125, CTX, azithro in the ED.   RVP neg  CTX, azithro  on steroids > taper as able   Duoneb q6.  - advair, albuterol    - patient on CPAP at night for suspect ?pulmonary HTN  - Primary team to confirm settings for CPAP; can restart as nocturnal BIPAP. vs AVAPS      as above per servicing company patient will need a new device      Problem/Plan - 2:  ·  Problem: Leukocytosis.   ·  Plan: Likely steroid induced.   Afebrile.   C/w Steroids with taper as able     Problem/Plan - 3:  ·  Problem: HTN (hypertension).   ·  Plan: home meds: metoprolol tartrate 50 BID, lisinopril 20  c/w home meds  Monitor   patient follows with Dr Simms     Problem/Plan - 4:  ·  Problem: HLD (hyperlipidemia).   ·  Plan: home meds: atorvastatin 40  c/w home meds.     Problem/Plan - 5:  ·  Problem: Hypothyroidism.   ·  Plan: home meds: levothyroxine 75 mcg  c/w home meds.     Problem/Plan - 6:  ·  Problem: CAD (coronary artery disease).   ·  Plan: Hx of Coronary stent  home meds: aspirin 81, Prasugrel 10  c/w home meds.    -GI/DVT Prophylaxis per protocol.    --------------------------------------------  Case discussed with patient..   Education given on findings and plan of care  ___________________________  H. JOJO Brown.  Pager: [phone]

## 2024-11-30 ENCOUNTER — NON-APPOINTMENT (OUTPATIENT)
Age: 69
End: 2024-11-30

## 2024-11-30 RX ORDER — AZITHROMYCIN 250 MG/1
1 TABLET, FILM COATED ORAL
Qty: 1 | Refills: 0
Start: 2024-11-30 | End: 2024-11-30

## 2024-12-01 LAB
CULTURE RESULTS: SIGNIFICANT CHANGE UP
CULTURE RESULTS: SIGNIFICANT CHANGE UP
SPECIMEN SOURCE: SIGNIFICANT CHANGE UP
SPECIMEN SOURCE: SIGNIFICANT CHANGE UP

## 2024-12-03 LAB
M PNEUMO IGM SER-ACNC: 0.98 INDEX — HIGH (ref 0–0.9)
MYCOPLASMA AG SPEC QL: ABNORMAL

## 2025-02-03 ENCOUNTER — APPOINTMENT (OUTPATIENT)
Dept: UROLOGY | Facility: CLINIC | Age: 70
End: 2025-02-03

## 2025-04-04 ENCOUNTER — RX RENEWAL (OUTPATIENT)
Age: 70
End: 2025-04-04

## 2025-05-06 ENCOUNTER — RX RENEWAL (OUTPATIENT)
Age: 70
End: 2025-05-06

## 2025-06-03 PROBLEM — I10 ESSENTIAL (PRIMARY) HYPERTENSION: Chronic | Status: ACTIVE | Noted: 2024-11-26

## 2025-06-03 PROBLEM — N40.0 BENIGN PROSTATIC HYPERPLASIA WITHOUT LOWER URINARY TRACT SYMPTOMS: Chronic | Status: ACTIVE | Noted: 2024-11-26

## 2025-06-03 PROBLEM — E03.9 HYPOTHYROIDISM, UNSPECIFIED: Chronic | Status: ACTIVE | Noted: 2024-11-26

## 2025-06-03 PROBLEM — Z29.9 ENCOUNTER FOR PROPHYLACTIC MEASURES, UNSPECIFIED: Chronic | Status: ACTIVE | Noted: 2024-11-26

## 2025-06-03 PROBLEM — I25.10 ATHEROSCLEROTIC HEART DISEASE OF NATIVE CORONARY ARTERY WITHOUT ANGINA PECTORIS: Chronic | Status: ACTIVE | Noted: 2024-11-26

## 2025-06-03 PROBLEM — J44.9 CHRONIC OBSTRUCTIVE PULMONARY DISEASE, UNSPECIFIED: Chronic | Status: ACTIVE | Noted: 2024-11-26

## 2025-06-09 ENCOUNTER — RX RENEWAL (OUTPATIENT)
Age: 70
End: 2025-06-09

## 2025-06-10 ENCOUNTER — NON-APPOINTMENT (OUTPATIENT)
Age: 70
End: 2025-06-10

## 2025-06-12 ENCOUNTER — APPOINTMENT (OUTPATIENT)
Dept: PULMONOLOGY | Facility: CLINIC | Age: 70
End: 2025-06-12
Payer: MEDICARE

## 2025-06-12 VITALS
DIASTOLIC BLOOD PRESSURE: 75 MMHG | HEART RATE: 54 BPM | OXYGEN SATURATION: 98 % | SYSTOLIC BLOOD PRESSURE: 143 MMHG | RESPIRATION RATE: 17 BRPM | HEIGHT: 68 IN

## 2025-06-12 PROCEDURE — 94726 PLETHYSMOGRAPHY LUNG VOLUMES: CPT

## 2025-06-12 PROCEDURE — 94010 BREATHING CAPACITY TEST: CPT

## 2025-06-12 PROCEDURE — 99215 OFFICE O/P EST HI 40 MIN: CPT | Mod: 25

## 2025-06-12 PROCEDURE — 94729 DIFFUSING CAPACITY: CPT

## 2025-07-10 ENCOUNTER — NON-APPOINTMENT (OUTPATIENT)
Age: 70
End: 2025-07-10

## 2025-07-14 ENCOUNTER — APPOINTMENT (OUTPATIENT)
Dept: VASCULAR SURGERY | Facility: CLINIC | Age: 70
End: 2025-07-14
Payer: MEDICARE

## 2025-07-14 ENCOUNTER — APPOINTMENT (OUTPATIENT)
Dept: VASCULAR SURGERY | Facility: CLINIC | Age: 70
End: 2025-07-14

## 2025-07-14 PROCEDURE — 99215 OFFICE O/P EST HI 40 MIN: CPT

## 2025-07-14 PROCEDURE — G2211 COMPLEX E/M VISIT ADD ON: CPT

## 2025-07-14 PROCEDURE — 93979 VASCULAR STUDY: CPT

## 2025-07-24 ENCOUNTER — NON-APPOINTMENT (OUTPATIENT)
Age: 70
End: 2025-07-24

## 2025-07-25 ENCOUNTER — NON-APPOINTMENT (OUTPATIENT)
Age: 70
End: 2025-07-25

## 2025-07-29 ENCOUNTER — INPATIENT (INPATIENT)
Facility: HOSPITAL | Age: 70
LOS: 5 days | Discharge: HOME | DRG: 192 | End: 2025-08-04
Attending: GENERAL PRACTICE | Admitting: GENERAL PRACTICE
Payer: MEDICARE

## 2025-07-29 ENCOUNTER — NON-APPOINTMENT (OUTPATIENT)
Age: 70
End: 2025-07-29

## 2025-07-29 VITALS
HEART RATE: 86 BPM | OXYGEN SATURATION: 98 % | HEIGHT: 68 IN | SYSTOLIC BLOOD PRESSURE: 160 MMHG | DIASTOLIC BLOOD PRESSURE: 89 MMHG | TEMPERATURE: 98 F | RESPIRATION RATE: 20 BRPM | WEIGHT: 134.92 LBS

## 2025-07-29 DIAGNOSIS — J44.1 CHRONIC OBSTRUCTIVE PULMONARY DISEASE WITH (ACUTE) EXACERBATION: ICD-10-CM

## 2025-07-29 LAB
ALBUMIN SERPL ELPH-MCNC: 3.9 G/DL — SIGNIFICANT CHANGE UP (ref 3.5–5)
ALP SERPL-CCNC: 82 U/L — SIGNIFICANT CHANGE UP (ref 40–120)
ALT FLD-CCNC: 19 U/L DA — SIGNIFICANT CHANGE UP (ref 10–60)
ANION GAP SERPL CALC-SCNC: 1 MMOL/L — LOW (ref 5–17)
APTT BLD: 33 SEC — SIGNIFICANT CHANGE UP (ref 26.1–36.8)
AST SERPL-CCNC: 17 U/L — SIGNIFICANT CHANGE UP (ref 10–40)
BASE EXCESS BLDV CALC-SCNC: 18.1 MMOL/L — SIGNIFICANT CHANGE UP
BASOPHILS # BLD AUTO: 0.04 K/UL — SIGNIFICANT CHANGE UP (ref 0–0.2)
BASOPHILS NFR BLD AUTO: 0.3 % — SIGNIFICANT CHANGE UP (ref 0–2)
BILIRUB SERPL-MCNC: 0.8 MG/DL — SIGNIFICANT CHANGE UP (ref 0.2–1.2)
BUN SERPL-MCNC: 7 MG/DL — SIGNIFICANT CHANGE UP (ref 7–18)
CA-I SERPL-SCNC: SIGNIFICANT CHANGE UP MMOL/L (ref 1.15–1.33)
CALCIUM SERPL-MCNC: 9.4 MG/DL — SIGNIFICANT CHANGE UP (ref 8.4–10.5)
CHLORIDE SERPL-SCNC: 88 MMOL/L — LOW (ref 96–108)
CO2 SERPL-SCNC: 43 MMOL/L — HIGH (ref 22–31)
CREAT SERPL-MCNC: 0.58 MG/DL — SIGNIFICANT CHANGE UP (ref 0.5–1.3)
EGFR: 104 ML/MIN/1.73M2 — SIGNIFICANT CHANGE UP
EGFR: 104 ML/MIN/1.73M2 — SIGNIFICANT CHANGE UP
EOSINOPHIL # BLD AUTO: 0.1 K/UL — SIGNIFICANT CHANGE UP (ref 0–0.5)
EOSINOPHIL NFR BLD AUTO: 0.9 % — SIGNIFICANT CHANGE UP (ref 0–6)
FLUAV AG NPH QL: SIGNIFICANT CHANGE UP
FLUBV AG NPH QL: SIGNIFICANT CHANGE UP
GAS PNL BLDV: 132 MMOL/L — LOW (ref 136–145)
GAS PNL BLDV: SIGNIFICANT CHANGE UP
GLUCOSE BLDV-MCNC: 117 MG/DL — HIGH (ref 70–99)
GLUCOSE SERPL-MCNC: 110 MG/DL — HIGH (ref 70–99)
HCO3 BLDV-SCNC: 48 MMOL/L — CRITICAL HIGH (ref 22–29)
HCT VFR BLD CALC: 36.4 % — LOW (ref 39–50)
HGB BLD-MCNC: 11.6 G/DL — LOW (ref 13–17)
IMM GRANULOCYTES NFR BLD AUTO: 0.3 % — SIGNIFICANT CHANGE UP (ref 0–0.9)
INR BLD: 1 RATIO — SIGNIFICANT CHANGE UP (ref 0.85–1.16)
LACTATE BLDV-MCNC: 1.6 MMOL/L — SIGNIFICANT CHANGE UP (ref 0.5–2)
LYMPHOCYTES # BLD AUTO: 1.07 K/UL — SIGNIFICANT CHANGE UP (ref 1–3.3)
LYMPHOCYTES # BLD AUTO: 9.3 % — LOW (ref 13–44)
MAGNESIUM SERPL-MCNC: 1.6 MG/DL — SIGNIFICANT CHANGE UP (ref 1.6–2.6)
MCHC RBC-ENTMCNC: 29.1 PG — SIGNIFICANT CHANGE UP (ref 27–34)
MCHC RBC-ENTMCNC: 31.9 G/DL — LOW (ref 32–36)
MCV RBC AUTO: 91.5 FL — SIGNIFICANT CHANGE UP (ref 80–100)
MONOCYTES # BLD AUTO: 0.76 K/UL — SIGNIFICANT CHANGE UP (ref 0–0.9)
MONOCYTES NFR BLD AUTO: 6.6 % — SIGNIFICANT CHANGE UP (ref 2–14)
NEUTROPHILS # BLD AUTO: 9.5 K/UL — HIGH (ref 1.8–7.4)
NEUTROPHILS NFR BLD AUTO: 82.6 % — HIGH (ref 43–77)
NRBC BLD AUTO-RTO: 0 /100 WBCS — SIGNIFICANT CHANGE UP (ref 0–0)
NT-PROBNP SERPL-SCNC: 545 PG/ML — HIGH (ref 0–125)
PCO2 BLDV: 83 MMHG — CRITICAL HIGH (ref 42–55)
PH BLDV: 7.37 — SIGNIFICANT CHANGE UP (ref 7.32–7.43)
PHOSPHATE SERPL-MCNC: 2.6 MG/DL — SIGNIFICANT CHANGE UP (ref 2.5–4.5)
PLATELET # BLD AUTO: 255 K/UL — SIGNIFICANT CHANGE UP (ref 150–400)
PO2 BLDV: 19 MMHG — SIGNIFICANT CHANGE UP
POTASSIUM BLDV-SCNC: 4.2 MMOL/L — SIGNIFICANT CHANGE UP (ref 3.5–5.1)
POTASSIUM SERPL-MCNC: 3.9 MMOL/L — SIGNIFICANT CHANGE UP (ref 3.5–5.3)
POTASSIUM SERPL-SCNC: 3.9 MMOL/L — SIGNIFICANT CHANGE UP (ref 3.5–5.3)
PROT SERPL-MCNC: 7.3 G/DL — SIGNIFICANT CHANGE UP (ref 6–8.3)
PROTHROM AB SERPL-ACNC: 11.6 SEC — SIGNIFICANT CHANGE UP (ref 9.9–13.4)
RBC # BLD: 3.98 M/UL — LOW (ref 4.2–5.8)
RBC # FLD: 14.5 % — SIGNIFICANT CHANGE UP (ref 10.3–14.5)
RSV RNA NPH QL NAA+NON-PROBE: SIGNIFICANT CHANGE UP
SAO2 % BLDV: 26.8 % — SIGNIFICANT CHANGE UP
SARS-COV-2 RNA SPEC QL NAA+PROBE: SIGNIFICANT CHANGE UP
SODIUM SERPL-SCNC: 132 MMOL/L — LOW (ref 135–145)
SOURCE RESPIRATORY: SIGNIFICANT CHANGE UP
TROPONIN I, HIGH SENSITIVITY RESULT: 217.9 NG/L — HIGH
WBC # BLD: 11.5 K/UL — HIGH (ref 3.8–10.5)
WBC # FLD AUTO: 11.5 K/UL — HIGH (ref 3.8–10.5)

## 2025-07-29 PROCEDURE — 85730 THROMBOPLASTIN TIME PARTIAL: CPT

## 2025-07-29 PROCEDURE — 84295 ASSAY OF SERUM SODIUM: CPT

## 2025-07-29 PROCEDURE — 71045 X-RAY EXAM CHEST 1 VIEW: CPT

## 2025-07-29 PROCEDURE — 99285 EMERGENCY DEPT VISIT HI MDM: CPT

## 2025-07-29 PROCEDURE — 84484 ASSAY OF TROPONIN QUANT: CPT

## 2025-07-29 PROCEDURE — 83880 ASSAY OF NATRIURETIC PEPTIDE: CPT

## 2025-07-29 PROCEDURE — 87637 SARSCOV2&INF A&B&RSV AMP PRB: CPT

## 2025-07-29 PROCEDURE — 82947 ASSAY GLUCOSE BLOOD QUANT: CPT

## 2025-07-29 PROCEDURE — 99223 1ST HOSP IP/OBS HIGH 75: CPT | Mod: GC

## 2025-07-29 PROCEDURE — 85025 COMPLETE CBC W/AUTO DIFF WBC: CPT

## 2025-07-29 PROCEDURE — 85610 PROTHROMBIN TIME: CPT

## 2025-07-29 PROCEDURE — 80053 COMPREHEN METABOLIC PANEL: CPT

## 2025-07-29 PROCEDURE — 84132 ASSAY OF SERUM POTASSIUM: CPT

## 2025-07-29 PROCEDURE — 36415 COLL VENOUS BLD VENIPUNCTURE: CPT

## 2025-07-29 PROCEDURE — 71045 X-RAY EXAM CHEST 1 VIEW: CPT | Mod: 26

## 2025-07-29 PROCEDURE — 82803 BLOOD GASES ANY COMBINATION: CPT

## 2025-07-29 PROCEDURE — 83735 ASSAY OF MAGNESIUM: CPT

## 2025-07-29 PROCEDURE — 83605 ASSAY OF LACTIC ACID: CPT

## 2025-07-29 PROCEDURE — 82330 ASSAY OF CALCIUM: CPT

## 2025-07-29 PROCEDURE — 84100 ASSAY OF PHOSPHORUS: CPT

## 2025-07-29 PROCEDURE — 94640 AIRWAY INHALATION TREATMENT: CPT

## 2025-07-29 RX ORDER — AZITHROMYCIN 250 MG
500 CAPSULE ORAL ONCE
Refills: 0 | Status: COMPLETED | OUTPATIENT
Start: 2025-07-29 | End: 2025-07-29

## 2025-07-29 RX ORDER — CEFTRIAXONE 500 MG/1
1000 INJECTION, POWDER, FOR SOLUTION INTRAMUSCULAR; INTRAVENOUS ONCE
Refills: 0 | Status: COMPLETED | OUTPATIENT
Start: 2025-07-29 | End: 2025-07-29

## 2025-07-29 RX ORDER — IPRATROPIUM BROMIDE AND ALBUTEROL SULFATE .5; 2.5 MG/3ML; MG/3ML
3 SOLUTION RESPIRATORY (INHALATION)
Refills: 0 | Status: COMPLETED | OUTPATIENT
Start: 2025-07-29 | End: 2025-07-29

## 2025-07-29 RX ADMIN — IPRATROPIUM BROMIDE AND ALBUTEROL SULFATE 3 MILLILITER(S): .5; 2.5 SOLUTION RESPIRATORY (INHALATION) at 21:00

## 2025-07-29 RX ADMIN — IPRATROPIUM BROMIDE AND ALBUTEROL SULFATE 3 MILLILITER(S): .5; 2.5 SOLUTION RESPIRATORY (INHALATION) at 19:15

## 2025-07-29 RX ADMIN — CEFTRIAXONE 100 MILLIGRAM(S): 500 INJECTION, POWDER, FOR SOLUTION INTRAMUSCULAR; INTRAVENOUS at 23:00

## 2025-07-30 ENCOUNTER — APPOINTMENT (OUTPATIENT)
Dept: VASCULAR SURGERY | Facility: CLINIC | Age: 70
End: 2025-07-30

## 2025-07-30 DIAGNOSIS — I10 ESSENTIAL (PRIMARY) HYPERTENSION: ICD-10-CM

## 2025-07-30 DIAGNOSIS — Z86.79 PERSONAL HISTORY OF OTHER DISEASES OF THE CIRCULATORY SYSTEM: ICD-10-CM

## 2025-07-30 DIAGNOSIS — E78.5 HYPERLIPIDEMIA, UNSPECIFIED: ICD-10-CM

## 2025-07-30 DIAGNOSIS — J96.21 ACUTE AND CHRONIC RESPIRATORY FAILURE WITH HYPOXIA: ICD-10-CM

## 2025-07-30 DIAGNOSIS — Z29.9 ENCOUNTER FOR PROPHYLACTIC MEASURES, UNSPECIFIED: ICD-10-CM

## 2025-07-30 DIAGNOSIS — R79.89 OTHER SPECIFIED ABNORMAL FINDINGS OF BLOOD CHEMISTRY: ICD-10-CM

## 2025-07-30 DIAGNOSIS — I25.10 ATHEROSCLEROTIC HEART DISEASE OF NATIVE CORONARY ARTERY WITHOUT ANGINA PECTORIS: ICD-10-CM

## 2025-07-30 DIAGNOSIS — I50.9 HEART FAILURE, UNSPECIFIED: ICD-10-CM

## 2025-07-30 DIAGNOSIS — E03.9 HYPOTHYROIDISM, UNSPECIFIED: ICD-10-CM

## 2025-07-30 DIAGNOSIS — I73.9 PERIPHERAL VASCULAR DISEASE, UNSPECIFIED: ICD-10-CM

## 2025-07-30 LAB
ALBUMIN SERPL ELPH-MCNC: 3.5 G/DL — SIGNIFICANT CHANGE UP (ref 3.5–5)
ALP SERPL-CCNC: 75 U/L — SIGNIFICANT CHANGE UP (ref 40–120)
ALT FLD-CCNC: 18 U/L DA — SIGNIFICANT CHANGE UP (ref 10–60)
ANION GAP SERPL CALC-SCNC: 1 MMOL/L — LOW (ref 5–17)
APPEARANCE UR: ABNORMAL
AST SERPL-CCNC: 15 U/L — SIGNIFICANT CHANGE UP (ref 10–40)
BACTERIA # UR AUTO: ABNORMAL /HPF
BASE EXCESS BLDV CALC-SCNC: 14.5 MMOL/L — SIGNIFICANT CHANGE UP
BILIRUB SERPL-MCNC: 0.6 MG/DL — SIGNIFICANT CHANGE UP (ref 0.2–1.2)
BILIRUB UR-MCNC: NEGATIVE — SIGNIFICANT CHANGE UP
BUN SERPL-MCNC: 9 MG/DL — SIGNIFICANT CHANGE UP (ref 7–18)
CALCIUM SERPL-MCNC: 8.8 MG/DL — SIGNIFICANT CHANGE UP (ref 8.4–10.5)
CHLORIDE SERPL-SCNC: 89 MMOL/L — LOW (ref 96–108)
CO2 SERPL-SCNC: 40 MMOL/L — HIGH (ref 22–31)
COLOR SPEC: YELLOW — SIGNIFICANT CHANGE UP
CREAT SERPL-MCNC: 0.7 MG/DL — SIGNIFICANT CHANGE UP (ref 0.5–1.3)
DIFF PNL FLD: ABNORMAL
EGFR: 98 ML/MIN/1.73M2 — SIGNIFICANT CHANGE UP
EGFR: 98 ML/MIN/1.73M2 — SIGNIFICANT CHANGE UP
EPI CELLS # UR: PRESENT
GLUCOSE SERPL-MCNC: 102 MG/DL — HIGH (ref 70–99)
GLUCOSE UR QL: NEGATIVE MG/DL — SIGNIFICANT CHANGE UP
HCO3 BLDV-SCNC: 43 MMOL/L — HIGH (ref 22–29)
HCT VFR BLD CALC: 34 % — LOW (ref 39–50)
HGB BLD-MCNC: 11.2 G/DL — LOW (ref 13–17)
KETONES UR QL: 80 MG/DL
LEUKOCYTE ESTERASE UR-ACNC: NEGATIVE — SIGNIFICANT CHANGE UP
MAGNESIUM SERPL-MCNC: 1.7 MG/DL — SIGNIFICANT CHANGE UP (ref 1.6–2.6)
MCHC RBC-ENTMCNC: 30.2 PG — SIGNIFICANT CHANGE UP (ref 27–34)
MCHC RBC-ENTMCNC: 32.9 G/DL — SIGNIFICANT CHANGE UP (ref 32–36)
MCV RBC AUTO: 91.6 FL — SIGNIFICANT CHANGE UP (ref 80–100)
NITRITE UR-MCNC: NEGATIVE — SIGNIFICANT CHANGE UP
NRBC BLD AUTO-RTO: 0 /100 WBCS — SIGNIFICANT CHANGE UP (ref 0–0)
PCO2 BLDV: 69 MMHG — HIGH (ref 42–55)
PH BLDV: 7.4 — SIGNIFICANT CHANGE UP (ref 7.32–7.43)
PH UR: 8.5 (ref 5–8)
PHOSPHATE SERPL-MCNC: 2.9 MG/DL — SIGNIFICANT CHANGE UP (ref 2.5–4.5)
PLATELET # BLD AUTO: 257 K/UL — SIGNIFICANT CHANGE UP (ref 150–400)
PO2 BLDV: 27 MMHG — SIGNIFICANT CHANGE UP
POTASSIUM SERPL-MCNC: 3.5 MMOL/L — SIGNIFICANT CHANGE UP (ref 3.5–5.3)
POTASSIUM SERPL-SCNC: 3.5 MMOL/L — SIGNIFICANT CHANGE UP (ref 3.5–5.3)
PROT SERPL-MCNC: 7 G/DL — SIGNIFICANT CHANGE UP (ref 6–8.3)
PROT UR-MCNC: 30 MG/DL
RBC # BLD: 3.71 M/UL — LOW (ref 4.2–5.8)
RBC # FLD: 14.5 % — SIGNIFICANT CHANGE UP (ref 10.3–14.5)
RBC CASTS # UR COMP ASSIST: ABNORMAL /HPF
SAO2 % BLDV: 37.6 % — SIGNIFICANT CHANGE UP
SODIUM SERPL-SCNC: 130 MMOL/L — LOW (ref 135–145)
SP GR SPEC: 1.01 — SIGNIFICANT CHANGE UP (ref 1–1.03)
TROPONIN I, HIGH SENSITIVITY RESULT: 224.7 NG/L — HIGH
TROPONIN I, HIGH SENSITIVITY RESULT: 253.5 NG/L — HIGH
UROBILINOGEN FLD QL: 1 MG/DL — SIGNIFICANT CHANGE UP (ref 0.2–1)
WBC # BLD: 10.28 K/UL — SIGNIFICANT CHANGE UP (ref 3.8–10.5)
WBC # FLD AUTO: 10.28 K/UL — SIGNIFICANT CHANGE UP (ref 3.8–10.5)
WBC UR QL: 0 /HPF — SIGNIFICANT CHANGE UP (ref 0–5)

## 2025-07-30 PROCEDURE — 74174 CTA ABD&PLVS W/CONTRAST: CPT | Mod: 26

## 2025-07-30 PROCEDURE — 93005 ELECTROCARDIOGRAM TRACING: CPT

## 2025-07-30 PROCEDURE — 80053 COMPREHEN METABOLIC PANEL: CPT

## 2025-07-30 PROCEDURE — 99222 1ST HOSP IP/OBS MODERATE 55: CPT

## 2025-07-30 PROCEDURE — 82330 ASSAY OF CALCIUM: CPT

## 2025-07-30 PROCEDURE — 99233 SBSQ HOSP IP/OBS HIGH 50: CPT

## 2025-07-30 PROCEDURE — 83605 ASSAY OF LACTIC ACID: CPT

## 2025-07-30 PROCEDURE — 71275 CT ANGIOGRAPHY CHEST: CPT

## 2025-07-30 PROCEDURE — 74174 CTA ABD&PLVS W/CONTRAST: CPT

## 2025-07-30 PROCEDURE — 84484 ASSAY OF TROPONIN QUANT: CPT

## 2025-07-30 PROCEDURE — 94660 CPAP INITIATION&MGMT: CPT

## 2025-07-30 PROCEDURE — 83880 ASSAY OF NATRIURETIC PEPTIDE: CPT

## 2025-07-30 PROCEDURE — 84132 ASSAY OF SERUM POTASSIUM: CPT

## 2025-07-30 PROCEDURE — 71045 X-RAY EXAM CHEST 1 VIEW: CPT

## 2025-07-30 PROCEDURE — 85730 THROMBOPLASTIN TIME PARTIAL: CPT

## 2025-07-30 PROCEDURE — 85610 PROTHROMBIN TIME: CPT

## 2025-07-30 PROCEDURE — 82947 ASSAY GLUCOSE BLOOD QUANT: CPT

## 2025-07-30 PROCEDURE — 85025 COMPLETE CBC W/AUTO DIFF WBC: CPT

## 2025-07-30 PROCEDURE — 82803 BLOOD GASES ANY COMBINATION: CPT

## 2025-07-30 PROCEDURE — 71275 CT ANGIOGRAPHY CHEST: CPT | Mod: 26

## 2025-07-30 PROCEDURE — 85027 COMPLETE CBC AUTOMATED: CPT

## 2025-07-30 PROCEDURE — 83735 ASSAY OF MAGNESIUM: CPT

## 2025-07-30 PROCEDURE — 84100 ASSAY OF PHOSPHORUS: CPT

## 2025-07-30 PROCEDURE — 94760 N-INVAS EAR/PLS OXIMETRY 1: CPT

## 2025-07-30 PROCEDURE — 81001 URINALYSIS AUTO W/SCOPE: CPT

## 2025-07-30 PROCEDURE — 36415 COLL VENOUS BLD VENIPUNCTURE: CPT

## 2025-07-30 PROCEDURE — 84295 ASSAY OF SERUM SODIUM: CPT

## 2025-07-30 PROCEDURE — 87637 SARSCOV2&INF A&B&RSV AMP PRB: CPT

## 2025-07-30 PROCEDURE — 94640 AIRWAY INHALATION TREATMENT: CPT

## 2025-07-30 RX ORDER — METHYLPREDNISOLONE ACETATE 80 MG/ML
40 INJECTION, SUSPENSION INTRA-ARTICULAR; INTRALESIONAL; INTRAMUSCULAR; SOFT TISSUE
Refills: 0 | Status: DISCONTINUED | OUTPATIENT
Start: 2025-07-30 | End: 2025-07-30

## 2025-07-30 RX ORDER — ASPIRIN 325 MG
81 TABLET ORAL DAILY
Refills: 0 | Status: DISCONTINUED | OUTPATIENT
Start: 2025-07-30 | End: 2025-08-04

## 2025-07-30 RX ORDER — METHYLPREDNISOLONE ACETATE 80 MG/ML
40 INJECTION, SUSPENSION INTRA-ARTICULAR; INTRALESIONAL; INTRAMUSCULAR; SOFT TISSUE DAILY
Refills: 0 | Status: DISCONTINUED | OUTPATIENT
Start: 2025-07-30 | End: 2025-07-30

## 2025-07-30 RX ORDER — IPRATROPIUM BROMIDE AND ALBUTEROL SULFATE .5; 2.5 MG/3ML; MG/3ML
3 SOLUTION RESPIRATORY (INHALATION) EVERY 6 HOURS
Refills: 0 | Status: DISCONTINUED | OUTPATIENT
Start: 2025-07-30 | End: 2025-08-04

## 2025-07-30 RX ORDER — CYCLOBENZAPRINE HYDROCHLORIDE 15 MG/1
5 CAPSULE, EXTENDED RELEASE ORAL THREE TIMES A DAY
Refills: 0 | Status: DISCONTINUED | OUTPATIENT
Start: 2025-07-30 | End: 2025-08-04

## 2025-07-30 RX ORDER — LISINOPRIL 5 MG/1
20 TABLET ORAL DAILY
Refills: 0 | Status: DISCONTINUED | OUTPATIENT
Start: 2025-07-30 | End: 2025-08-01

## 2025-07-30 RX ORDER — ONDANSETRON HCL/PF 4 MG/2 ML
4 VIAL (ML) INJECTION EVERY 8 HOURS
Refills: 0 | Status: DISCONTINUED | OUTPATIENT
Start: 2025-07-30 | End: 2025-08-04

## 2025-07-30 RX ORDER — TAMSULOSIN HYDROCHLORIDE 0.4 MG/1
0.4 CAPSULE ORAL AT BEDTIME
Refills: 0 | Status: DISCONTINUED | OUTPATIENT
Start: 2025-07-30 | End: 2025-08-04

## 2025-07-30 RX ORDER — ALBUTEROL SULFATE 2.5 MG/3ML
2 VIAL, NEBULIZER (ML) INHALATION EVERY 6 HOURS
Refills: 0 | Status: DISCONTINUED | OUTPATIENT
Start: 2025-07-30 | End: 2025-08-04

## 2025-07-30 RX ORDER — PREDNISONE 20 MG/1
40 TABLET ORAL DAILY
Refills: 0 | Status: DISCONTINUED | OUTPATIENT
Start: 2025-07-31 | End: 2025-08-01

## 2025-07-30 RX ORDER — AZITHROMYCIN 250 MG
500 CAPSULE ORAL EVERY 24 HOURS
Refills: 0 | Status: DISCONTINUED | OUTPATIENT
Start: 2025-07-30 | End: 2025-08-01

## 2025-07-30 RX ORDER — LEVOTHYROXINE SODIUM 300 MCG
75 TABLET ORAL
Refills: 0 | Status: DISCONTINUED | OUTPATIENT
Start: 2025-07-30 | End: 2025-08-04

## 2025-07-30 RX ORDER — ATORVASTATIN CALCIUM 80 MG/1
40 TABLET, FILM COATED ORAL AT BEDTIME
Refills: 0 | Status: DISCONTINUED | OUTPATIENT
Start: 2025-07-30 | End: 2025-08-04

## 2025-07-30 RX ORDER — DEXTROMETHORPHAN HBR, GUAIFENESIN 200 MG/10ML
1200 LIQUID ORAL EVERY 12 HOURS
Refills: 0 | Status: COMPLETED | OUTPATIENT
Start: 2025-07-30 | End: 2025-08-02

## 2025-07-30 RX ORDER — PRASUGREL HYDROCHLORIDE 10 MG/1
10 TABLET, COATED ORAL DAILY
Refills: 0 | Status: DISCONTINUED | OUTPATIENT
Start: 2025-07-30 | End: 2025-08-04

## 2025-07-30 RX ORDER — ENOXAPARIN SODIUM 100 MG/ML
40 INJECTION SUBCUTANEOUS EVERY 24 HOURS
Refills: 0 | Status: DISCONTINUED | OUTPATIENT
Start: 2025-07-30 | End: 2025-08-04

## 2025-07-30 RX ORDER — METOPROLOL SUCCINATE 50 MG/1
50 TABLET, EXTENDED RELEASE ORAL
Refills: 0 | Status: DISCONTINUED | OUTPATIENT
Start: 2025-07-30 | End: 2025-08-04

## 2025-07-30 RX ORDER — MAGNESIUM, ALUMINUM HYDROXIDE 200-200 MG
30 TABLET,CHEWABLE ORAL EVERY 4 HOURS
Refills: 0 | Status: DISCONTINUED | OUTPATIENT
Start: 2025-07-30 | End: 2025-08-04

## 2025-07-30 RX ORDER — LEVOTHYROXINE SODIUM 300 MCG
37.5 TABLET ORAL
Refills: 0 | Status: DISCONTINUED | OUTPATIENT
Start: 2025-07-30 | End: 2025-08-04

## 2025-07-30 RX ORDER — LEVOTHYROXINE SODIUM 300 MCG
75 TABLET ORAL DAILY
Refills: 0 | Status: DISCONTINUED | OUTPATIENT
Start: 2025-07-30 | End: 2025-07-30

## 2025-07-30 RX ADMIN — TAMSULOSIN HYDROCHLORIDE 0.4 MILLIGRAM(S): 0.4 CAPSULE ORAL at 22:58

## 2025-07-30 RX ADMIN — IPRATROPIUM BROMIDE AND ALBUTEROL SULFATE 3 MILLILITER(S): .5; 2.5 SOLUTION RESPIRATORY (INHALATION) at 08:08

## 2025-07-30 RX ADMIN — PRASUGREL HYDROCHLORIDE 10 MILLIGRAM(S): 10 TABLET, COATED ORAL at 13:17

## 2025-07-30 RX ADMIN — Medication 81 MILLIGRAM(S): at 11:24

## 2025-07-30 RX ADMIN — METOPROLOL SUCCINATE 50 MILLIGRAM(S): 50 TABLET, EXTENDED RELEASE ORAL at 17:07

## 2025-07-30 RX ADMIN — METHYLPREDNISOLONE ACETATE 40 MILLIGRAM(S): 80 INJECTION, SUSPENSION INTRA-ARTICULAR; INTRALESIONAL; INTRAMUSCULAR; SOFT TISSUE at 11:24

## 2025-07-30 RX ADMIN — Medication 1 DOSE(S): at 11:24

## 2025-07-30 RX ADMIN — DEXTROMETHORPHAN HBR, GUAIFENESIN 1200 MILLIGRAM(S): 200 LIQUID ORAL at 18:31

## 2025-07-30 RX ADMIN — ATORVASTATIN CALCIUM 40 MILLIGRAM(S): 80 TABLET, FILM COATED ORAL at 23:01

## 2025-07-30 RX ADMIN — ENOXAPARIN SODIUM 40 MILLIGRAM(S): 100 INJECTION SUBCUTANEOUS at 08:08

## 2025-07-30 RX ADMIN — LISINOPRIL 20 MILLIGRAM(S): 5 TABLET ORAL at 08:08

## 2025-07-30 RX ADMIN — Medication 37.5 MICROGRAM(S): at 08:08

## 2025-07-30 RX ADMIN — Medication 250 MILLIGRAM(S): at 00:02

## 2025-07-30 RX ADMIN — IPRATROPIUM BROMIDE AND ALBUTEROL SULFATE 3 MILLILITER(S): .5; 2.5 SOLUTION RESPIRATORY (INHALATION) at 17:07

## 2025-07-30 RX ADMIN — Medication 1 DOSE(S): at 23:01

## 2025-07-30 RX ADMIN — IPRATROPIUM BROMIDE AND ALBUTEROL SULFATE 3 MILLILITER(S): .5; 2.5 SOLUTION RESPIRATORY (INHALATION) at 20:12

## 2025-07-31 ENCOUNTER — RESULT REVIEW (OUTPATIENT)
Age: 70
End: 2025-07-31

## 2025-07-31 DIAGNOSIS — E87.1 HYPO-OSMOLALITY AND HYPONATREMIA: ICD-10-CM

## 2025-07-31 LAB
ANION GAP SERPL CALC-SCNC: 4 MMOL/L — LOW (ref 5–17)
APPEARANCE UR: CLEAR — SIGNIFICANT CHANGE UP
BACTERIA # UR AUTO: NEGATIVE /HPF — SIGNIFICANT CHANGE UP
BILIRUB UR-MCNC: NEGATIVE — SIGNIFICANT CHANGE UP
BUN SERPL-MCNC: 9 MG/DL — SIGNIFICANT CHANGE UP (ref 7–18)
CALCIUM SERPL-MCNC: 8.9 MG/DL — SIGNIFICANT CHANGE UP (ref 8.4–10.5)
CHLORIDE SERPL-SCNC: 86 MMOL/L — LOW (ref 96–108)
CO2 SERPL-SCNC: 36 MMOL/L — HIGH (ref 22–31)
COLOR SPEC: YELLOW — SIGNIFICANT CHANGE UP
CREAT SERPL-MCNC: 0.48 MG/DL — LOW (ref 0.5–1.3)
DIFF PNL FLD: ABNORMAL
EGFR: 110 ML/MIN/1.73M2 — SIGNIFICANT CHANGE UP
EGFR: 110 ML/MIN/1.73M2 — SIGNIFICANT CHANGE UP
EPI CELLS # UR: SIGNIFICANT CHANGE UP
GLUCOSE BLDC GLUCOMTR-MCNC: 132 MG/DL — HIGH (ref 70–99)
GLUCOSE SERPL-MCNC: 111 MG/DL — HIGH (ref 70–99)
GLUCOSE UR QL: NEGATIVE MG/DL — SIGNIFICANT CHANGE UP
HCT VFR BLD CALC: 33.2 % — LOW (ref 39–50)
HGB BLD-MCNC: 11.2 G/DL — LOW (ref 13–17)
KETONES UR QL: 40 MG/DL
LEUKOCYTE ESTERASE UR-ACNC: NEGATIVE — SIGNIFICANT CHANGE UP
MCHC RBC-ENTMCNC: 29.3 PG — SIGNIFICANT CHANGE UP (ref 27–34)
MCHC RBC-ENTMCNC: 33.7 G/DL — SIGNIFICANT CHANGE UP (ref 32–36)
MCV RBC AUTO: 86.9 FL — SIGNIFICANT CHANGE UP (ref 80–100)
NITRITE UR-MCNC: NEGATIVE — SIGNIFICANT CHANGE UP
NRBC BLD AUTO-RTO: 0 /100 WBCS — SIGNIFICANT CHANGE UP (ref 0–0)
OSMOLALITY SERPL: 276 MOSMOL/KG — LOW (ref 280–301)
OSMOLALITY UR: 503 MOS/KG — SIGNIFICANT CHANGE UP (ref 50–1200)
PH UR: 7.5 — SIGNIFICANT CHANGE UP (ref 5–8)
PLATELET # BLD AUTO: 256 K/UL — SIGNIFICANT CHANGE UP (ref 150–400)
POTASSIUM SERPL-MCNC: 3.7 MMOL/L — SIGNIFICANT CHANGE UP (ref 3.5–5.3)
POTASSIUM SERPL-SCNC: 3.7 MMOL/L — SIGNIFICANT CHANGE UP (ref 3.5–5.3)
PROT UR-MCNC: 30 MG/DL
RBC # BLD: 3.82 M/UL — LOW (ref 4.2–5.8)
RBC # FLD: 14.6 % — HIGH (ref 10.3–14.5)
RBC CASTS # UR COMP ASSIST: 0 /HPF — SIGNIFICANT CHANGE UP (ref 0–4)
SODIUM SERPL-SCNC: 126 MMOL/L — LOW (ref 135–145)
SP GR SPEC: 1.02 — SIGNIFICANT CHANGE UP (ref 1–1.03)
TROPONIN I, HIGH SENSITIVITY RESULT: 222.1 NG/L — HIGH
TROPONIN I, HIGH SENSITIVITY RESULT: 227.6 NG/L — HIGH
UROBILINOGEN FLD QL: 1 MG/DL — SIGNIFICANT CHANGE UP (ref 0.2–1)
WBC # BLD: 11.35 K/UL — HIGH (ref 3.8–10.5)
WBC # FLD AUTO: 11.35 K/UL — HIGH (ref 3.8–10.5)
WBC UR QL: 6 /HPF — HIGH (ref 0–5)

## 2025-07-31 PROCEDURE — 94640 AIRWAY INHALATION TREATMENT: CPT

## 2025-07-31 PROCEDURE — 80048 BASIC METABOLIC PNL TOTAL CA: CPT

## 2025-07-31 PROCEDURE — 71275 CT ANGIOGRAPHY CHEST: CPT

## 2025-07-31 PROCEDURE — 83930 ASSAY OF BLOOD OSMOLALITY: CPT

## 2025-07-31 PROCEDURE — 82947 ASSAY GLUCOSE BLOOD QUANT: CPT

## 2025-07-31 PROCEDURE — 83880 ASSAY OF NATRIURETIC PEPTIDE: CPT

## 2025-07-31 PROCEDURE — 81001 URINALYSIS AUTO W/SCOPE: CPT

## 2025-07-31 PROCEDURE — 99232 SBSQ HOSP IP/OBS MODERATE 35: CPT

## 2025-07-31 PROCEDURE — 84484 ASSAY OF TROPONIN QUANT: CPT

## 2025-07-31 PROCEDURE — 83735 ASSAY OF MAGNESIUM: CPT

## 2025-07-31 PROCEDURE — 94760 N-INVAS EAR/PLS OXIMETRY 1: CPT

## 2025-07-31 PROCEDURE — 94660 CPAP INITIATION&MGMT: CPT

## 2025-07-31 PROCEDURE — 93005 ELECTROCARDIOGRAM TRACING: CPT

## 2025-07-31 PROCEDURE — 82803 BLOOD GASES ANY COMBINATION: CPT

## 2025-07-31 PROCEDURE — 85025 COMPLETE CBC W/AUTO DIFF WBC: CPT

## 2025-07-31 PROCEDURE — 87637 SARSCOV2&INF A&B&RSV AMP PRB: CPT

## 2025-07-31 PROCEDURE — 85730 THROMBOPLASTIN TIME PARTIAL: CPT

## 2025-07-31 PROCEDURE — 85027 COMPLETE CBC AUTOMATED: CPT

## 2025-07-31 PROCEDURE — 74174 CTA ABD&PLVS W/CONTRAST: CPT

## 2025-07-31 PROCEDURE — 84295 ASSAY OF SERUM SODIUM: CPT

## 2025-07-31 PROCEDURE — 84100 ASSAY OF PHOSPHORUS: CPT

## 2025-07-31 PROCEDURE — 85610 PROTHROMBIN TIME: CPT

## 2025-07-31 PROCEDURE — 83935 ASSAY OF URINE OSMOLALITY: CPT

## 2025-07-31 PROCEDURE — 93289 INTERROG DEVICE EVAL HEART: CPT | Mod: 26

## 2025-07-31 PROCEDURE — 36415 COLL VENOUS BLD VENIPUNCTURE: CPT

## 2025-07-31 PROCEDURE — 99232 SBSQ HOSP IP/OBS MODERATE 35: CPT | Mod: GC

## 2025-07-31 PROCEDURE — 82962 GLUCOSE BLOOD TEST: CPT

## 2025-07-31 PROCEDURE — 82330 ASSAY OF CALCIUM: CPT

## 2025-07-31 PROCEDURE — 71045 X-RAY EXAM CHEST 1 VIEW: CPT

## 2025-07-31 PROCEDURE — 80053 COMPREHEN METABOLIC PANEL: CPT

## 2025-07-31 PROCEDURE — 84132 ASSAY OF SERUM POTASSIUM: CPT

## 2025-07-31 PROCEDURE — 83605 ASSAY OF LACTIC ACID: CPT

## 2025-07-31 RX ADMIN — TAMSULOSIN HYDROCHLORIDE 0.4 MILLIGRAM(S): 0.4 CAPSULE ORAL at 22:27

## 2025-07-31 RX ADMIN — METOPROLOL SUCCINATE 50 MILLIGRAM(S): 50 TABLET, EXTENDED RELEASE ORAL at 17:41

## 2025-07-31 RX ADMIN — IPRATROPIUM BROMIDE AND ALBUTEROL SULFATE 3 MILLILITER(S): .5; 2.5 SOLUTION RESPIRATORY (INHALATION) at 09:12

## 2025-07-31 RX ADMIN — Medication 1 DOSE(S): at 22:27

## 2025-07-31 RX ADMIN — Medication 75 MICROGRAM(S): at 05:49

## 2025-07-31 RX ADMIN — ENOXAPARIN SODIUM 40 MILLIGRAM(S): 100 INJECTION SUBCUTANEOUS at 08:41

## 2025-07-31 RX ADMIN — ATORVASTATIN CALCIUM 40 MILLIGRAM(S): 80 TABLET, FILM COATED ORAL at 22:27

## 2025-07-31 RX ADMIN — METOPROLOL SUCCINATE 50 MILLIGRAM(S): 50 TABLET, EXTENDED RELEASE ORAL at 05:50

## 2025-07-31 RX ADMIN — Medication 250 MILLIGRAM(S): at 00:15

## 2025-07-31 RX ADMIN — Medication 1 DOSE(S): at 11:59

## 2025-07-31 RX ADMIN — PREDNISONE 50 MILLIGRAM(S): 20 TABLET ORAL at 05:50

## 2025-07-31 RX ADMIN — DEXTROMETHORPHAN HBR, GUAIFENESIN 1200 MILLIGRAM(S): 200 LIQUID ORAL at 05:49

## 2025-07-31 RX ADMIN — IPRATROPIUM BROMIDE AND ALBUTEROL SULFATE 3 MILLILITER(S): .5; 2.5 SOLUTION RESPIRATORY (INHALATION) at 02:29

## 2025-07-31 RX ADMIN — Medication 250 MILLIGRAM(S): at 22:27

## 2025-07-31 RX ADMIN — Medication 81 MILLIGRAM(S): at 11:53

## 2025-07-31 RX ADMIN — PRASUGREL HYDROCHLORIDE 10 MILLIGRAM(S): 10 TABLET, COATED ORAL at 11:53

## 2025-07-31 RX ADMIN — LISINOPRIL 20 MILLIGRAM(S): 5 TABLET ORAL at 05:50

## 2025-07-31 RX ADMIN — DEXTROMETHORPHAN HBR, GUAIFENESIN 1200 MILLIGRAM(S): 200 LIQUID ORAL at 17:41

## 2025-07-31 RX ADMIN — IPRATROPIUM BROMIDE AND ALBUTEROL SULFATE 3 MILLILITER(S): .5; 2.5 SOLUTION RESPIRATORY (INHALATION) at 15:30

## 2025-07-31 RX ADMIN — IPRATROPIUM BROMIDE AND ALBUTEROL SULFATE 3 MILLILITER(S): .5; 2.5 SOLUTION RESPIRATORY (INHALATION) at 20:14

## 2025-08-01 LAB
ALBUMIN SERPL ELPH-MCNC: 3.4 G/DL — LOW (ref 3.5–5)
ALP SERPL-CCNC: 66 U/L — SIGNIFICANT CHANGE UP (ref 40–120)
ALT FLD-CCNC: 21 U/L DA — SIGNIFICANT CHANGE UP (ref 10–60)
ANION GAP SERPL CALC-SCNC: 2 MMOL/L — LOW (ref 5–17)
AST SERPL-CCNC: 18 U/L — SIGNIFICANT CHANGE UP (ref 10–40)
BILIRUB SERPL-MCNC: 0.6 MG/DL — SIGNIFICANT CHANGE UP (ref 0.2–1.2)
BUN SERPL-MCNC: 10 MG/DL — SIGNIFICANT CHANGE UP (ref 7–18)
CALCIUM SERPL-MCNC: 8.8 MG/DL — SIGNIFICANT CHANGE UP (ref 8.4–10.5)
CHLORIDE SERPL-SCNC: 89 MMOL/L — LOW (ref 96–108)
CO2 SERPL-SCNC: 38 MMOL/L — HIGH (ref 22–31)
CREAT SERPL-MCNC: 0.54 MG/DL — SIGNIFICANT CHANGE UP (ref 0.5–1.3)
EGFR: 106 ML/MIN/1.73M2 — SIGNIFICANT CHANGE UP
EGFR: 106 ML/MIN/1.73M2 — SIGNIFICANT CHANGE UP
GLUCOSE SERPL-MCNC: 103 MG/DL — HIGH (ref 70–99)
HCT VFR BLD CALC: 32.8 % — LOW (ref 39–50)
HGB BLD-MCNC: 11 G/DL — LOW (ref 13–17)
MAGNESIUM SERPL-MCNC: 1.8 MG/DL — SIGNIFICANT CHANGE UP (ref 1.6–2.6)
MCHC RBC-ENTMCNC: 29.6 PG — SIGNIFICANT CHANGE UP (ref 27–34)
MCHC RBC-ENTMCNC: 33.5 G/DL — SIGNIFICANT CHANGE UP (ref 32–36)
MCV RBC AUTO: 88.4 FL — SIGNIFICANT CHANGE UP (ref 80–100)
NRBC BLD AUTO-RTO: 0 /100 WBCS — SIGNIFICANT CHANGE UP (ref 0–0)
PHOSPHATE SERPL-MCNC: 2.9 MG/DL — SIGNIFICANT CHANGE UP (ref 2.5–4.5)
PLATELET # BLD AUTO: 258 K/UL — SIGNIFICANT CHANGE UP (ref 150–400)
POTASSIUM SERPL-MCNC: 3.4 MMOL/L — LOW (ref 3.5–5.3)
POTASSIUM SERPL-SCNC: 3.4 MMOL/L — LOW (ref 3.5–5.3)
PROT SERPL-MCNC: 6.5 G/DL — SIGNIFICANT CHANGE UP (ref 6–8.3)
RBC # BLD: 3.71 M/UL — LOW (ref 4.2–5.8)
RBC # FLD: 14.5 % — SIGNIFICANT CHANGE UP (ref 10.3–14.5)
SODIUM SERPL-SCNC: 129 MMOL/L — LOW (ref 135–145)
WBC # BLD: 11.85 K/UL — HIGH (ref 3.8–10.5)
WBC # FLD AUTO: 11.85 K/UL — HIGH (ref 3.8–10.5)

## 2025-08-01 PROCEDURE — 99232 SBSQ HOSP IP/OBS MODERATE 35: CPT | Mod: GC

## 2025-08-01 PROCEDURE — 99232 SBSQ HOSP IP/OBS MODERATE 35: CPT

## 2025-08-01 RX ORDER — PREDNISONE 20 MG/1
40 TABLET ORAL DAILY
Refills: 0 | Status: DISCONTINUED | OUTPATIENT
Start: 2025-08-02 | End: 2025-08-04

## 2025-08-01 RX ORDER — MELATONIN 5 MG
5 TABLET ORAL AT BEDTIME
Refills: 0 | Status: DISCONTINUED | OUTPATIENT
Start: 2025-08-01 | End: 2025-08-04

## 2025-08-01 RX ORDER — MELATONIN 5 MG
3 TABLET ORAL AT BEDTIME
Refills: 0 | Status: DISCONTINUED | OUTPATIENT
Start: 2025-08-01 | End: 2025-08-01

## 2025-08-01 RX ADMIN — LISINOPRIL 20 MILLIGRAM(S): 5 TABLET ORAL at 05:15

## 2025-08-01 RX ADMIN — Medication 1 DOSE(S): at 22:07

## 2025-08-01 RX ADMIN — PRASUGREL HYDROCHLORIDE 10 MILLIGRAM(S): 10 TABLET, COATED ORAL at 11:41

## 2025-08-01 RX ADMIN — DEXTROMETHORPHAN HBR, GUAIFENESIN 1200 MILLIGRAM(S): 200 LIQUID ORAL at 05:16

## 2025-08-01 RX ADMIN — PREDNISONE 40 MILLIGRAM(S): 20 TABLET ORAL at 05:26

## 2025-08-01 RX ADMIN — Medication 37.5 MICROGRAM(S): at 05:14

## 2025-08-01 RX ADMIN — ATORVASTATIN CALCIUM 40 MILLIGRAM(S): 80 TABLET, FILM COATED ORAL at 22:06

## 2025-08-01 RX ADMIN — TAMSULOSIN HYDROCHLORIDE 0.4 MILLIGRAM(S): 0.4 CAPSULE ORAL at 22:06

## 2025-08-01 RX ADMIN — METOPROLOL SUCCINATE 50 MILLIGRAM(S): 50 TABLET, EXTENDED RELEASE ORAL at 05:27

## 2025-08-01 RX ADMIN — IPRATROPIUM BROMIDE AND ALBUTEROL SULFATE 3 MILLILITER(S): .5; 2.5 SOLUTION RESPIRATORY (INHALATION) at 09:20

## 2025-08-01 RX ADMIN — ENOXAPARIN SODIUM 40 MILLIGRAM(S): 100 INJECTION SUBCUTANEOUS at 09:29

## 2025-08-01 RX ADMIN — IPRATROPIUM BROMIDE AND ALBUTEROL SULFATE 3 MILLILITER(S): .5; 2.5 SOLUTION RESPIRATORY (INHALATION) at 03:12

## 2025-08-01 RX ADMIN — Medication 1 DOSE(S): at 09:30

## 2025-08-01 RX ADMIN — IPRATROPIUM BROMIDE AND ALBUTEROL SULFATE 3 MILLILITER(S): .5; 2.5 SOLUTION RESPIRATORY (INHALATION) at 20:12

## 2025-08-01 RX ADMIN — DEXTROMETHORPHAN HBR, GUAIFENESIN 1200 MILLIGRAM(S): 200 LIQUID ORAL at 17:41

## 2025-08-01 RX ADMIN — Medication 5 MILLIGRAM(S): at 22:06

## 2025-08-01 RX ADMIN — Medication 81 MILLIGRAM(S): at 11:41

## 2025-08-01 RX ADMIN — IPRATROPIUM BROMIDE AND ALBUTEROL SULFATE 3 MILLILITER(S): .5; 2.5 SOLUTION RESPIRATORY (INHALATION) at 15:42

## 2025-08-01 RX ADMIN — METOPROLOL SUCCINATE 50 MILLIGRAM(S): 50 TABLET, EXTENDED RELEASE ORAL at 17:41

## 2025-08-02 LAB
ALBUMIN SERPL ELPH-MCNC: 3.6 G/DL — SIGNIFICANT CHANGE UP (ref 3.5–5)
ALP SERPL-CCNC: 60 U/L — SIGNIFICANT CHANGE UP (ref 40–120)
ALT FLD-CCNC: 23 U/L DA — SIGNIFICANT CHANGE UP (ref 10–60)
ANION GAP SERPL CALC-SCNC: 4 MMOL/L — LOW (ref 5–17)
AST SERPL-CCNC: 17 U/L — SIGNIFICANT CHANGE UP (ref 10–40)
BILIRUB SERPL-MCNC: 0.4 MG/DL — SIGNIFICANT CHANGE UP (ref 0.2–1.2)
BUN SERPL-MCNC: 14 MG/DL — SIGNIFICANT CHANGE UP (ref 7–18)
CALCIUM SERPL-MCNC: 9 MG/DL — SIGNIFICANT CHANGE UP (ref 8.4–10.5)
CHLORIDE SERPL-SCNC: 91 MMOL/L — LOW (ref 96–108)
CO2 SERPL-SCNC: 36 MMOL/L — HIGH (ref 22–31)
CREAT SERPL-MCNC: 0.58 MG/DL — SIGNIFICANT CHANGE UP (ref 0.5–1.3)
EGFR: 104 ML/MIN/1.73M2 — SIGNIFICANT CHANGE UP
EGFR: 104 ML/MIN/1.73M2 — SIGNIFICANT CHANGE UP
GLUCOSE SERPL-MCNC: 98 MG/DL — SIGNIFICANT CHANGE UP (ref 70–99)
HCT VFR BLD CALC: 33.7 % — LOW (ref 39–50)
HGB BLD-MCNC: 11.1 G/DL — LOW (ref 13–17)
MAGNESIUM SERPL-MCNC: 1.8 MG/DL — SIGNIFICANT CHANGE UP (ref 1.6–2.6)
MCHC RBC-ENTMCNC: 29.7 PG — SIGNIFICANT CHANGE UP (ref 27–34)
MCHC RBC-ENTMCNC: 32.9 G/DL — SIGNIFICANT CHANGE UP (ref 32–36)
MCV RBC AUTO: 90.1 FL — SIGNIFICANT CHANGE UP (ref 80–100)
NRBC BLD AUTO-RTO: 0 /100 WBCS — SIGNIFICANT CHANGE UP (ref 0–0)
PHOSPHATE SERPL-MCNC: 3.4 MG/DL — SIGNIFICANT CHANGE UP (ref 2.5–4.5)
PLATELET # BLD AUTO: 304 K/UL — SIGNIFICANT CHANGE UP (ref 150–400)
POTASSIUM SERPL-MCNC: 3.4 MMOL/L — LOW (ref 3.5–5.3)
POTASSIUM SERPL-SCNC: 3.4 MMOL/L — LOW (ref 3.5–5.3)
PROT SERPL-MCNC: 6.5 G/DL — SIGNIFICANT CHANGE UP (ref 6–8.3)
RBC # BLD: 3.74 M/UL — LOW (ref 4.2–5.8)
RBC # FLD: 14.6 % — HIGH (ref 10.3–14.5)
SODIUM SERPL-SCNC: 131 MMOL/L — LOW (ref 135–145)
WBC # BLD: 11.73 K/UL — HIGH (ref 3.8–10.5)
WBC # FLD AUTO: 11.73 K/UL — HIGH (ref 3.8–10.5)

## 2025-08-02 RX ORDER — LISINOPRIL 5 MG/1
20 TABLET ORAL DAILY
Refills: 0 | Status: DISCONTINUED | OUTPATIENT
Start: 2025-08-02 | End: 2025-08-04

## 2025-08-02 RX ADMIN — Medication 40 MILLIGRAM(S): at 05:41

## 2025-08-02 RX ADMIN — PREDNISONE 40 MILLIGRAM(S): 20 TABLET ORAL at 05:40

## 2025-08-02 RX ADMIN — PRASUGREL HYDROCHLORIDE 10 MILLIGRAM(S): 10 TABLET, COATED ORAL at 17:09

## 2025-08-02 RX ADMIN — ENOXAPARIN SODIUM 40 MILLIGRAM(S): 100 INJECTION SUBCUTANEOUS at 09:10

## 2025-08-02 RX ADMIN — ATORVASTATIN CALCIUM 40 MILLIGRAM(S): 80 TABLET, FILM COATED ORAL at 21:24

## 2025-08-02 RX ADMIN — Medication 81 MILLIGRAM(S): at 17:09

## 2025-08-02 RX ADMIN — Medication 1 DOSE(S): at 21:28

## 2025-08-02 RX ADMIN — Medication 40 MILLIEQUIVALENT(S): at 09:10

## 2025-08-02 RX ADMIN — METOPROLOL SUCCINATE 50 MILLIGRAM(S): 50 TABLET, EXTENDED RELEASE ORAL at 05:41

## 2025-08-02 RX ADMIN — IPRATROPIUM BROMIDE AND ALBUTEROL SULFATE 3 MILLILITER(S): .5; 2.5 SOLUTION RESPIRATORY (INHALATION) at 03:10

## 2025-08-02 RX ADMIN — Medication 37.5 MICROGRAM(S): at 05:40

## 2025-08-02 RX ADMIN — IPRATROPIUM BROMIDE AND ALBUTEROL SULFATE 3 MILLILITER(S): .5; 2.5 SOLUTION RESPIRATORY (INHALATION) at 20:31

## 2025-08-02 RX ADMIN — Medication 1 DOSE(S): at 09:10

## 2025-08-02 RX ADMIN — TAMSULOSIN HYDROCHLORIDE 0.4 MILLIGRAM(S): 0.4 CAPSULE ORAL at 21:24

## 2025-08-02 RX ADMIN — IPRATROPIUM BROMIDE AND ALBUTEROL SULFATE 3 MILLILITER(S): .5; 2.5 SOLUTION RESPIRATORY (INHALATION) at 09:12

## 2025-08-02 RX ADMIN — LISINOPRIL 20 MILLIGRAM(S): 5 TABLET ORAL at 21:24

## 2025-08-02 RX ADMIN — DEXTROMETHORPHAN HBR, GUAIFENESIN 1200 MILLIGRAM(S): 200 LIQUID ORAL at 05:40

## 2025-08-02 RX ADMIN — METOPROLOL SUCCINATE 50 MILLIGRAM(S): 50 TABLET, EXTENDED RELEASE ORAL at 17:10

## 2025-08-02 RX ADMIN — Medication 5 MILLIGRAM(S): at 21:24

## 2025-08-03 LAB
ALBUMIN SERPL ELPH-MCNC: 3.3 G/DL — LOW (ref 3.5–5)
ALP SERPL-CCNC: 60 U/L — SIGNIFICANT CHANGE UP (ref 40–120)
ALT FLD-CCNC: 23 U/L DA — SIGNIFICANT CHANGE UP (ref 10–60)
ANION GAP SERPL CALC-SCNC: 2 MMOL/L — LOW (ref 5–17)
AST SERPL-CCNC: 14 U/L — SIGNIFICANT CHANGE UP (ref 10–40)
BILIRUB SERPL-MCNC: 0.4 MG/DL — SIGNIFICANT CHANGE UP (ref 0.2–1.2)
BUN SERPL-MCNC: 15 MG/DL — SIGNIFICANT CHANGE UP (ref 7–18)
CALCIUM SERPL-MCNC: 9.2 MG/DL — SIGNIFICANT CHANGE UP (ref 8.4–10.5)
CHLORIDE SERPL-SCNC: 95 MMOL/L — LOW (ref 96–108)
CO2 SERPL-SCNC: 36 MMOL/L — HIGH (ref 22–31)
CREAT SERPL-MCNC: 0.59 MG/DL — SIGNIFICANT CHANGE UP (ref 0.5–1.3)
EGFR: 103 ML/MIN/1.73M2 — SIGNIFICANT CHANGE UP
EGFR: 103 ML/MIN/1.73M2 — SIGNIFICANT CHANGE UP
GLUCOSE SERPL-MCNC: 97 MG/DL — SIGNIFICANT CHANGE UP (ref 70–99)
HCT VFR BLD CALC: 32.6 % — LOW (ref 39–50)
HGB BLD-MCNC: 10.5 G/DL — LOW (ref 13–17)
MAGNESIUM SERPL-MCNC: 1.7 MG/DL — SIGNIFICANT CHANGE UP (ref 1.6–2.6)
MCHC RBC-ENTMCNC: 29.2 PG — SIGNIFICANT CHANGE UP (ref 27–34)
MCHC RBC-ENTMCNC: 32.2 G/DL — SIGNIFICANT CHANGE UP (ref 32–36)
MCV RBC AUTO: 90.6 FL — SIGNIFICANT CHANGE UP (ref 80–100)
NRBC BLD AUTO-RTO: 0 /100 WBCS — SIGNIFICANT CHANGE UP (ref 0–0)
PHOSPHATE SERPL-MCNC: 3.4 MG/DL — SIGNIFICANT CHANGE UP (ref 2.5–4.5)
PLATELET # BLD AUTO: 304 K/UL — SIGNIFICANT CHANGE UP (ref 150–400)
POTASSIUM SERPL-MCNC: 3.5 MMOL/L — SIGNIFICANT CHANGE UP (ref 3.5–5.3)
POTASSIUM SERPL-SCNC: 3.5 MMOL/L — SIGNIFICANT CHANGE UP (ref 3.5–5.3)
PROT SERPL-MCNC: 6.4 G/DL — SIGNIFICANT CHANGE UP (ref 6–8.3)
RBC # BLD: 3.6 M/UL — LOW (ref 4.2–5.8)
RBC # FLD: 14.7 % — HIGH (ref 10.3–14.5)
SODIUM SERPL-SCNC: 133 MMOL/L — LOW (ref 135–145)
WBC # BLD: 11.78 K/UL — HIGH (ref 3.8–10.5)
WBC # FLD AUTO: 11.78 K/UL — HIGH (ref 3.8–10.5)

## 2025-08-03 RX ORDER — POLYETHYLENE GLYCOL 3350 17 G/17G
17 POWDER, FOR SOLUTION ORAL DAILY
Refills: 0 | Status: DISCONTINUED | OUTPATIENT
Start: 2025-08-03 | End: 2025-08-04

## 2025-08-03 RX ORDER — SENNA 187 MG
2 TABLET ORAL AT BEDTIME
Refills: 0 | Status: DISCONTINUED | OUTPATIENT
Start: 2025-08-03 | End: 2025-08-04

## 2025-08-03 RX ADMIN — Medication 1 DOSE(S): at 09:39

## 2025-08-03 RX ADMIN — Medication 1 DOSE(S): at 21:12

## 2025-08-03 RX ADMIN — ENOXAPARIN SODIUM 40 MILLIGRAM(S): 100 INJECTION SUBCUTANEOUS at 08:39

## 2025-08-03 RX ADMIN — METOPROLOL SUCCINATE 50 MILLIGRAM(S): 50 TABLET, EXTENDED RELEASE ORAL at 18:27

## 2025-08-03 RX ADMIN — PRASUGREL HYDROCHLORIDE 10 MILLIGRAM(S): 10 TABLET, COATED ORAL at 12:54

## 2025-08-03 RX ADMIN — METOPROLOL SUCCINATE 50 MILLIGRAM(S): 50 TABLET, EXTENDED RELEASE ORAL at 05:29

## 2025-08-03 RX ADMIN — ATORVASTATIN CALCIUM 40 MILLIGRAM(S): 80 TABLET, FILM COATED ORAL at 21:09

## 2025-08-03 RX ADMIN — LISINOPRIL 20 MILLIGRAM(S): 5 TABLET ORAL at 05:29

## 2025-08-03 RX ADMIN — IPRATROPIUM BROMIDE AND ALBUTEROL SULFATE 3 MILLILITER(S): .5; 2.5 SOLUTION RESPIRATORY (INHALATION) at 15:28

## 2025-08-03 RX ADMIN — Medication 81 MILLIGRAM(S): at 12:54

## 2025-08-03 RX ADMIN — IPRATROPIUM BROMIDE AND ALBUTEROL SULFATE 3 MILLILITER(S): .5; 2.5 SOLUTION RESPIRATORY (INHALATION) at 08:27

## 2025-08-03 RX ADMIN — POLYETHYLENE GLYCOL 3350 17 GRAM(S): 17 POWDER, FOR SOLUTION ORAL at 18:27

## 2025-08-03 RX ADMIN — Medication 40 MILLIGRAM(S): at 05:29

## 2025-08-03 RX ADMIN — CYCLOBENZAPRINE HYDROCHLORIDE 5 MILLIGRAM(S): 15 CAPSULE, EXTENDED RELEASE ORAL at 21:09

## 2025-08-03 RX ADMIN — Medication 37.5 MICROGRAM(S): at 05:29

## 2025-08-03 RX ADMIN — IPRATROPIUM BROMIDE AND ALBUTEROL SULFATE 3 MILLILITER(S): .5; 2.5 SOLUTION RESPIRATORY (INHALATION) at 03:38

## 2025-08-03 RX ADMIN — Medication 2 TABLET(S): at 21:10

## 2025-08-03 RX ADMIN — TAMSULOSIN HYDROCHLORIDE 0.4 MILLIGRAM(S): 0.4 CAPSULE ORAL at 21:09

## 2025-08-03 RX ADMIN — IPRATROPIUM BROMIDE AND ALBUTEROL SULFATE 3 MILLILITER(S): .5; 2.5 SOLUTION RESPIRATORY (INHALATION) at 20:09

## 2025-08-03 RX ADMIN — Medication 5 MILLIGRAM(S): at 21:10

## 2025-08-03 RX ADMIN — PREDNISONE 40 MILLIGRAM(S): 20 TABLET ORAL at 05:28

## 2025-08-03 RX ADMIN — CYCLOBENZAPRINE HYDROCHLORIDE 5 MILLIGRAM(S): 15 CAPSULE, EXTENDED RELEASE ORAL at 12:54

## 2025-08-04 ENCOUNTER — TRANSCRIPTION ENCOUNTER (OUTPATIENT)
Age: 70
End: 2025-08-04

## 2025-08-04 VITALS
OXYGEN SATURATION: 96 % | HEART RATE: 78 BPM | TEMPERATURE: 98 F | RESPIRATION RATE: 18 BRPM | SYSTOLIC BLOOD PRESSURE: 123 MMHG | DIASTOLIC BLOOD PRESSURE: 88 MMHG

## 2025-08-04 PROCEDURE — 36415 COLL VENOUS BLD VENIPUNCTURE: CPT

## 2025-08-04 PROCEDURE — 83930 ASSAY OF BLOOD OSMOLALITY: CPT

## 2025-08-04 PROCEDURE — 80053 COMPREHEN METABOLIC PANEL: CPT

## 2025-08-04 PROCEDURE — 93005 ELECTROCARDIOGRAM TRACING: CPT

## 2025-08-04 PROCEDURE — 94640 AIRWAY INHALATION TREATMENT: CPT

## 2025-08-04 PROCEDURE — 99233 SBSQ HOSP IP/OBS HIGH 50: CPT | Mod: GC

## 2025-08-04 PROCEDURE — 80048 BASIC METABOLIC PNL TOTAL CA: CPT

## 2025-08-04 PROCEDURE — 84132 ASSAY OF SERUM POTASSIUM: CPT

## 2025-08-04 PROCEDURE — 71275 CT ANGIOGRAPHY CHEST: CPT

## 2025-08-04 PROCEDURE — 85730 THROMBOPLASTIN TIME PARTIAL: CPT

## 2025-08-04 PROCEDURE — 93306 TTE W/DOPPLER COMPLETE: CPT

## 2025-08-04 PROCEDURE — 84100 ASSAY OF PHOSPHORUS: CPT

## 2025-08-04 PROCEDURE — 85027 COMPLETE CBC AUTOMATED: CPT

## 2025-08-04 PROCEDURE — 94760 N-INVAS EAR/PLS OXIMETRY 1: CPT

## 2025-08-04 PROCEDURE — 82947 ASSAY GLUCOSE BLOOD QUANT: CPT

## 2025-08-04 PROCEDURE — 84295 ASSAY OF SERUM SODIUM: CPT

## 2025-08-04 PROCEDURE — 84484 ASSAY OF TROPONIN QUANT: CPT

## 2025-08-04 PROCEDURE — 83605 ASSAY OF LACTIC ACID: CPT

## 2025-08-04 PROCEDURE — 81001 URINALYSIS AUTO W/SCOPE: CPT

## 2025-08-04 PROCEDURE — 83735 ASSAY OF MAGNESIUM: CPT

## 2025-08-04 PROCEDURE — 82803 BLOOD GASES ANY COMBINATION: CPT

## 2025-08-04 PROCEDURE — 85025 COMPLETE CBC W/AUTO DIFF WBC: CPT

## 2025-08-04 PROCEDURE — 83935 ASSAY OF URINE OSMOLALITY: CPT

## 2025-08-04 PROCEDURE — 82962 GLUCOSE BLOOD TEST: CPT

## 2025-08-04 PROCEDURE — 83880 ASSAY OF NATRIURETIC PEPTIDE: CPT

## 2025-08-04 PROCEDURE — 71045 X-RAY EXAM CHEST 1 VIEW: CPT

## 2025-08-04 PROCEDURE — 82330 ASSAY OF CALCIUM: CPT

## 2025-08-04 PROCEDURE — 74174 CTA ABD&PLVS W/CONTRAST: CPT

## 2025-08-04 PROCEDURE — 85610 PROTHROMBIN TIME: CPT

## 2025-08-04 PROCEDURE — 94660 CPAP INITIATION&MGMT: CPT

## 2025-08-04 PROCEDURE — 87637 SARSCOV2&INF A&B&RSV AMP PRB: CPT

## 2025-08-04 PROCEDURE — 99285 EMERGENCY DEPT VISIT HI MDM: CPT | Mod: 25

## 2025-08-04 RX ORDER — BUDESONIDE AND FORMOTEROL FUMARATE DIHYDRATE 80; 4.5 UG/1; UG/1
2 AEROSOL RESPIRATORY (INHALATION)
Qty: 1 | Refills: 0
Start: 2025-08-04 | End: 2025-09-02

## 2025-08-04 RX ORDER — FLUTICASONE FUROATE, UMECLIDINIUM BROMIDE AND VILANTEROL TRIFENATATE 100; 62.5; 25 UG/1; UG/1; UG/1
1 POWDER RESPIRATORY (INHALATION)
Qty: 1 | Refills: 0
Start: 2025-08-04 | End: 2025-09-02

## 2025-08-04 RX ORDER — TIOTROPIUM BROMIDE INHALATION SPRAY 3.12 UG/1
2 SPRAY, METERED RESPIRATORY (INHALATION)
Qty: 1 | Refills: 0
Start: 2025-08-04 | End: 2025-09-02

## 2025-08-04 RX ADMIN — POLYETHYLENE GLYCOL 3350 17 GRAM(S): 17 POWDER, FOR SOLUTION ORAL at 11:57

## 2025-08-04 RX ADMIN — Medication 1 DOSE(S): at 10:51

## 2025-08-04 RX ADMIN — Medication 40 MILLIGRAM(S): at 05:57

## 2025-08-04 RX ADMIN — METOPROLOL SUCCINATE 50 MILLIGRAM(S): 50 TABLET, EXTENDED RELEASE ORAL at 05:58

## 2025-08-04 RX ADMIN — Medication 75 MICROGRAM(S): at 05:57

## 2025-08-04 RX ADMIN — PREDNISONE 40 MILLIGRAM(S): 20 TABLET ORAL at 05:57

## 2025-08-04 RX ADMIN — Medication 81 MILLIGRAM(S): at 11:57

## 2025-08-04 RX ADMIN — LISINOPRIL 20 MILLIGRAM(S): 5 TABLET ORAL at 05:57

## 2025-08-04 RX ADMIN — ENOXAPARIN SODIUM 40 MILLIGRAM(S): 100 INJECTION SUBCUTANEOUS at 11:53

## 2025-08-04 RX ADMIN — METOPROLOL SUCCINATE 50 MILLIGRAM(S): 50 TABLET, EXTENDED RELEASE ORAL at 18:01

## 2025-08-04 RX ADMIN — IPRATROPIUM BROMIDE AND ALBUTEROL SULFATE 3 MILLILITER(S): .5; 2.5 SOLUTION RESPIRATORY (INHALATION) at 03:10

## 2025-08-04 RX ADMIN — IPRATROPIUM BROMIDE AND ALBUTEROL SULFATE 3 MILLILITER(S): .5; 2.5 SOLUTION RESPIRATORY (INHALATION) at 09:01

## 2025-08-04 RX ADMIN — PRASUGREL HYDROCHLORIDE 10 MILLIGRAM(S): 10 TABLET, COATED ORAL at 11:57

## 2025-08-05 RX ORDER — LACTULOSE 10 G/15ML
1 SOLUTION ORAL
Qty: 1 | Refills: 0
Start: 2025-08-05 | End: 2025-09-03

## 2025-08-11 ENCOUNTER — APPOINTMENT (OUTPATIENT)
Dept: PULMONOLOGY | Facility: CLINIC | Age: 70
End: 2025-08-11

## 2025-08-13 ENCOUNTER — NON-APPOINTMENT (OUTPATIENT)
Age: 70
End: 2025-08-13

## 2025-08-15 ENCOUNTER — NON-APPOINTMENT (OUTPATIENT)
Age: 70
End: 2025-08-15

## 2025-08-18 ENCOUNTER — APPOINTMENT (OUTPATIENT)
Dept: PULMONOLOGY | Facility: CLINIC | Age: 70
End: 2025-08-18

## 2025-09-02 ENCOUNTER — NON-APPOINTMENT (OUTPATIENT)
Age: 70
End: 2025-09-02

## (undated) DEVICE — SENSOR O2 FINGER ADULT

## (undated) DEVICE — TUBING SUCTION 20FT

## (undated) DEVICE — FOLEY HOLDER STATLOCK 2 WAY ADULT

## (undated) DEVICE — BITE BLOCK ADULT 20 X 27MM (GREEN)

## (undated) DEVICE — SOL INJ NS 0.9% 500ML 2 PORT

## (undated) DEVICE — CATH IV SAFE BC 20G X 1.16" (PINK)

## (undated) DEVICE — SUCTION YANKAUER NO CONTROL VENT

## (undated) DEVICE — SYR ALLIANCE II INFLATION 60ML

## (undated) DEVICE — CATH IV SAFE BC 22G X 1" (BLUE)

## (undated) DEVICE — TUBING IV SET GRAVITY 3Y 100" MACRO

## (undated) DEVICE — TUBING SUCTION CONN 6FT STERILE

## (undated) DEVICE — BALLOON US ENDO

## (undated) DEVICE — BIOPSY FORCEP RADIAL JAW 4 STANDARD WITH NEEDLE

## (undated) DEVICE — PACK IV START WITH CHG